# Patient Record
Sex: MALE | Race: WHITE | Employment: OTHER | URBAN - METROPOLITAN AREA
[De-identification: names, ages, dates, MRNs, and addresses within clinical notes are randomized per-mention and may not be internally consistent; named-entity substitution may affect disease eponyms.]

---

## 2017-01-04 RX ORDER — FENOFIBRATE 160 MG/1
160 TABLET ORAL DAILY
Qty: 90 TAB | Refills: 3 | Status: SHIPPED | OUTPATIENT
Start: 2017-01-04 | End: 2018-01-27 | Stop reason: SDUPTHER

## 2017-01-04 RX ORDER — PANTOPRAZOLE SODIUM 40 MG/1
TABLET, DELAYED RELEASE ORAL
Qty: 30 TAB | Refills: 3 | Status: SHIPPED | OUTPATIENT
Start: 2017-01-04 | End: 2017-03-12 | Stop reason: SDUPTHER

## 2017-02-14 NOTE — PERIOP NOTES
[facility]  Ambulatory Surgery Unit  Pre-operative Instructions    Surgery/Procedure Date  02/16/2017            Tentative Arrival Time 0895      1. On the day of your surgery/procedure, please report to the Ambulatory Surgery Unit Registration Desk and sign in at your designated time. The Ambulatory Surgery Unit is located in [facility] on the [facility] side of the [facility] across from the [address] [place] [address]. Please have all of your health insurance cards and a photo ID. 2. You must have someone with you to drive you home, as you should not drive a car for 24 hours following anesthesia. Please make arrangements for a responsible adult friend or family member to stay with you for at least the first 24 hours after your surgery. 3. Do not have anything to eat or drink (including water, gum, mints, coffee, juice) after midnight   02/15/2017  . This may not apply to medications prescribed by your physician. (Please note below the special instructions with medications to take the morning of surgery, if applicable.)    4. We recommend you do not drink any alcoholic beverages for 24 hours before and after your surgery. 5. Stop all Aspirin and non-steroidal anti-inflammatory drugs (i.e. Advil, Aleve) as directed by your surgeon's office. Stop all vitamins and herbal supplements seven days prior to your surgery. If you are currently taking Plavix, Coumadin, or other blood-thinning agents, contact your surgeon for instructions. 6. In an effort to help prevent surgical site infection, we ask that you shower with an anti-bacterial soap (i.e. Dial or Safeguard) for 3 days prior to and on the morning of surgery, using a fresh towel after each shower. Do not apply any lotions, powders or deodorants after the shower on the day of your procedure. If applicable, please do not shave the operative site for 48 hours prior to surgery. 7. Wear comfortable clothes.   Wear glasses instead of contacts. Do not bring any money or jewelry. Do not wear make-up, particularly mascara, the morning of your surgery. Do not wear nail polish, particularly if you are having foot /hand surgery. Wear your hair loose or down, no ponytails, buns, silas pins or clips. All body piercings must be removed. 8. You should understand that if you do not follow these instructions your surgery may be cancelled. If your physical condition changes (i.e. fever, cold or flu) please contact your surgeon as soon as possible. 9. It is important that you be on time. If a situation occurs where you may be late, or if you have any questions or problems, please call [phone].    10. Your surgery time may be subject to change. You will receive a phone call the day prior to surgery to confirm your arrival time. 11. Pediatric patients: please bring a change of clothes, diapers, bottle/sippy cup, pacifier, etc.      Special Instructions:    MEDICATIONS TO TAKE THE MORNING OF SURGERY WITH A SIP OF WATER: Protonix      I understand a pre-operative phone call will be made to verify my surgery time. In the event that I am not available, I give permission for a message to be left on my answering service and/or with another person? yes         ___________________      ___________________  _________  Pt verbalized understanding of preop instructions via telephone.   (Signature of Patient)          (Witness)                              (Date and Time)

## 2017-02-15 ENCOUNTER — ANESTHESIA EVENT (OUTPATIENT)
Dept: SURGERY | Age: 67
End: 2017-02-15
Payer: MEDICARE

## 2017-02-16 ENCOUNTER — HOSPITAL ENCOUNTER (OUTPATIENT)
Age: 67
Setting detail: OUTPATIENT SURGERY
Discharge: HOME | End: 2017-02-16
Attending: ORTHOPAEDIC SURGERY | Admitting: ORTHOPAEDIC SURGERY
Payer: MEDICARE

## 2017-02-16 ENCOUNTER — ANESTHESIA (OUTPATIENT)
Dept: SURGERY | Age: 67
End: 2017-02-16
Payer: MEDICARE

## 2017-02-16 VITALS
SYSTOLIC BLOOD PRESSURE: 142 MMHG | RESPIRATION RATE: 11 BRPM | HEIGHT: 70 IN | HEART RATE: 65 BPM | OXYGEN SATURATION: 97 % | BODY MASS INDEX: 29.06 KG/M2 | DIASTOLIC BLOOD PRESSURE: 87 MMHG | WEIGHT: 203 LBS | TEMPERATURE: 97.7 F

## 2017-02-16 PROBLEM — G56.02 CARPAL TUNNEL SYNDROME, LEFT: Status: ACTIVE | Noted: 2017-02-16

## 2017-02-16 PROCEDURE — 74011250636 HC RX REV CODE- 250/636

## 2017-02-16 PROCEDURE — 76210000046 HC AMBSU PH II REC FIRST 0.5 HR: Performed by: ORTHOPAEDIC SURGERY

## 2017-02-16 PROCEDURE — 76030000000 HC AMB SURG OR TIME 0.5 TO 1: Performed by: ORTHOPAEDIC SURGERY

## 2017-02-16 PROCEDURE — 77030006607 HC BLD CARPL SAFGD INLC -C: Performed by: ORTHOPAEDIC SURGERY

## 2017-02-16 PROCEDURE — 76210000040 HC AMBSU PH I REC FIRST 0.5 HR: Performed by: ORTHOPAEDIC SURGERY

## 2017-02-16 PROCEDURE — 74011250636 HC RX REV CODE- 250/636: Performed by: ANESTHESIOLOGY

## 2017-02-16 PROCEDURE — 77030020255 HC SOL INJ LR 1000ML BG: Performed by: ORTHOPAEDIC SURGERY

## 2017-02-16 PROCEDURE — 77030020753 HC CUF TRNQT 1BLA STRY -B: Performed by: ORTHOPAEDIC SURGERY

## 2017-02-16 PROCEDURE — 77030002916 HC SUT ETHLN J&J -A: Performed by: ORTHOPAEDIC SURGERY

## 2017-02-16 PROCEDURE — 76060000061 HC AMB SURG ANES 0.5 TO 1 HR: Performed by: ORTHOPAEDIC SURGERY

## 2017-02-16 PROCEDURE — 77030018836 HC SOL IRR NACL ICUM -A: Performed by: ORTHOPAEDIC SURGERY

## 2017-02-16 PROCEDURE — 74011000250 HC RX REV CODE- 250: Performed by: ORTHOPAEDIC SURGERY

## 2017-02-16 PROCEDURE — 74011250636 HC RX REV CODE- 250/636: Performed by: ORTHOPAEDIC SURGERY

## 2017-02-16 PROCEDURE — 77030010813: Performed by: ORTHOPAEDIC SURGERY

## 2017-02-16 RX ORDER — HYDROMORPHONE HYDROCHLORIDE 1 MG/ML
.2-.5 INJECTION, SOLUTION INTRAMUSCULAR; INTRAVENOUS; SUBCUTANEOUS ONCE
Status: DISCONTINUED | OUTPATIENT
Start: 2017-02-16 | End: 2017-02-16 | Stop reason: HOSPADM

## 2017-02-16 RX ORDER — OXYCODONE AND ACETAMINOPHEN 5; 325 MG/1; MG/1
1 TABLET ORAL ONCE
Status: DISCONTINUED | OUTPATIENT
Start: 2017-02-16 | End: 2017-02-16 | Stop reason: HOSPADM

## 2017-02-16 RX ORDER — DIPHENHYDRAMINE HYDROCHLORIDE 50 MG/ML
12.5 INJECTION, SOLUTION INTRAMUSCULAR; INTRAVENOUS AS NEEDED
Status: DISCONTINUED | OUTPATIENT
Start: 2017-02-16 | End: 2017-02-16 | Stop reason: HOSPADM

## 2017-02-16 RX ORDER — ONDANSETRON 2 MG/ML
4 INJECTION INTRAMUSCULAR; INTRAVENOUS AS NEEDED
Status: DISCONTINUED | OUTPATIENT
Start: 2017-02-16 | End: 2017-02-16 | Stop reason: HOSPADM

## 2017-02-16 RX ORDER — FENTANYL CITRATE 50 UG/ML
INJECTION, SOLUTION INTRAMUSCULAR; INTRAVENOUS AS NEEDED
Status: DISCONTINUED | OUTPATIENT
Start: 2017-02-16 | End: 2017-02-16 | Stop reason: HOSPADM

## 2017-02-16 RX ORDER — LIDOCAINE HYDROCHLORIDE AND EPINEPHRINE 20; 5 MG/ML; UG/ML
INJECTION, SOLUTION EPIDURAL; INFILTRATION; INTRACAUDAL; PERINEURAL AS NEEDED
Status: DISCONTINUED | OUTPATIENT
Start: 2017-02-16 | End: 2017-02-16 | Stop reason: HOSPADM

## 2017-02-16 RX ORDER — MORPHINE SULFATE 10 MG/ML
2 INJECTION, SOLUTION INTRAMUSCULAR; INTRAVENOUS
Status: DISCONTINUED | OUTPATIENT
Start: 2017-02-16 | End: 2017-02-16 | Stop reason: HOSPADM

## 2017-02-16 RX ORDER — MIDAZOLAM HYDROCHLORIDE 1 MG/ML
INJECTION, SOLUTION INTRAMUSCULAR; INTRAVENOUS AS NEEDED
Status: DISCONTINUED | OUTPATIENT
Start: 2017-02-16 | End: 2017-02-16 | Stop reason: HOSPADM

## 2017-02-16 RX ORDER — OXYCODONE AND ACETAMINOPHEN 5; 325 MG/1; MG/1
1 TABLET ORAL
Qty: 30 TAB | Refills: 0 | Status: SHIPPED | OUTPATIENT
Start: 2017-02-16 | End: 2017-12-26

## 2017-02-16 RX ORDER — LIDOCAINE HYDROCHLORIDE 10 MG/ML
0.1 INJECTION, SOLUTION EPIDURAL; INFILTRATION; INTRACAUDAL; PERINEURAL AS NEEDED
Status: DISCONTINUED | OUTPATIENT
Start: 2017-02-16 | End: 2017-02-16 | Stop reason: HOSPADM

## 2017-02-16 RX ORDER — SODIUM CHLORIDE 0.9 % (FLUSH) 0.9 %
5-10 SYRINGE (ML) INJECTION AS NEEDED
Status: DISCONTINUED | OUTPATIENT
Start: 2017-02-16 | End: 2017-02-16 | Stop reason: HOSPADM

## 2017-02-16 RX ORDER — SODIUM CHLORIDE, SODIUM LACTATE, POTASSIUM CHLORIDE, CALCIUM CHLORIDE 600; 310; 30; 20 MG/100ML; MG/100ML; MG/100ML; MG/100ML
25 INJECTION, SOLUTION INTRAVENOUS CONTINUOUS
Status: DISCONTINUED | OUTPATIENT
Start: 2017-02-16 | End: 2017-02-16 | Stop reason: HOSPADM

## 2017-02-16 RX ORDER — PROPOFOL 10 MG/ML
INJECTION, EMULSION INTRAVENOUS AS NEEDED
Status: DISCONTINUED | OUTPATIENT
Start: 2017-02-16 | End: 2017-02-16 | Stop reason: HOSPADM

## 2017-02-16 RX ORDER — PROPOFOL 10 MG/ML
INJECTION, EMULSION INTRAVENOUS
Status: DISCONTINUED | OUTPATIENT
Start: 2017-02-16 | End: 2017-02-16 | Stop reason: HOSPADM

## 2017-02-16 RX ORDER — SODIUM CHLORIDE 0.9 % (FLUSH) 0.9 %
5-10 SYRINGE (ML) INJECTION EVERY 8 HOURS
Status: DISCONTINUED | OUTPATIENT
Start: 2017-02-16 | End: 2017-02-16 | Stop reason: HOSPADM

## 2017-02-16 RX ORDER — ONDANSETRON 2 MG/ML
INJECTION INTRAMUSCULAR; INTRAVENOUS AS NEEDED
Status: DISCONTINUED | OUTPATIENT
Start: 2017-02-16 | End: 2017-02-16 | Stop reason: HOSPADM

## 2017-02-16 RX ORDER — LIDOCAINE HYDROCHLORIDE 20 MG/ML
INJECTION, SOLUTION EPIDURAL; INFILTRATION; INTRACAUDAL; PERINEURAL AS NEEDED
Status: DISCONTINUED | OUTPATIENT
Start: 2017-02-16 | End: 2017-02-16 | Stop reason: HOSPADM

## 2017-02-16 RX ORDER — GLYCOPYRROLATE 0.2 MG/ML
INJECTION INTRAMUSCULAR; INTRAVENOUS AS NEEDED
Status: DISCONTINUED | OUTPATIENT
Start: 2017-02-16 | End: 2017-02-16 | Stop reason: HOSPADM

## 2017-02-16 RX ORDER — FENTANYL CITRATE 50 UG/ML
25 INJECTION, SOLUTION INTRAMUSCULAR; INTRAVENOUS
Status: DISCONTINUED | OUTPATIENT
Start: 2017-02-16 | End: 2017-02-16 | Stop reason: HOSPADM

## 2017-02-16 RX ORDER — TRIAMCINOLONE ACETONIDE 40 MG/ML
INJECTION, SUSPENSION INTRA-ARTICULAR; INTRAMUSCULAR AS NEEDED
Status: DISCONTINUED | OUTPATIENT
Start: 2017-02-16 | End: 2017-02-16 | Stop reason: HOSPADM

## 2017-02-16 RX ADMIN — PROPOFOL 25 MCG/KG/MIN: 10 INJECTION, EMULSION INTRAVENOUS at 09:24

## 2017-02-16 RX ADMIN — MIDAZOLAM HYDROCHLORIDE 1 MG: 1 INJECTION, SOLUTION INTRAMUSCULAR; INTRAVENOUS at 09:24

## 2017-02-16 RX ADMIN — ONDANSETRON 4 MG: 2 INJECTION INTRAMUSCULAR; INTRAVENOUS at 09:30

## 2017-02-16 RX ADMIN — PROPOFOL 10 MG: 10 INJECTION, EMULSION INTRAVENOUS at 09:24

## 2017-02-16 RX ADMIN — LIDOCAINE HYDROCHLORIDE 40 MG: 20 INJECTION, SOLUTION EPIDURAL; INFILTRATION; INTRACAUDAL; PERINEURAL at 09:24

## 2017-02-16 RX ADMIN — SODIUM CHLORIDE, SODIUM LACTATE, POTASSIUM CHLORIDE, AND CALCIUM CHLORIDE 25 ML/HR: 600; 310; 30; 20 INJECTION, SOLUTION INTRAVENOUS at 07:38

## 2017-02-16 RX ADMIN — MIDAZOLAM HYDROCHLORIDE 1 MG: 1 INJECTION, SOLUTION INTRAMUSCULAR; INTRAVENOUS at 09:44

## 2017-02-16 RX ADMIN — MIDAZOLAM HYDROCHLORIDE 2 MG: 1 INJECTION, SOLUTION INTRAMUSCULAR; INTRAVENOUS at 09:22

## 2017-02-16 RX ADMIN — MIDAZOLAM HYDROCHLORIDE 1 MG: 1 INJECTION, SOLUTION INTRAMUSCULAR; INTRAVENOUS at 09:34

## 2017-02-16 RX ADMIN — GLYCOPYRROLATE 0.2 MG: 0.2 INJECTION INTRAMUSCULAR; INTRAVENOUS at 09:24

## 2017-02-16 RX ADMIN — FENTANYL CITRATE 50 MCG: 50 INJECTION, SOLUTION INTRAMUSCULAR; INTRAVENOUS at 09:22

## 2017-02-16 RX ADMIN — FENTANYL CITRATE 50 MCG: 50 INJECTION, SOLUTION INTRAMUSCULAR; INTRAVENOUS at 09:53

## 2017-02-16 NOTE — ANESTHESIA PREPROCEDURE EVALUATION
Anesthetic History   No history of anesthetic complications            Review of Systems / Medical History  Patient summary reviewed, nursing notes reviewed and pertinent labs reviewed    Pulmonary  Within defined limits                 Neuro/Psych   Within defined limits           Cardiovascular    Hypertension              Exercise tolerance: >4 METS     GI/Hepatic/Renal     GERD: well controlled           Endo/Other        Arthritis     Other Findings              Physical Exam    Airway  Mallampati: I  TM Distance: 4 - 6 cm  Neck ROM: normal range of motion   Mouth opening: Normal     Cardiovascular    Rhythm: regular  Rate: normal      Pertinent negatives: No murmur   Dental  No notable dental hx       Pulmonary  Breath sounds clear to auscultation               Abdominal  GI exam deferred       Other Findings            Anesthetic Plan    ASA: 2  Anesthesia type: MAC          Induction: Intravenous  Anesthetic plan and risks discussed with: Patient      Took beta blocker yesterday am

## 2017-02-16 NOTE — OP NOTES
PATIENT NAME:  Ana Cerna. SURGEON:  Golda Cranker., M.D.    DATE OF SURGERY:  2/16/2017    LOCATION: [place] [address]    PREOPERATIVE DIAGNOSIS: Right Carpal Tunnel Syndrome        POSTOPERATIVE DIAGNOSIS: Same    PROCEDURE: Right Carpal Tunnel Decompression           ANESTHESIA: Local 2% xylocaine with epinephrine and  and IV sedation     BLOOD LOSS: none    COMPLICATIONS: none    OPERATIVE INDICATIONS:  The patient is a 74 y.o. old man who is 5 months s/p right palmar fasciectomy and thumb CMC interposition arthroplasty. His recovery has been low and he has now has developed progressive right carpal tunnel syndrome, unresponsive to all conservative treatment. The patient has symptoms and signs of median nerve entrapment including median distribution paresthesias and numbness, nocturnal wakening, positive Tinel's sign, and positive wrist flexion test.He has also developed a subtle sensory deficit in the thumb and index fingers mostly, which I have told him may or may not resolve completely after surgery. Symptoms have failed to respond consistently to conservative treatment ie splinting, exercises, NSAIDS, and steroid injection, such that patient has elected to undergo carpal tunnel decompression with mini-incision. He understands the alternatives to surgery, the nature of this elective procedure, the usual recovery, possible variations in healing, and the potential for shortcomings and complications ( including but not exclusive to bleeding, infection, scar tenderness,  weakness, residual numbness, or thenar muscle weakness). DESCRIPTION  OF PROCEDURE: After satisfactory anesthesia had been attained, the right hand and forearm were prepped and draped in a sterile field. A timeout was taken and the operative site was confirmed. A 1.5 cm incision was made in the proximal palm in line with the radial side of the ring finger.   Dissection was carried through the subcutaneous tissue and a small segment of palmar fascia was excised, protecting the ulnar nerve and artery carefully. After a Gramajo retractor was carefully placed, the distal end of the transverse carpal ligament was visualized and released from its distal edge proximally. The first 1.5 cm was released under direct vision along its ulnar border until the flexor tendons were visualized within the carpal tunnel ulnar to the median nerve itself. This allowed for injection of a few cc of 2% xylocaine/epinephrine and the subsequent safe passage of the Integra guide beneath the TCL until it was palpable proximal to the wrist flexion crease and just ulnar to the palmaris longus; then the Integra carpal tunnel blade was passed along the groove in the guide, releasing the ligament to a point 1 cm proximal to the wrist flexion crease. A complete decompression was confirmed by direct visualization of the decompressed median nerve. No other synovial pathology was noted. Hemostasis was confirmed and the wound was closed with a few nylon sutures. As planned the middle and ring fingers were injected with 10 mg kenalog in each because of his history of intermittent finger stiffness. A sterile dressing and volar splint were applied leaving the fingers free for range of motion. The patient tolerated the procedure well and was discharged to the recovery area uneventfully.

## 2017-02-16 NOTE — PERIOP NOTES
Cesar Mays.  1950  [id number]    Situation:  Verbal report given from: NATACHA Nicole CRNA, RN  Procedure: Procedure(s):  RIGHT CARPAL TUNNEL DECOMPRESSION    Background:    Preoperative diagnosis: CARPAL TUNNEL SYNDROME RIGHT WRIST    Postoperative diagnosis: CARPAL TUNNEL SYNDROME RIGHT WRIST    :  Dr. Syed Suh    Assistant(s): Circ-1: Angelica Wilks RN  Circ-2: Bryan Hancock Tech-1: Ernestina Wallace    Specimens: * No specimens in log *    Assessment:  Intra-procedure medications         Anesthesia gave intra-procedure sedation and medications, see anesthesia flow sheet     Intravenous fluids: LR@ KVO     Vital signs stable       Recommendation:    Permission to share finding with family or friend yes

## 2017-02-16 NOTE — DISCHARGE INSTRUCTIONS
[facility]  Post-operative instructions    I would like to see you in our [place] office on : Hand Therapy appointment 2/21/2017 at 8:30 am.    [facility]   [address] [place] [address]. [address]  [phone]      You have just had surgery by Dr. Poncho Moreno. Please follow these instructions for a safe and speedy recovery. 1.  Surgical bandage:  Leave the bandage in place until we remove it. Please keep it clean and dry. To shower or bathe, apply a plastic bag or GLAD Press'n Seal plastic wrap around the bandage or simply sponge bathe. 2.  Elevation:  Hand swelling is best prevented by keeping your hand elevated above the level of your heart at all times, night and day. The opposite, dangling your hand below your waist, will causes additional pain, swelling, and later stiffness. You can elevate the hand in a sling or by propping it on a pillow at night. Occasionally, we will provide you with a custom-made foam block for elevating the arm. Ice compresses may help but do not replace elevation. Frequently, extreme pain is caused by a a tight bandage, which should be lossened. If pain is severe and progressive, call us at [phone] during the day (ask for immediate connection to Dr. Poncho Moreno team) or during the night (ask for the on-call physician). 3.  Medication:  You will be provided with and appropriate pain medication (over-the-counter or prescription). Please fill this at a pharmacy promptly so you will have it available when all local anesthetic wears off. Take this to relieve pain as directed on the bottle. Please refrain from driving, drinking alcohol, and making important personal decisions while taking the medication. Please call us if you need something stronger.   Medication changes or refills must be made before 5 pm or through your pharmacy.    ----------------------------------------------------------------------------------------------------------------------    I want to thank you for choosing us for your hand care needs. My staff and I committed to providing all our customers with the highest quality hand surgery and subsequent hand therapy care a possible. We want your recovery to be comfortable. If you have clinical non-emergent questions about your surgery or other hand conditions, please call [phone] and ask for my team.  Your call will be returned with 24 hours. For more information regarding your recent hand surgery and recovery you can visit my website. We look forward to seeing you again. [facility]  [email or web address]      DO NOT TAKE TYLENOL/ACETAMINOPHEN WITH PERCOCET, LORTAB, [address] TAKE NARCOTIC PAIN MEDICATIONS WITH FOOD   Narcotics tend to be constipating, we suggest taking a stool softener such as Colace or Miralax (follow package instructions). DO NOT DRIVE WHILE TAKING NARCOTIC PAIN MEDICATIONS. DO NOT TAKE SLEEPING MEDICATIONS OR ANTIANXIETY MEDICATIONS WHILE TAKING NARCOTIC PAIN MEDICATIONS,  ESPECIALLY THE NIGHT OF ANESTHESIA. CPAP PATIENTS BE SURE TO WEAR MACHINE WHENEVER NAPPING OR SLEEPING. DISCHARGE SUMMARY from Nurse    The following personal items collected during your admission are returned to you:   Dental Appliance: Dental Appliances: None  Vision: Visual Aid: Glasses  Hearing Aid:    Jewelry:    Clothing: Clothing:  (clothing under stretcher, glasses to family member)  Other Valuables:    Valuables sent to safe:        PATIENT INSTRUCTIONS:    After General Anesthesia or Intravenous Sedation, for 24 hours or while taking prescription Narcotics:        Someone should be with you for the next 24 hours. For your own safety, a responsible adult must drive you home. · Limit your activities  · Recommended activity: Rest today, up with assistance today. Do not climb stairs or shower unattended for the next 24 hours.   · Please start with a soft bland diet and advance as tolerated (no nausea) to regular diet. · If you have a sore throat you should try the following: fluids, warm salt water gargles, or throat lozenges. If it does not improve after several days please follow up with your primary physician. · Do not drive and operate hazardous machinery  · Do not make important personal or business decisions  · Do  not drink alcoholic beverages  · If you have not urinated within 8 hours after discharge, please contact your surgeon on call. Report the following to your surgeon:  · Excessive pain, swelling, redness or odor of or around the surgical area  · Temperature over 100.5  · Nausea and vomiting lasting longer than 4 hours or if unable to take medications  · Any signs of decreased circulation or nerve impairment to extremity: change in color, persistent  numbness, tingling, coldness or increase pain      · You will receive a Post Operative Call from one of the Recovery Room Nurses on the day after your surgery to check on you. It is very important for us to know how you are recovering after your surgery. If you have an issue or need to speak with someone, please call your surgeon, do not wait for the post operative call. · You may receive an e-mail or letter in the mail from [facility] regarding your experience with us in the Ambulatory Surgery Unit. Your feedback is valuable to us and we appreciate your participation in the survey. · If the above instructions are not adequate, please contact Germaine Butts RN, [facility] anesthesia Nurse Manager or our Anesthesiologist, at [phone]. If you are having problems after your surgery, call the physician at his office number. · We wish you a speedy recovery ? What to do at Home:      *  Please give a list of your current medications to your Primary Care Provider. *  Please update this list whenever your medications are discontinued, doses are      changed, or new medications (including over-the-counter products) are added.     *  Please carry medication information at all times in case of emergency situations. These are general instructions for a healthy lifestyle:    No smoking/ No tobacco products/ Avoid exposure to second hand smoke    Surgeon General's Warning:  Quitting smoking now greatly reduces serious risk to your health. Obesity, smoking, and sedentary lifestyle greatly increases your risk for illness    A healthy diet, regular physical exercise & weight monitoring are important for maintaining a healthy lifestyle    You may be retaining fluid if you have a history of heart failure or if you experience any of the following symptoms:  Weight gain of 3 pounds or more overnight or 5 pounds in a week, increased swelling in our hands or feet or shortness of breath while lying flat in bed. Please call your doctor as soon as you notice any of these symptoms; do not wait until your next office visit. Recognize signs and symptoms of STROKE:    F-face looks uneven    A-arms unable to move or move even    S-speech slurred or non-existent    T-time-call 911 as soon as signs and symptoms begin-DO NOT go       Back to bed or wait to see if you get better-TIME IS BRAIN. If you have not received your influenza and/or pneumococcal vaccine, please follow up with your primary care physician. The discharge information has been reviewed with the patient and spouse. The patient and spouse verbalized understanding.

## 2017-02-16 NOTE — IP AVS SNAPSHOT
[address] [phone] [phone] Patient: Demetria Castro. MRN: [id number] PAOLO:3/40/7834 You are allergic to the following Allergen Reactions Shellfish Derived Anaphylaxis Ace Inhibitors Cough Pcn (Penicillins) Rash Recent Documentation Height Weight BMI Smoking Status 1.778 m 92.1 kg 29.13 kg/m2 Former Smoker Emergency Contacts Name Discharge Info Relation Home Work Mobile Gerber Melgar DISCHARGE CAREGIVER [3] Spouse [3] [phone] About your hospitalization You were admitted on:  February 16, 2017 You last received care in the:  [facility] PACU You were discharged on:  February 16, 2017 Unit phone number:  [phone] Why you were hospitalized Your primary diagnosis was:  Carpal Tunnel Syndrome, Left Providers Seen During Your Hospitalizations Provider Role Specialty Primary office phone Romeo Alejandre MD Attending Provider Orthopedic Surgery [phone] Your Primary Care Physician (PCP) Primary Care Physician Office Phone Office Fax Eddi Keen [phone] Follow-up Information Follow up With Details Comments Contact Info Milton Natarajan MD   [address] [place] [address],[address] [facility] [phone]. [phone] Current Discharge Medication List  
  
START taking these medications Dose & Instructions Dispensing Information Comments Morning Noon Evening Bedtime  
 oxyCODONE-acetaminophen 5-325 mg per tablet Commonly known as:  PERCOCET Your next dose is: Today, Tomorrow Other:  _________ Dose:  1 Tab Take 1 Tab by mouth every four (4) hours as needed for Pain. Max Daily Amount: 6 Tabs. Quantity:  30 Tab Refills:  0 CONTINUE these medications which have CHANGED Dose & Instructions Dispensing Information Comments Morning Noon Evening Bedtime  
 tamsulosin 0.4 mg capsule Commonly known as:  FLOMAX What changed:  when to take this Your next dose is: Today, Tomorrow Other:  _________ Dose:  0.4 mg Take 1 Cap by mouth daily. Quantity:  90 Cap Refills:  3 CONTINUE these medications which have NOT CHANGED Dose & Instructions Dispensing Information Comments Morning Noon Evening Bedtime  
 atenolol-chlorthalidone 50-25 mg per tablet Commonly known as:  Orpha Hertel Your next dose is: Today, Tomorrow Other:  _________ Take 1 tablet by mouth  daily Quantity:  90 Tab Refills:  1  
     
   
   
   
  
 calcium carbonate 200 mg calcium (500 mg) Chew Commonly known as:  TUMS Your next dose is: Today, Tomorrow Other:  _________ Dose:  2 Tab Take 2 Tabs by mouth four (4) times daily as needed for Other (perioral tingling). Quantity:  30 Tab Refills:  0  
     
   
   
   
  
 fenofibrate 160 mg tablet Commonly known as:  LOFIBRA Your next dose is: Today, Tomorrow Other:  _________ Dose:  160 mg Take 1 Tab by mouth daily. Quantity:  90 Tab Refills:  3 In place of lofibra  
    
   
   
   
  
 fexofenadine 180 mg tablet Commonly known as:  Estelita Leech Your next dose is: Today, Tomorrow Other:  _________ Dose:  180 mg Take 180 mg by mouth daily as needed. Refills:  0  
     
   
   
   
  
 fluticasone 27.5 mcg/actuation nasal spray Commonly known as:  VERAMYST Your next dose is: Today, Tomorrow Other:  _________ Dose:  2 Spray 2 Sprays by Both Nostrils route daily. Quantity:  3 Bottle Refills:  3  
     
   
   
   
  
 pantoprazole 40 mg tablet Commonly known as:  PROTONIX Your next dose is: Today, Tomorrow Other:  _________ Take 1 tablet by mouth  daily Quantity:  30 Tab Refills:  3  
     
   
   
   
  
 potassium chloride 10 mEq tablet Commonly known as:  K-DUR, KLOR-CON Your next dose is: Today, Tomorrow Other:  _________ Take 2 tablets by mouth 3  times daily Quantity:  540 Tab Refills:  2 VITAMIN D3 1,000 unit tablet Generic drug:  cholecalciferol Your next dose is: Today, Tomorrow Other:  _________ Dose:  2000 Units Take 2,000 Units by mouth daily. Refills:  0 Where to Get Your Medications Information on where to get these meds will be given to you by the nurse or doctor. ! Ask your nurse or doctor about these medications  
  oxyCODONE-acetaminophen 5-325 mg per tablet Discharge Instructions [facility] Post-operative instructions I would like to see you in our [place] office on : Hand Therapy appointment 2/21/2017 at 8:30 am. 
 
[facility]  
[address] [place] [address]. [address] 
[phone] You have just had surgery by Dr. Tangela Raza. Please follow these instructions for a safe and speedy recovery. 1.  Surgical bandage:  Leave the bandage in place until we remove it. Please keep it clean and dry. To shower or bathe, apply a plastic bag or GLAD Press'n Seal plastic wrap around the bandage or simply sponge bathe. 2.  Elevation:  Hand swelling is best prevented by keeping your hand elevated above the level of your heart at all times, night and day. The opposite, dangling your hand below your waist, will causes additional pain, swelling, and later stiffness. You can elevate the hand in a sling or by propping it on a pillow at night. Occasionally, we will provide you with a custom-made foam block for elevating the arm. Ice compresses may help but do not replace elevation.   Frequently, extreme pain is caused by a a tight bandage, which should be lossened. If pain is severe and progressive, call us at [phone] during the day (ask for immediate connection to Dr. Yisroel Brittle team) or during the night (ask for the on-call physician). 3.  Medication:  You will be provided with and appropriate pain medication (over-the-counter or prescription). Please fill this at a pharmacy promptly so you will have it available when all local anesthetic wears off. Take this to relieve pain as directed on the bottle. Please refrain from driving, drinking alcohol, and making important personal decisions while taking the medication. Please call us if you need something stronger. Medication changes or refills must be made before 5 pm or through your pharmacy. 
 
---------------------------------------------------------------------------------------------------------------------- I want to thank you for choosing us for your hand care needs. My staff and I committed to providing all our customers with the highest quality hand surgery and subsequent hand therapy care a possible. We want your recovery to be comfortable. If you have clinical non-emergent questions about your surgery or other hand conditions, please call [phone] and ask for my team.  Your call will be returned with 24 hours. For more information regarding your recent hand surgery and recovery you can visit my website. We look forward to seeing you again. Have [employer] [facility] [email or web address] 
 
 
DO NOT TAKE TYLENOL/ACETAMINOPHEN WITH PERCOCET, [facility], [address] TAKE NARCOTIC PAIN MEDICATIONS WITH FOOD Narcotics tend to be constipating, we suggest taking a stool softener such as Colace or Miralax (follow package instructions). DO NOT DRIVE WHILE TAKING NARCOTIC PAIN MEDICATIONS. DO NOT TAKE SLEEPING MEDICATIONS OR ANTIANXIETY MEDICATIONS WHILE TAKING NARCOTIC PAIN MEDICATIONS,  ESPECIALLY THE NIGHT OF ANESTHESIA. CPAP PATIENTS BE SURE TO WEAR MACHINE WHENEVER NAPPING OR SLEEPING. DISCHARGE SUMMARY from Nurse The following personal items collected during your admission are returned to you:  
Dental Appliance: Dental Appliances: None Vision: Visual Aid: Glasses Hearing Aid:   
Jewelry:   
Clothing: Clothing:  (clothing under stretcher, glasses to family member) Other Valuables:   
Valuables sent to safe:   
 
 
PATIENT INSTRUCTIONS: 
 
 
F-face looks uneven A-arms unable to move or move even S-speech slurred or non-existent T-time-call 911 as soon as signs and symptoms begin-DO NOT go Back to bed or wait to see if you get better-TIME IS BRAIN. If you have not received your influenza and/or pneumococcal vaccine, please follow up with your primary care physician. The discharge information has been reviewed with the patient and spouse. The patient and spouse verbalized understanding. Discharge Instructions Attachments/References OXYCODONE/ACETAMINOPHEN (BY MOUTH) (ENGLISH) Discharge Orders None Introducing \[facility]\"" & HEALTH SERVICES! Dear Christina Nichols: 
Thank you for requesting a [employer] account. Our records indicate that you already have an active [employer] account. You can access your account anytime at [email or web address] Did you know that you can access your hospital and ER discharge instructions at any time in [employer]? You can also review all of your test results from your hospital stay or ER visit. Additional Information If you have questions, please visit the Frequently Asked Questions section of the [employer] website at [email or web address] Remember, [employer] is NOT to be used for urgent needs. For medical emergencies, dial 911. Now available from your iPhone and Android! General Information Please provide this summary of care documentation to your next provider. Patient Signature:  ____________________________________________________________ Date:  ____________________________________________________________  
  
Mecca Chua Provider Signature:  ____________________________________________________________ Date:  ____________________________________________________________ More Information Oxycodone/Acetaminophen (By mouth) Acetaminophen (y-znoo-k-MIN-oh-fen), Oxycodone Hydrochloride (xy-c-AWK-done natasha-angel-KLOR-gayla) Treats moderate to moderately severe pain.  This medicine is a narcotic pain reliever. Brand Name(s):Endocet, Percocet, Primlev, Roxicet, Xartemis XR There may be other brand names for this medicine. When This Medicine Should Not Be Used: This medicine is not right for everyone. Do not use it if you had an allergic reaction to acetaminophen or oxycodone, or if you have serious breathing problems (such as severe asthma, hypercarbia, respiratory depression) or paralytic ileus. How to Use This Medicine:  
Capsule, Liquid, Tablet, Long Acting Tablet · Your doctor will tell you how much medicine to use. Do not use more than directed. · Measure the oral liquid medicine with a marked measuring spoon, oral syringe, or medicine cup. · Swallow the extended-release tablet whole. Do not crush, break, or chew it. Do not lick or wet the tablet before placing it in your mouth. Do not give this medicine through a feeding tube. · This medicine should come with a Medication Guide. Ask your pharmacist for a copy if you do not have one. · Store the medicine in a closed container at room temperature, away from heat, moisture, and direct light. Ask your pharmacist about the best way to dispose of medicine you do not use. Drugs and Foods to Avoid: Ask your doctor or pharmacist before using any other medicine, including over-the-counter medicines, vitamins, and herbal products. · Do not use Xartemis XR if you have used an MAO inhibitor in the past 14 days. · Some medicines and foods can affect how this medicine works. Tell your doctor if you are taking any of the following: ¨ Butorphanol, lamotrigine, nalbuphine, naltrexone, pentazocine, propranolol, zidovudine ¨ Birth control pills, a diuretic (water pill), muscle relaxants, a phenothiazine medicine (chlorpromazine, perphenazine, promethazine, prochlorperazine, thioridazine) · Do not drink alcohol while you are using this medicine. Acetaminophen can damage your liver, and alcohol can increase this risk.  Do not take acetaminophen without asking your doctor if you have 3 or more drinks of alcohol every day. · Tell your doctor if you are using any medicine that makes you sleepy, such as allergy medicine or other narcotic pain medicine. Warnings While Using This Medicine: · Tell your doctor if you are pregnant, or if you have kidney disease, liver disease, heart disease, low blood pressure, breathing problems or lung disease, especially if you have asthma, COPD, cor pulmonale, or kyphoscoliosis (curved spine that causes breathing trouble). Tell your doctor if you have urination problems, an underactive thyroid, [place] disease, pancreas or prostate problems, or a stomach disorder. Tell your doctor if you have a history of head injury or brain damage, seizures, or alcohol or drug abuse. Tell your doctor if you are allergic to codeine. · Do not breastfeed while you are using this medicine, unless otherwise directed by your doctor. · This medicine may cause the following problems: 
¨ Liver problems ¨ Serious skin reactions ¨ Low blood pressure · If you take this medicine for more than a few weeks, do not stop taking it suddenly. Your doctor will need to slowly decrease your dose before you stop it completely. · Get emergency help immediately if you think you may have taken too much of this medicine. Signs of an overdose include shallow breathing, fainting, confusion, nausea, vomiting, pinpoint pupils of the eyes, or pale or blue lips, fingernails, or skin. · This medicine may make you dizzy or drowsy. Do not drive or do anything that could be dangerous until you know how this medicine affects you. · This medicine contains acetaminophen. Read the labels of all other medicines you are using to see if they also contain acetaminophen, or ask your doctor or pharmacist. Batsheva Feliciano not use more than 4 grams (4,000 milligrams) total of acetaminophen in one day. · This medicine can be habit-forming.  Do not use more than your prescribed dose. Call your doctor if you think your medicine is not working. · This medicine may cause constipation, especially with long-term use. Ask your doctor if you should use a laxative to prevent and treat constipation. · Keep all medicine out of the reach of children. Never share your medicine with anyone. Possible Side Effects While Using This Medicine:  
Call your doctor right away if you notice any of these side effects: · Allergic reaction: Itching or hives, swelling in your face or hands, swelling or tingling in your mouth or throat, chest tightness, trouble breathing · Dark urine or pale stools, nausea, vomiting, loss of appetite, stomach pain, yellow skin or eyes · Extreme weakness, shallow breathing, uneven heartbeat, seizures, sweating, or cold or clammy skin · Lightheadedness, dizziness, or fainting · Trouble breathing If you notice these less serious side effects, talk with your doctor: · Constipation · Headache · Mild lightheadedness, sleepiness, or drowsiness · Mild nausea or vomiting If you notice other side effects that you think are caused by this medicine, tell your doctor. Call your doctor for medical advice about side effects. You may report side effects to FDA at 8-142-FDA-6850 © 2016 [address] is for End User's use only and may not be sold, redistributed or otherwise used for commercial purposes. The above information is an  only. It is not intended as medical advice for individual conditions or treatments. Talk to your doctor, nurse or pharmacist before following any medical regimen to see if it is safe and effective for you.

## 2017-02-17 NOTE — ANESTHESIA POSTPROCEDURE EVALUATION
Post-Anesthesia Evaluation and Assessment    Patient: Whit Cordoba MRN: [id number]  SSN: [id number]    YOB: 1950  Age: 74 y.o. Sex: male       Cardiovascular Function/Vital Signs  Visit Vitals    /87 (BP 1 Location: Left arm, BP Patient Position: At rest)    Pulse 65    Temp 36.5 °C (97.7 °F)    Resp 11    Ht 5' 10\" (1.778 m)    Wt 92.1 kg (203 lb)    SpO2 97%    BMI 29.13 kg/m2       Patient is status post MAC anesthesia for Procedure(s):  RIGHT CARPAL TUNNEL DECOMPRESSION. Nausea/Vomiting: None    Postoperative hydration reviewed and adequate. Pain:  Pain Scale 1: Numeric (0 - 10) (02/16/17 1016)  Pain Intensity 1: 0 (02/16/17 1016)   Managed    Neurological Status:   Neuro (WDL): Within Defined Limits (02/16/17 1016)   At baseline    Mental Status and Level of Consciousness: Arousable    Pulmonary Status:   O2 Device: Room air (02/16/17 1016)   Adequate oxygenation and airway patent    Complications related to anesthesia: None    Post-anesthesia assessment completed.  No concerns    Signed By: Aure Elias MD     February 17, 2017

## 2017-06-09 ENCOUNTER — TELEPHONE (OUTPATIENT)
Dept: INTERNAL MEDICINE CLINIC | Age: 67
End: 2017-06-09

## 2017-06-09 NOTE — TELEPHONE ENCOUNTER
Santana Barnes with [employer] were wondering what the dosage form should be for the Potassium. Best contact number is [phone] reference number [id number].        Message received & copied from [facility] after closing on 6/8/17

## 2017-06-09 NOTE — TELEPHONE ENCOUNTER
Spoke with Sabi Waldrop at [facility]. Confirmed directions and form for patient's potassium rx.

## 2017-07-21 RX ORDER — PANTOPRAZOLE SODIUM 40 MG/1
TABLET, DELAYED RELEASE ORAL
Qty: 90 TAB | Refills: 0 | Status: SHIPPED | OUTPATIENT
Start: 2017-07-21 | End: 2017-10-01 | Stop reason: SDUPTHER

## 2017-07-21 RX ORDER — TAMSULOSIN HYDROCHLORIDE 0.4 MG/1
CAPSULE ORAL
Qty: 90 CAP | Refills: 0 | Status: SHIPPED | OUTPATIENT
Start: 2017-07-21 | End: 2017-10-01 | Stop reason: SDUPTHER

## 2017-07-21 RX ORDER — POTASSIUM CHLORIDE 750 MG/1
TABLET, EXTENDED RELEASE ORAL
Qty: 540 TAB | Refills: 0 | Status: SHIPPED | OUTPATIENT
Start: 2017-07-21 | End: 2017-10-01 | Stop reason: SDUPTHER

## 2017-08-11 ENCOUNTER — HOSPITAL ENCOUNTER (OUTPATIENT)
Dept: MRI IMAGING | Age: 67
Discharge: HOME | End: 2017-08-11
Attending: PHYSICAL MEDICINE & REHABILITATION

## 2017-08-11 DIAGNOSIS — M54.2 CERVICALGIA: ICD-10-CM

## 2017-08-11 DIAGNOSIS — M50.30 DDD (DEGENERATIVE DISC DISEASE), CERVICAL: ICD-10-CM

## 2017-08-16 ENCOUNTER — HOSPITAL ENCOUNTER (OUTPATIENT)
Dept: MRI IMAGING | Age: 67
Discharge: HOME | End: 2017-08-16
Attending: PHYSICAL MEDICINE & REHABILITATION
Payer: MEDICARE

## 2017-08-16 PROCEDURE — 72141 MRI NECK SPINE W/O DYE: CPT

## 2017-09-20 ENCOUNTER — OFFICE VISIT (OUTPATIENT)
Dept: INTERNAL MEDICINE CLINIC | Age: 67
End: 2017-09-20

## 2017-09-20 ENCOUNTER — HOSPITAL ENCOUNTER (OUTPATIENT)
Dept: LAB | Age: 67
Discharge: HOME | End: 2017-09-20
Payer: MEDICARE

## 2017-09-20 VITALS
RESPIRATION RATE: 18 BRPM | DIASTOLIC BLOOD PRESSURE: 74 MMHG | HEIGHT: 70 IN | TEMPERATURE: 97.7 F | WEIGHT: 211.6 LBS | SYSTOLIC BLOOD PRESSURE: 127 MMHG | BODY MASS INDEX: 30.29 KG/M2 | HEART RATE: 56 BPM | OXYGEN SATURATION: 98 %

## 2017-09-20 DIAGNOSIS — M79.641 PAIN OF RIGHT HAND: ICD-10-CM

## 2017-09-20 DIAGNOSIS — I10 ESSENTIAL HYPERTENSION: Primary | ICD-10-CM

## 2017-09-20 DIAGNOSIS — Z23 ENCOUNTER FOR IMMUNIZATION: ICD-10-CM

## 2017-09-20 DIAGNOSIS — E78.2 MIXED HYPERLIPIDEMIA: ICD-10-CM

## 2017-09-20 DIAGNOSIS — Z11.59 NEED FOR HEPATITIS C SCREENING TEST: ICD-10-CM

## 2017-09-20 DIAGNOSIS — R73.09 ELEVATED GLUCOSE: ICD-10-CM

## 2017-09-20 DIAGNOSIS — Z00.00 MEDICARE ANNUAL WELLNESS VISIT, SUBSEQUENT: ICD-10-CM

## 2017-09-20 LAB — HBA1C MFR BLD HPLC: 5.7 % (ref 4.8–5.6)

## 2017-09-20 PROCEDURE — 36415 COLL VENOUS BLD VENIPUNCTURE: CPT

## 2017-09-20 PROCEDURE — 86803 HEPATITIS C AB TEST: CPT

## 2017-09-20 PROCEDURE — 80061 LIPID PANEL: CPT

## 2017-09-20 PROCEDURE — 85651 RBC SED RATE NONAUTOMATED: CPT

## 2017-09-20 PROCEDURE — 85027 COMPLETE CBC AUTOMATED: CPT

## 2017-09-20 PROCEDURE — 86431 RHEUMATOID FACTOR QUANT: CPT

## 2017-09-20 PROCEDURE — 86038 ANTINUCLEAR ANTIBODIES: CPT

## 2017-09-20 PROCEDURE — 83735 ASSAY OF MAGNESIUM: CPT

## 2017-09-20 PROCEDURE — 80053 COMPREHEN METABOLIC PANEL: CPT

## 2017-09-20 RX ORDER — DICLOFENAC SODIUM 10 MG/G
GEL TOPICAL
COMMUNITY
Start: 2017-09-19 | End: 2017-12-26

## 2017-09-20 RX ORDER — FLUOROURACIL 50 MG/G
CREAM TOPICAL
COMMUNITY
Start: 2017-08-30 | End: 2017-12-26

## 2017-09-20 NOTE — PROGRESS NOTES
Medicare Wellness Visit      Milan Taylor is a 69 y.o. male and presents for Annual Medicare Wellness Visit. Assessment of cognitive impairment: Alert and oriented x 3. Patient appears well groomed, appropriately dressed with pleasant and cooperative mood. Depression Screen:   PHQ over the last two weeks 9/20/2017   PHQ Not Done -   Little interest or pleasure in doing things Not at all   Feeling down, depressed or hopeless Not at all   Total Score PHQ 2 0       Fall Risk Assessment:    Fall Risk Assessment, last 12 mths 9/20/2017   Able to walk? Yes   Fall in past 12 months? No       Abuse Screen:   Abuse Screening Questionnaire 9/20/2017   Do you ever feel afraid of your partner? N   Are you in a relationship with someone who physically or mentally threatens you? N   Is it safe for you to go home? Y       Activities of Daily Living:  Self-care.    Requires assistance with: no ADLs  Patient handle his/her own medications  yes Use of pill box  yes  Activities of Daily Living:   ADL Assessment 9/20/2017   Feeding yourself No Help Needed   Getting from bed to chair No Help Needed   Getting dressed No Help Needed   Bathing or showering No Help Needed   Walk across the room (includes cane/walker) No Help Needed   Using the telphone No Help Needed   Taking your medications No Help Needed   Preparing meals No Help Needed   Managing money (expenses/bills) No Help Needed   Moderately strenuous housework (laundry) No Help Needed   Shopping for personal items (toiletries/medicines) No Help Needed   Shopping for groceries No Help Needed   Driving No Help Needed   Climbing a flight of stairs No Help Needed   Getting to places beyond walking distances No Help Needed       Health Maintenance:  Daily Aspirin: no  Bone Density: N/A  Patient does not have diagnosis of osteoporosis  Glaucoma Screening:  Patient reports seeing Dr. Joel Peguero and has appointment end of Sept. 2017  Immunizations:    Tetanus: patient declines at this time. Influenza: patient will receive this vaccine today. Order pended. Shingles: not up to date - patient declines at this time, but may consider for future. PPSV-23: up to date. Prevnar-13: up to date. Cancer screening:    Colon: patient reports this was done about 8 years ago with Dr. Susie Harding and has upcoming appointment with him Nov. 2107. He will check for repeat date at that appointment and schedule as needed. Prostate:  Patient is followed by Dr. Triny Kraft for this    Alcohol Risk Screen:   On any occasion during the past 3 months, have you had more than 3 drinks(female) or 4 drinks (male) containing alcohol in one? No  Do you average more than 7 drinks (female) or 14 drinks (male) per week? No  Type and amount:patient reports drinking occasional beer    Hearing Loss:  Hearing is good at this interview. Patient reports having mild hearing loss and has seen audiologist 1 year ago. He does not wear hearing aids. Encouraged patient to follow up if hearing worsens. Patient nods head in agreement. Vision Loss:   Wears glasses, contact lenses, or have any other visual impairment  Yes pt. Wears glasses and keeps regularly scheduled appointments with Dr. Nita He    Adult Nutrition Screen:  No risk factors noted. Patient reports good appetite and stable weight    Advance Care Planning:   End of Life Planning: has an advanced directive - a copy has been provided      Medications/Allergies: Reviewed with patient  Prior to Admission medications    Medication Sig Start Date End Date Taking?  Authorizing Provider   potassium chloride (KLOR-CON) 10 mEq tablet Take 2 tablets by mouth 3  times a day 7/21/17  Yes Cosme Morejon MD   tamsulosin [facility]) 0.4 mg capsule Take 1 capsule by mouth  daily 7/21/17  Yes Cosme Morejon MD   pantoprazole (PROTONIX) 40 mg tablet Take 1 tablet by mouth  daily 7/21/17  Yes Cosme Morejon MD   atenolol-chlorthalidone (TENORETIC) 50-25 mg per tablet Take 1 tablet by mouth  daily 5/7/17  Yes Clemencia Mendosa MD   fluticasone (VERAMYST) 27.5 mcg/actuation nasal spray 2 Sprays by Both Nostrils route daily. 1/4/17  Yes Clemencia Mendosa MD   fenofibrate (LOFIBRA) 160 mg tablet Take 1 Tab by mouth daily. 1/4/17  Yes Clemencia Mendosa MD   fexofenadine (ALLEGRA) 180 mg tablet Take 180 mg by mouth daily as needed. Yes Phys Other, MD   cholecalciferol (VITAMIN D3) 1,000 unit tablet Take 2,000 Units by mouth daily. Yes Phys Other, MD   diclofenac (VOLTAREN) 1 % gel Apply two grams by topical route four times daily to the affected area (s). 9/19/17   Historical Provider   fluorouracil (EFUDEX) 5 % chemo cream  8/30/17   Historical Provider   oxyCODONE-acetaminophen (PERCOCET) 5-325 mg per tablet Take 1 Tab by mouth every four (4) hours as needed for Pain. Max Daily Amount: 6 Tabs. 2/16/17   Duane Money, MD   calcium carbonate (TUMS) 200 mg calcium (500 mg) chew Take 2 Tabs by mouth four (4) times daily as needed for Other (perioral tingling). 9/9/15   Nicolette Ibrahim MD       Allergies   Allergen Reactions    Shellfish Derived Anaphylaxis    Ace Inhibitors Cough    Pcn [Penicillins] Rash       Objective:  Visit Vitals    /74 (BP 1 Location: Left arm, BP Patient Position: Sitting)    Pulse (!) 56    Temp 97.7 °F (36.5 °C) (Oral)    Resp 18    Ht 5' 10\" (1.778 m)    Wt 211 lb 9.6 oz (96 kg)    SpO2 98%    BMI 30.36 kg/m2    Body mass index is 30.36 kg/(m^2). Problem List: Reviewed with patient and discussed risk factors.     Patient Active Problem List   Diagnosis Code    Gastroesophageal reflux disease without esophagitis K21.9    Essential hypertension I10    Benign prostatic hyperplasia N40.0    Mixed hyperlipidemia E78.2    Primary osteoarthritis of first carpometacarpal joint of right hand M18.11    Dupuytren's contracture of right hand M72.0    Carpal tunnel syndrome, left G56.02       PSH: Reviewed with patient  Past Surgical History:   Procedure Laterality Date    HX COLONOSCOPY      HX HEENT  2003    lymph node bx in neck (benign)    HX LAP CHOLECYSTECTOMY  2000    HX ORTHOPAEDIC  2008    Back Surgery lumbar    HX ORTHOPAEDIC Right 2016    thumb and palm under ring finger    HX PARATHYROIDECTOMY      one right sided 2015. SH: Reviewed with patient  Social History   Substance Use Topics    Smoking status: Former Smoker     Quit date: 9/3/1990    Smokeless tobacco: Never Used    Alcohol use 1.2 oz/week     1 Cans of beer, 1 Shots of liquor per week       FH: Reviewed with patient  Family History   Problem Relation Age of Onset    Cancer Mother      lung    Cancer Father      lung       Current medical providers:    Patient Care Team:  Kd Evans MD as PCP - General (Internal Medicine)  Anne Schaeffer MD as Surgeon (General Surgery)  Renetta Bunch MD (Ophthalmology)  Carlee Shaffer OD (Optometry)  Maikol Perry MD (Urology)  Wesley Lemus MD (Gastroenterology)  Rakel Collins MD as Physician (Hand Surgery)    Plan:      Orders Placed This Encounter    Influenza virus vaccine (QUADRIVALENT PRES FREE SYRINGE) IM ([id number])    AMB POC HEMOGLOBIN A1C    diclofenac (VOLTAREN) 1 % gel    fluorouracil (EFUDEX) 5 % chemo cream     1. Patient will schedule and maintain health maintenance screens as discussed this visit  2. Patient will continue current exercise program; increase gradually as tolerated  3. Patient has completed ADV document and supplied to office for scanning into chart.  Update as needed      Health Maintenance   Topic Date Due    Hepatitis C Screening  1950    DTaP/Tdap/Td series (1 - Tdap) 04/27/1969    FOBT Q 1 YEAR AGE 50-75  04/27/1998    ZOSTER VACCINE AGE 60>  02/27/2008    INFLUENZA AGE 9 TO ADULT  08/01/2017    GLAUCOMA SCREENING Q2Y  01/19/2018    MEDICARE YEARLY EXAM  09/21/2018    Pneumococcal 65+ Low/Medium Risk  Completed         Urinary/ Fecal Incontinence: No    Regular physical exercise: Yes patient reports water aerobics 2-3 days/week, works out in gym 2 days/week and walks for exercise    Medication reconciliation completed by MA and reviewed by provider. Family history and Care Team reviewed and updated. Patient provided with a copy of After Visit Summary and Medicare Part B Preventive Services Table. See table for findings under Recommendation and Scheduled.     Patient Verbalized understanding of all information discussed

## 2017-09-20 NOTE — MR AVS SNAPSHOT
Visit Information Date & Time Provider Department Dept. Phone Encounter #  
 9/20/2017  9:15 AM Christian Gary, [address]  [id number] Upcoming Health Maintenance Date Due Hepatitis C Screening 1950 DTaP/Tdap/Td series (1 - Tdap) 4/27/1969 FOBT Q 1 YEAR AGE 50-75 4/27/1998 ZOSTER VACCINE AGE 60> 2/27/2008 INFLUENZA AGE 9 TO ADULT 8/1/2017 GLAUCOMA SCREENING Q2Y 1/19/2018 MEDICARE YEARLY EXAM 9/21/2018 Allergies as of 9/20/2017  Review Complete On: 9/20/2017 By: Christian Gary MD  
  
 Severity Noted Reaction Type Reactions Shellfish Derived High 02/14/2014    Anaphylaxis Ace Inhibitors  03/25/2016   Side Effect Cough Pcn [Penicillins]  02/14/2014    Rash Current Immunizations  Never Reviewed Name Date DTP 5/4/2015 Influenza High Dose Vaccine PF 10/29/2015 Influenza Vaccine (Quad) PF  Incomplete Pneumococcal Conjugate (PCV-13) 5/4/2015 Pneumococcal Polysaccharide (PPSV-23) 1/16/2014 Not reviewed this visit You Were Diagnosed With   
  
 Codes Comments Essential hypertension    -  Primary ICD-10-CM: I10 
ICD-9-CM: 401.9 Medicare annual wellness visit, subsequent     ICD-10-CM: Z00.00 ICD-9-CM: V70.0 Encounter for immunization     ICD-10-CM: O12 ICD-9-CM: V03.89 Elevated glucose     ICD-10-CM: R73.09 
ICD-9-CM: 790.29 Mixed hyperlipidemia     ICD-10-CM: E78.2 ICD-9-CM: 272.2 Pain of right hand     ICD-10-CM: M79.641 ICD-9-CM: 729.5 Need for hepatitis C screening test     ICD-10-CM: Z11.59 
ICD-9-CM: V73.89 Vitals BP Pulse Temp Resp Height(growth percentile) Weight(growth percentile) 127/74 (BP 1 Location: Left arm, BP Patient Position: Sitting) (!) 56 97.7 °F (36.5 °C) (Oral) 18 5' 10\" (1.778 m) 211 lb 9.6 oz (96 kg) SpO2 BMI Smoking Status 98% 30.36 kg/m2 Former Smoker Vitals History BMI and BSA Data Body Mass Index Body Surface Area  
 30.36 kg/m 2 2.18 m 2 Preferred Pharmacy Pharmacy Name Phone 305 [facility], [address] [address] [place] [address] [address] Kenneth Eaton Your Updated Medication List  
  
   
This list is accurate as of: 9/20/17 10:02 AM.  Always use your most recent med list.  
  
  
  
  
 atenolol-chlorthalidone 50-25 mg per tablet Commonly known as:  Tucker Stephen Take 1 tablet by mouth  daily  
  
 calcium carbonate 200 mg calcium (500 mg) Chew Commonly known as:  TUMS Take 2 Tabs by mouth four (4) times daily as needed for Other (perioral tingling). diclofenac 1 % Gel Commonly known as:  VOLTAREN Apply two grams by topical route four times daily to the affected area (s). diph,Pertuss(Acell),Tet Vac-PF 2 Lf-(2.5-5-3-5 mcg)-5Lf/0.5 mL susp Commonly known as:  ADACEL  
0.5 mL by IntraMUSCular route once for 1 dose. fenofibrate 160 mg tablet Commonly known as:  LOFIBRA Take 1 Tab by mouth daily. fexofenadine 180 mg tablet Commonly known as:  Ifeoma Massimo Take 180 mg by mouth daily as needed. fluorouracil 5 % chemo cream  
Commonly known as:  EFUDEX  
  
 fluticasone 27.5 mcg/actuation nasal spray Commonly known as:  VERAMYST  
2 Sprays by Both Nostrils route daily. oxyCODONE-acetaminophen 5-325 mg per tablet Commonly known as:  PERCOCET Take 1 Tab by mouth every four (4) hours as needed for Pain. Max Daily Amount: 6 Tabs. pantoprazole 40 mg tablet Commonly known as:  PROTONIX Take 1 tablet by mouth  daily  
  
 potassium chloride 10 mEq tablet Commonly known as:  KLOR-CON Take 2 tablets by mouth 3  times a day  
  
 tamsulosin 0.4 mg capsule Commonly known as:  FLOMAX Take 1 capsule by mouth  daily  
  
 varicella zoster vacine live 19,400 unit/0.65 mL Susr injection Commonly known as:  ZOSTAVAX  
1 Vial by SubCUTAneous route once for 1 dose. VITAMIN D3 1,000 unit tablet Generic drug:  cholecalciferol Take 2,000 Units by mouth daily. Prescriptions Printed Refills diph,Pertuss,Acell,,Tet Vac-PF (ADACEL) 2 Lf-(2.5-5-3-5 mcg)-5Lf/0.5 mL susp 0 Si.5 mL by IntraMUSCular route once for 1 dose. Class: Print Route: IntraMUSCular  
 varicella zoster vacine live (ZOSTAVAX) 19,400 unit/0.65 mL susr injection 0 Si Vial by SubCUTAneous route once for 1 dose. Class: Print Route: SubCUTAneous We Performed the Following AMB POC HEMOGLOBIN A1C [78090 CPT(R)] RENÉE QL, W/REFLEX CASCADE [[id number] Custom] CBC W/O DIFF [21612 CPT(R)] HCV AB W/RFLX TO JESUSITA [66721 CPT(R)] INFLUENZA VIRUS VAC QUAD,SPLIT,PRESV FREE SYRINGE IM [id number] CPT(R)] LIPID PANEL [59475 CPT(R)] MAGNESIUM U651361 CPT(R)] METABOLIC PANEL, COMPREHENSIVE [93910 CPT(R)] RHEUMATOID FACTOR, QL [id number] CPT(R)] SED RATE (ESR) [id number] CPT(R)] Patient Instructions PATIENT INSTRUCTIONS: 
Prepared by Allen Presley Todays date: 17 Medicare Part B Preventive Services Guidelines/Limitations Date last completed and Frequency Due Date Bone Mass Measurement 
(age 72 & older, biennial) Requires diagnosis related to osteoporosis or estrogen deficiency. Biennial benefit unless patient has history of long-term glucocorticoid tx or baseline is needed because initial test was by other method or for 
patients with vertebral abnormalities on x-ray Completed: No record found Recommended every 2 years Due: N/A Unless  recommended by your PCP or Specialist 
  
Cardiovascular Screening Blood Tests (every 5 years or annual if on cholesterol lowering meds) Total cholesterol, HDL, Triglycerides Order as a panel if possible Completed:  
16 As recommended by your PCP Due: May 2021 OR As recommended by your PCP or Specialist  
HIV Screening HIV-1 and HIV-2 by EIA, SHANNAN, rapid antibody test, or oral mucosa transudate Covered annually for patients between the ages of 13 - 72 or patients less than age 13 and those older than age 72 that are considered at an increased risk. N/A N/A Hepatitis C screening tests Indicated for patients with at least one of the following: current or past history of IV drug use, those who had blood transfusion before 1992, those born between [facility]. No record found Done today. Colorectal Cancer Screening 
-Fecal occult blood test (annual) -Flexible sigmoidoscopy (5y) 
-Screening colonoscopy (10y) -Barium Enema Age 49-80; After age [de-identified] if history of abnormal results Completed:  
 Dr. Awa Stiles performed colonoscopy about 8 years ago Recommended every 5 to 10 years  Due: You have an appointment with Dr. [facility]wa Stiles [facility]. 2017. Please check for follow up date for colonoscopy Hepatitis B vaccines (covered for patients at high risk- hemophilia, ESRD, DM, body fluid contact 
 
 
 
 
 
 
 
 
 
 
 
______________ Prostate Cancer Screening (annually up to age 76) -Digital rectal exam 
-Prostate specific antigen (PSA) Medium/high risk factors:  End-stage renal disease, Hemophiliacs who received Factor VIII or IX concentrates, Clients of institutions for the mentally retarded, Persons who live in the same house as a HepB virus carrier, Homosexual men, Illicit injectable drug abusers. Three shot series covered once 
_________________ Annually (age 48 or over), JADON not paid separately when covered E/M service is provided on the same date You have had this About 20 years ago 
 
 
 
 
 
 
 
 
 
 
 
 
 
 
 
______________ Completed: 
Done Sept. 2017 at [facility] Recommended 
annually Completed 
 
 
 
 
 
 
 
 
 
 
 
 
 
 
 
 
 
_____________ Due: Continue to follow Dr. Judy Darby recommendations Seasonal Influenza Vaccination (annually)  Completed:  
9-21-17 Recommended Annually Due: FALL 2018 TDAP Vaccination Covered by Medicare part D through the pharmacy- PCP provides prescription Completed: No record found Recommended every 10 years Due: NOW At your discretion Zoster (Shingles) Vaccination Covered by Medicare Part D through the pharmacy- PCP provides prescription Completed: No record found Recommended once over age 61  Due: NOW At your discretion This is a one-time vaccine Glaucoma Screening (no USPSTF recommendation) Covered for high risk patients such as patients with Diabetes mellitus, family history of glaucoma, , age 48 or over,  American, age 72 or over Completed:  
2016 Dr. Olivia Melendez at [facility] Recommended annually Due: NOW Appointment scheduled Sept. 2017 Pneumococcal Vaccination (once after 65)  Completed:  
1-16-14 Recommended once over the age of 72 Completed This is a one-time vaccine Prevnar 13 vaccine  Prevnar 13   
5-4-15 Recommended once over the age of 72 Completed This is a one-time vaccine Diabetes Screening Tests (at least every 3 years, Medicare covers annually or at 6-month intervals for prediabetic patients) Fasting blood sugar (FBS) or glucose tolerance test (GTT) Patient must be diagnosed with one of the following: 
-Hypertension, Dyslipidemia, obesity, previous impaired FBS or GTT 
Or any two of the following: overweight, FH of diabetes, age ? 72, history of gestational diabetes, birth of baby weighing more than 9 pounds Completed: Your glucose was 102 on 5-5-16 Recommended every -6 months for pre-diabetics Due: May 2019 OR As recommended by your PCP or Specialist  
Lung Cancer Screening-with low dose CT for patients who meet all criteria:  
-Age 50-69 
-either a current smoker or have quit in the past 15 yrs 
-have a smoking history of 30 pack years or more Covered every 12 months N/A N/A Ultrasound Screening for Abdominal Aortic Aneurysm (AAA) (once) Medicare covers a one-time AAA ultrasound. You must get a referral from your doctor. Limited to patients who meet one of the following criteria: 
* Men who are 73-68 years old and have smoked more than 100 cigarettes in their lifetime. *Anyone with a FH of AAA Completed: 
 
 Check records and notify office if this has been done N/A = Not Applicable Please contact RN/Nurse Navigator with any questions about your visit or instructions today. Thank you for the opportunity for us to participate in your care. Varicella Virus Vaccine (By injection) Varicella Virus Vaccine (ambreen-i-SHERMAN-a VYE-elizabeth VAX-een) Varivax® prevents chicken pox and Zostavax® prevents shingles. Both are caused by varicella virus. Brand Name(s): Varivax, Zostavax There may be other brand names for this medicine. When This Medicine Should Not Be Used: This vaccine is not right for everyone. You should not receive it if you had an allergic reaction to varicella virus vaccine, gelatin, or neomycin, or if you are pregnant. You should not receive it if you have a fever, an immune system problem, AIDS or HIV, a blood or bone marrow disorder, or tuberculosis. How to Use This Medicine:  
Injectable · Your doctor will prescribe your exact dose and tell you how often it should be given. This medicine is given as a shot under your skin. · A nurse or other health provider will give you this medicine. · Varivax®:  
¨ Most people will need 2 shots. Children usually receive one shot at 15to 13months of age and a second shot between 3and 10years of age. Teenagers and adults should have a second shot 4 weeks after the first dose. · Zostavax®:  
¨ Only 1 dose is needed. · Read and follow the patient instructions that come with this medicine. Talk to your doctor or pharmacist if you have any questions. · Missed dose: It is important that Varivax® be given at the proper time.  If a scheduled shot is missed, call your doctor to make another appointment as soon as possible. Drugs and Foods to Avoid: Ask your doctor or pharmacist before using any other medicine, including over-the-counter medicines, vitamins, and herbal products. · You should not receive this vaccine if you are also taking cancer medicines or steroid medicine. · Tell your doctor if you plan to get a flu shot or other vaccines. Zostavax® should not be given with Pneumovax® 23 pneumococcal vaccine. · Children and adolescents should not take aspirin or medicines that contain aspirin (including cold medicines) for 6 weeks after receiving this vaccine. Warnings While Using This Medicine: · It is not safe to take this medicine during pregnancy. It could harm an unborn baby. Tell your doctor right away if you become pregnant. Do not become pregnant for 3 months after you receive this vaccine without first checking with your doctor. · Tell your doctor if you are breastfeeding, or if you have received a blood transfusion, other blood products, or an immune globulin. · You may be able to pass the virus to other people after your get this vaccine. You should avoid close contact with people at high risk for chickenpox for 6 weeks after you receive this vaccine. Some examples of people who are at high risk are pregnant women,  babies, and those with immune system problems (including bone marrow disease, cancer, or AIDS). Talk to your doctor if you have questions. Possible Side Effects While Using This Medicine:  
Call your doctor right away if you notice any of these side effects: · Allergic reaction: Itching or hives, swelling in your face or hands, swelling or tingling in your mouth or throat, chest tightness, trouble breathing · Blistering, peeling, or red skin rash · Cough, chills, runny or stuffy nose, or cold-like symptoms · High fever (at least 102 degrees F in children) If you notice these less serious side effects, talk with your doctor: · Ear pain · Mild skin rash, itching, or dryness · Pain, redness, itching, swelling, rash, or a lump where the shot was given If you notice other side effects that you think are caused by this medicine, tell your doctor. Call your doctor for medical advice about side effects. You may report side effects to FDA at 7-067-FDA-0114 © 2017 Swap[employer]son Information is for End User's use only and may not be sold, redistributed or otherwise used for commercial purposes. The above information is an  only. It is not intended as medical advice for individual conditions or treatments. Talk to your doctor, nurse or pharmacist before following any medical regimen to see if it is safe and effective for you. Tdap (Tetanus, Diphtheria, Pertussis) Vaccine: What You Need to Know Why get vaccinated? Tetanus, diphtheria, and pertussis are very serious diseases. Tdap vaccine can protect us from these diseases. And Tdap vaccine given to pregnant women can protect  babies against pertussis. Tetanus (lockjaw) is rare in the [facility] today. It causes painful muscle tightening and stiffness, usually all over the body. · It can lead to tightening of muscles in the head and neck so you can't open your mouth, swallow, or sometimes even breathe. Tetanus kills about 1 out of 10 people who are infected even after receiving the best medical care. Diphtheria is also rare in the [place] today. It can cause a thick coating to form in the back of the throat. · It can lead to breathing problems, heart failure, paralysis, and death. Pertussis (whooping cough) causes severe coughing spells, which can cause difficulty breathing, vomiting, and disturbed sleep. · It can also lead to weight loss, incontinence, and rib fractures. Up to 2 in 100 adolescents and 5 in 100 adults with pertussis are hospitalized or have complications, which could include pneumonia or death. These diseases are caused by bacteria. Diphtheria and pertussis are spread from person to person through secretions from coughing or sneezing. Tetanus enters the body through cuts, scratches, or wounds. Before vaccines, as many as 200,000 cases of diphtheria, 200,000 cases of pertussis, and hundreds of cases of tetanus were reported in the [place] each year. Since vaccination began, reports of cases for tetanus and diphtheria have dropped by about 99% and for pertussis by about 80%. Tdap vaccine The Tdap vaccine can protect adolescents and adults from tetanus, diphtheria, and pertussis. One dose of Tdap is routinely given at age 6 or 15. People who did not get Tdap at that age should get it as soon as possible. Tdap is especially important for health care professionals and anyone having close contact with a baby younger than 12 months. Pregnant women should get a dose of Tdap during every pregnancy, to protect the  from pertussis. Infants are most at risk for severe, life-threatening complications from pertussis. Another vaccine, called Td, protects against tetanus and diphtheria, but not pertussis. A Td booster should be given every 10 years. Tdap may be given as one of these boosters if you have never gotten Tdap before. Tdap may also be given after a severe cut or burn to prevent tetanus infection. Your doctor or the person giving you the vaccine can give you more information. Tdap may safely be given at the same time as other vaccines. Some people should not get this vaccine · A person who has ever had a life-threatening allergic reaction after a previous dose of any diphtheria-, tetanus-, or pertussis-containing vaccine, OR has a severe allergy to any part of this vaccine, should not get Tdap vaccine. Tell the person giving the vaccine about any severe allergies.  
· Anyone who had coma or long repeated seizures within 7 days after a childhood dose of DTP or DTaP, or a previous dose of Tdap, should not get Tdap, unless a cause other than the vaccine was found. They can still get Td. · Talk to your doctor if you: 
¨ Have seizures or another nervous system problem. ¨ Had severe pain or swelling after any vaccine containing diphtheria, tetanus, or pertussis. ¨ Ever had a condition called Guillain-Barré Syndrome (GBS). ¨ Aren't feeling well on the day the shot is scheduled. Risks With any medicine, including vaccines, there is a chance of side effects. These are usually mild and go away on their own. Serious reactions are also possible but are rare. Most people who get Tdap vaccine do not have any problems with it. Mild problems following Tdap 
(Did not interfere with activities) · Pain where the shot was given (about 3 in 4 adolescents or 2 in 3 adults) · Redness or swelling where the shot was given (about 1 person in 5) · Mild fever of at least 100.4°F (up to about 1 in 25 adolescents or 1 in 100 adults) · Headache (about 3 or 4 people in 10) · Tiredness (about 1 person in 3 or 4) · Nausea, vomiting, diarrhea, stomachache (up to 1 in 4 adolescents or 1 in 10 adults) · Chills, sore joints (about 1 person in 10) · Body aches (about 1 person in 3 or 4) · Rash, swollen glands (uncommon) Moderate problems following Tdap (Interfered with activities, but did not require medical attention) · Pain where the shot was given (up to 1 in 5 or 6) · Redness or swelling where the shot was given (up to about 1 in 16 adolescents or 1 in 12 adults) · Fever over 102°F (about 1 in 100 adolescents or 1 in 250 adults) · Headache (about 1 in 7 adolescents or 1 in 10 adults) · Nausea, vomiting, diarrhea, stomachache (up to 1 to 3 people in 100) · Swelling of the entire arm where the shot was given (up to about 1 in 500) Severe problems following Tdap 
(Unable to perform usual activities; required medical attention) · Swelling, severe pain, bleeding and redness in the arm where the shot was given (rare) Problems that could happen after any vaccine: · People sometimes faint after a medical procedure, including vaccination. Sitting or lying down for about 15 minutes can help prevent fainting, and injuries caused by a fall. Tell your doctor if you feel dizzy or have vision changes or ringing in the ears. · Some people get severe pain in the shoulder and have difficulty moving the arm where a shot was given. This happens very rarely. · Any medication can cause a severe allergic reaction. Such reactions from a vaccine are very rare, estimated at fewer than 1 in a million doses, and would happen within a few minutes to a few hours after the vaccination. As with any medicine, there is a very remote chance of a vaccine causing a serious injury or death. The safety of vaccines is always being monitored. For more information, visit: www.cdc.gov/vaccinesafety. What if there is a serious problem? What should I look for? · Look for anything that concerns you, such as signs of a severe allergic reaction, very high fever, or unusual behavior. Signs of a severe allergic reaction can include hives, swelling of the face and throat, difficulty breathing, a fast heartbeat, dizziness, and weakness. These would usually start a few minutes to a few hours after the vaccination. What should I do? · If you think it is a severe allergic reaction or other emergency that can't wait, call 9-1-1 or get the person to the nearest hospital. Otherwise, call your doctor. · Afterward, the reaction should be reported to the Vaccine Adverse Event Reporting System (VAERS). Your doctor might file this report, or you can do it yourself through the VAERS web site at www.vaers. hhs.gov, or by calling 3-642.955.3833. VAERS does not give medical advice.  
The [employer] Vaccine Injury [place] 
 The [employer] Injury Compensation Program (VICP) is a federal program that was created to compensate people who may have been injured by certain vaccines. Persons who believe they may have been injured by a vaccine can learn about the program and about filing a claim by calling 6-208.647.1884 or visiting the [employer] website at www.[facility].gov/vaccinecompensation. There is a time limit to file a claim for compensation. How can I learn more? · Ask your doctor. He or she can give you the vaccine package insert or suggest other sources of information. · Call your local or state health department. · Contact the Centers for Disease Control and Prevention (CDC): 
¨ Call 5-199.717.9988 (1-800-CDC-INFO) or ¨ Visit CDC's website at www.cdc.gov/vaccines Vaccine Information Statement (Interim) Tdap Vaccine 
(2/24/15) 42 ROBERT Rodriguez [facility] [id number] [facility] and [employer] Centers for Disease Control and Prevention Many Vaccine Information Statements are available in Russian and other languages. See www.immunize.org/vis. Muchas hojas de información sobre vacunas están disponibles en español y en otros idiomas. Visite www.immunize.org/vis. Care instructions adapted under license by [employer] (which disclaims liability or warranty for this information). If you have questions about a medical condition or this instruction, always ask your healthcare professional. [place] [address] any warranty or liability for your use of this information. Introducing [facility]! Dear Rekha Maxwell: 
Thank you for requesting a [facility] account. Our records indicate that you already have an active [employer] account. You can access your account anytime at [email or web address] Did you know that you can access your hospital and ER discharge instructions at any time in [employer]? You can also review all of your test results from your hospital stay or ER visit. Additional Information If you have questions, please visit the Frequently Asked Questions section of the [employer] website at [email or web address] Remember, [employer] is NOT to be used for urgent needs. For medical emergencies, dial 911. Now available from your iPhone and Android! Please provide this summary of care documentation to your next provider. Your primary care clinician is listed as Patricio Garcia. If you have any questions after today's visit, please call [phone].

## 2017-09-20 NOTE — PROGRESS NOTES
Nicolle Escobar. is a 69 y.o. male who presents for follow up on medications. Last seen October 2016. Treated for HTN. On tenoretic 50/25mg and potassium supplement 6 a day. On this regimen for the past 5 years. Was on other medications in the past. BP at home is elevated per patient- 132-158/70's. No dizziness or headaches. No decline in exercise tolerance. Exercises regularly. Is active with water aerobics 3 days a week, resistance workout, stretching, cycling. He thinks he may want to simplify medications. Father diabetic. He is concerned about diabetes risk. Diet is healthy.       Sees urologist, Dr. Aravind Varela, for BPH. Taking flomax. Is getting up twice a night. PSA done with urology recently. Prior ditropan, not tolerated.      No aspirin or NSAIDS due to GI bleeding, sees Dr. Jen Sykes.       Since last seen, had surgery with Dr. Suzette Sandhoff, Nov 2017, for right thumb pain. Still has issues with hand pain, seeing a new ortho. Dr. Ar Yoon. He was told by the ortho that he needed testing for inflammatory arthritis. Does some hand exercises and had had PT in the past.  Given voltaren gel. Past Medical History:   Diagnosis Date    Arthritis     Enlarged prostate     GERD (gastroesophageal reflux disease)     High cholesterol     Hx of seasonal allergies     Hypertension        Family History   Problem Relation Age of Onset    Cancer Mother      lung    Cancer Father      lung       Social History     Social History    Marital status:      Spouse name: N/A    Number of children: N/A    Years of education: N/A     Occupational History    Not on file.      Social History Main Topics    Smoking status: Former Smoker     Quit date: 9/3/1990    Smokeless tobacco: Never Used    Alcohol use 1.2 oz/week     1 Cans of beer, 1 Shots of liquor per week    Drug use: No    Sexual activity: Yes     Partners: Female     Birth control/ protection: None     Other Topics Concern  Not on file     Social History Narrative       Current Outpatient Prescriptions on File Prior to Visit   Medication Sig Dispense Refill    potassium chloride (KLOR-CON) 10 mEq tablet Take 2 tablets by mouth 3  times a day 540 Tab 0    tamsulosin (FLOMAX) 0.4 mg capsule Take 1 capsule by mouth  daily 90 Cap 0    pantoprazole (PROTONIX) 40 mg tablet Take 1 tablet by mouth  daily 90 Tab 0    atenolol-chlorthalidone (TENORETIC) 50-25 mg per tablet Take 1 tablet by mouth  daily 90 Tab 0    fluticasone (VERAMYST) 27.5 mcg/actuation nasal spray 2 Sprays by Both Nostrils route daily. 3 Bottle 3    fenofibrate (LOFIBRA) 160 mg tablet Take 1 Tab by mouth daily. 90 Tab 3    fexofenadine (ALLEGRA) 180 mg tablet Take 180 mg by mouth daily as needed.  cholecalciferol (VITAMIN D3) 1,000 unit tablet Take 2,000 Units by mouth daily.  oxyCODONE-acetaminophen (PERCOCET) 5-325 mg per tablet Take 1 Tab by mouth every four (4) hours as needed for Pain. Max Daily Amount: 6 Tabs. 30 Tab 0    calcium carbonate (TUMS) 200 mg calcium (500 mg) chew Take 2 Tabs by mouth four (4) times daily as needed for Other (perioral tingling). 30 Tab 0     No current facility-administered medications on file prior to visit. Review of Systems  Pertinent items are noted in HPI. Objective:     Visit Vitals    /74 (BP 1 Location: Left arm, BP Patient Position: Sitting)    Pulse (!) 56    Temp 97.7 °F (36.5 °C) (Oral)    Resp 18    Ht 5' 10\" (1.778 m)    Wt 211 lb 9.6 oz (96 kg)    SpO2 98%    BMI 30.36 kg/m2     Gen: well appearing male  HEENT:   PERRL,normal conjunctiva. External ear and canals normal, TMs no opacification or erythema,  OP no erythema, no exudates, MMM  Neck:  Supple. Thyroid normal size, nontender, without nodules. No masses or LAD  Resp:  No wheezing, no rhonchi, no rales. CV:  RRR, normal S1S2, no murmur. GI: soft, nontender, without masses. No hepatosplenomegaly.   Extrem:  +2 pulses, no edema, warm distally      Assessment/Plan:     1. Medicare annual wellness visit, subsequent- REVIEWED MEDICARE WELLNESS      2. Encounter for immunization    - Influenza virus vaccine (QUADRIVALENT PRES FREE SYRINGE) IM (31917)  - diph,Pertuss,Acell,,Tet Vac-PF (ADACEL) 2 Lf-(2.5-5-3-5 mcg)-5Lf/0.5 mL susp; 0.5 mL by IntraMUSCular route once for 1 dose. Dispense: 1 Syringe; Refill: 0  - varicella zoster vacine live (ZOSTAVAX) 19,400 unit/0.65 mL susr injection; 1 Vial by SubCUTAneous route once for 1 dose. Dispense: 0.65 mL; Refill: 0    3. Elevated glucose-Recommend following diabetic diet, exercise and maintain a healthy weight. - AMB POC HEMOGLOBIN A1C    4. Essential hypertension- Recommend following a lower sodium diet and regular cardiovascular exercise. Blood pressure goal is less than 130/85 on average. Advised compliance with blood pressure medication and regular follow up. - METABOLIC PANEL, COMPREHENSIVE  - CBC W/O DIFF  - MAGNESIUM    5. Mixed hyperlipidemia- Recommend AHA diet and regular cardiovascular exercise. Continue lofibra    - LIPID PANEL    6. Pain of right hand-followed by ortho. - RHEUMATOID FACTOR, QL  - RENÉE QL, W/REFLEX CASCADE  - SED RATE (ESR)    7. Need for hepatitis C screening test    - HCV AB W/RFLX TO JESUSITA    Follow-up Disposition:  Return for follow up pending labs and 6 months.  Hung London MD

## 2017-09-20 NOTE — PATIENT INSTRUCTIONS
PATIENT INSTRUCTIONS:  Prepared by Kelsie Wood date: 9-19-17  Medicare Part B Preventive Services Guidelines/Limitations Date last completed and Frequency Due Date   Bone Mass Measurement  (age 72 & older, biennial) Requires diagnosis related to osteoporosis or estrogen deficiency. Biennial benefit unless patient has history of long-term glucocorticoid tx or baseline is needed because initial test was by other method or for  patients with vertebral abnormalities on x-ray Completed:   No record found    Recommended every 2 years Due: N/A    Unless  recommended by your PCP or Specialist     Cardiovascular Screening Blood Tests (every 5 years or annual if on cholesterol lowering meds)  Total cholesterol, HDL, Triglycerides   Order as a panel if possible Completed:   5-5-16    As recommended by your PCP Due: May 2021    OR  As recommended by your PCP or Specialist   HIV Screening    HIV-1 and HIV-2 by EIA, SHANNAN, rapid antibody test, or oral mucosa transudate Covered annually for patients between the ages of 13 [de-identified] 72 or patients less than age 13 and those older than age 72 that are considered at an increased risk. N/A N/A   Hepatitis C screening tests Indicated for patients with at least one of the following: current or past history of IV drug use, those who had blood transfusion before 1992, those born between [facility]. No record found Done today. Colorectal Cancer Screening  -Fecal occult blood test (annual)  -Flexible sigmoidoscopy (5y)  -Screening colonoscopy (10y)  -Barium Enema Age 49-80; After age [de-identified] if history of abnormal results Completed:    Dr. Awa Stiles performed colonoscopy about 8 years ago    Recommended every 5 to 10 years  Due: You have an appointment with Dr. Awa Stiles in [facility]. 2017.   Please check for follow up date for colonoscopy         Hepatitis B vaccines  (covered for patients at high risk- hemophilia, ESRD, DM, body fluid contact                        ______________  Prostate Cancer Screening (annually up to age 76)  -Digital rectal exam  -Prostate specific antigen (PSA)   Medium/high risk factors:  End-stage renal disease,  Hemophiliacs who received Factor VIII or IX concentrates, Clients of institutions for the mentally retarded, Persons who live in the same house as a HepB virus carrier, Homosexual men, Illicit injectable drug abusers.     Three shot series covered once  _________________  Annually (age 48 or over), JADON not paid separately when covered E/M service is provided on the same date You have had this  About 20 years ago                                ______________  Completed:  Done Sept. 2017 at [facility]      Recommended  annually Completed                                    _____________  Due: Continue to follow Dr. Radha Langston recommendations   Seasonal Influenza Vaccination (annually)  Completed:   9-21-17    Recommended Annually   Due: FALL 2018       TDAP Vaccination Covered by Medicare part D through the pharmacy- PCP provides prescription Completed:   No record found    Recommended every 10 years   Due: NOW    At your discretion   Zoster (Shingles) Vaccination Covered by Medicare Part D through the pharmacy- PCP provides prescription Completed:   No record found    Recommended once over age 61  Due: NOW  At your discretion    This is a one-time vaccine   Glaucoma Screening (no USPSTF recommendation) Covered for high risk patients such as patients with Diabetes mellitus, family history of glaucoma, , age 48 or over,  American, age 72 or over Completed:   2016  Dr. Hannah Grimes at [facility]     Recommended annually Due: NOW    Appointment scheduled Sept. 2017   Pneumococcal Vaccination (once after 65)  Completed:   1-16-14    Recommended once over the age of 72   Completed    This is a one-time vaccine   Prevnar 13 vaccine  Prevnar 13    5-4-15    Recommended once over the age of 72 Completed      This is a one-time vaccine       Diabetes Screening Tests (at least every 3 years, Medicare covers annually or at 6-month intervals for prediabetic patients)     Fasting blood sugar (FBS) or glucose tolerance test (GTT) Patient must be diagnosed with one of the following:  -Hypertension, Dyslipidemia, obesity, previous impaired FBS or GTT  Or any two of the following: overweight, FH of diabetes, age ? 72, history of gestational diabetes, birth of baby weighing more than 9 pounds Completed: Your glucose was 102 on 5-5-16          Recommended every -6 months for pre-diabetics Due: May 2019      OR  As recommended by your PCP or Specialist   Lung Cancer Screening-with low dose CT for patients who meet all criteria:   -Age 50-69  -either a current smoker or have quit in the past 15 yrs  -have a smoking history of 30 pack years or more Covered every 12 months N/A N/A   Ultrasound Screening for Abdominal Aortic Aneurysm (AAA) (once) Medicare covers a one-time AAA ultrasound. You must get a referral from your doctor. Limited to patients who meet one of the following criteria:  * Men who are 73-68 years old and have smoked more than 100 cigarettes in their lifetime. *Anyone with a FH of AAA Completed:     Check records and notify office if this has been done   N/A = Not Applicable    Please contact RN/Nurse Navigator with any questions about your visit or instructions today. Thank you for the opportunity for us to participate in your care. Varicella Virus Vaccine (By injection)   Varicella Virus Vaccine (ambreen-i-SHERMAN-a VYE-elizabeth VAX-een)  Varivax® prevents chicken pox and Zostavax® prevents shingles. Both are caused by varicella virus. Brand Name(s): Varivax, Zostavax   There may be other brand names for this medicine. When This Medicine Should Not Be Used: This vaccine is not right for everyone. You should not receive it if you had an allergic reaction to varicella virus vaccine, gelatin, or neomycin, or if you are pregnant.  You should not receive it if you have a fever, an immune system problem, AIDS or HIV, a blood or bone marrow disorder, or tuberculosis. How to Use This Medicine:   Injectable  · Your doctor will prescribe your exact dose and tell you how often it should be given. This medicine is given as a shot under your skin. · A nurse or other health provider will give you this medicine. · Varivax®:   ¨ Most people will need 2 shots. Children usually receive one shot at 15to 13months of age and a second shot between 3and 10years of age. Teenagers and adults should have a second shot 4 weeks after the first dose. · Zostavax®:   ¨ Only 1 dose is needed. · Read and follow the patient instructions that come with this medicine. Talk to your doctor or pharmacist if you have any questions. · Missed dose: It is important that Varivax® be given at the proper time. If a scheduled shot is missed, call your doctor to make another appointment as soon as possible. Drugs and Foods to Avoid:   Ask your doctor or pharmacist before using any other medicine, including over-the-counter medicines, vitamins, and herbal products. · You should not receive this vaccine if you are also taking cancer medicines or steroid medicine. · Tell your doctor if you plan to get a flu shot or other vaccines. Zostavax® should not be given with Pneumovax® 23 pneumococcal vaccine. · Children and adolescents should not take aspirin or medicines that contain aspirin (including cold medicines) for 6 weeks after receiving this vaccine. Warnings While Using This Medicine:   · It is not safe to take this medicine during pregnancy. It could harm an unborn baby. Tell your doctor right away if you become pregnant. Do not become pregnant for 3 months after you receive this vaccine without first checking with your doctor. · Tell your doctor if you are breastfeeding, or if you have received a blood transfusion, other blood products, or an immune globulin.   · You may be able to pass the virus to other people after your get this vaccine. You should avoid close contact with people at high risk for chickenpox for 6 weeks after you receive this vaccine. Some examples of people who are at high risk are pregnant women,  babies, and those with immune system problems (including bone marrow disease, cancer, or AIDS). Talk to your doctor if you have questions. Possible Side Effects While Using This Medicine:   Call your doctor right away if you notice any of these side effects:  · Allergic reaction: Itching or hives, swelling in your face or hands, swelling or tingling in your mouth or throat, chest tightness, trouble breathing  · Blistering, peeling, or red skin rash  · Cough, chills, runny or stuffy nose, or cold-like symptoms  · High fever (at least 102 degrees F in children)  If you notice these less serious side effects, talk with your doctor:   · Ear pain  · Mild skin rash, itching, or dryness  · Pain, redness, itching, swelling, rash, or a lump where the shot was given  If you notice other side effects that you think are caused by this medicine, tell your doctor. Call your doctor for medical advice about side effects. You may report side effects to FDA at 6-616-FDA-3771  ©  [address] Information is for End User's use only and may not be sold, redistributed or otherwise used for commercial purposes. The above information is an  only. It is not intended as medical advice for individual conditions or treatments. Talk to your doctor, nurse or pharmacist before following any medical regimen to see if it is safe and effective for you. Tdap (Tetanus, Diphtheria, Pertussis) Vaccine: What You Need to Know  Why get vaccinated? Tetanus, diphtheria, and pertussis are very serious diseases. Tdap vaccine can protect us from these diseases. And Tdap vaccine given to pregnant women can protect  babies against pertussis. Tetanus (lockjaw) is rare in the [facility] today.  It causes painful muscle tightening and stiffness, usually all over the body. · It can lead to tightening of muscles in the head and neck so you can't open your mouth, swallow, or sometimes even breathe. Tetanus kills about 1 out of 10 people who are infected even after receiving the best medical care. Diphtheria is also rare in the [place] today. It can cause a thick coating to form in the back of the throat. · It can lead to breathing problems, heart failure, paralysis, and death. Pertussis (whooping cough) causes severe coughing spells, which can cause difficulty breathing, vomiting, and disturbed sleep. · It can also lead to weight loss, incontinence, and rib fractures. Up to 2 in 100 adolescents and 5 in 100 adults with pertussis are hospitalized or have complications, which could include pneumonia or death. These diseases are caused by bacteria. Diphtheria and pertussis are spread from person to person through secretions from coughing or sneezing. Tetanus enters the body through cuts, scratches, or wounds. Before vaccines, as many as 200,000 cases of diphtheria, 200,000 cases of pertussis, and hundreds of cases of tetanus were reported in the [place] each year. Since vaccination began, reports of cases for tetanus and diphtheria have dropped by about 99% and for pertussis by about 80%. Tdap vaccine  The Tdap vaccine can protect adolescents and adults from tetanus, diphtheria, and pertussis. One dose of Tdap is routinely given at age 6 or 15. People who did not get Tdap at that age should get it as soon as possible. Tdap is especially important for health care professionals and anyone having close contact with a baby younger than 12 months. Pregnant women should get a dose of Tdap during every pregnancy, to protect the  from pertussis. Infants are most at risk for severe, life-threatening complications from pertussis.   Another vaccine, called Td, protects against tetanus and diphtheria, but not pertussis. A Td booster should be given every 10 years. Tdap may be given as one of these boosters if you have never gotten Tdap before. Tdap may also be given after a severe cut or burn to prevent tetanus infection. Your doctor or the person giving you the vaccine can give you more information. Tdap may safely be given at the same time as other vaccines. Some people should not get this vaccine  · A person who has ever had a life-threatening allergic reaction after a previous dose of any diphtheria-, tetanus-, or pertussis-containing vaccine, OR has a severe allergy to any part of this vaccine, should not get Tdap vaccine. Tell the person giving the vaccine about any severe allergies. · Anyone who had coma or long repeated seizures within 7 days after a childhood dose of DTP or DTaP, or a previous dose of Tdap, should not get Tdap, unless a cause other than the vaccine was found. They can still get Td. · Talk to your doctor if you:  ¨ Have seizures or another nervous system problem. ¨ Had severe pain or swelling after any vaccine containing diphtheria, tetanus, or pertussis. ¨ Ever had a condition called Guillain-Barré Syndrome (GBS). ¨ Aren't feeling well on the day the shot is scheduled. Risks  With any medicine, including vaccines, there is a chance of side effects. These are usually mild and go away on their own. Serious reactions are also possible but are rare. Most people who get Tdap vaccine do not have any problems with it.   Mild problems following Tdap  (Did not interfere with activities)  · Pain where the shot was given (about 3 in 4 adolescents or 2 in 3 adults)  · Redness or swelling where the shot was given (about 1 person in 5)  · Mild fever of at least 100.4°F (up to about 1 in 25 adolescents or 1 in 100 adults)  · Headache (about 3 or 4 people in 10)  · Tiredness (about 1 person in 3 or 4)  · Nausea, vomiting, diarrhea, stomachache (up to 1 in 4 adolescents or 1 in 10 adults)  · Chills, sore joints (about 1 person in 10)  · Body aches (about 1 person in 3 or 4)  · Rash, swollen glands (uncommon)  Moderate problems following Tdap  (Interfered with activities, but did not require medical attention)  · Pain where the shot was given (up to 1 in 5 or 6)  · Redness or swelling where the shot was given (up to about 1 in 16 adolescents or 1 in 12 adults)  · Fever over 102°F (about 1 in 100 adolescents or 1 in 250 adults)  · Headache (about 1 in 7 adolescents or 1 in 10 adults)  · Nausea, vomiting, diarrhea, stomachache (up to 1 to 3 people in 100)  · Swelling of the entire arm where the shot was given (up to about 1 in 500)  Severe problems following Tdap  (Unable to perform usual activities; required medical attention)  · Swelling, severe pain, bleeding and redness in the arm where the shot was given (rare)  Problems that could happen after any vaccine:  · People sometimes faint after a medical procedure, including vaccination. Sitting or lying down for about 15 minutes can help prevent fainting, and injuries caused by a fall. Tell your doctor if you feel dizzy or have vision changes or ringing in the ears. · Some people get severe pain in the shoulder and have difficulty moving the arm where a shot was given. This happens very rarely. · Any medication can cause a severe allergic reaction. Such reactions from a vaccine are very rare, estimated at fewer than 1 in a million doses, and would happen within a few minutes to a few hours after the vaccination. As with any medicine, there is a very remote chance of a vaccine causing a serious injury or death. The safety of vaccines is always being monitored. For more information, visit: www.cdc.gov/vaccinesafety. What if there is a serious problem? What should I look for? · Look for anything that concerns you, such as signs of a severe allergic reaction, very high fever, or unusual behavior.   Signs of a severe allergic reaction can include hives, swelling of the face and throat, difficulty breathing, a fast heartbeat, dizziness, and weakness. These would usually start a few minutes to a few hours after the vaccination. What should I do? · If you think it is a severe allergic reaction or other emergency that can't wait, call 9-1-1 or get the person to the nearest hospital. Otherwise, call your doctor. · Afterward, the reaction should be reported to the Vaccine Adverse Event Reporting System (VAERS). Your doctor might file this report, or you can do it yourself through the VAERS web site at www.vaers. [facility].gov, or by calling 7-141.479.2457. VAERS does not give medical advice. The National Vaccine Injury Compensation Program  The National Vaccine Injury Compensation Program (VICP) is a federal program that was created to compensate people who may have been injured by certain vaccines. Persons who believe they may have been injured by a vaccine can learn about the program and about filing a claim by calling 5-842.791.2982 or visiting the [employer] website at www.[facility].gov/vaccinecompensation. There is a time limit to file a claim for compensation. How can I learn more? · Ask your doctor. He or she can give you the vaccine package insert or suggest other sources of information. · Call your local or state health department. · Contact the Centers for Disease Control and Prevention (CDC):  ¨ Call 6-144.247.4982 (1-800-CDC-INFO) or  ¨ Visit CDC's website at www.cdc.gov/vaccines  Vaccine Information Statement (Interim)  Tdap Vaccine  (2/24/15)  42 ROBERT Ortez [id number]  Department of Health and Human Services  Centers for Disease Control and Prevention  Many Vaccine Information Statements are available in [place] and other languages. See www.immunize.org/vis. Muchas hojas de información sobre vacunas están disponibles en español y en otros idiomas. Visite www.immunize.org/vis.   Care instructions adapted under license by [employer] (which disclaims liability or warranty for this information). If you have questions about a medical condition or this instruction, always ask your healthcare professional. [address] any warranty or liability for your use of this information.

## 2017-09-20 NOTE — PROGRESS NOTES
Chief Complaint   Patient presents with    Annual Wellness Visit     Pt fasting     Reviewed record In preparation for visit and have obtained necessary documentation    1. Have you been to the ER, urgent care clinic since your last visit? Hospitalized since your last visit? NO    2. Have you seen or consulted any other health care providers outside of the [address] [place] [address] since your last visit? Include any pap smears or colon screening. Basia Raza    Patient does have advance directive on file.

## 2017-09-21 LAB
ALBUMIN SERPL-MCNC: 4.3 G/DL (ref 3.6–4.8)
ALBUMIN/GLOB SERPL: 1.8 {RATIO} (ref 1.2–2.2)
ALP SERPL-CCNC: 34 IU/L (ref 39–117)
ALT SERPL-CCNC: 32 IU/L (ref 0–44)
ANA SER QL: NEGATIVE
AST SERPL-CCNC: 24 IU/L (ref 0–40)
BILIRUB SERPL-MCNC: 0.9 MG/DL (ref 0–1.2)
BUN SERPL-MCNC: 20 MG/DL (ref 8–27)
BUN/CREAT SERPL: 16 (ref 10–24)
CALCIUM SERPL-MCNC: 10.1 MG/DL (ref 8.6–10.2)
CHLORIDE SERPL-SCNC: 98 MMOL/L (ref 96–106)
CHOLEST SERPL-MCNC: 151 MG/DL (ref 100–199)
CO2 SERPL-SCNC: 28 MMOL/L (ref 18–29)
CREAT SERPL-MCNC: 1.24 MG/DL (ref 0.76–1.27)
ERYTHROCYTE [DISTWIDTH] IN BLOOD BY AUTOMATED COUNT: 13.8 % (ref 12.3–15.4)
ERYTHROCYTE [SEDIMENTATION RATE] IN BLOOD BY WESTERGREN METHOD: 2 MM/HR (ref 0–30)
GLOBULIN SER CALC-MCNC: 2.4 G/DL (ref 1.5–4.5)
GLUCOSE SERPL-MCNC: 107 MG/DL (ref 65–99)
HCT VFR BLD AUTO: 49.3 % (ref 37.5–51)
HCV AB S/CO SERPL IA: <0.1 S/CO RATIO (ref 0–0.9)
HCV AB SERPL QL IA: NORMAL
HDLC SERPL-MCNC: 47 MG/DL
HGB BLD-MCNC: 16.1 G/DL (ref 12.6–17.7)
LDLC SERPL CALC-MCNC: 82 MG/DL (ref 0–99)
MAGNESIUM SERPL-MCNC: 1.8 MG/DL (ref 1.6–2.3)
MCH RBC QN AUTO: 28.4 PG (ref 26.6–33)
MCHC RBC AUTO-ENTMCNC: 32.7 G/DL (ref 31.5–35.7)
MCV RBC AUTO: 87 FL (ref 79–97)
PLATELET # BLD AUTO: 217 X10E3/UL (ref 150–379)
POTASSIUM SERPL-SCNC: 4 MMOL/L (ref 3.5–5.2)
PROT SERPL-MCNC: 6.7 G/DL (ref 6–8.5)
RBC # BLD AUTO: 5.66 X10E6/UL (ref 4.14–5.8)
RHEUMATOID FACT SERPL-ACNC: <10 IU/ML (ref 0–13.9)
SEE BELOW:, 164879: NORMAL
SODIUM SERPL-SCNC: 139 MMOL/L (ref 134–144)
TRIGL SERPL-MCNC: 109 MG/DL (ref 0–149)
VLDLC SERPL CALC-MCNC: 22 MG/DL (ref 5–40)
WBC # BLD AUTO: 7.4 X10E3/UL (ref 3.4–10.8)

## 2017-09-27 NOTE — PROGRESS NOTES
Spoke with patient , let patient know that patients  lab results were normal Cholesterol controlled. Negative for hepatitis C, negative for connective tissue disease, negative for rheumatoid arthritis. Normal blood counts. Normal kidney and liver tests. Not diabetic. Labs sent to ortho. Follow up annually and as needed. Asked patient if any questions or concerns at the time. Pt stated no.

## 2017-10-01 RX ORDER — ATENOLOL AND CHLORTHALIDONE TABLET 50; 25 MG/1; MG/1
TABLET ORAL
Qty: 90 TAB | Refills: 0 | Status: SHIPPED | OUTPATIENT
Start: 2017-10-01 | End: 2017-11-13 | Stop reason: ALTCHOICE

## 2017-10-01 RX ORDER — POTASSIUM CHLORIDE 750 MG/1
TABLET, EXTENDED RELEASE ORAL
Qty: 540 TAB | Refills: 0 | Status: SHIPPED | OUTPATIENT
Start: 2017-10-01 | End: 2017-11-13

## 2017-10-01 RX ORDER — TAMSULOSIN HYDROCHLORIDE 0.4 MG/1
CAPSULE ORAL
Qty: 90 CAP | Refills: 0 | Status: SHIPPED | OUTPATIENT
Start: 2017-10-01 | End: 2018-02-09 | Stop reason: SDUPTHER

## 2017-10-01 RX ORDER — PANTOPRAZOLE SODIUM 40 MG/1
TABLET, DELAYED RELEASE ORAL
Qty: 90 TAB | Refills: 0 | Status: SHIPPED | OUTPATIENT
Start: 2017-10-01 | End: 2018-01-27 | Stop reason: SDUPTHER

## 2017-11-15 ENCOUNTER — TELEPHONE (OUTPATIENT)
Dept: INTERNAL MEDICINE CLINIC | Age: 67
End: 2017-11-15

## 2017-11-15 ENCOUNTER — OFFICE VISIT (OUTPATIENT)
Dept: INTERNAL MEDICINE CLINIC | Age: 67
End: 2017-11-15

## 2017-11-15 VITALS
TEMPERATURE: 97.6 F | HEART RATE: 60 BPM | HEIGHT: 70 IN | WEIGHT: 200 LBS | SYSTOLIC BLOOD PRESSURE: 145 MMHG | BODY MASS INDEX: 28.63 KG/M2 | DIASTOLIC BLOOD PRESSURE: 71 MMHG | RESPIRATION RATE: 16 BRPM | OXYGEN SATURATION: 99 %

## 2017-11-15 DIAGNOSIS — J01.01 ACUTE RECURRENT MAXILLARY SINUSITIS: Primary | ICD-10-CM

## 2017-11-15 DIAGNOSIS — I10 ESSENTIAL HYPERTENSION: ICD-10-CM

## 2017-11-15 RX ORDER — PREDNISONE 20 MG/1
TABLET ORAL
Qty: 6 TAB | Refills: 0 | Status: SHIPPED | OUTPATIENT
Start: 2017-11-15 | End: 2017-12-26

## 2017-11-15 RX ORDER — AZITHROMYCIN 250 MG/1
250 TABLET, FILM COATED ORAL SEE ADMIN INSTRUCTIONS
Qty: 6 TAB | Refills: 0 | Status: SHIPPED | OUTPATIENT
Start: 2017-11-15 | End: 2017-11-20

## 2017-11-15 RX ORDER — CODEINE PHOSPHATE AND GUAIFENESIN 10; 100 MG/5ML; MG/5ML
5 SOLUTION ORAL
Qty: 120 ML | Refills: 0 | Status: SHIPPED | OUTPATIENT
Start: 2017-11-15 | End: 2017-12-26

## 2017-11-15 NOTE — TELEPHONE ENCOUNTER
Called patient. Two patient identifiers verified. Patient complaint of sinus and chest congestion, headache, cough, and yellow mucus production. Appointment scheduled for today at [phone] PM with Dr. Madeline Currie. Patient satisfied.

## 2017-11-15 NOTE — PROGRESS NOTES
HISTORY OF PRESENT ILLNESS  Livan Lau is a 69 y.o. male. HPI   Pt normally follows with Dr. Igor Schneider (PCP). Pt is here for acute care. Pt c/o cough with yellow sputum production (improving), mild wheezing, congestion and sinus pain (worsening) x 1 week   Pt states that the cough used to keep him up at night   However, his cough did not keep him up last night  Pt denies F/C  Pt has been taking mucinex and tylenol OTC  Pt states that his wife had similar sx, as well   Ordered zpak and 4 days worth of prednisone   Ordered cough syrup prn  If his sx worsen, he will call the clinic        Patient Active Problem List    Diagnosis Date Noted    Pain of right hand 09/20/2017    Carpal tunnel syndrome, left 02/16/2017    Primary osteoarthritis of first carpometacarpal joint of right hand 11/10/2016    Dupuytren's contracture of right hand 11/10/2016    Benign prostatic hyperplasia 10/25/2016    Mixed hyperlipidemia 10/25/2016    Gastroesophageal reflux disease without esophagitis 03/25/2016    Essential hypertension 03/25/2016     Current Outpatient Prescriptions   Medication Sig Dispense Refill    tamsulosin (FLOMAX) 0.4 mg capsule TAKE 1 CAPSULE BY MOUTH  DAILY 90 Cap 0    pantoprazole (PROTONIX) 40 mg tablet TAKE 1 TABLET BY MOUTH  DAILY 90 Tab 0    fluticasone (VERAMYST) 27.5 mcg/actuation nasal spray 2 Sprays by Both Nostrils route daily. 3 Bottle 3    fenofibrate (LOFIBRA) 160 mg tablet Take 1 Tab by mouth daily. 90 Tab 3    cholecalciferol (VITAMIN D3) 1,000 unit tablet Take 2,000 Units by mouth daily.  diclofenac (VOLTAREN) 1 % gel Apply two grams by topical route four times daily to the affected area (s).  fluorouracil (EFUDEX) 5 % chemo cream       oxyCODONE-acetaminophen (PERCOCET) 5-325 mg per tablet Take 1 Tab by mouth every four (4) hours as needed for Pain. Max Daily Amount: 6 Tabs.  30 Tab 0    calcium carbonate (TUMS) 200 mg calcium (500 mg) chew Take 2 Tabs by mouth four (4) times daily as needed for Other (perioral tingling). 30 Tab 0    fexofenadine (ALLEGRA) 180 mg tablet Take 180 mg by mouth daily as needed. Past Surgical History:   Procedure Laterality Date    HX COLONOSCOPY      HX HEENT  2003    lymph node bx in neck (benign)    HX LAP CHOLECYSTECTOMY  2000    HX ORTHOPAEDIC  2008    Back Surgery lumbar    HX ORTHOPAEDIC Right 2016    thumb and palm under ring finger    HX PARATHYROIDECTOMY      one right sided 2015. Lab Results  Component Value Date/Time   WBC 7.4 09/20/2017 10:23 AM   HGB 16.1 09/20/2017 10:23 AM   Hemoglobin (POC) 17.3 09/09/2015 07:08 AM   HCT 49.3 09/20/2017 10:23 AM   Hematocrit (POC) 51 09/09/2015 07:08 AM   PLATELET 487 94/18/0402 10:23 AM   MCV 87 09/20/2017 10:23 AM     Lab Results  Component Value Date/Time   Cholesterol, total 151 09/20/2017 10:23 AM   HDL Cholesterol 47 09/20/2017 10:23 AM   LDL, calculated 82 09/20/2017 10:23 AM   LDL-C, External 75.0 06/05/2015   Triglyceride 109 09/20/2017 10:23 AM     Lab Results  Component Value Date/Time   GFR est non-AA 59 09/20/2017 10:23 AM   GFRNA, POC 55 09/09/2015 07:08 AM   GFR est AA 68 09/20/2017 10:23 AM   GFRAA, POC >60 09/09/2015 07:08 AM   Creatinine 1.24 09/20/2017 10:23 AM   Creatinine (POC) 1.3 09/09/2015 07:08 AM   BUN 20 09/20/2017 10:23 AM   BUN (POC) 17 09/09/2015 07:08 AM   Sodium 139 09/20/2017 10:23 AM   Sodium (POC) 140 09/09/2015 07:08 AM   Potassium 4.0 09/20/2017 10:23 AM   Potassium (POC) 3.4 09/09/2015 07:08 AM   Chloride 98 09/20/2017 10:23 AM   Chloride (POC) 104 09/09/2015 07:08 AM   CO2 28 09/20/2017 10:23 AM   Magnesium 1.8 09/20/2017 10:23 AM   PTH, Intact 28 07/17/2015 11:43 AM          Review of Systems   Constitutional: Negative for chills and fever. HENT: Positive for congestion and sinus pain. Respiratory: Positive for cough, sputum production and wheezing. Negative for shortness of breath. Cardiovascular: Negative for chest pain. Physical Exam   Constitutional: He is oriented to person, place, and time. He appears well-developed and well-nourished. No distress. HENT:   Head: Normocephalic and atraumatic. Mouth/Throat: No oropharyngeal exudate. Significant erythema to BL TM, worse on R TM  Erythema to oropharynx   Eyes: Conjunctivae and EOM are normal. Right eye exhibits no discharge. Left eye exhibits no discharge. Neck: Normal range of motion. Neck supple. No thyromegaly present. Cardiovascular: Normal rate, regular rhythm and normal heart sounds. Exam reveals no gallop and no friction rub. No murmur heard. Pulmonary/Chest: Effort normal and breath sounds normal. No respiratory distress. He has no wheezes. He has no rales. He exhibits no tenderness. Musculoskeletal: Normal range of motion. He exhibits no edema, tenderness or deformity. Lymphadenopathy:     He has no cervical adenopathy. Neurological: He is alert and oriented to person, place, and time. He has normal reflexes. He exhibits normal muscle tone. Coordination normal.   Skin: Skin is warm and dry. No rash noted. He is not diaphoretic. No erythema. No pallor. Psychiatric: He has a normal mood and affect. His behavior is normal.       ASSESSMENT and PLAN    ICD-10-CM ICD-9-CM    1. Acute recurrent maxillary sinusitis    Will treat with zpak and short prednisone taper, tussionex for cough   J01.01 461.0    2. Essential hypertension    Borderline BP today, not feeling well, no change to meds   I10 401.9         Scribed by [address], as dictated by Dr. Mayra Enamorado. Current diagnosis and concerns discussed with pt at length. Pt understands risks and benefits or current treatment plan and medications, and accepts the treatment and medication with any possible risks. Pt asks appropriate questions, which were answered. Pt was instructed to call with any concerns or problems. This note will not be viewable in [address]

## 2017-11-15 NOTE — MR AVS SNAPSHOT
Visit Information Date & Time Provider Department Dept. Phone Encounter #  
 11/15/2017  2:15 PM Jolena Ganser, [address] [phone] [id number] Follow-up Instructions Return if symptoms worsen or fail to improve. Your Appointments 9/11/2018  8:30 AM  
ROUTINE CARE with Sharon Bee MD  
[facility] [address]) Appt Note: 6 month follow up; not needed; annual follow [address] [facility] [address] [address]  
[address] [facility] [address] [place] [address] Upcoming Health Maintenance Date Due FOBT Q 1 YEAR AGE 50-75 4/27/1998 GLAUCOMA SCREENING Q2Y 1/19/2018 MEDICARE YEARLY EXAM 9/21/2018 DTaP/Tdap/Td series (2 - Td) 9/20/2027 Allergies as of 11/15/2017  Review Complete On: 11/15/2017 By: Jolena Ganser, MD  
  
 Severity Noted Reaction Type Reactions Shellfish Derived High 02/14/2014    Anaphylaxis Ace Inhibitors  03/25/2016   Side Effect Cough Nsaids (Non-steroidal Anti-inflammatory Drug)  09/20/2017   Intolerance Other (comments) GI bleeding. Pcn [Penicillins]  02/14/2014    Rash Current Immunizations  Reviewed on 9/21/2017 Name Date DTP 5/4/2015 Influenza High Dose Vaccine PF 10/29/2015 Influenza Vaccine (Quad) PF 9/20/2017 Pneumococcal Conjugate (PCV-13) 5/4/2015 Pneumococcal Polysaccharide (PPSV-23) 1/16/2014 Tdap 9/20/2017 Zoster Vaccine, Live 9/20/2017 Not reviewed this visit You Were Diagnosed With   
  
 Codes Comments Acute recurrent maxillary sinusitis    -  Primary ICD-10-CM: J01.01 
ICD-9-CM: 461.0 Essential hypertension     ICD-10-CM: I10 
ICD-9-CM: 401.9 Vitals BP Pulse Temp Resp Height(growth percentile) Weight(growth percentile) 145/71 (BP 1 Location: Left arm, BP Patient Position: Sitting) 60 97.6 °F (36.4 °C) (Oral) 16 5' 10\" (1.778 m) 200 lb (90.7 kg) SpO2 BMI Smoking Status 99% 28.7 kg/m2 Former Smoker Vitals History BMI and BSA Data Body Mass Index Body Surface Area 28.7 kg/m 2 2.12 m 2 Preferred Pharmacy Pharmacy Name Phone [facility] [address], [address] [place] [address] Avenue [phone] Your Updated Medication List  
  
   
This list is accurate as of: 11/15/17  2:24 PM.  Always use your most recent med list.  
  
  
  
  
 azithromycin 250 mg tablet Commonly known as:  Charlotta Primrose Take 1 Tab by mouth See Admin Instructions for 5 days. calcium carbonate 200 mg calcium (500 mg) Chew Commonly known as:  TUMS Take 2 Tabs by mouth four (4) times daily as needed for Other (perioral tingling). diclofenac 1 % Gel Commonly known as:  VOLTAREN Apply two grams by topical route four times daily to the affected area (s). fenofibrate 160 mg tablet Commonly known as:  LOFIBRA Take 1 Tab by mouth daily. fexofenadine 180 mg tablet Commonly known as:  Reuben Ardara Take 180 mg by mouth daily as needed. fluorouracil 5 % chemo cream  
Commonly known as:  EFUDEX  
  
 fluticasone 27.5 mcg/actuation nasal spray Commonly known as:  VERAMYST  
2 Sprays by Both Nostrils route daily. guaiFENesin-codeine 100-10 mg/5 mL solution Commonly known as:  ROBITUSSIN AC Take 5 mL by mouth three (3) times daily as needed for Cough. Max Daily Amount: 15 mL. oxyCODONE-acetaminophen 5-325 mg per tablet Commonly known as:  PERCOCET Take 1 Tab by mouth every four (4) hours as needed for Pain. Max Daily Amount: 6 Tabs. pantoprazole 40 mg tablet Commonly known as:  PROTONIX  
TAKE 1 TABLET BY MOUTH  DAILY predniSONE 20 mg tablet Commonly known as:  DELTASONE  
40mg po daily for 2 days, 20mg po daily for 2days then stop  
  
 tamsulosin 0.4 mg capsule Commonly known as:  FLOMAX TAKE 1 CAPSULE BY MOUTH  DAILY  
  
 VITAMIN D3 1,000 unit tablet Generic drug:  cholecalciferol Take 2,000 Units by mouth daily. Prescriptions Printed Refills  
 guaiFENesin-codeine (ROBITUSSIN AC) 100-10 mg/5 mL solution 0 Sig: Take 5 mL by mouth three (3) times daily as needed for Cough. Max Daily Amount: 15 mL. Class: Print Route: Oral  
  
Prescriptions Sent to Pharmacy Refills  
 azithromycin (ZITHROMAX) 250 mg tablet 0 Sig: Take 1 Tab by mouth See Admin Instructions for 5 days. Class: Normal  
 Pharmacy: [facility] [address] [place] [address] Dr Santos, [address] [place] [address] Ph #: [phone] Route: Oral  
 predniSONE (DELTASONE) 20 mg tablet 0 Simg po daily for 2 days, 20mg po daily for 2days then stop Class: Normal  
 Pharmacy: [address] [place] [address] Dr Santos, [address] [place] [address] Ph #: [phone] Follow-up Instructions Return if symptoms worsen or fail to improve. To-Do List   
 2017 12:45 PM  
  Appointment with MASSAGE-JACKSON at [facility] OP PT ([phone]) [facility] SERVICES! Dear Pérez Philip: 
Thank you for requesting a [employer] account. Our records indicate that you already have an active [employer] account. You can access your account anytime at [email or web address] Did you know that you can access your hospital and ER discharge instructions at any time in [employer] You can also review all of your test results from your hospital stay or ER visit. Additional Information If you have questions, please visit the Frequently Asked Questions section of the [employer] website at [email or web address] Remember, [employer] is NOT to be used for urgent needs. For medical emergencies, dial 911. Now available from your iPhone and Android! Please provide this summary of care documentation to your next provider. Your primary care clinician is listed as Marina Plants.  If you have any questions after today's visit, please call [phone].

## 2017-11-15 NOTE — TELEPHONE ENCOUNTER
Pt would like to be seen today for a sinus infection.  ([phone]       Message received & copied from [facility]

## 2017-11-26 ENCOUNTER — PATIENT MESSAGE (OUTPATIENT)
Dept: INTERNAL MEDICINE CLINIC | Age: 67
End: 2017-11-26

## 2017-11-27 NOTE — TELEPHONE ENCOUNTER
From: Alicia Hatfield.   To: Fabiola Elena MD  Sent: 11/26/2017 2:59 PM EST  Subject: Update Medical Information    Below are my blood pressure reading from 11-14 to 11-26:    139/74  141/71  129/62  144/81  133/74  146/71  149/76  150/79

## 2017-11-28 RX ORDER — AMLODIPINE BESYLATE 5 MG/1
5 TABLET ORAL DAILY
Qty: 30 TAB | Refills: 2 | Status: SHIPPED | OUTPATIENT
Start: 2017-11-28 | End: 2018-01-27 | Stop reason: SDUPTHER

## 2017-11-29 NOTE — TELEPHONE ENCOUNTER
Regarding: Update Medical Information  Contact: [phone]  ----- Message from Ashley Schuler LPN sent at 02/16/9761 11:37 AM EST -----  Please review patient's BP readings     ----- Message from Della Oconnor to Vernell Terrell MD sent at 11/26/2017  2:59 PM -----   Below are my blood pressure reading from 11-14 to 11-26:    139/74  141/71  129/62  144/81  133/74  146/71  149/76  150/79

## 2017-11-29 NOTE — TELEPHONE ENCOUNTER
Blood pressure should be treated. I am sending in amlodipine 5mg once a day. Patient to send in readings at 2 weeks on medication.

## 2017-12-26 RX ORDER — DICLOFENAC SODIUM 10 MG/G
GEL TOPICAL
COMMUNITY
Start: 2017-09-19 | End: 2022-02-23

## 2017-12-26 RX ORDER — FLUOROURACIL 50 MG/G
CREAM TOPICAL
COMMUNITY
Start: 2017-08-30 | End: 2017-12-26

## 2017-12-26 NOTE — PERIOP NOTES
[facility]  Ambulatory Surgery Unit  Pre-operative Instructions for Endo Procedures    Procedure Date  1/5            Tentative Arrival Time 1200      1. On the day of your procedure, please report to the Ambulatory Surgery Unit Registration Desk and sign in at your designated time. The Ambulatory Surgery Unit is located in [facility] on the [facility] side of the [facility] across from the [address] [place] [address]. Please have all of your health insurance cards and a photo ID. 2. You must have someone with you to drive you home, as you should not drive a car for 24 hours following anesthesia. Please make arrangements for a responsible adult friend or family member to stay with you for at least the first 24 hours after your procedure. 3. Do not have anything to eat or drink (including water, gum, mints, coffee, juice) after midnight   none. This may not apply to medications prescribed by your physician. (Please note below the special instructions with medications to take the morning of your procedure.)    4. If applicable, follow the clear liquid diet and bowel prep instructions provided by your physician's office. If you do not have this information, or have any questions, please contact your physician's office. 5. We recommend you do not drink any alcoholic beverages for 24 hours before and after your procedure. 6. Contact your surgeons office for instructions on the following medications: non-steroidal anti-inflammatory drugs (i.e. Advil, Aleve), vitamins, and supplements. (Some surgeons will want you to stop these medications prior to surgery and others may allow you to take them)   **If you are currently taking Plavix, Coumadin, Aspirin and/or other blood-thinning agents, contact your surgeon for instructions. ** Your surgeon will partner with the physician prescribing these medications to determine if it is safe to stop or if you need to continue taking.  Please do not stop taking these medications without instructions from your surgeon. 7. In an effort to help prevent surgical site infection, we ask that you shower with an anti-bacterial soap (i.e. Dial or Safeguard) on the morning of your procedure. Do not apply any lotions, powders, or deodorants after showering. 8. Wear comfortable clothes. Wear glasses instead of contacts. Do not bring any jewelry or money (other than copays or fees as instructed). Do not wear make-up, particularly mascara, the morning of your procedure. Wear your hair loose or down, no ponytails, buns, silas pins or clips. All body piercings must be removed. 9. You should understand that if you do not follow these instructions your procedure may be cancelled. If your physical condition changes (i.e. fever, cold or flu) please contact your surgeon as soon as possible. 10. It is important that you be on time. If a situation occurs where you may be late, or if you have any questions or problems, please call [phone]. 11. Your procedure time may be subject to change. You will receive a phone call the day prior to confirm your arrival time. Special Instructions: Take all medications and inhalers, as prescribed, on the morning of surgery with a sip of water EXCEPT: none      I understand a pre-operative phone call will be made to verify my procedure time. In the event that I am not available, I give permission for a message to be left on my answering service and/or with another person?       yes    Reviewed by phone with pt, verbalized understanding     ___________________      ___________________      ___________________  (Signature of Patient)          (Witness)                   (Date and Time)

## 2018-01-04 ENCOUNTER — ANESTHESIA EVENT (OUTPATIENT)
Dept: SURGERY | Age: 68
End: 2018-01-04
Payer: MEDICARE

## 2018-01-05 ENCOUNTER — ANESTHESIA (OUTPATIENT)
Dept: SURGERY | Age: 68
End: 2018-01-05
Payer: MEDICARE

## 2018-01-05 ENCOUNTER — HOSPITAL ENCOUNTER (OUTPATIENT)
Age: 68
Setting detail: OUTPATIENT SURGERY
Discharge: HOME | End: 2018-01-05
Attending: SPECIALIST | Admitting: SPECIALIST
Payer: MEDICARE

## 2018-01-05 VITALS
WEIGHT: 194.25 LBS | HEIGHT: 70 IN | TEMPERATURE: 98.1 F | HEART RATE: 65 BPM | OXYGEN SATURATION: 100 % | BODY MASS INDEX: 27.81 KG/M2 | DIASTOLIC BLOOD PRESSURE: 84 MMHG | RESPIRATION RATE: 18 BRPM | SYSTOLIC BLOOD PRESSURE: 138 MMHG

## 2018-01-05 PROCEDURE — 76060000073 HC AMB SURG ANES FIRST 0.5 HR: Performed by: SPECIALIST

## 2018-01-05 PROCEDURE — 77030020255 HC SOL INJ LR 1000ML BG: Performed by: SPECIALIST

## 2018-01-05 PROCEDURE — 74011000250 HC RX REV CODE- 250

## 2018-01-05 PROCEDURE — 74011250636 HC RX REV CODE- 250/636: Performed by: ANESTHESIOLOGY

## 2018-01-05 PROCEDURE — 77030009426 HC FCPS BIOP ENDOSC BSC -B: Performed by: SPECIALIST

## 2018-01-05 PROCEDURE — 77030021352 HC CBL LD SYS DISP COVD -B: Performed by: SPECIALIST

## 2018-01-05 PROCEDURE — 76210000040 HC AMBSU PH I REC FIRST 0.5 HR: Performed by: SPECIALIST

## 2018-01-05 PROCEDURE — 76210000050 HC AMBSU PH II REC 0.5 TO 1 HR: Performed by: SPECIALIST

## 2018-01-05 PROCEDURE — 74011250636 HC RX REV CODE- 250/636

## 2018-01-05 PROCEDURE — 76030000002 HC AMB SURG OR TIME FIRST 0.: Performed by: SPECIALIST

## 2018-01-05 PROCEDURE — 77030019988 HC FCPS ENDOSC DISP BSC -B: Performed by: SPECIALIST

## 2018-01-05 PROCEDURE — 88305 TISSUE EXAM BY PATHOLOGIST: CPT | Performed by: SPECIALIST

## 2018-01-05 RX ORDER — SODIUM CHLORIDE 9 MG/ML
50 INJECTION, SOLUTION INTRAVENOUS CONTINUOUS
Status: DISCONTINUED | OUTPATIENT
Start: 2018-01-05 | End: 2018-01-05 | Stop reason: HOSPADM

## 2018-01-05 RX ORDER — SODIUM CHLORIDE 0.9 % (FLUSH) 0.9 %
5-10 SYRINGE (ML) INJECTION AS NEEDED
Status: DISCONTINUED | OUTPATIENT
Start: 2018-01-05 | End: 2018-01-05 | Stop reason: HOSPADM

## 2018-01-05 RX ORDER — SODIUM CHLORIDE 0.9 % (FLUSH) 0.9 %
5-10 SYRINGE (ML) INJECTION EVERY 8 HOURS
Status: DISCONTINUED | OUTPATIENT
Start: 2018-01-05 | End: 2018-01-05 | Stop reason: HOSPADM

## 2018-01-05 RX ORDER — DEXTROMETHORPHAN/PSEUDOEPHED 2.5-7.5/.8
1.2 DROPS ORAL
Status: DISCONTINUED | OUTPATIENT
Start: 2018-01-05 | End: 2018-01-05 | Stop reason: HOSPADM

## 2018-01-05 RX ORDER — FENTANYL CITRATE 50 UG/ML
25 INJECTION, SOLUTION INTRAMUSCULAR; INTRAVENOUS
Status: DISCONTINUED | OUTPATIENT
Start: 2018-01-05 | End: 2018-01-05 | Stop reason: HOSPADM

## 2018-01-05 RX ORDER — SODIUM CHLORIDE, SODIUM LACTATE, POTASSIUM CHLORIDE, CALCIUM CHLORIDE 600; 310; 30; 20 MG/100ML; MG/100ML; MG/100ML; MG/100ML
25 INJECTION, SOLUTION INTRAVENOUS CONTINUOUS
Status: DISCONTINUED | OUTPATIENT
Start: 2018-01-05 | End: 2018-01-05 | Stop reason: HOSPADM

## 2018-01-05 RX ORDER — LIDOCAINE HYDROCHLORIDE 10 MG/ML
0.1 INJECTION, SOLUTION EPIDURAL; INFILTRATION; INTRACAUDAL; PERINEURAL AS NEEDED
Status: DISCONTINUED | OUTPATIENT
Start: 2018-01-05 | End: 2018-01-05 | Stop reason: HOSPADM

## 2018-01-05 RX ORDER — DIPHENHYDRAMINE HYDROCHLORIDE 50 MG/ML
12.5 INJECTION, SOLUTION INTRAMUSCULAR; INTRAVENOUS AS NEEDED
Status: DISCONTINUED | OUTPATIENT
Start: 2018-01-05 | End: 2018-01-05 | Stop reason: HOSPADM

## 2018-01-05 RX ORDER — ONDANSETRON 2 MG/ML
4 INJECTION INTRAMUSCULAR; INTRAVENOUS AS NEEDED
Status: DISCONTINUED | OUTPATIENT
Start: 2018-01-05 | End: 2018-01-05 | Stop reason: HOSPADM

## 2018-01-05 RX ORDER — LIDOCAINE HYDROCHLORIDE 20 MG/ML
INJECTION, SOLUTION EPIDURAL; INFILTRATION; INTRACAUDAL; PERINEURAL AS NEEDED
Status: DISCONTINUED | OUTPATIENT
Start: 2018-01-05 | End: 2018-01-05 | Stop reason: HOSPADM

## 2018-01-05 RX ORDER — PROPOFOL 10 MG/ML
INJECTION, EMULSION INTRAVENOUS AS NEEDED
Status: DISCONTINUED | OUTPATIENT
Start: 2018-01-05 | End: 2018-01-05 | Stop reason: HOSPADM

## 2018-01-05 RX ADMIN — LIDOCAINE HYDROCHLORIDE 100 MG: 20 INJECTION, SOLUTION EPIDURAL; INFILTRATION; INTRACAUDAL; PERINEURAL at 13:21

## 2018-01-05 RX ADMIN — PROPOFOL 20 MG: 10 INJECTION, EMULSION INTRAVENOUS at 13:22

## 2018-01-05 RX ADMIN — SODIUM CHLORIDE, SODIUM LACTATE, POTASSIUM CHLORIDE, AND CALCIUM CHLORIDE 25 ML/HR: 600; 310; 30; 20 INJECTION, SOLUTION INTRAVENOUS at 12:22

## 2018-01-05 RX ADMIN — PROPOFOL 50 MG: 10 INJECTION, EMULSION INTRAVENOUS at 13:27

## 2018-01-05 RX ADMIN — PROPOFOL 150 MG: 10 INJECTION, EMULSION INTRAVENOUS at 13:21

## 2018-01-05 RX ADMIN — PROPOFOL 50 MG: 10 INJECTION, EMULSION INTRAVENOUS at 13:31

## 2018-01-05 RX ADMIN — PROPOFOL 50 MG: 10 INJECTION, EMULSION INTRAVENOUS at 13:29

## 2018-01-05 RX ADMIN — PROPOFOL 50 MG: 10 INJECTION, EMULSION INTRAVENOUS at 13:24

## 2018-01-05 NOTE — H&P
Gastroenterology Outpatient History and Physical    Patient: Ernestina Rivera. Physician: Flo Katz MD    Vital Signs: Blood pressure 149/81, pulse 70, temperature 98.7 °F (37.1 °C), resp. rate 16, height 5' 10\" (1.778 m), weight 88.1 kg (194 lb 4 oz), SpO2 98 %. Allergies: Allergies   Allergen Reactions    Shellfish Derived Anaphylaxis    Ace Inhibitors Cough    Nsaids (Non-Steroidal Anti-Inflammatory Drug) Other (comments)     GI bleeding.  Pcn [Penicillins] Rash       Chief Complaint: Diarrhea also CRC screening    History of Present Illness: 70 yo WM with urgency and chronic diarrhea for colonoscopy also for screening. Justification for Procedure: above    History:  Past Medical History:   Diagnosis Date    Arthritis     Enlarged prostate     GERD (gastroesophageal reflux disease)     High cholesterol     Hx of seasonal allergies     Hypertension       Past Surgical History:   Procedure Laterality Date    HX CARPAL TUNNEL RELEASE Right     HX COLONOSCOPY      HX HEENT  2003    lymph node bx in neck (benign)    HX LAP CHOLECYSTECTOMY  2000    HX ORTHOPAEDIC  2008    Back Surgery lumbar    HX ORTHOPAEDIC Right 2016    thumb and palm under ring finger    HX PARATHYROIDECTOMY      one right sided 2015.         Social History     Social History    Marital status:      Spouse name: N/A    Number of children: N/A    Years of education: N/A     Social History Main Topics    Smoking status: Former Smoker     Quit date: 9/3/1990    Smokeless tobacco: Never Used    Alcohol use 1.2 oz/week     1 Cans of beer, 1 Shots of liquor per week    Drug use: No    Sexual activity: Yes     Partners: Female     Birth control/ protection: None     Other Topics Concern    None     Social History Narrative      Family History   Problem Relation Age of Onset   Fausto Malloy Cancer Mother      lung    Cancer Father      lung       Medications:   Prior to Admission medications    Medication Sig Start Date End Date Taking? Authorizing Provider   diclofenac (VOLTAREN) 1 % gel Apply two grams by topical route four times daily to the affected area (s). PRN 9/19/17  Yes Historical Provider   amLODIPine (NORVASC) 5 mg tablet Take 1 Tab by mouth daily. For hypertension 11/28/17  Yes Edson Chamberlain MD   tamsulosin (FLOMAX) 0.4 mg capsule TAKE 1 CAPSULE BY MOUTH  DAILY 10/1/17  Yes Edson Chamberlain MD   pantoprazole (PROTONIX) 40 mg tablet TAKE 1 TABLET BY MOUTH  DAILY 10/1/17  Yes Edson Chamberlain MD   fluticasone (VERAMYST) 27.5 mcg/actuation nasal spray 2 Sprays by Both Nostrils route daily. 1/4/17  Yes Edson Chamberlain MD   fenofibrate (LOFIBRA) 160 mg tablet Take 1 Tab by mouth daily. 1/4/17  Yes Edson Chamberlain MD   cholecalciferol (VITAMIN D3) 1,000 unit tablet Take 2,000 Units by mouth daily. Yes Jarad Yen MD   calcium carbonate (TUMS) 200 mg calcium (500 mg) chew Take 2 Tabs by mouth four (4) times daily as needed for Other (perioral tingling). 9/9/15   Dina Cantor MD   fexofenadine (ALLEGRA) 180 mg tablet Take 180 mg by mouth daily as needed. Jarad Yen MD       Physical Exam:   General: alert, no distress   HEENT: Head: Normocephalic, no lesions, without obvious abnormality.    Heart: regular rate and rhythm, S1, S2 normal, no murmur, click, rub or gallop   Lungs: chest clear, no wheezing, rales, normal symmetric air entry   Abdominal: soft, NT/ND + BS   Neurological: Grossly normal   Extremities: extremities normal, atraumatic, no cyanosis or edema     Findings/Diagnosis: Screening; chronic diarrhea    Plan of Care/Planned Procedure: Colonoscopy with MAC    Signed By: Vicente Gonzales MD     January 5, 2018

## 2018-01-05 NOTE — ANESTHESIA PREPROCEDURE EVALUATION
Anesthetic History   No history of anesthetic complications            Review of Systems / Medical History  Patient summary reviewed, nursing notes reviewed and pertinent labs reviewed    Pulmonary  Within defined limits                 Neuro/Psych   Within defined limits           Cardiovascular    Hypertension              Exercise tolerance: >4 METS     GI/Hepatic/Renal     GERD: well controlled           Endo/Other        Arthritis     Other Findings              Physical Exam    Airway  Mallampati: I  TM Distance: 4 - 6 cm  Neck ROM: normal range of motion   Mouth opening: Normal     Cardiovascular    Rhythm: regular  Rate: normal      Pertinent negatives: No murmur   Dental    Dentition: Caps/crowns and Bridges     Pulmonary  Breath sounds clear to auscultation               Abdominal  GI exam deferred       Other Findings            Anesthetic Plan    ASA: 2  Anesthesia type: general and total IV anesthesia          Induction: Intravenous  Anesthetic plan and risks discussed with: Patient

## 2018-01-05 NOTE — PERIOP NOTES
1337 pt arrived via stretcher from OR procedure sedated and comfortable  1345 pt more alert and awake brought wife back. 1350 tolerating liquids and crackers  1355 Dr. Candice Das stopped by to visit with pt.   1400 IV removed and discharge instructions being gone over  1420 Pt. Alert. Denies pain or chill. Discharge instructions reviewed with caregiver and patient. Allowed and answered any and all questions. Tolerating PO fluids. Both state ready for discharge. Assisted pt with getting dressed.  Confirmed all belongings with patient

## 2018-01-05 NOTE — IP AVS SNAPSHOT
[facility] [address] [facility] 
[phone] Patient: Jagjit Xiong MRN: [id number] EDZ:5/03/8325 About your hospitalization You were admitted on:  January 5, 2018 You last received care in the:  [facility] PACU You were discharged on:  January 5, 2018 Why you were hospitalized Your primary diagnosis was:  Not on File Follow-up Information None Your Scheduled Appointments Friday January 12, 2018  7:30 AM EST  
MASSAGE 60 with MASSAGE-JACKSON  
[facility] OP PT ([address]) [address] [facility] [id number] [phone] Discharge Orders None A check sara indicates which time of day the medication should be taken. My Medications CONTINUE taking these medications Instructions Each Dose to Equal  
 Morning Noon Evening Bedtime  
 amLODIPine 5 mg tablet Commonly known as:  Dakota Vazquez Your last dose was: Your next dose is: Take 1 Tab by mouth daily. For hypertension 5 mg  
    
   
   
   
  
 calcium carbonate 200 mg calcium (500 mg) Chew Commonly known as:  TUMS Your last dose was: Your next dose is: Take 2 Tabs by mouth four (4) times daily as needed for Other (perioral tingling). 2 Tab  
    
   
   
   
  
 diclofenac 1 % Gel Commonly known as:  VOLTAREN Your last dose was: Your next dose is:    
   
   
 Apply two grams by topical route four times daily to the affected area (s). PRN  
     
   
   
   
  
 fenofibrate 160 mg tablet Commonly known as:  LOFIBRA Your last dose was: Your next dose is: Take 1 Tab by mouth daily. 160 mg  
    
   
   
   
  
 fexofenadine 180 mg tablet Commonly known as:  Abi Rae Your last dose was: Your next dose is: Take 180 mg by mouth daily as needed. 180 mg  
    
   
   
   
  
 fluticasone 27.5 mcg/actuation nasal spray Commonly known as:  VERAMYST Your last dose was: Your next dose is: 2 Sprays by Both Nostrils route daily. 2 Spray  
    
   
   
   
  
 pantoprazole 40 mg tablet Commonly known as:  PROTONIX Your last dose was: Your next dose is: TAKE 1 TABLET BY MOUTH  DAILY  
     
   
   
   
  
 tamsulosin 0.4 mg capsule Commonly known as:  FLOMAX Your last dose was: Your next dose is: TAKE 1 CAPSULE BY MOUTH  DAILY  
     
   
   
   
  
 VITAMIN D3 1,000 unit tablet Generic drug:  cholecalciferol Your last dose was: Your next dose is: Take 2,000 Units by mouth daily. 2000 Units Discharge Instructions Sera Lozada. 
[id number] 
1950 COLON DISCHARGE INSTRUCTIONS Discomfort: 
Redness at IV site- apply warm compress to area; if redness or soreness persist- contact your physician There may be a slight amount of blood passed from the rectum Gaseous discomfort- walking, belching will help relieve any discomfort You may not operate a vehicle for 12 hours You may not engage in an occupation involving machinery or appliances for rest of today You may not drink alcoholic beverages for at least 12 hours Avoid making any critical decisions for at least 24 hour DIET: 
 Regular diet.  however -  remember your colon is empty and a heavy meal will produce gas. Avoid these foods:  vegetables, fried / greasy foods, carbonated drinks for today. MEDICATIONS: 
  
 
 Regarding Aspirin or Nonsteroidal medications, please see below. ACTIVITY: 
You may resume your normal daily activities it is recommended that you spend the remainder of the day resting -  avoid any strenuous activity. CALL M.D. ANY SIGN OF: Increasing pain, nausea, vomiting Abdominal distension (swelling) New increased bleeding (oral or rectal) Fever (chills) Pain in chest area Bloody discharge from nose or mouth Shortness of breath Tylenol as needed for pain. Follow-up Instructions: 
 Call Dr. Tawana Cancino for results of procedure / biopsy in 4-5 days at telephone #  [phone] DISCHARGE SUMMARY from Nurse The following personal items collected during your admission are returned to you:  
Dental Appliance: Dental Appliances: None Vision: Visual Aid: Glasses, At home Hearing Aid:   
Jewelry:   
Clothing:   
Other Valuables:   
Valuables sent to safe:   
 
 
PATIENT INSTRUCTIONS: 
 
 
B - Balance E - Eyes F-  Face looks uneven A-  Arms unable to move or move even S-  Speech slurred or non-existent T-  Time-call 911 as soon as signs and symptoms begin-DO NOT go Back to bed or wait to see if you get better-TIME IS BRAIN. If you have not received your influenza and/or pneumococcal vaccine, please follow up with your primary care physician. The discharge information has been reviewed with the {PATIENT PARENT GUARDIAN:67118}. The {PATIENT PARENT GUARDIAN:63925} verbalized understanding. ACO Transitions of Care Introducing [employer] [address] Aaliyah Gopi offers a voluntary care coordination program to provide high quality service and care to [facility] fee-for-service beneficiaries. Krystal Humphrey was designed to help you enhance your health and well-being through the following services: ? Transitions of Care  support for individuals who are transitioning from one care setting to another (example: Hospital to home). ? Chronic and Complex Care Coordination  support for individuals and caregivers of those with serious or chronic illnesses or with more than one chronic (ongoing) condition and those who take a number of different medications. If you meet specific medical criteria, a [facility] [address] may call you directly to coordinate your care with your primary care physician and your other care providers. For questions about the [facility] programs, please, contact your physicians office. For general questions or additional information about Accountable Care Organizations: 
Please visit www.medicare.gov/acos. html or call 1-800-MEDICARE (9-942.789.6123) TTY users should call 9-752.109.6211. Introducing [facility]! Dear Abilio Lubin: 
Thank you for requesting a [employer] online [employer] account. Our records indicate that you already have an active [employer] account. You can access your account anytime at [email or web address] Did you know that you can access your hospital and ER discharge instructions at any time in [employer] club? You can also review all of your test results from your hospital stay or ER visit. Additional Information If you have questions, please visit the Frequently Asked Questions section of the [employer] website at [email or web address] Remember, [employer] is NOT to be used for urgent needs. For medical emergencies, dial 911. Now available from your iPhone and Android! Providers Seen During Your Hospitalization Provider Specialty Primary office phone Hessie Klinefelter, MD Gastroenterology [phone] Your Primary Care Physician (PCP) Primary Care Physician Office Phone Office Fax Gloria Ana [phone] You are allergic to the following Allergen Reactions Shellfish Derived Anaphylaxis Ace Inhibitors Cough Nsaids (Non-Steroidal Anti-Inflammatory Drug) Other (comments) GI bleeding. Pcn (Penicillins) Rash Recent Documentation Height Weight BMI Smoking Status 1.778 m 88.1 kg 27.87 kg/m2 Former Smoker Emergency Contacts Name Discharge Info Relation Home Work Mobile Gerber Melgar DISCHARGE CAREGIVER [3] Spouse [3] [phone] Patient Belongings The following personal items are in your possession at time of discharge: 
  Dental Appliances: None  Visual Aid: Glasses, At home Please provide this summary of care documentation to your next provider. Signatures-by signing, you are acknowledging that this After Visit Summary has been reviewed with you and you have received a copy.   
  
 
  
    
    
 Patient Signature: ____________________________________________________________ Date:  ____________________________________________________________  
  
Curlie Ruths Provider Signature:  ____________________________________________________________ Date:  ____________________________________________________________

## 2018-01-05 NOTE — ANESTHESIA POSTPROCEDURE EVALUATION
Post-Anesthesia Evaluation and Assessment    Patient: Barb Del Castillo MRN: [id number]  SSN: [id number]    YOB: 1950  Age: 69 y.o. Sex: male       Cardiovascular Function/Vital Signs  Visit Vitals    /84    Pulse 65    Temp 36.7 °C (98.1 °F)    Resp 18    Ht 5' 10\" (1.778 m)    Wt 88.1 kg (194 lb 4 oz)    SpO2 100%    BMI 27.87 kg/m2       Patient is status post general, total IV anesthesia anesthesia for Procedure(s):  COLONOSCOPY WITH BIOPSIES. Nausea/Vomiting: None    Postoperative hydration reviewed and adequate. Pain:  Pain Scale 1: Numeric (0 - 10) (01/05/18 1400)  Pain Intensity 1: 0 (01/05/18 1400)   Managed    Neurological Status:   Neuro (WDL): Within Defined Limits (01/05/18 1400)  Neuro  Neurologic State: Alert;Eyes open spontaneously (01/05/18 1400)  LUE Motor Response: Spontaneous ; Purposeful (01/05/18 1400)  LLE Motor Response: Spontaneous ; Purposeful (01/05/18 1400)  RUE Motor Response: Spontaneous ; Purposeful (01/05/18 1400)  RLE Motor Response: Spontaneous ; Purposeful (01/05/18 1400)   At baseline    Mental Status and Level of Consciousness: Arousable    Pulmonary Status:   O2 Device: Room air (01/05/18 1400)   Adequate oxygenation and airway patent    Complications related to anesthesia: None    Post-anesthesia assessment completed.  No concerns    Signed By: Shant Saravia MD     January 5, 2018

## 2018-01-05 NOTE — DISCHARGE INSTRUCTIONS
[facility]  [id number]  1950    COLON DISCHARGE INSTRUCTIONS  Discomfort:  Redness at IV site- apply warm compress to area; if redness or soreness persist- contact your physician  There may be a slight amount of blood passed from the rectum  Gaseous discomfort- walking, belching will help relieve any discomfort  You may not operate a vehicle for 12 hours  You may not engage in an occupation involving machinery or appliances for rest of today  You may not drink alcoholic beverages for at least 12 hours  Avoid making any critical decisions for at least 24 hour  DIET:   Regular diet. - however -  remember your colon is empty and a heavy meal will produce gas. Avoid these foods:  vegetables, fried / greasy foods, carbonated drinks for today. MEDICATIONS:        Regarding Aspirin or Nonsteroidal medications, please see below. ACTIVITY:  You may resume your normal daily activities it is recommended that you spend the remainder of the day resting -  avoid any strenuous activity. CALL M.D. ANY SIGN OF:  Increasing pain, nausea, vomiting  Abdominal distension (swelling)  New increased bleeding (oral or rectal)  Fever (chills)  Pain in chest area  Bloody discharge from nose or mouth  Shortness of breath    Tylenol as needed for pain. Follow-up Instructions:   Call Dr. Osvaldo Cuellar for results of procedure / biopsy in 4-5 days at telephone #  [phone]      Deanna Hernandez from Nurse    The following personal items collected during your admission are returned to you:   Dental Appliance: Dental Appliances: None  Vision: Visual Aid: Glasses, At home  Hearing Aid:    Jewelry:    Clothing:    Other Valuables:    Valuables sent to safe:        PATIENT INSTRUCTIONS:    After General Anesthesia or Intravenous Sedation, for 24 hours or while taking prescription Narcotics:        Someone should be with you for the next 24 hours.         For your own safety, a responsible adult must drive you home.  · Limit your activities  · Recommended activity: Rest today, up with assistance today. Do not climb stairs or shower unattended for the next 24 hours. · Please start with a soft bland diet and advance as tolerated (no nausea) to regular diet. · If you have a sore throat you should try the following: fluids, warm salt water gargles, or throat lozenges. If it does not improve after several days please follow up with your primary physician. · Do not drive and operate hazardous machinery  · Do not make important personal or business decisions  · Do  not drink alcoholic beverages  · If you have not urinated within 8 hours after discharge, please contact your surgeon on call. Report the following to your surgeon:  · Excessive pain, swelling, redness or odor of or around the surgical area  · Temperature over 100.5  · Nausea and vomiting lasting longer than 4 hours or if unable to take medications  · Any signs of decreased circulation or nerve impairment to extremity: change in color, persistent  numbness, tingling, coldness or increase pain      · You will receive a Post Operative Call from one of the Recovery Room Nurses on the day after your surgery to check on you. It is very important for us to know how you are recovering after your surgery. If you have an issue or need to speak with someone, please call your surgeon, do not wait for the post operative call. · You may receive an e-mail or letter in the mail from David regarding your experience with us in the Ambulatory Surgery Unit. Your feedback is valuable to us and we appreciate your participation in the survey. · If the above instructions are not adequate, please contact Mirza Geronimo RN, Brittani anesthesia Nurse Manager or our Anesthesiologist, at [phone]. If you are having problems after your surgery, call the physician at his office number. · We wish you a speedy recovery ?         What to do at Home:      *  Please give a list of your current medications to your Primary Care Provider. *  Please update this list whenever your medications are discontinued, doses are      changed, or new medications (including over-the-counter products) are added. *  Please carry medication information at all times in case of emergency situations. These are general instructions for a healthy lifestyle:    No smoking/ No tobacco products/ Avoid exposure to second hand smoke    Surgeon General's Warning:  Quitting smoking now greatly reduces serious risk to your health. Obesity, smoking, and sedentary lifestyle greatly increases your risk for illness    A healthy diet, regular physical exercise & weight monitoring are important for maintaining a healthy lifestyle    You may be retaining fluid if you have a history of heart failure or if you experience any of the following symptoms:  Weight gain of 3 pounds or more overnight or 5 pounds in a week, increased swelling in our hands or feet or shortness of breath while lying flat in bed. Please call your doctor as soon as you notice any of these symptoms; do not wait until your next office visit. Recognize signs and symptoms of STROKE:    B - Balance  E - Eyes    F-  Face looks uneven    A-  Arms unable to move or move even    S-  Speech slurred or non-existent    T-  Time-call 911 as soon as signs and symptoms begin-DO NOT go       Back to bed or wait to see if you get better-TIME IS BRAIN. If you have not received your influenza and/or pneumococcal vaccine, please follow up with your primary care physician. The discharge information has been reviewed with the patient and caregiver. The patient and caregiver verbalized understanding.

## 2018-01-05 NOTE — PERIOP NOTES
Daniel Diaz.  1950  [id number]    Situation:  Verbal report given from: [address] CRNA  Procedure: Procedure(s):  COLONOSCOPY WITH BIOPSIES    Background:    Preoperative diagnosis: DIARRHEA    Postoperative diagnosis: DIVERTICULOSIS, INTERNAL HEMORRHOIDS    :  Dr. Ilir Kelley    Assistant(s): Circ-1: Dominick Catherine RN  Circ-2: Wendy Toussaint; Ryan Hughes RN  Scrub Tech-1: Nina Becerra    Specimens:   ID Type Source Tests Collected by Time Destination   1 : Terminal Ileum biopsy Preservative Ileum, Terminal  Juni Felix MD 1/5/2018 1327 Pathology   2 : Random colon biopsy Preservative Colon  Juni Felix MD 1/5/2018 1329 Pathology       Assessment:  Intra-procedure medications         Anesthesia gave intra-procedure sedation and medications, see anesthesia flow sheet     Intravenous fluids: LR@ KVO     Vital signs stable       Recommendation:    Permission to share finding with family or friend yes

## 2018-01-05 NOTE — PROCEDURES
Colonoscopy Procedure Note    Indications:   Diarrhea, Screening colonoscopy    Referring Physician: Markus Orta MD  Anesthesia/Sedation: MAC anesthesia Propofol  Endoscopist:  Dr. Nickie Arroyo    Procedure in Detail:  Informed consent was obtained for the procedure, including sedation. Risks of perforation, hemorrhage, adverse drug reaction, and aspiration were discussed. The patient was placed in the left lateral decubitus position. Based on the pre-procedure assessment, including review of the patient's medical history, medications, allergies, and review of systems, he had been deemed to be an appropriate candidate for moderate sedation; he was therefore sedated with the medications listed above. The patient was monitored continuously with ECG tracing, pulse oximetry, blood pressure monitoring, and direct observations. A rectal examination was performed. The [id number] was inserted into the rectum and advanced under direct vision to the terminal ileum. The quality of the colonic preparation was adequate. A careful inspection was made as the colonoscope was withdrawn, including a retroflexed view of the rectum; findings and interventions are described below. Appropriate photodocumentation was obtained. Findings:     1. Scope advanced to the cecum and terminal ileum. 2.  Overall the preparation was adequate. 3.  Normal mucosa visualized throughout s/p bx of the Ileum (jar 1) and entire colon (Jar 2). 4.  Diffuse moderate severity diverticulosis of sigmoid colon. 5.  Grade 2 internal hemorrhoids. Therapies:  See above    Specimen:  Specimens were collected as described above and sent to pathology. Complications: None were encountered during the procedure. EBL: < 10 ml.     Recommendations:   -f/u with Dr. Osvaldo Cuellar  -repeat colonoscopy for screening purposes in 10 years  Signed By: Arvin Do MD                        January 5, 2018

## 2018-01-29 RX ORDER — FENOFIBRATE 160 MG/1
160 TABLET ORAL DAILY
Qty: 90 TAB | Refills: 1 | Status: SHIPPED | OUTPATIENT
Start: 2018-01-29 | End: 2018-08-11 | Stop reason: SDUPTHER

## 2018-01-29 RX ORDER — PANTOPRAZOLE SODIUM 40 MG/1
40 TABLET, DELAYED RELEASE ORAL DAILY
Qty: 90 TAB | Refills: 1 | Status: SHIPPED | OUTPATIENT
Start: 2018-01-29 | End: 2018-11-11 | Stop reason: SDUPTHER

## 2018-01-29 RX ORDER — AMLODIPINE BESYLATE 5 MG/1
5 TABLET ORAL DAILY
Qty: 90 TAB | Refills: 1 | Status: SHIPPED | OUTPATIENT
Start: 2018-01-29 | End: 2018-07-28 | Stop reason: SDUPTHER

## 2018-01-29 NOTE — TELEPHONE ENCOUNTER
From: Tasha Colón. To: Bayron Bridges MD  Sent: 1/27/2018 8:55 AM EST  Subject: Medication Renewal Request    Original authorizing provider: Bayron Bridges MD    [facility]. would like a refill of the following medications:  fenofibrate (LOFIBRA) 160 mg tablet [Cha Swann MD]  pantoprazole (PROTONIX) 40 mg tablet Bayron Bridges MD]  amLODIPine (NORVASC) 5 mg tablet Bayron Bridges MD]    Preferred pharmacy: [facility] [address] [place] [address] [address]    Comment:

## 2018-02-09 RX ORDER — TAMSULOSIN HYDROCHLORIDE 0.4 MG/1
CAPSULE ORAL
Qty: 90 CAP | Refills: 1 | Status: SHIPPED | OUTPATIENT
Start: 2018-02-09 | End: 2018-06-12 | Stop reason: SDUPTHER

## 2018-05-03 ENCOUNTER — HOSPITAL ENCOUNTER (OUTPATIENT)
Dept: MRI IMAGING | Age: 68
Discharge: HOME | End: 2018-05-03
Attending: OTOLARYNGOLOGY
Payer: MEDICARE

## 2018-05-03 DIAGNOSIS — H90.3 SENSORINEURAL HEARING LOSS, ASYMMETRICAL: ICD-10-CM

## 2018-05-03 LAB — CREAT BLD-MCNC: 1.1 MG/DL (ref 0.6–1.3)

## 2018-05-03 PROCEDURE — A9576 INJ PROHANCE MULTIPACK: HCPCS | Performed by: OTOLARYNGOLOGY

## 2018-05-03 PROCEDURE — 82565 ASSAY OF CREATININE: CPT

## 2018-05-03 PROCEDURE — 70553 MRI BRAIN STEM W/O & W/DYE: CPT

## 2018-05-03 PROCEDURE — 74011250636 HC RX REV CODE- 250/636: Performed by: OTOLARYNGOLOGY

## 2018-05-03 RX ADMIN — GADOTERIDOL 20 ML: 279.3 INJECTION, SOLUTION INTRAVENOUS at 15:54

## 2018-06-12 RX ORDER — TAMSULOSIN HYDROCHLORIDE 0.4 MG/1
0.4 CAPSULE ORAL DAILY
Qty: 90 CAP | Refills: 1 | Status: SHIPPED | OUTPATIENT
Start: 2018-06-12 | End: 2018-12-18 | Stop reason: SDUPTHER

## 2018-06-12 NOTE — TELEPHONE ENCOUNTER
From: Estelita Montiel. To: Rodri Pedroza MD  Sent: 6/12/2018 7:51 AM EDT  Subject: Medication Renewal Request    Original authorizing provider: Rodri Pedroza MD    [address] [facility]. would like a refill of the following medications:  tamsulosin (FLOMAX) 0.4 mg capsule Rodri Pedroza MD]    Preferred pharmacy: [address] [place] [address] [address], [address]    Comment:

## 2018-07-30 RX ORDER — AMLODIPINE BESYLATE 5 MG/1
5 TABLET ORAL DAILY
Qty: 90 TAB | Refills: 1 | Status: SHIPPED | OUTPATIENT
Start: 2018-07-30 | End: 2019-02-06 | Stop reason: SDUPTHER

## 2018-07-30 NOTE — TELEPHONE ENCOUNTER
From: Lali Rodriguez. To: Sharon Bee MD  Sent: 7/28/2018 8:19 AM EDT  Subject: Medication Renewal Request    Original authorizing provider: Sharon Bee MD    [address] [facility]. would like a refill of the following medications:  amLODIPine (NORVASC) 5 mg tablet Sharon Bee MD]    Preferred pharmacy: [facility] [address], [address]    Comment:

## 2018-08-12 RX ORDER — FENOFIBRATE 160 MG/1
TABLET ORAL
Qty: 90 TAB | Refills: 1 | Status: SHIPPED | OUTPATIENT
Start: 2018-08-12 | End: 2019-02-06 | Stop reason: SDUPTHER

## 2018-10-01 NOTE — PROGRESS NOTES
This is the Subsequent Medicare Annual Wellness Exam, performed 12 months or more after the Initial AWV or the last Subsequent AWV I have reviewed the patient's medical history in detail and updated the computerized patient record. History Past Medical History:  
Diagnosis Date  Arthritis  Enlarged prostate  GERD (gastroesophageal reflux disease)  High cholesterol  Hx of seasonal allergies  Hypertension Past Surgical History:  
Procedure Laterality Date  COLONOSCOPY N/A 1/5/2018 COLONOSCOPY WITH BIOPSIES performed by Lloyd Rojas MD at [facility] AMBULATORY OR  
 HX CARPAL TUNNEL RELEASE Right  HX COLONOSCOPY    
 HX HEENT  2003  
 lymph node bx in neck (benign)  HX LAP CHOLECYSTECTOMY  2000 [facility] ORTHOPAEDIC  2008 Back Surgery lumbar  HX ORTHOPAEDIC Right 2016  
 thumb and palm under ring finger  HX PARATHYROIDECTOMY    
 one right sided 2015. Current Outpatient Prescriptions Medication Sig Dispense Refill  fenofibrate (LOFIBRA) 160 mg tablet TAKE ONE TABLET BY MOUTH ONCE DAILY **TAKE  IN  PLACE  OF  LOFIBRA** 90 Tab 1  
 amLODIPine (NORVASC) 5 mg tablet Take 1 Tab by mouth daily. For hypertension 90 Tab 1  
 tamsulosin (FLOMAX) 0.4 mg capsule Take 1 Cap by mouth daily. 90 Cap 1  
 pantoprazole (PROTONIX) 40 mg tablet Take 1 Tab by mouth daily. 90 Tab 1  
 diclofenac (VOLTAREN) 1 % gel Apply two grams by topical route four times daily to the affected area (s). PRN    
 fluticasone (VERAMYST) 27.5 mcg/actuation nasal spray 2 Sprays by Both Nostrils route daily. 3 Bottle 3  
 calcium carbonate (TUMS) 200 mg calcium (500 mg) chew Take 2 Tabs by mouth four (4) times daily as needed for Other (perioral tingling). 30 Tab 0  
 fexofenadine (ALLEGRA) 180 mg tablet Take 180 mg by mouth daily as needed.  cholecalciferol (VITAMIN D3) 1,000 unit tablet Take 2,000 Units by mouth daily. Allergies Allergen Reactions  Shellfish Derived Anaphylaxis  Ace Inhibitors Cough  Nsaids (Non-Steroidal Anti-Inflammatory Drug) Other (comments) GI bleeding.  Pcn [Penicillins] Rash Family History Problem Relation Age of Onset  Cancer Mother   
  lung  Cancer Father   
  lung Social History Substance Use Topics  Smoking status: Former Smoker Quit date: 9/3/1990  Smokeless tobacco: Never Used  Alcohol use 1.2 oz/week 1 Cans of beer, 1 Shots of liquor per week Patient Active Problem List  
Diagnosis Code  Gastroesophageal reflux disease without esophagitis K21.9  Essential hypertension I10  Benign prostatic hyperplasia N40.0  Mixed hyperlipidemia E78.2  Primary osteoarthritis of first carpometacarpal joint of right hand M18.11  
 Dupuytren's contracture of right hand M72.0  Carpal tunnel syndrome, left G56.02  
 Pain of right hand M79.641 Depression Risk Factor Screening: PHQ over the last two weeks 9/20/2017 PHQ Not Done - Little interest or pleasure in doing things Not at all Feeling down, depressed, irritable, or hopeless Not at all Total Score PHQ 2 0 Alcohol Risk Factor Screening: You do not drink alcohol or very rarely. Functional Ability and Level of Safety:  
Hearing Loss Hearing is good. Activities of Daily Living The home contains: no safety equipment. Patient does total self care Fall Risk Fall Risk Assessment, last 12 mths 9/20/2017 Able to walk? Yes Fall in past 12 months? No  
 
 
Abuse Screen Patient is not abused Cognitive Screening Evaluation of Cognitive Function: 
Has your family/caregiver stated any concerns about your memory: no 
Normal 
 
Patient Care Team  
Patient Care Team: 
Danya Araujo MD as PCP - General (Internal Medicine) Lauren Gastelum MD as Surgeon (General Surgery) Staci Favre, MD (Ophthalmology) Joanne Melendez OD (Optometry) Jody High MD (Urology) Claire Schultz MD (Gastroenterology) Leia Infante MD as Physician (Hand Surgery) Leia Infante MD as Consulting Provider (Hand Surgery) Assessment/Plan Education and counseling provided: 
Are appropriate based on today's review and evaluation Diagnoses and all orders for this visit: 
 
1. Medicare annual wellness visit, subsequent Health Maintenance Due Topic Date Due  
 FOBT Q 1 YEAR AGE 50-75  04/27/1998  GLAUCOMA SCREENING Q2Y  01/19/2018  Shingrix Vaccine Age 50> (2 of 2) 07/10/2018  Influenza Age 5 to Adult  08/01/2018  MEDICARE YEARLY EXAM  09/21/2018

## 2018-10-01 NOTE — PATIENT INSTRUCTIONS
Medicare Wellness Visit, Male The best way to live healthy is to have a lifestyle where you eat a well-balanced diet, exercise regularly, limit alcohol use, and quit all forms of tobacco/nicotine, if applicable. Regular preventive services are another way to keep healthy. Preventive services (vaccines, screening tests, monitoring & exams) can help personalize your care plan, which helps you manage your own care. Screening tests can find health problems at the earliest stages, when they are easiest to treat. [address] Nguy[address]n follows the current, evidence-based guidelines published by the [facility] Santos Mart[facility] ([facility]) when recommending preventive services for our patients. Because we follow these guidelines, sometimes recommendations change over time as research supports it. (For example, a prostate screening blood test is no longer routinely recommended for men with no symptoms.) Of course, you and your doctor may decide to screen more often for some diseases, based on your risk and co-morbidities (chronic disease you are already diagnosed with). Preventive services for you include: - Medicare offers their members a free annual wellness visit, which is time for you and your primary care provider to discuss and plan for your preventive service needs. Take advantage of this benefit every year! 
-All adults over age 72 should receive the recommended pneumonia vaccines. Current USPSTF guidelines recommend a series of two vaccines for the best pneumonia protection.  
-All adults should have a flu vaccine yearly and an ECG.  All adults age 61 and older should receive a shingles vaccine once in their lifetime.   
-All adults age 38-68 who are overweight should have a diabetes screening test once every three years.  
-Other screening tests & preventive services for persons with diabetes include: an eye exam to screen for diabetic retinopathy, a kidney function test, a foot exam, and stricter control over your cholesterol.  
-Cardiovascular screening for adults with routine risk involves an electrocardiogram (ECG) at intervals determined by the provider.  
-Colorectal cancer screening should be done for adults age 54-65 with no increased risk factors for colorectal cancer. There are a number of acceptable methods of screening for this type of cancer. Each test has its own benefits and drawbacks. Discuss with your provider what is most appropriate for you during your annual wellness visit. The different tests include: colonoscopy (considered the best screening method), a fecal occult blood test, a fecal DNA test, and sigmoidoscopy. 
-All adults born between [facility] should be screened once for Hepatitis C. 
-An Abdominal Aortic Aneurysm (AAA) Screening is recommended for men age 73-68 who has ever smoked in their lifetime. Here is a list of your current Health Maintenance items (your personalized list of preventive services) with a due date: 
Health Maintenance Due Topic Date Due  Glaucoma Screening   01/19/2018  Flu Vaccine  08/01/2018 Body Mass Index: Care Instructions Your Care Instructions Body mass index (BMI) can help you see if your weight is raising your risk for health problems. It uses a formula to compare how much you weigh with how tall you are. · A BMI lower than 18.5 is considered underweight. · A BMI between 18.5 and 24.9 is considered healthy. · A BMI between 25 and 29.9 is considered overweight. A BMI of 30 or higher is considered obese. If your BMI is in the normal range, it means that you have a lower risk for weight-related health problems. If your BMI is in the overweight or obese range, you may be at increased risk for weight-related health problems, such as high blood pressure, heart disease, stroke, arthritis or joint pain, and diabetes.  If your BMI is in the underweight range, you may be at increased risk for health problems such as fatigue, lower protection (immunity) against illness, muscle loss, bone loss, hair loss, and hormone problems. BMI is just one measure of your risk for weight-related health problems. You may be at higher risk for health problems if you are not active, you eat an unhealthy diet, or you drink too much alcohol or use tobacco products. Follow-up care is a key part of your treatment and safety. Be sure to make and go to all appointments, and call your doctor if you are having problems. It's also a good idea to know your test results and keep a list of the medicines you take. How can you care for yourself at home? · Practice healthy eating habits. This includes eating plenty of fruits, vegetables, whole grains, lean protein, and low-fat dairy. · If your doctor recommends it, get more exercise. Walking is a good choice. Bit by bit, increase the amount you walk every day. Try for at least 30 minutes on most days of the week. · Do not smoke. Smoking can increase your risk for health problems. If you need help quitting, talk to your doctor about stop-smoking programs and medicines. These can increase your chances of quitting for good. · Limit alcohol to 2 drinks a day for men and 1 drink a day for women. Too much alcohol can cause health problems. If you have a BMI higher than 25 · Your doctor may do other tests to check your risk for weight-related health problems. This may include measuring the distance around your waist. A waist measurement of more than 40 inches in men or 35 inches in women can increase the risk of weight-related health problems. · Talk with your doctor about steps you can take to stay healthy or improve your health. You may need to make lifestyle changes to lose weight and stay healthy, such as changing your diet and getting regular exercise. If you have a BMI lower than 18.5 · Your doctor may do other tests to check your risk for health problems. · Talk with your doctor about steps you can take to stay healthy or improve your health. You may need to make lifestyle changes to gain or maintain weight and stay healthy, such as getting more healthy foods in your diet and doing exercises to build muscle. Where can you learn more? Go to http://darcie-linden.info/. Enter S176 in the search box to learn more about \"Body Mass Index: Care Instructions. \" Current as of: October 13, 2016 Content Version: 11.4 © 8534-5389 [employer]. Care instructions adapted under license by [employer] (which disclaims liability or warranty for this information). If you have questions about a medical condition or this instruction, always ask your healthcare professional. [address] any warranty or liability for your use of this information.

## 2018-10-01 NOTE — PROGRESS NOTES
Hillary Monsivais. is a 77 y.o. male who presents for follow up. Last seen Dept 2017 Treated for HTN. /69. HR 50-60. Off beta blocker. Is active regularly walking and weights. Water aerobics. 3 days a week. No dizziness or headaches. No decline in exercise tolerance.   
   
Sees urologist, Dr. Vito New, for BPH. Taking flomax, nocturia has increased. Has PSA with urologist, last was normal. Due for follow up in 2 weeks.  
   
No aspirin or NSAIDS due to GI bleeding, sees Dr. Morris Menjivar exam, DR. Ochoa, 6 weeks ago normal.   
 
Sees Dr. Razo Congress. On gabapentin 300mg TID for neck pain, better, has now reduced to 2 a day. Had NCV with DR. [facility]. Reports ear pressure, right ear pain on cruise. Saw ship's doctor and given antibiotic. A little better[facility]ak and mucinex. Not better, saw ENT, right ear wax removed. No fluid seen. Advised to use afrin for 3 days and continue nasacort.   
   
Still has issues with hand pain, seeing a new ortho. Dr. Jyoti Cuba. has trigger fingers in right 2nd and 3rd fingers. Last shots not helpful. Does some hand exercises. Given voltaren gel, stopped using it but it was helpful. Yvon Prabhakar He feels that the gabapentin has helped the hand symptoms.    
  
 
 
Past Medical History:  
Diagnosis Date  Arthritis  Enlarged prostate  GERD (gastroesophageal reflux disease)  High cholesterol  Hx of seasonal allergies  Hypertension Family History Problem Relation Age of Onset  Cancer Mother   
  lung  Cancer Father   
  lung Social History Social History  Marital status:  Spouse name: N/A  
 Number of children: N/A  
 Years of education: N/A Occupational History  Not on file. Social History Main Topics  Smoking status: Former Smoker Quit date: 9/3/1990  Smokeless tobacco: Never Used  Alcohol use 1.2 oz/week 1 Cans of beer, 1 Shots of liquor per week  Drug use: No  
 Sexual activity: Yes  
  Partners: Female Birth control/ protection: None Other Topics Concern  Not on file Social History Narrative Current Outpatient Prescriptions on File Prior to Visit Medication Sig Dispense Refill  fenofibrate (LOFIBRA) 160 mg tablet TAKE ONE TABLET BY MOUTH ONCE DAILY **TAKE  IN  PLACE  OF  LOFIBRA** 90 Tab 1  
 amLODIPine (NORVASC) 5 mg tablet Take 1 Tab by mouth daily. For hypertension 90 Tab 1  
 tamsulosin (FLOMAX) 0.4 mg capsule Take 1 Cap by mouth daily. 90 Cap 1  
 pantoprazole (PROTONIX) 40 mg tablet Take 1 Tab by mouth daily. 90 Tab 1  calcium carbonate (TUMS) 200 mg calcium (500 mg) chew Take 2 Tabs by mouth four (4) times daily as needed for Other (perioral tingling). 30 Tab 0  
 fexofenadine (ALLEGRA) 180 mg tablet Take 180 mg by mouth daily as needed.  cholecalciferol (VITAMIN D3) 1,000 unit tablet Take 2,000 Units by mouth daily.  diclofenac (VOLTAREN) 1 % gel Apply two grams by topical route four times daily to the affected area (s). PRN    
 fluticasone (VERAMYST) 27.5 mcg/actuation nasal spray 2 Sprays by Both Nostrils route daily. 3 Bottle 3 No current facility-administered medications on file prior to visit. Review of Systems Pertinent items are noted in HPI. Objective:  
 
Visit Vitals  /73 (BP 1 Location: Left arm, BP Patient Position: Sitting)  Pulse (!) 52  Temp 97.6 °F (36.4 °C) (Oral)  Resp 16  
 Ht 5' 10\" (1.778 m)  Wt 194 lb 3.2 oz (88.1 kg)  SpO2 100%  BMI 27.86 kg/m2 Gen: well appearing male HEENT:   PERRL,normal conjunctiva. External ear and canals normal, TMs no opacification or erythema,  OP no erythema, no exudates, MMM Neck:  Supple. Thyroid normal size, nontender, without nodules. No masses or LAD Resp:  No wheezing, no rhonchi, no rales. CV:  RRR, normal S1S2, no murmur. GI: soft, nontender, without masses. No hepatosplenomegaly. Extrem:  +2 pulses, no edema, warm distally Assessment/Plan: ICD-10-CM ICD-9-CM 1. Essential hypertension I11 820.8 METABOLIC PANEL, COMPREHENSIVE  
   CBC W/O DIFF  
   URINALYSIS W/ RFLX MICROSCOPIC 2. Medicare annual wellness visit, subsequent Z00.00 V70.0 3. Encounter for immunization Z23 V03.89 ADMIN INFLUENZA VIRUS VAC INFLUENZA VIRUS VAC QUAD,SPLIT,PRESV FREE SYRINGE IM 4. Benign prostatic hyperplasia, unspecified whether lower urinary tract symptoms present N40.0 600.00   
5. Mixed hyperlipidemia E78.2 272.2 LIPID PANEL 6. Drug-induced vitamin B12 deficiency anemia D51.9 281.1 VITAMIN B12  
 T50.905A E947.9 7. Congestion of both ears H93.8X3 388.8 montelukast (SINGULAIR) 10 mg tablet 8. Family history of diabetes mellitus Z83.3 V18.0 9. Elevated glucose R73.09 790.29 HEMOGLOBIN A1C W/O EAG 10. BMI 27.0-27.9,adult Z68.27 V85.23 Follow-up Disposition: 
Return for follow up pending labs and 6 months. Halina Hall MD 
 
 
Discussed the patient's BMI with him. The BMI follow up plan is as follows:  
 
dietary management education, guidance, and counseling 
encourage exercise 
monitor weight 
prescribed dietary intake An After Visit Summary was printed and given to the patient.

## 2018-10-02 ENCOUNTER — OFFICE VISIT (OUTPATIENT)
Dept: INTERNAL MEDICINE CLINIC | Age: 68
End: 2018-10-02

## 2018-10-02 VITALS
BODY MASS INDEX: 27.8 KG/M2 | DIASTOLIC BLOOD PRESSURE: 73 MMHG | OXYGEN SATURATION: 100 % | RESPIRATION RATE: 16 BRPM | HEIGHT: 70 IN | HEART RATE: 52 BPM | WEIGHT: 194.2 LBS | SYSTOLIC BLOOD PRESSURE: 153 MMHG | TEMPERATURE: 97.6 F

## 2018-10-02 DIAGNOSIS — Z83.3 FAMILY HISTORY OF DIABETES MELLITUS: ICD-10-CM

## 2018-10-02 DIAGNOSIS — R73.09 ELEVATED GLUCOSE: ICD-10-CM

## 2018-10-02 DIAGNOSIS — E78.2 MIXED HYPERLIPIDEMIA: ICD-10-CM

## 2018-10-02 DIAGNOSIS — T50.905A DRUG-INDUCED VITAMIN B12 DEFICIENCY ANEMIA: ICD-10-CM

## 2018-10-02 DIAGNOSIS — D51.9 DRUG-INDUCED VITAMIN B12 DEFICIENCY ANEMIA: ICD-10-CM

## 2018-10-02 DIAGNOSIS — Z23 ENCOUNTER FOR IMMUNIZATION: ICD-10-CM

## 2018-10-02 DIAGNOSIS — Z00.00 MEDICARE ANNUAL WELLNESS VISIT, SUBSEQUENT: ICD-10-CM

## 2018-10-02 DIAGNOSIS — I10 ESSENTIAL HYPERTENSION: Primary | ICD-10-CM

## 2018-10-02 DIAGNOSIS — H93.8X3 CONGESTION OF BOTH EARS: ICD-10-CM

## 2018-10-02 DIAGNOSIS — N40.0 BENIGN PROSTATIC HYPERPLASIA, UNSPECIFIED WHETHER LOWER URINARY TRACT SYMPTOMS PRESENT: ICD-10-CM

## 2018-10-02 RX ORDER — MONTELUKAST SODIUM 10 MG/1
10 TABLET ORAL DAILY
Qty: 30 TAB | Refills: 2 | Status: SHIPPED | OUTPATIENT
Start: 2018-10-02 | End: 2020-12-16

## 2018-10-02 RX ORDER — TRIAMCINOLONE ACETONIDE 55 UG/1
2 SPRAY, METERED NASAL DAILY
COMMUNITY
End: 2022-02-14

## 2018-10-02 RX ORDER — MINERAL OIL
ENEMA (ML) RECTAL
COMMUNITY
End: 2019-04-02 | Stop reason: SDUPTHER

## 2018-10-02 RX ORDER — ZOSTER VACCINE RECOMBINANT, ADJUVANTED 50 MCG/0.5
KIT INTRAMUSCULAR
COMMUNITY
Start: 2018-08-14 | End: 2019-10-18

## 2018-10-02 RX ORDER — GABAPENTIN 300 MG/1
300 CAPSULE ORAL 2 TIMES DAILY
COMMUNITY
Start: 2018-10-01

## 2018-10-02 NOTE — MR AVS SNAPSHOT
[address] [facility] 
[phone] Patient: Griffin Lindsey. MRN: [id number] Visit Information Date & Time Provider Department Dept. Phone Encounter #  
 10/2/2018  9:30 AM Edgardo Liu, [address] [id number] Follow-up Instructions Return for follow up pending labs and 6 months. Jeet Woodaly Upcoming Health Maintenance Date Due  
 GLAUCOMA SCREENING Q2Y 1/19/2018 Influenza Age 5 to Adult 8/1/2018 Shingrix Vaccine Age 50> (2 of 2) 11/1/2019* MEDICARE YEARLY EXAM 10/3/2019 DTaP/Tdap/Td series (2 - Td) 9/20/2027 COLONOSCOPY 1/5/2028 *Topic was postponed. The date shown is not the original due date. Allergies as of 10/2/2018  Review Complete On: 10/2/2018 By: Edgardo Liu MD  
  
 Severity Noted Reaction Type Reactions Shellfish Derived High 02/14/2014    Anaphylaxis Ace Inhibitors  03/25/2016   Side Effect Cough Nsaids (Non-steroidal Anti-inflammatory Drug)  09/20/2017   Intolerance Other (comments) GI bleeding. Pcn [Penicillins]  02/14/2014    Rash Current Immunizations  Reviewed on 10/2/2018 Name Date DTP 5/4/2015 Influenza High Dose Vaccine PF 10/29/2015 Influenza Vaccine (Quad) PF  Incomplete, 9/20/2017 Pneumococcal Conjugate (PCV-13) 5/4/2015 Pneumococcal Polysaccharide (PPSV-23) 1/16/2014 Tdap 9/20/2017 Zoster Recombinant 9/1/2018, 5/15/2018 Zoster Vaccine, Live 9/20/2017 Reviewed by Edgardo Liu MD on 10/2/2018 at 10:11 AM  
 Reviewed by Edgardo Liu MD on 10/2/2018 at 10:12 AM  
You Were Diagnosed With   
  
 Codes Comments Essential hypertension    -  Primary ICD-10-CM: I10 
ICD-9-CM: 401.9 Medicare annual wellness visit, subsequent     ICD-10-CM: Z00.00 ICD-9-CM: V70.0 Encounter for immunization     ICD-10-CM: K36 ICD-9-CM: V03.89   
 Benign prostatic hyperplasia, unspecified whether lower urinary tract symptoms present     ICD-10-CM: N40.0 ICD-9-CM: 600.00 Mixed hyperlipidemia     ICD-10-CM: E78.2 ICD-9-CM: 272.2 Drug-induced vitamin B12 deficiency anemia     ICD-10-CM: D51.9, T50.905A ICD-9-CM: 281.1, E947.9 Congestion of both ears     ICD-10-CM: H93.8X3 ICD-9-CM: 388. 8 Family history of diabetes mellitus     ICD-10-CM: Z83.3 ICD-9-CM: V18.0 Elevated glucose     ICD-10-CM: R73.09 
ICD-9-CM: 790.29 Vitals BP Pulse Temp Resp Height(growth percentile) Weight(growth percentile) 153/73 (BP 1 Location: Left arm, BP Patient Position: Sitting) (!) 52 97.6 °F (36.4 °C) (Oral) 16 5' 10\" (1.778 m) 194 lb 3.2 oz (88.1 kg) SpO2 BMI Smoking Status 100% 27.86 kg/m2 Former Smoker BMI and BSA Data Body Mass Index Body Surface Area  
 27.86 kg/m 2 2.09 m 2 Preferred Pharmacy Pharmacy Name Phone [facility] [address], [address] [place] [address] Avenue [phone] Your Updated Medication List  
  
   
This list is accurate as of 10/2/18 10:24 AM.  Always use your most recent med list. amLODIPine 5 mg tablet Commonly known as:  Toshia Leavens Take 1 Tab by mouth daily. For hypertension  
  
 calcium carbonate 200 mg calcium (500 mg) Chew Commonly known as:  TUMS Take 2 Tabs by mouth four (4) times daily as needed for Other (perioral tingling). diclofenac 1 % Gel Commonly known as:  VOLTAREN Apply two grams by topical route four times daily to the affected area (s). PRN  
  
 fenofibrate 160 mg tablet Commonly known as:  LOFIBRA TAKE ONE TABLET BY MOUTH ONCE DAILY **TAKE  IN  PLACE  OF  LOFIBRA**  
  
 * fexofenadine 180 mg tablet Commonly known as:  Bro Phillips Take 180 mg by mouth daily as needed. * fexofenadine 180 mg tablet Commonly known as:  Bro Phillips Take  by mouth. fluticasone 27.5 mcg/actuation nasal spray Commonly known as:  VERAMYST  
2 Sprays by Both Nostrils route daily. gabapentin 300 mg capsule Commonly known as:  NEURONTIN  
TAKE 1 CAPSULE BY MOUTH THREE TIMES DAILY  
  
 montelukast 10 mg tablet Commonly known as:  SINGULAIR Take 1 Tab by mouth daily. NASACORT 55 mcg nasal inhaler Generic drug:  triamcinolone 2 Sprays daily. pantoprazole 40 mg tablet Commonly known as:  PROTONIX Take 1 Tab by mouth daily. SHINGRIX (PF) 50 mcg/0.5 mL Susr injection Generic drug:  varicella-zoster recombinant (PF)  
  
 tamsulosin 0.4 mg capsule Commonly known as:  FLOMAX Take 1 Cap by mouth daily. VITAMIN D3 1,000 unit tablet Generic drug:  cholecalciferol Take 2,000 Units by mouth daily. * Notice: This list has 2 medication(s) that are the same as other medications prescribed for you. Read the directions carefully, and ask your doctor or other care provider to review them with you. Prescriptions Sent to Pharmacy Refills  
 montelukast (SINGULAIR) 10 mg tablet 2 Sig: Take 1 Tab by mouth daily. Class: Normal  
 Pharmacy: [address] [place] [address] Dr Santos, [address] [place] [address] Ph #: [phone] Route: Oral  
  
We Performed the Following ADMIN INFLUENZA VIRUS VAC [ HCPCS] CBC W/O DIFF [12110 CPT(R)] HEMOGLOBIN A1C W/O EAG [89964 CPT(R)] INFLUENZA VIRUS VAC QUAD,SPLIT,PRESV FREE SYRINGE IM [id number] CPT(R)] LIPID PANEL [71001 CPT(R)] METABOLIC PANEL, COMPREHENSIVE [04179 CPT(R)] URINALYSIS W/ RFLX MICROSCOPIC [31251 CPT(R)] VITAMIN B12 [id number] CPT(R)] Follow-up Instructions Return for follow up pending labs and 6 months. .  
  
To-Do List   
 10/17/2018 12:30 PM  
  Appointment with [facility] at [facility] ([phone]) Patient Instructions Medicare Wellness Visit, Male The best way to live healthy is to have a lifestyle where you eat a well-balanced diet, exercise regularly, limit alcohol use, and quit all forms of tobacco/nicotine, if applicable. Regular preventive services are another way to keep healthy. Preventive services (vaccines, screening tests, monitoring & exams) can help personalize your care plan, which helps you manage your own care. Screening tests can find health problems at the earliest stages, when they are easiest to treat. [address] Aaliyah Nguyễn follows the current, evidence-based guidelines published by the [facility] Santos Mart[facility] ([facility]) when recommending preventive services for our patients. Because we follow these guidelines, sometimes recommendations change over time as research supports it. (For example, a prostate screening blood test is no longer routinely recommended for men with no symptoms.) Of course, you and your doctor may decide to screen more often for some diseases, based on your risk and co-morbidities (chronic disease you are already diagnosed with). Preventive services for you include: - Medicare offers their members a free annual wellness visit, which is time for you and your primary care provider to discuss and plan for your preventive service needs. Take advantage of this benefit every year! 
-All adults over age 72 should receive the recommended pneumonia vaccines. Current USPSTF guidelines recommend a series of two vaccines for the best pneumonia protection.  
-All adults should have a flu vaccine yearly and an ECG. All adults age 61 and older should receive a shingles vaccine once in their lifetime.   
-All adults age 38-68 who are overweight should have a diabetes screening test once every three years.  
-Other screening tests & preventive services for persons with diabetes include: an eye exam to screen for diabetic retinopathy, a kidney function test, a foot exam, and stricter control over your cholesterol. -Cardiovascular screening for adults with routine risk involves an electrocardiogram (ECG) at intervals determined by the provider.  
-Colorectal cancer screening should be done for adults age 54-65 with no increased risk factors for colorectal cancer. There are a number of acceptable methods of screening for this type of cancer. Each test has its own benefits and drawbacks. Discuss with your provider what is most appropriate for you during your annual wellness visit. The different tests include: colonoscopy (considered the best screening method), a fecal occult blood test, a fecal DNA test, and sigmoidoscopy. 
-All adults born between [facility] should be screened once for Hepatitis C. 
-An Abdominal Aortic Aneurysm (AAA) Screening is recommended for men age 73-68 who has ever smoked in their lifetime. Here is a list of your current Health Maintenance items (your personalized list of preventive services) with a due date: 
Health Maintenance Due Topic Date Due  Glaucoma Screening   01/19/2018  Flu Vaccine  08/01/2018 [facility]! Dear [address] [place] [address]: 
Thank you for requesting a [facility] account. Our records indicate that you already have an active [facility] account. You can access your account anytime at [email or web address] Did you know that you can access your hospital and ER discharge instructions at any time in [facility]? You can also review all of your test results from your hospital stay or ER visit. Additional Information If you have questions, please visit the Frequently Asked Questions section of the [employer] [facility] website at [email or web address] Remember, [facility] is NOT to be used for urgent needs. For medical emergencies, dial 911. Now available from your iPhone and Android! Please provide this summary of care documentation to your next provider. Your primary care clinician is listed as Gustavo Lakhani. If you have any questions after today's visit, please call [phone].

## 2018-10-02 NOTE — PROGRESS NOTES
Reviewed record in preparation for visit and have obtained necessary documentation. Identified pt with two pt identifiers(name and ). Chief Complaint Patient presents with Latosha Annual Wellness Visit Pt Fasting  Medication Evaluation  Immunization/Injection Flu shot Health Maintenance Due Topic Date Due  Stool testing for trace blood  1998  Glaucoma Screening   2018  Shingles Vaccine (2 of 2) 07/10/2018  Flu Vaccine  2018 Mr. Douglas has a reminder for a \"due or due soon\" health maintenance. I have asked that he discuss this further with his primary care provider for follow-up on this health maintenance. Coordination of Care Questionnaire: 
:  
 
1) Have you been to an emergency room, urgent care clinic since your last visit? yes Urgent Care bilateral ear infection Hospitalized since your last visit? no          
 
2) Have you seen or consulted any other health care providers outside of [address] [place] [address] since your last visit? no  (Include any pap smears or colon screenings in this section.) 3) In the event something were to happen to you and you were unable to speak on your behalf, do you have an Advance Directive/ Living Will in place stating your wishes? YES Do you have an Advance Directive on file? yes 4) Are you interested in receiving information on Advance Directives? NO Patient is accompanied by self I have received verbal consent from [employer]. to discuss any/all medical information while they are present in the room.

## 2018-10-03 LAB
ALBUMIN SERPL-MCNC: 4.8 G/DL (ref 3.5–4.8)
ALBUMIN/GLOB SERPL: 2.2 {RATIO} (ref 1.2–2.2)
ALP SERPL-CCNC: 42 IU/L (ref 39–117)
ALT SERPL-CCNC: 18 IU/L (ref 0–44)
APPEARANCE UR: CLEAR
AST SERPL-CCNC: 19 IU/L (ref 0–40)
BILIRUB SERPL-MCNC: 1.4 MG/DL (ref 0–1.2)
BILIRUB UR QL STRIP: NEGATIVE
BUN SERPL-MCNC: 18 MG/DL (ref 8–27)
BUN/CREAT SERPL: 16 (ref 10–24)
CALCIUM SERPL-MCNC: 10.1 MG/DL (ref 8.6–10.2)
CHLORIDE SERPL-SCNC: 101 MMOL/L (ref 96–106)
CHOLEST SERPL-MCNC: 134 MG/DL (ref 100–199)
CO2 SERPL-SCNC: 24 MMOL/L (ref 20–29)
COLOR UR: YELLOW
CREAT SERPL-MCNC: 1.1 MG/DL (ref 0.76–1.27)
ERYTHROCYTE [DISTWIDTH] IN BLOOD BY AUTOMATED COUNT: 14.4 % (ref 12.3–15.4)
GLOBULIN SER CALC-MCNC: 2.2 G/DL (ref 1.5–4.5)
GLUCOSE SERPL-MCNC: 87 MG/DL (ref 65–99)
GLUCOSE UR QL: NEGATIVE
HBA1C MFR BLD: 5.1 % (ref 4.8–5.6)
HCT VFR BLD AUTO: 45.8 % (ref 37.5–51)
HDLC SERPL-MCNC: 55 MG/DL
HGB BLD-MCNC: 15.5 G/DL (ref 13–17.7)
HGB UR QL STRIP: NEGATIVE
KETONES UR QL STRIP: NEGATIVE
LDLC SERPL CALC-MCNC: 64 MG/DL (ref 0–99)
LEUKOCYTE ESTERASE UR QL STRIP: NEGATIVE
MCH RBC QN AUTO: 28.1 PG (ref 26.6–33)
MCHC RBC AUTO-ENTMCNC: 33.8 G/DL (ref 31.5–35.7)
MCV RBC AUTO: 83 FL (ref 79–97)
MICRO URNS: NORMAL
NITRITE UR QL STRIP: NEGATIVE
PH UR STRIP: 5.5 [PH] (ref 5–7.5)
PLATELET # BLD AUTO: 242 X10E3/UL (ref 150–379)
POTASSIUM SERPL-SCNC: 4.3 MMOL/L (ref 3.5–5.2)
PROT SERPL-MCNC: 7 G/DL (ref 6–8.5)
PROT UR QL STRIP: NORMAL
RBC # BLD AUTO: 5.52 X10E6/UL (ref 4.14–5.8)
SODIUM SERPL-SCNC: 141 MMOL/L (ref 134–144)
SP GR UR: 1.03 (ref 1–1.03)
TRIGL SERPL-MCNC: 74 MG/DL (ref 0–149)
UROBILINOGEN UR STRIP-MCNC: 0.2 MG/DL (ref 0.2–1)
VIT B12 SERPL-MCNC: 344 PG/ML (ref 232–1245)
VLDLC SERPL CALC-MCNC: 15 MG/DL (ref 5–40)
WBC # BLD AUTO: 8.3 X10E3/UL (ref 3.4–10.8)

## 2018-11-11 RX ORDER — PANTOPRAZOLE SODIUM 40 MG/1
TABLET, DELAYED RELEASE ORAL
Qty: 90 TAB | Refills: 1 | Status: SHIPPED | OUTPATIENT
Start: 2018-11-11 | End: 2019-05-19 | Stop reason: SDUPTHER

## 2018-12-18 RX ORDER — TAMSULOSIN HYDROCHLORIDE 0.4 MG/1
CAPSULE ORAL
Qty: 90 CAP | Refills: 1 | Status: SHIPPED | OUTPATIENT
Start: 2018-12-18 | End: 2019-06-29 | Stop reason: SDUPTHER

## 2019-02-07 RX ORDER — AMLODIPINE BESYLATE 5 MG/1
TABLET ORAL
Qty: 90 TAB | Refills: 1 | Status: SHIPPED | OUTPATIENT
Start: 2019-02-07 | End: 2019-04-02 | Stop reason: ALTCHOICE

## 2019-02-07 RX ORDER — FENOFIBRATE 160 MG/1
TABLET ORAL
Qty: 90 TAB | Refills: 1 | Status: SHIPPED | OUTPATIENT
Start: 2019-02-07 | End: 2019-08-03 | Stop reason: SDUPTHER

## 2019-04-02 ENCOUNTER — OFFICE VISIT (OUTPATIENT)
Dept: INTERNAL MEDICINE CLINIC | Age: 69
End: 2019-04-02

## 2019-04-02 VITALS
RESPIRATION RATE: 16 BRPM | OXYGEN SATURATION: 100 % | DIASTOLIC BLOOD PRESSURE: 72 MMHG | WEIGHT: 197.2 LBS | HEIGHT: 70 IN | TEMPERATURE: 97.5 F | SYSTOLIC BLOOD PRESSURE: 148 MMHG | BODY MASS INDEX: 28.23 KG/M2 | HEART RATE: 55 BPM

## 2019-04-02 DIAGNOSIS — I10 ESSENTIAL HYPERTENSION: Primary | ICD-10-CM

## 2019-04-02 DIAGNOSIS — N40.0 BENIGN PROSTATIC HYPERPLASIA, UNSPECIFIED WHETHER LOWER URINARY TRACT SYMPTOMS PRESENT: ICD-10-CM

## 2019-04-02 DIAGNOSIS — E78.2 MIXED HYPERLIPIDEMIA: ICD-10-CM

## 2019-04-02 DIAGNOSIS — K21.9 GASTROESOPHAGEAL REFLUX DISEASE WITHOUT ESOPHAGITIS: ICD-10-CM

## 2019-04-02 RX ORDER — AMLODIPINE BESYLATE 10 MG/1
10 TABLET ORAL DAILY
Qty: 90 TAB | Refills: 3 | Status: SHIPPED | OUTPATIENT
Start: 2019-04-02 | End: 2020-04-14

## 2019-04-02 NOTE — PROGRESS NOTES
Kiersten Smalls. is a 77 y.o. adult who presents for follow up. Seen in October. Concerned regarding BP.131-160/67-78. Taking amlodipine 5mg once a day. BP at ortho appt in February 160/72. Prior atenolol/chlorthalidone. Had low potassium, low resting heart rate. Prior ace inhibitor with cough. Lower back pain is better with exercise. Right knee pain intermittent. Saw Dr. Mateusz Gustafson. On gabapentin 300mg BID for neck pain. Had hand OA. Had right thumb surgery for OA. Dr. Dara Waite. Sees Dr. Irene Payan now. Doing hand exercises. Is participating in water aerobics, walking and weights. No sx with exertion. Watches salt in diet. Has hearing aides. On singulair and allegra for allergies.      
No aspirin or NSAIDS due to GI bleeding, sees Dr. Chantal Diaz.   
  
 
Past Medical History:  
Diagnosis Date  Arthritis  Enlarged prostate  GERD (gastroesophageal reflux disease)  High cholesterol  Hx of seasonal allergies  Hypertension Family History Problem Relation Age of Onset  Cancer Mother   
     lung  Cancer Father   
     lung Social History Socioeconomic History  Marital status:  Spouse name: Not on file  Number of children: Not on file  Years of education: Not on file  Highest education level: Not on file Occupational History  Not on file Social Needs  Financial resource strain: Not on file  Food insecurity:  
  Worry: Not on file Inability: Not on file  Transportation needs:  
  Medical: Not on file Non-medical: Not on file Tobacco Use  Smoking status: Former Smoker Last attempt to quit: 9/3/1990 Years since quittin.6  Smokeless tobacco: Never Used Substance and Sexual Activity  Alcohol use: Yes Alcohol/week: 1.2 oz Types: 1 Cans of beer, 1 Shots of liquor per week  Drug use: No  
 Sexual activity: Yes  
  Partners: Female Birth control/protection: None Lifestyle  Physical activity:  
  Days per week: Not on file Minutes per session: Not on file  Stress: Not on file Relationships  Social connections:  
  Talks on phone: Not on file Gets together: Not on file Attends Hinduism service: Not on file Active member of club or organization: Not on file Attends meetings of clubs or organizations: Not on file Relationship status: Not on file  Intimate partner violence:  
  Fear of current or ex partner: Not on file Emotionally abused: Not on file Physically abused: Not on file Forced sexual activity: Not on file Other Topics Concern  Not on file Social History Narrative  Not on file Current Outpatient Medications on File Prior to Visit Medication Sig Dispense Refill  fenofibrate (LOFIBRA) 160 mg tablet TAKE 1 TABLET BY MOUTH ONCE DAILY **TAKE  IN  PLACE  OF  LOFIBRA** 90 Tab 1  
 tamsulosin (FLOMAX) 0.4 mg capsule TAKE 1 CAPSULE BY MOUTH ONCE DAILY 90 Cap 1  
 pantoprazole (PROTONIX) 40 mg tablet TAKE ONE TABLET BY MOUTH ONCE DAILY 90 Tab 1  
 gabapentin (NEURONTIN) 300 mg capsule TAKE 1 CAPSULE BY MOUTH THREE TIMES DAILY  triamcinolone (NASACORT) 55 mcg nasal inhaler 2 Sprays daily.  montelukast (SINGULAIR) 10 mg tablet Take 1 Tab by mouth daily. 30 Tab 2  
 diclofenac (VOLTAREN) 1 % gel Apply two grams by topical route four times daily to the affected area (s). PRN    
 fluticasone (VERAMYST) 27.5 mcg/actuation nasal spray 2 Sprays by Both Nostrils route daily. 3 Bottle 3  
 fexofenadine (ALLEGRA) 180 mg tablet Take 180 mg by mouth daily as needed.  cholecalciferol (VITAMIN D3) 1,000 unit tablet Take 2,000 Units by mouth daily.  SHINGRIX, PF, 50 mcg/0.5 mL susr injection  calcium carbonate (TUMS) 200 mg calcium (500 mg) chew Take 2 Tabs by mouth four (4) times daily as needed for Other (perioral tingling).  30 Tab 0  
 
 No current facility-administered medications on file prior to visit. Review of Systems Pertinent items are noted in HPI. Objective:  
 
Visit Vitals /72 Pulse (!) 55 Temp 97.5 °F (36.4 °C) (Oral) Resp 16 Ht 5' 10\" (1.778 m) Wt 197 lb 3.2 oz (89.4 kg) SpO2 100% BMI 28.30 kg/m² Gen: well appearing adult HEENT:   PERRL,normal conjunctiva. External ear and canals normal, TMs no opacification or erythema,  OP no erythema, no exudates, MMM Neck:  Supple. Thyroid normal size, nontender, without nodules. No masses or LAD Resp:  No wheezing, no rhonchi, no rales. CV:  RRR, normal S1S2, no murmur. GI: soft, nontender, without masses. No hepatosplenomegaly. Extrem:  +2 pulses, no edema, warm distally Assessment/Plan: ICD-10-CM ICD-9-CM 1. Essential hypertension I10 401.9 amLODIPine (NORVASC) 10 mg tablet 2. Gastroesophageal reflux disease without esophagitis K21.9 530.81   
3. Benign prostatic hyperplasia, unspecified whether lower urinary tract symptoms present N40.0 600.00   
4. Mixed hyperlipidemia E78.2 272.2 Amlodipine increase to 10mg once a day. Use 2 of the 5mg until gone. New rx for 10mg tablet. Regular cardio exercise and low salt diet. Call or message with blood pressure after 2 weeks on medication. Goal is less than 130/85 on average. Follow up:  6 months Andrez Fish MD 
 
Discussed the patient's BMI with her. The BMI follow up plan is as follows:  
 
dietary management education, guidance, and counseling 
encourage exercise 
monitor weight 
prescribed dietary intake An After Visit Summary was printed and given to the patient.

## 2019-04-02 NOTE — PATIENT INSTRUCTIONS
Amlodipine increase to 10mg once a day. Use 2 of the 5mg until gone. New rx for 10mg tablet. Regular cardio exercise and low salt diet. Call or message with blood pressure after 2 weeks on medication. Goal is less than 130/85 on average. Body Mass Index: Care Instructions Your Care Instructions Body mass index (BMI) can help you see if your weight is raising your risk for health problems. It uses a formula to compare how much you weigh with how tall you are. · A BMI lower than 18.5 is considered underweight. · A BMI between 18.5 and 24.9 is considered healthy. · A BMI between 25 and 29.9 is considered overweight. A BMI of 30 or higher is considered obese. If your BMI is in the normal range, it means that you have a lower risk for weight-related health problems. If your BMI is in the overweight or obese range, you may be at increased risk for weight-related health problems, such as high blood pressure, heart disease, stroke, arthritis or joint pain, and diabetes. If your BMI is in the underweight range, you may be at increased risk for health problems such as fatigue, lower protection (immunity) against illness, muscle loss, bone loss, hair loss, and hormone problems. BMI is just one measure of your risk for weight-related health problems. You may be at higher risk for health problems if you are not active, you eat an unhealthy diet, or you drink too much alcohol or use tobacco products. Follow-up care is a key part of your treatment and safety. Be sure to make and go to all appointments, and call your doctor if you are having problems. It's also a good idea to know your test results and keep a list of the medicines you take. How can you care for yourself at home? · Practice healthy eating habits. This includes eating plenty of fruits, vegetables, whole grains, lean protein, and low-fat dairy. · If your doctor recommends it, get more exercise.  Walking is a good choice. Bit by bit, increase the amount you walk every day. Try for at least 30 minutes on most days of the week. · Do not smoke. Smoking can increase your risk for health problems. If you need help quitting, talk to your doctor about stop-smoking programs and medicines. These can increase your chances of quitting for good. · Limit alcohol to 2 drinks a day for men and 1 drink a day for women. Too much alcohol can cause health problems. If you have a BMI higher than 25 · Your doctor may do other tests to check your risk for weight-related health problems. This may include measuring the distance around your waist. A waist measurement of more than 40 inches in men or 35 inches in women can increase the risk of weight-related health problems. · Talk with your doctor about steps you can take to stay healthy or improve your health. You may need to make lifestyle changes to lose weight and stay healthy, such as changing your diet and getting regular exercise. If you have a BMI lower than 18.5 · Your doctor may do other tests to check your risk for health problems. · Talk with your doctor about steps you can take to stay healthy or improve your health. You may need to make lifestyle changes to gain or maintain weight and stay healthy, such as getting more healthy foods in your diet and doing exercises to build muscle. Where can you learn more? Go to http://darcie-linden.info/. Enter S176 in the search box to learn more about \"Body Mass Index: Care Instructions. \" Current as of: October 13, 2016 Content Version: 11.4 © 3144-2375 [employer]wise, [employer]. Care instructions adapted under license by [employer] (which disclaims liability or warranty for this information). If you have questions about a medical condition or this instruction, always ask your healthcare professional. [place] [address] any warranty or liability for your use of this information.

## 2019-04-02 NOTE — PROGRESS NOTES
Reviewed record in preparation for visit and have obtained necessary documentation. Identified pt with two pt identifiers(name and ). Chief Complaint Patient presents with  Hypertension 6 month follow up  Medication Evaluation Pt fasting Health Maintenance Due Topic Date Due  Mammogram  1998 Ms. Douglas has a reminder for a \"due or due soon\" health maintenance. I have asked that she discuss this further with her primary care provider for follow-up on this health maintenance. Coordination of Care Questionnaire: 
:  
 
1) Have you been to an emergency room, urgent care clinic since your last visit? yes Hospitalized since your last visit? no          
 
2) Have you seen or consulted any other health care providers outside of [address] [place] [address] since your last visit? no  (Include any pap smears or colon screenings in this section.) 3) In the event something were to happen to you and you were unable to speak on your behalf, do you have an Advance Directive/ Living Will in place stating your wishes? YES Do you have an Advance Directive on file? yes 4) Are you interested in receiving information on Advance Directives? NO Patient is accompanied by self I have received verbal consent from Dhruv Harrell. to discuss any/all medical information while they are present in the room.

## 2019-05-19 RX ORDER — PANTOPRAZOLE SODIUM 40 MG/1
TABLET, DELAYED RELEASE ORAL
Qty: 90 TAB | Refills: 1 | Status: SHIPPED | OUTPATIENT
Start: 2019-05-19 | End: 2019-11-09 | Stop reason: SDUPTHER

## 2019-07-01 RX ORDER — TAMSULOSIN HYDROCHLORIDE 0.4 MG/1
CAPSULE ORAL
Qty: 90 CAP | Refills: 1 | Status: SHIPPED | OUTPATIENT
Start: 2019-07-01 | End: 2019-11-24 | Stop reason: SDUPTHER

## 2019-08-04 RX ORDER — FENOFIBRATE 160 MG/1
TABLET ORAL
Qty: 90 TAB | Refills: 1 | Status: SHIPPED | OUTPATIENT
Start: 2019-08-04 | End: 2019-09-04

## 2019-08-25 ENCOUNTER — APPOINTMENT (OUTPATIENT)
Dept: CT IMAGING | Age: 69
DRG: 069 | End: 2019-08-25
Attending: EMERGENCY MEDICINE
Payer: MEDICARE

## 2019-08-25 ENCOUNTER — HOSPITAL ENCOUNTER (INPATIENT)
Age: 69
LOS: 1 days | Discharge: HOME | DRG: 069 | End: 2019-08-26
Attending: EMERGENCY MEDICINE | Admitting: INTERNAL MEDICINE
Payer: MEDICARE

## 2019-08-25 ENCOUNTER — APPOINTMENT (OUTPATIENT)
Dept: MRI IMAGING | Age: 69
DRG: 069 | End: 2019-08-25
Attending: INTERNAL MEDICINE
Payer: MEDICARE

## 2019-08-25 ENCOUNTER — APPOINTMENT (OUTPATIENT)
Dept: GENERAL RADIOLOGY | Age: 69
DRG: 069 | End: 2019-08-25
Attending: INTERNAL MEDICINE
Payer: MEDICARE

## 2019-08-25 DIAGNOSIS — G45.9 TIA (TRANSIENT ISCHEMIC ATTACK): ICD-10-CM

## 2019-08-25 DIAGNOSIS — H53.2 DOUBLE VISION: Primary | ICD-10-CM

## 2019-08-25 DIAGNOSIS — E78.5 DYSLIPIDEMIA, GOAL LDL BELOW 70: ICD-10-CM

## 2019-08-25 DIAGNOSIS — R42 VERTIGO: ICD-10-CM

## 2019-08-25 DIAGNOSIS — I10 BENIGN ESSENTIAL HTN: ICD-10-CM

## 2019-08-25 PROBLEM — I63.9 CVA (CEREBRAL VASCULAR ACCIDENT) (HCC): Status: ACTIVE | Noted: 2019-08-25

## 2019-08-25 LAB
AMPHET UR QL SCN: NEGATIVE
ANION GAP SERPL CALC-SCNC: 6 MMOL/L (ref 5–15)
APPEARANCE UR: CLEAR
ATRIAL RATE: 54 BPM
BACTERIA URNS QL MICRO: NEGATIVE /HPF
BARBITURATES UR QL SCN: NEGATIVE
BASOPHILS # BLD: 0 K/UL (ref 0–0.1)
BASOPHILS NFR BLD: 0 % (ref 0–1)
BENZODIAZ UR QL: NEGATIVE
BILIRUB UR QL: NEGATIVE
BUN SERPL-MCNC: 17 MG/DL (ref 6–20)
BUN/CREAT SERPL: 17 (ref 12–20)
CALCIUM SERPL-MCNC: 9.6 MG/DL (ref 8.5–10.1)
CALCULATED P AXIS, ECG09: 35 DEGREES
CALCULATED R AXIS, ECG10: -42 DEGREES
CALCULATED T AXIS, ECG11: -9 DEGREES
CANNABINOIDS UR QL SCN: NEGATIVE
CHLORIDE SERPL-SCNC: 107 MMOL/L (ref 97–108)
CO2 SERPL-SCNC: 28 MMOL/L (ref 21–32)
COCAINE UR QL SCN: NEGATIVE
COLOR UR: NORMAL
CREAT SERPL-MCNC: 1.03 MG/DL (ref 0.7–1.3)
DIAGNOSIS, 93000: NORMAL
DIFFERENTIAL METHOD BLD: ABNORMAL
DRUG SCRN COMMENT,DRGCM: NORMAL
EOSINOPHIL # BLD: 0.1 K/UL (ref 0–0.4)
EOSINOPHIL NFR BLD: 1 % (ref 0–7)
EPITH CASTS URNS QL MICRO: NORMAL /LPF
ERYTHROCYTE [DISTWIDTH] IN BLOOD BY AUTOMATED COUNT: 13.6 % (ref 11.5–14.5)
GLUCOSE BLD STRIP.AUTO-MCNC: 123 MG/DL (ref 65–100)
GLUCOSE SERPL-MCNC: 124 MG/DL (ref 65–100)
GLUCOSE UR STRIP.AUTO-MCNC: NEGATIVE MG/DL
HCT VFR BLD AUTO: 49.9 % (ref 36.6–50.3)
HGB BLD-MCNC: 15.9 G/DL (ref 12.1–17)
HGB UR QL STRIP: NEGATIVE
HYALINE CASTS URNS QL MICRO: NORMAL /LPF (ref 0–5)
IMM GRANULOCYTES # BLD AUTO: 0.1 K/UL (ref 0–0.04)
IMM GRANULOCYTES NFR BLD AUTO: 1 % (ref 0–0.5)
INR PPP: 1 (ref 0.9–1.1)
KETONES UR QL STRIP.AUTO: NEGATIVE MG/DL
LEUKOCYTE ESTERASE UR QL STRIP.AUTO: NEGATIVE
LYMPHOCYTES # BLD: 3.7 K/UL (ref 0.8–3.5)
LYMPHOCYTES NFR BLD: 38 % (ref 12–49)
MCH RBC QN AUTO: 28.2 PG (ref 26–34)
MCHC RBC AUTO-ENTMCNC: 31.9 G/DL (ref 30–36.5)
MCV RBC AUTO: 88.6 FL (ref 80–99)
METHADONE UR QL: NEGATIVE
MONOCYTES # BLD: 0.8 K/UL (ref 0–1)
MONOCYTES NFR BLD: 9 % (ref 5–13)
NEUTS SEG # BLD: 4.9 K/UL (ref 1.8–8)
NEUTS SEG NFR BLD: 51 % (ref 32–75)
NITRITE UR QL STRIP.AUTO: NEGATIVE
NRBC # BLD: 0 K/UL (ref 0–0.01)
NRBC BLD-RTO: 0 PER 100 WBC
OPIATES UR QL: NEGATIVE
P-R INTERVAL, ECG05: 174 MS
PCP UR QL: NEGATIVE
PH UR STRIP: 7.5 [PH] (ref 5–8)
PLATELET # BLD AUTO: 218 K/UL (ref 150–400)
PMV BLD AUTO: 11 FL (ref 8.9–12.9)
POTASSIUM SERPL-SCNC: 3.3 MMOL/L (ref 3.5–5.1)
PROT UR STRIP-MCNC: NEGATIVE MG/DL
PROTHROMBIN TIME: 10.3 SEC (ref 9–11.1)
Q-T INTERVAL, ECG07: 496 MS
QRS DURATION, ECG06: 154 MS
QTC CALCULATION (BEZET), ECG08: 470 MS
RBC # BLD AUTO: 5.63 M/UL (ref 4.1–5.7)
RBC #/AREA URNS HPF: NORMAL /HPF (ref 0–5)
SERVICE CMNT-IMP: ABNORMAL
SODIUM SERPL-SCNC: 141 MMOL/L (ref 136–145)
SP GR UR REFRACTOMETRY: 1.01 (ref 1–1.03)
TROPONIN I SERPL-MCNC: <0.05 NG/ML
TROPONIN I SERPL-MCNC: <0.05 NG/ML
UA: UC IF INDICATED,UAUC: NORMAL
UROBILINOGEN UR QL STRIP.AUTO: 0.2 EU/DL (ref 0.2–1)
VENTRICULAR RATE, ECG03: 54 BPM
WBC # BLD AUTO: 9.7 K/UL (ref 4.1–11.1)
WBC URNS QL MICRO: NORMAL /HPF (ref 0–4)

## 2019-08-25 PROCEDURE — 93005 ELECTROCARDIOGRAM TRACING: CPT

## 2019-08-25 PROCEDURE — 94762 N-INVAS EAR/PLS OXIMTRY CONT: CPT

## 2019-08-25 PROCEDURE — 70551 MRI BRAIN STEM W/O DYE: CPT

## 2019-08-25 PROCEDURE — 74011636320 HC RX REV CODE- 636/320: Performed by: EMERGENCY MEDICINE

## 2019-08-25 PROCEDURE — 65660000000 HC RM CCU STEPDOWN

## 2019-08-25 PROCEDURE — 36415 COLL VENOUS BLD VENIPUNCTURE: CPT

## 2019-08-25 PROCEDURE — 70450 CT HEAD/BRAIN W/O DYE: CPT

## 2019-08-25 PROCEDURE — 74011250637 HC RX REV CODE- 250/637: Performed by: EMERGENCY MEDICINE

## 2019-08-25 PROCEDURE — 74011250637 HC RX REV CODE- 250/637: Performed by: INTERNAL MEDICINE

## 2019-08-25 PROCEDURE — 74011250636 HC RX REV CODE- 250/636

## 2019-08-25 PROCEDURE — 65390000012 HC CONDITION CODE 44 OBSERVATION

## 2019-08-25 PROCEDURE — 80307 DRUG TEST PRSMV CHEM ANLYZR: CPT

## 2019-08-25 PROCEDURE — 99285 EMERGENCY DEPT VISIT HI MDM: CPT

## 2019-08-25 PROCEDURE — 81001 URINALYSIS AUTO W/SCOPE: CPT

## 2019-08-25 PROCEDURE — 65270000029 HC RM PRIVATE

## 2019-08-25 PROCEDURE — 85025 COMPLETE CBC W/AUTO DIFF WBC: CPT

## 2019-08-25 PROCEDURE — 74011250636 HC RX REV CODE- 250/636: Performed by: EMERGENCY MEDICINE

## 2019-08-25 PROCEDURE — 71045 X-RAY EXAM CHEST 1 VIEW: CPT

## 2019-08-25 PROCEDURE — 70496 CT ANGIOGRAPHY HEAD: CPT

## 2019-08-25 PROCEDURE — 84484 ASSAY OF TROPONIN QUANT: CPT

## 2019-08-25 PROCEDURE — 80048 BASIC METABOLIC PNL TOTAL CA: CPT

## 2019-08-25 PROCEDURE — 85610 PROTHROMBIN TIME: CPT

## 2019-08-25 PROCEDURE — 74011250636 HC RX REV CODE- 250/636: Performed by: INTERNAL MEDICINE

## 2019-08-25 PROCEDURE — 82962 GLUCOSE BLOOD TEST: CPT

## 2019-08-25 PROCEDURE — 96374 THER/PROPH/DIAG INJ IV PUSH: CPT

## 2019-08-25 PROCEDURE — 97112 NEUROMUSCULAR REEDUCATION: CPT

## 2019-08-25 PROCEDURE — 97165 OT EVAL LOW COMPLEX 30 MIN: CPT

## 2019-08-25 RX ORDER — ONDANSETRON 2 MG/ML
4 INJECTION INTRAMUSCULAR; INTRAVENOUS
Status: DISCONTINUED | OUTPATIENT
Start: 2019-08-25 | End: 2019-08-26 | Stop reason: HOSPADM

## 2019-08-25 RX ORDER — PANTOPRAZOLE SODIUM 40 MG/1
40 TABLET, DELAYED RELEASE ORAL
Status: DISCONTINUED | OUTPATIENT
Start: 2019-08-25 | End: 2019-08-26 | Stop reason: HOSPADM

## 2019-08-25 RX ORDER — SODIUM CHLORIDE 0.9 % (FLUSH) 0.9 %
5-40 SYRINGE (ML) INJECTION AS NEEDED
Status: DISCONTINUED | OUTPATIENT
Start: 2019-08-25 | End: 2019-08-26 | Stop reason: HOSPADM

## 2019-08-25 RX ORDER — ENOXAPARIN SODIUM 100 MG/ML
40 INJECTION SUBCUTANEOUS EVERY 24 HOURS
Status: DISCONTINUED | OUTPATIENT
Start: 2019-08-25 | End: 2019-08-26 | Stop reason: HOSPADM

## 2019-08-25 RX ORDER — CALCIUM CARBONATE 200(500)MG
400 TABLET,CHEWABLE ORAL
Status: DISCONTINUED | OUTPATIENT
Start: 2019-08-25 | End: 2019-08-26 | Stop reason: HOSPADM

## 2019-08-25 RX ORDER — MECLIZINE HCL 12.5 MG 12.5 MG/1
25 TABLET ORAL
Status: COMPLETED | OUTPATIENT
Start: 2019-08-25 | End: 2019-08-25

## 2019-08-25 RX ORDER — LABETALOL HYDROCHLORIDE 5 MG/ML
5 INJECTION, SOLUTION INTRAVENOUS
Status: DISCONTINUED | OUTPATIENT
Start: 2019-08-25 | End: 2019-08-26 | Stop reason: HOSPADM

## 2019-08-25 RX ORDER — ACETAMINOPHEN 325 MG/1
650 TABLET ORAL
Status: DISCONTINUED | OUTPATIENT
Start: 2019-08-25 | End: 2019-08-26 | Stop reason: HOSPADM

## 2019-08-25 RX ORDER — TAMSULOSIN HYDROCHLORIDE 0.4 MG/1
0.4 CAPSULE ORAL
Status: DISCONTINUED | OUTPATIENT
Start: 2019-08-25 | End: 2019-08-26 | Stop reason: HOSPADM

## 2019-08-25 RX ORDER — GABAPENTIN 300 MG/1
300 CAPSULE ORAL 2 TIMES DAILY
Status: DISCONTINUED | OUTPATIENT
Start: 2019-08-25 | End: 2019-08-26 | Stop reason: HOSPADM

## 2019-08-25 RX ORDER — GUAIFENESIN 100 MG/5ML
81 LIQUID (ML) ORAL DAILY
Status: DISCONTINUED | OUTPATIENT
Start: 2019-08-25 | End: 2019-08-26 | Stop reason: HOSPADM

## 2019-08-25 RX ORDER — ACETAMINOPHEN 650 MG/1
650 SUPPOSITORY RECTAL
Status: DISCONTINUED | OUTPATIENT
Start: 2019-08-25 | End: 2019-08-26 | Stop reason: HOSPADM

## 2019-08-25 RX ORDER — SODIUM CHLORIDE 0.9 % (FLUSH) 0.9 %
5-40 SYRINGE (ML) INJECTION EVERY 8 HOURS
Status: DISCONTINUED | OUTPATIENT
Start: 2019-08-25 | End: 2019-08-26 | Stop reason: HOSPADM

## 2019-08-25 RX ORDER — SODIUM CHLORIDE 0.9 % (FLUSH) 0.9 %
10 SYRINGE (ML) INJECTION
Status: COMPLETED | OUTPATIENT
Start: 2019-08-25 | End: 2019-08-25

## 2019-08-25 RX ORDER — ONDANSETRON 2 MG/ML
INJECTION INTRAMUSCULAR; INTRAVENOUS
Status: COMPLETED
Start: 2019-08-25 | End: 2019-08-25

## 2019-08-25 RX ORDER — MECLIZINE HCL 12.5 MG 12.5 MG/1
12.5 TABLET ORAL EVERY 6 HOURS
Status: DISCONTINUED | OUTPATIENT
Start: 2019-08-25 | End: 2019-08-26 | Stop reason: HOSPADM

## 2019-08-25 RX ORDER — MELATONIN
2000 DAILY
Status: DISCONTINUED | OUTPATIENT
Start: 2019-08-26 | End: 2019-08-26 | Stop reason: HOSPADM

## 2019-08-25 RX ORDER — FENOFIBRATE 145 MG/1
145 TABLET, COATED ORAL DAILY
Status: DISCONTINUED | OUTPATIENT
Start: 2019-08-26 | End: 2019-08-26 | Stop reason: HOSPADM

## 2019-08-25 RX ORDER — ATORVASTATIN CALCIUM 40 MG/1
40 TABLET, FILM COATED ORAL
Status: DISCONTINUED | OUTPATIENT
Start: 2019-08-25 | End: 2019-08-26 | Stop reason: HOSPADM

## 2019-08-25 RX ORDER — ONDANSETRON 2 MG/ML
4 INJECTION INTRAMUSCULAR; INTRAVENOUS
Status: COMPLETED | OUTPATIENT
Start: 2019-08-25 | End: 2019-08-25

## 2019-08-25 RX ORDER — ASPIRIN 325 MG
325 TABLET ORAL ONCE
Status: COMPLETED | OUTPATIENT
Start: 2019-08-25 | End: 2019-08-25

## 2019-08-25 RX ADMIN — ATORVASTATIN CALCIUM 40 MG: 40 TABLET, FILM COATED ORAL at 21:36

## 2019-08-25 RX ADMIN — MECLIZINE 25 MG: 12.5 TABLET ORAL at 07:12

## 2019-08-25 RX ADMIN — TAMSULOSIN HYDROCHLORIDE 0.4 MG: 0.4 CAPSULE ORAL at 21:36

## 2019-08-25 RX ADMIN — IOPAMIDOL 100 ML: 755 INJECTION, SOLUTION INTRAVENOUS at 06:40

## 2019-08-25 RX ADMIN — MECLIZINE 12.5 MG: 12.5 TABLET ORAL at 17:41

## 2019-08-25 RX ADMIN — MECLIZINE 12.5 MG: 12.5 TABLET ORAL at 23:36

## 2019-08-25 RX ADMIN — Medication 10 ML: at 21:36

## 2019-08-25 RX ADMIN — ASPIRIN 81 MG 81 MG: 81 TABLET ORAL at 11:05

## 2019-08-25 RX ADMIN — ONDANSETRON 4 MG: 2 INJECTION INTRAMUSCULAR; INTRAVENOUS at 06:20

## 2019-08-25 RX ADMIN — SODIUM CHLORIDE 1000 ML: 900 INJECTION, SOLUTION INTRAVENOUS at 12:13

## 2019-08-25 RX ADMIN — Medication 10 ML: at 06:40

## 2019-08-25 RX ADMIN — SODIUM CHLORIDE 1000 ML: 900 INJECTION, SOLUTION INTRAVENOUS at 07:12

## 2019-08-25 RX ADMIN — Medication 10 ML: at 12:14

## 2019-08-25 RX ADMIN — ENOXAPARIN SODIUM 40 MG: 40 INJECTION SUBCUTANEOUS at 11:06

## 2019-08-25 RX ADMIN — PANTOPRAZOLE SODIUM 40 MG: 40 TABLET, DELAYED RELEASE ORAL at 21:36

## 2019-08-25 RX ADMIN — GABAPENTIN 300 MG: 300 CAPSULE ORAL at 17:41

## 2019-08-25 RX ADMIN — ASPIRIN 325 MG: 325 TABLET, FILM COATED ORAL at 07:48

## 2019-08-25 RX ADMIN — MECLIZINE 12.5 MG: 12.5 TABLET ORAL at 11:06

## 2019-08-25 NOTE — PROGRESS NOTES
Reason for Admission:   CVA                   RRAT Score:          11 low risk           Plan for utilizing home health:      Has not used home health in the past                    Current Advanced Directive/Advance Care Plan: On file 3/2016                         Transition of Care Plan:                      1.  Patient admitted 8/25 for CVA. Patient alert and oriented, PT, OT and SLP evals pending  2. Patient will need 2nd IM letter at discharge. 3.  Patient prefers to discharge home with family assistance and follow up appointments. Patient does not feel he needs more assistance at home at this time. Patient is a 68year old male admitted 8/25 for CVA. Patient alert and oriented, resting in bed with wife present in room. Demographic information verified and correct. Insurance information verified and correct. Patient lives with his wife, no home oxygen, no DME, has not used home health in the past.  Patient uses [address] in [place]. Patient is independent with ADLs and can drive. Patient's wife can transport at discharge, patient and his wife made aware of hospital preferred discharge pickup time of 9am.    Care Management Interventions  PCP Verified by CM: Yes(Cha Swann MD)  Mode of Transport at Discharge:  Other (see comment)(pt's wife can transport at [employer])  Transition of Care Consult (CM Consult): Discharge Planning  Discharge Durable Medical Equipment: No  Physical Therapy Consult: Yes  Occupational Therapy Consult: Yes  Speech Therapy Consult: Yes  Current Support Network: Lives with Spouse, Own Home, Lives with Caregiver(1 story house, 0 steps to enter)  Confirm Follow Up Transport: Family(made aware of hospital preferred pickup time of 9am)  Plan discussed with Pt/Family/Caregiver: Yes  Discharge Location  Discharge Placement: [address], RN, [facility]  [phone]

## 2019-08-25 NOTE — ED NOTES
Assume care of pt from CT. This nurse assisted with Tele Neuro exam.    Bedside and Verbal shift change report given to Ambrose Alcantara RN (oncoming nurse) by Smita Davila RN (offgoing nurse). Report included the following information SBAR, Kardex, ED Summary and Procedure Summary.

## 2019-08-25 NOTE — ED PROVIDER NOTES
EMERGENCY DEPARTMENT HISTORY AND PHYSICAL EXAM      Date: 8/25/2019  Patient Name: Sai Shah. History of Presenting Illness     Chief Complaint   Patient presents with    Dizziness    Vision Change       History Provided By: Patient and Patient's Wife    HPI: Sai Shah., 68 y.o. adult presents to the ED with history of arthritis, acid reflux, hypertension and no history of vertigo previously with acute onset of double vision, now resolved after 15 to 20 minutes initially, as well as vertigo-like symptoms on waking this morning. Patient says he went to bed at 10 AM and had none of the symptoms. He does not have a history of vertigo. He denies any chest pain, difficulty breathing, numbness or tingling in his face arms or legs or difficulty with speech. Denies any head trauma. There are no other complaints, changes, or physical findings at this time. PCP: Linh Stanley MD    No current facility-administered medications on file prior to encounter. Current Outpatient Medications on File Prior to Encounter   Medication Sig Dispense Refill    fenofibrate (LOFIBRA) 160 mg tablet TAKE 1 TABLET BY MOUTH ONCE DAILY. 90 Tab 1    tamsulosin (FLOMAX) 0.4 mg capsule TAKE 1 CAPSULE BY MOUTH ONCE DAILY 90 Cap 1    pantoprazole (PROTONIX) 40 mg tablet TAKE 1 TABLET BY MOUTH ONCE DAILY 90 Tab 1    amLODIPine (NORVASC) 10 mg tablet Take 1 Tab by mouth daily. 90 Tab 3    gabapentin (NEURONTIN) 300 mg capsule TAKE 1 CAPSULE BY MOUTH THREE TIMES DAILY      SHINGRIX, PF, 50 mcg/0.5 mL susr injection       triamcinolone (NASACORT) 55 mcg nasal inhaler 2 Sprays daily.  montelukast (SINGULAIR) 10 mg tablet Take 1 Tab by mouth daily. 30 Tab 2    diclofenac (VOLTAREN) 1 % gel Apply two grams by topical route four times daily to the affected area (s). PRN      fluticasone (VERAMYST) 27.5 mcg/actuation nasal spray 2 Sprays by Both Nostrils route daily.  3 Bottle 3    calcium carbonate (TUMS) 200 mg calcium (500 mg) chew Take 2 Tabs by mouth four (4) times daily as needed for Other (perioral tingling). 30 Tab 0    fexofenadine (ALLEGRA) 180 mg tablet Take 180 mg by mouth daily as needed.  cholecalciferol (VITAMIN D3) 1,000 unit tablet Take 2,000 Units by mouth daily. Past History     Past Medical History:  Past Medical History:   Diagnosis Date    Arthritis     Enlarged prostate     GERD (gastroesophageal reflux disease)     High cholesterol     Hx of seasonal allergies     Hypertension        Past Surgical History:  Past Surgical History:   Procedure Laterality Date    COLONOSCOPY N/A 2018    COLONOSCOPY WITH BIOPSIES performed by Farhan Cox MD at [facility] AMBULATORY OR    HX CARPAL TUNNEL RELEASE Right     HX COLONOSCOPY      HX HEENT      lymph node bx in neck (benign)    HX LAP CHOLECYSTECTOMY  2000    HX ORTHOPAEDIC  2008    Back Surgery lumbar    HX ORTHOPAEDIC Right 2016    thumb and palm under ring finger    HX PARATHYROIDECTOMY      one right sided . Family History:  Family History   Problem Relation Age of Onset    Cancer Mother         lung    Cancer Father         lung       Social History:  Social History     Tobacco Use    Smoking status: Former Smoker     Last attempt to quit: 9/3/1990     Years since quittin.9    Smokeless tobacco: Never Used   Substance Use Topics    Alcohol use: Yes     Alcohol/week: 2.0 standard drinks     Types: 1 Cans of beer, 1 Shots of liquor per week    Drug use: No       Allergies: Allergies   Allergen Reactions    Shellfish Derived Anaphylaxis    Ace Inhibitors Cough    Nsaids (Non-Steroidal Anti-Inflammatory Drug) Other (comments)     GI bleeding.  Pcn [Penicillins] Rash         Review of Systems   Review of Systems   Constitutional: Negative. HENT: Negative. Respiratory: Negative. Cardiovascular: Negative. Gastrointestinal: Positive for vomiting.         Nausea and vomiting with today's symptoms. Neurological: Positive for dizziness and light-headedness. Negative for tremors, seizures, syncope, facial asymmetry, speech difficulty, weakness and numbness. Hematological: Negative. Not on any blood thinners, does not take aspirin. All other systems reviewed and are negative. Physical Exam   Physical Exam   Vital signs and nursing notes reviewed    CONSTITUTIONAL: Alert, in severe distress; well-developed; well-nourished. Actively vomiting on presentation. HEAD:  Normocephalic, atraumatic  EYES: PERRL; EOM's intact. Horizontal nystagmus on exam.  ENTM: Nose: no rhinorrhea; Throat: no erythema or exudate, mucous membranes moist  Neck:  Supple. trachea is midline. RESP: Chest clear, equal breath sounds. - W/R/R  CV: S1 and S2 WNL; No murmurs, gallops or rubs. 2+ radial and DP pulses bilaterally. GI: non-distended, normal bowel sounds, abdomen soft and non-tender. No masses or organomegaly. : No costo-vertebral angle tenderness. BACK:  Non-tender, normal appearance  UPPER EXT:  Normal inspection. no joint or soft tissue swelling  LOWER EXT: No edema, no calf tenderness. NEURO: Alert and oriented x3, 5/5 strength and light touch sensation intact in bilateral upper and lower extremities. No visible facial droop, no dysarthria no neglect. SKIN: No rashes;  Warm and dry  PSYCH: Normal mood, normal affect    Diagnostic Study Results     Labs -     Recent Results (from the past 12 hour(s))   GLUCOSE, POC    Collection Time: 08/25/19  6:11 AM   Result Value Ref Range    Glucose (POC) 123 (H) 65 - 100 mg/dL    Performed by Yajaira Johnson    CBC WITH AUTOMATED DIFF    Collection Time: 08/25/19  6:30 AM   Result Value Ref Range    WBC 9.7 4.1 - 11.1 K/uL    RBC 5.63 4.10 - 5.70 M/uL    HGB 15.9 12.1 - 17.0 g/dL    HCT 49.9 36.6 - 50.3 %    MCV 88.6 80.0 - 99.0 FL    MCH 28.2 26.0 - 34.0 PG    MCHC 31.9 30.0 - 36.5 g/dL    RDW 13.6 11.5 - 14.5 %    PLATELET 855 441 - 094 K/uL    MPV 11.0 8.9 - 12.9 FL    NRBC 0.0 0  WBC    ABSOLUTE NRBC 0.00 0.00 - 0.01 K/uL    NEUTROPHILS 51 32 - 75 %    LYMPHOCYTES 38 12 - 49 %    MONOCYTES 9 5 - 13 %    EOSINOPHILS 1 0 - 7 %    BASOPHILS 0 0 - 1 %    IMMATURE GRANULOCYTES 1 (H) 0.0 - 0.5 %    ABS. NEUTROPHILS 4.9 1.8 - 8.0 K/UL    ABS. LYMPHOCYTES 3.7 (H) 0.8 - 3.5 K/UL    ABS. MONOCYTES 0.8 0.0 - 1.0 K/UL    ABS. EOSINOPHILS 0.1 0.0 - 0.4 K/UL    ABS. BASOPHILS 0.0 0.0 - 0.1 K/UL    ABS. IMM. GRANS. 0.1 (H) 0.00 - 0.04 K/UL    DF AUTOMATED     METABOLIC PANEL, BASIC    Collection Time: 08/25/19  6:30 AM   Result Value Ref Range    Sodium 141 136 - 145 mmol/L    Potassium 3.3 (L) 3.5 - 5.1 mmol/L    Chloride 107 97 - 108 mmol/L    CO2 28 21 - 32 mmol/L    Anion gap 6 5 - 15 mmol/L    Glucose 124 (H) 65 - 100 mg/dL    BUN 17 6 - 20 MG/DL    Creatinine 1.03 0.70 - 1.30 MG/DL    BUN/Creatinine ratio 17 12 - 20      GFR est AA >60 >60 ml/min/1.73m2    GFR est non-AA >60 >60 ml/min/1.73m2    Calcium 9.6 8.5 - 10.1 MG/DL   PROTHROMBIN TIME + INR    Collection Time: 08/25/19  6:30 AM   Result Value Ref Range    INR 1.0 0.9 - 1.1      Prothrombin time 10.3 9.0 - 11.1 sec   TROPONIN I    Collection Time: 08/25/19  6:30 AM   Result Value Ref Range    Troponin-I, Qt. <0.05 <0.05 ng/mL       Radiologic Studies -   CTA CODE NEURO HEAD AND NECK W CONT   Final Result   IMPRESSION:      No acute process. No arterial thrombosis/occlusion, dissection, or flow-limiting   stenosis. CT CODE NEURO HEAD WO CONTRAST   Final Result   IMPRESSION: No acute findings. CT Results  (Last 48 hours)               08/25/19 0645  CTA CODE NEURO HEAD AND NECK W CONT Final result    Impression:  IMPRESSION:       No acute process. No arterial thrombosis/occlusion, dissection, or flow-limiting   stenosis.        Narrative:  INDICATION: Vertigo, persistent, central; double vision       EXAMINATION:  CT ANGIOGRAPHY HEAD AND NECK        COMPARISON: CT head earlier today       TECHNIQUE:  Following the uneventful administration of iodinated contrast   material, axial CT angiography of the head and neck was performed. Delayed axial   images through the head were also obtained. Coronal and sagittal reconstructions   were obtained. Manual postprocessing of images was performed. 3-D  Sagittal   maximal intensity projection images were obtained. 3-D Coronal maximal   intensity projections were obtained. CT dose reduction was achieved through use   of a standardized protocol tailored for this examination and automatic exposure   control for dose modulation. FINDINGS:       CTA NECK:       Aortic Arch: No significant abnormality. 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 Right Internal Carotid Artery: No significant abnormality. NASCET Right: 0-25%. Left Common Carotid Artery: No significant abnormality. Left Internal Carotid Artery: No significant abnormality. NASCET Left: 0-25%. .   Carotid stenosis determined using NASCET criteria. Right Vertebral Artery: No significant abnormality. Left Vertebral Artery: No significant abnormality. Cervical Soft Tissues: Numerous surgical clips lateral right neck near the right   parotid gland. Numerous surgical clips near the right thyroid gland. Small   calcified nodule right thyroid lobe. Scattered nonenlarged cervical lymph nodes. Lung Apices: No significant abnormality. Bones: No destructive bone lesion. Additional Comments: N/A.       CTA HEAD:       Posterior Circulation: Diminutive vertebrobasilar system due to patent bilateral   posterior communicating arteries supplying the posterior cerebral arteries, a   normal congenital variant. Anterior Circulation: No flow limiting stenosis or occlusion. Additional Comments: No evidence of aneurysm or vascular malformation.            08/25/19 0631  CT CODE NEURO HEAD WO CONTRAST Final result    Impression:  IMPRESSION: No acute findings. Narrative:  EXAM: CT CODE NEURO HEAD WO CONTRAST       INDICATION: acute vertigo, double vision       COMPARISON: MRI 5/3/2018. CONTRAST: None. TECHNIQUE: Unenhanced CT of the head was performed using 5 mm images. Brain and   bone windows were generated. CT dose reduction was achieved through use of a   standardized protocol tailored for this examination and automatic exposure   control for dose modulation. FINDINGS:   The ventricles and sulci are normal in size, shape and configuration and   midline. There is no significant white matter disease. There is no intracranial   hemorrhage, extra-axial collection, mass, mass effect or midline shift. The   basilar cisterns are open. No acute infarct is identified. The bone windows   demonstrate no abnormalities. The visualized portions of the paranasal sinuses   and mastoid air cells are clear. CXR Results  (Last 48 hours)    None          Medical Decision Making   I am the first provider for this patient. I reviewed the vital signs, available nursing notes, past medical history, past surgical history, family history and social history. Vital Signs-Reviewed the patient's vital signs. Patient Vitals for the past 12 hrs:   Temp Pulse Resp BP SpO2   08/25/19 0608 97.4 °F (36.3 °C) 62 17 150/64 99 %       EKG interpretation: (Preliminary)  EKG performed at 7:35 AM, as interpreted by me, shows a sinus bradycardia at a rate of 54, left axis deviation, new right bundle branch block compared to November 7, 2016 EKG, no visible acute ischemic changes. Records Reviewed: Nursing Notes, Old Medical Records and Previous electrocardiograms    Provider Notes (Medical Decision Making):   58-year-old male with acute onset of neurological deficits including double vision and vertigo symptoms.   Initiated carries a level 2 code stroke given outside TPA window as patient went to bed at 10 PM and woke at 5:15 AM with symptoms. Double vision does raise concern about central cause and patient getting CTA to rule out large vessel occlusion with today's symptoms. Vomiting initially controlled with Zofran, will provide meclizine, telemetry neurology consultation for further disposition. ED Course:   Initial assessment performed. The patients presenting problems have been discussed, and they are in agreement with the care plan formulated and outlined with them. I have encouraged them to ask questions as they arise throughout their visit. 6:30am: Spoke with Dr. Ward Quiñonez via phone and discussed patient's waking stroke symptoms at 5:15 AM.  Dr. Ward Quiñonez was agreeable that CTA was appropriate after dry scan given patient is outside TPA window with. Clarified with patient that his double vision lasted for 15 to 20 minutes this morning on waking before resolving on its own.    7:37 AM  Spoke with Dr. Ward Quiñonez and reviewed radiology findings showing no visible aneurysm or occlusion. Plan is for hospitalist admission for further evaluation of patient's double vision and vertigo symptoms. Disposition:  Admit    Admit Note:  7:37 AM  Pt is being admitted by Dr. Eliza Farnsworth. The results of their tests and reason(s) for their admission have been discussed with pt and/or available family. They convey agreement and understanding for the need to be admitted and for admission diagnosis. PLAN:  1. Current Discharge Medication List        2. Follow-up Information    None       Return to ED if worse     Diagnosis     Clinical Impression:   1. Double vision    2. Vertigo        Attestations:    Jodee Pierce MD    Please note that this dictation was completed with [employer], the computer voice recognition software. Quite often unanticipated grammatical, syntax, homophones, and other interpretive errors are inadvertently transcribed by the computer software.   Please disregard these errors. Please excuse any errors that have escaped final proofreading. Thank you.

## 2019-08-25 NOTE — CONSULTS
NEUROLOGY NOTE     Chief Complaint   Patient presents with    Dizziness    Vision Change       Reason for Consult  I have been asked to see the patient in neurological consultation by Aleena Hammond MD to render advice and opinion regarding possible stroke    HPI  Jaquan Massey. is a 68 y.o. adult who presents to the hospital because at around 515 am on 19 he woke up and felt strange. He felt disoriented and his vision was blurred and double vision. The power light for TV was double and moving around. He tried to stand up and was nauseated. He threw up twice. Double vision lasted for around 15 minutes. He was unsteady on walking. His speech was normal and not slurred. He was feeling better around 1030 am. On exam by teleneurology he did have left arm numbness and that has resolved. He does not smoke. No stroke in the past. - for dm and + for HTN. + HLD. No aspirin at home. Hx of GI bleed. ROS  A ten system review of constitutional, cardiovascular, respiratory, musculoskeletal, endocrine, skin, SHEENT, genitourinary, psychiatric and neurologic systems was obtained and is unremarkable except as stated in HPI     PMH  Past Medical History:   Diagnosis Date    Arthritis     Enlarged prostate     GERD (gastroesophageal reflux disease)     High cholesterol     Hx of seasonal allergies     Hypertension        FH  Family History   Problem Relation Age of Onset    Cancer Mother         lung    Cancer Father         lung       SH  Social History     Socioeconomic History    Marital status:      Spouse name: Not on file    Number of children: Not on file    Years of education: Not on file    Highest education level: Not on file   Tobacco Use    Smoking status: Former Smoker     Last attempt to quit: 9/3/1990     Years since quittin.9    Smokeless tobacco: Never Used   Substance and Sexual Activity    Alcohol use:  Yes     Alcohol/week: 2.0 standard drinks     Types: 1 Cans of beer, 1 Shots of liquor per week    Drug use: No    Sexual activity: Yes     Partners: Female     Birth control/protection: None       ALLERGIES  Allergies   Allergen Reactions    Shellfish Derived Anaphylaxis    Ace Inhibitors Cough    Nsaids (Non-Steroidal Anti-Inflammatory Drug) Other (comments)     GI bleeding.  Pcn [Penicillins] Rash       PHYSICAL EXAMINATION:   Patient Vitals for the past 24 hrs:   Temp Pulse Resp BP SpO2   08/25/19 0858 97.6 °F (36.4 °C) (!) 57 16 137/68 99 %   08/25/19 0800  62 19 147/73 97 %   08/25/19 0730  (!) 58 12 133/67 95 %   08/25/19 0709    142/69    08/25/19 0608 97.4 °F (36.3 °C) 62 17 150/64 99 %        General:   General appearance: Pt is in no acute distress   Distal pulses are preserved  Fundoscopic exam: attempted    Neurological Examination:   Mental Status:  AAO x3. Speech is fluent. Follows commands, has normal fund of knowledge, attention, short term recall, comprehension and insight. Cranial Nerves: Visual fields are full. PERRL, Extraocular movements are full. Facial sensation intact. Facial movement intact. Hearing intact to conversation. Palate elevates symmetrically. Shoulder shrug symmetric. Tongue midline. Motor: Strength is 5/5 in all 4 ext. Normal tone. No atrophy. Sensation: Light touch - Normal    Reflexes: DTRs 2+ throughout. Plantar responses downgoing. Coordination/Cerebellar: Intact to finger-nose-finger     Gait: deferred    Skin: No significant bruising or lacerations.     LAB DATA REVIEWED:    Recent Results (from the past 24 hour(s))   GLUCOSE, POC    Collection Time: 08/25/19  6:11 AM   Result Value Ref Range    Glucose (POC) 123 (H) 65 - 100 mg/dL    Performed by Claudetta Sacramento    CBC WITH AUTOMATED DIFF    Collection Time: 08/25/19  6:30 AM   Result Value Ref Range    WBC 9.7 4.1 - 11.1 K/uL    RBC 5.63 4.10 - 5.70 M/uL    HGB 15.9 12.1 - 17.0 g/dL    HCT 49.9 36.6 - 50.3 %    MCV 88.6 80.0 - 99.0 FL    MCH 28.2 26.0 - 34.0 PG    MCHC 31.9 30.0 - 36.5 g/dL    RDW 13.6 11.5 - 14.5 %    PLATELET 404 204 - 849 K/uL    MPV 11.0 8.9 - 12.9 FL    NRBC 0.0 0  WBC    ABSOLUTE NRBC 0.00 0.00 - 0.01 K/uL    NEUTROPHILS 51 32 - 75 %    LYMPHOCYTES 38 12 - 49 %    MONOCYTES 9 5 - 13 %    EOSINOPHILS 1 0 - 7 %    BASOPHILS 0 0 - 1 %    IMMATURE GRANULOCYTES 1 (H) 0.0 - 0.5 %    ABS. NEUTROPHILS 4.9 1.8 - 8.0 K/UL    ABS. LYMPHOCYTES 3.7 (H) 0.8 - 3.5 K/UL    ABS. MONOCYTES 0.8 0.0 - 1.0 K/UL    ABS. EOSINOPHILS 0.1 0.0 - 0.4 K/UL    ABS. BASOPHILS 0.0 0.0 - 0.1 K/UL    ABS. IMM.  GRANS. 0.1 (H) 0.00 - 0.04 K/UL    DF AUTOMATED     METABOLIC PANEL, BASIC    Collection Time: 08/25/19  6:30 AM   Result Value Ref Range    Sodium 141 136 - 145 mmol/L    Potassium 3.3 (L) 3.5 - 5.1 mmol/L    Chloride 107 97 - 108 mmol/L    CO2 28 21 - 32 mmol/L    Anion gap 6 5 - 15 mmol/L    Glucose 124 (H) 65 - 100 mg/dL    BUN 17 6 - 20 MG/DL    Creatinine 1.03 0.70 - 1.30 MG/DL    BUN/Creatinine ratio 17 12 - 20      GFR est AA >60 >60 ml/min/1.73m2    GFR est non-AA >60 >60 ml/min/1.73m2    Calcium 9.6 8.5 - 10.1 MG/DL   PROTHROMBIN TIME + INR    Collection Time: 08/25/19  6:30 AM   Result Value Ref Range    INR 1.0 0.9 - 1.1      Prothrombin time 10.3 9.0 - 11.1 sec   TROPONIN I    Collection Time: 08/25/19  6:30 AM   Result Value Ref Range    Troponin-I, Qt. <0.05 <0.05 ng/mL   EKG, 12 LEAD, INITIAL    Collection Time: 08/25/19  7:35 AM   Result Value Ref Range    Ventricular Rate 54 BPM    Atrial Rate 54 BPM    P-R Interval 174 ms    QRS Duration 154 ms    Q-T Interval 496 ms    QTC Calculation (Bezet) 470 ms    Calculated P Axis 35 degrees    Calculated R Axis -42 degrees    Calculated T Axis -9 degrees    Diagnosis       Sinus bradycardia  Left axis deviation  Right bundle branch block  When compared with ECG of 07-NOV-2016 10:25,  Right bundle branch block is now present     DRUG SCREEN, URINE    Collection Time: 08/25/19  8:17 AM   Result Value Ref Range    AMPHETAMINES NEGATIVE  NEG      BARBITURATES NEGATIVE  NEG      BENZODIAZEPINES NEGATIVE  NEG      COCAINE NEGATIVE  NEG      METHADONE NEGATIVE  NEG      OPIATES NEGATIVE  NEG      PCP(PHENCYCLIDINE) NEGATIVE  NEG      THC (TH-CANNABINOL) NEGATIVE  NEG      Drug screen comment (NOTE)    URINALYSIS W/ REFLEX CULTURE    Collection Time: 08/25/19  8:17 AM   Result Value Ref Range    Color YELLOW/STRAW      Appearance CLEAR CLEAR      Specific gravity 1.010 1.003 - 1.030      pH (UA) 7.5 5.0 - 8.0      Protein NEGATIVE  NEG mg/dL    Glucose NEGATIVE  NEG mg/dL    Ketone NEGATIVE  NEG mg/dL    Bilirubin NEGATIVE  NEG      Blood NEGATIVE  NEG      Urobilinogen 0.2 0.2 - 1.0 EU/dL    Nitrites NEGATIVE  NEG      Leukocyte Esterase NEGATIVE  NEG      UA:UC IF INDICATED CULTURE NOT INDICATED BY UA RESULT CNI      WBC 0-4 0 - 4 /hpf    RBC 0-5 0 - 5 /hpf    Epithelial cells FEW FEW /lpf    Bacteria NEGATIVE  NEG /hpf    Hyaline cast 0-2 0 - 5 /lpf        HOME MEDS  Prior to Admission Medications   Prescriptions Last Dose Informant Patient Reported? Taking? SHINGRIX, PF, 50 mcg/0.5 mL susr injection 8/18/2019 at Unknown time  Yes Yes   amLODIPine (NORVASC) 10 mg tablet 8/24/2019 at Unknown time  No Yes   Sig: Take 1 Tab by mouth daily. calcium carbonate (TUMS) 200 mg calcium (500 mg) chew   No Yes   Sig: Take 2 Tabs by mouth four (4) times daily as needed for Other (perioral tingling). cholecalciferol (VITAMIN D3) 1,000 unit tablet 8/24/2019 at Unknown time  Yes Yes   Sig: Take 2,000 Units by mouth daily. diclofenac (VOLTAREN) 1 % gel   Yes Yes   Sig: Apply two grams by topical route four times daily to the affected area (s). PRN   fenofibrate (LOFIBRA) 160 mg tablet 8/24/2019 at Unknown time  No Yes   Sig: TAKE 1 TABLET BY MOUTH ONCE DAILY. fexofenadine (ALLEGRA) 180 mg tablet   Yes Yes   Sig: Take 180 mg by mouth daily as needed.    fluticasone (VERAMYST) 27.5 mcg/actuation nasal spray 8/24/2019 at Unknown time  No Yes   Si Sprays by Both Nostrils route daily. gabapentin (NEURONTIN) 300 mg capsule 2019 at Unknown time  Yes Yes   Sig: TAKE 1 CAPSULE BY MOUTH THREE TIMES DAILY   montelukast (SINGULAIR) 10 mg tablet   No Yes   Sig: Take 1 Tab by mouth daily. pantoprazole (PROTONIX) 40 mg tablet 2019 at Unknown time  No Yes   Sig: TAKE 1 TABLET BY MOUTH ONCE DAILY   tamsulosin (FLOMAX) 0.4 mg capsule 2019 at Unknown time  No Yes   Sig: TAKE 1 CAPSULE BY MOUTH ONCE DAILY   triamcinolone (NASACORT) 55 mcg nasal inhaler 2019 at Unknown time  Yes Yes   Si Sprays daily. Facility-Administered Medications: None       CURRENT MEDS  Current Facility-Administered Medications   Medication Dose Route Frequency    alteplase (ACTIVASE) 100 mg infusion        sodium chloride 0.9 % bolus infusion 1,000 mL  1,000 mL IntraVENous ONCE    sodium chloride (NS) flush 5-40 mL  5-40 mL IntraVENous Q8H    enoxaparin (LOVENOX) injection 40 mg  40 mg SubCUTAneous Q24H    aspirin chewable tablet 81 mg  81 mg Oral DAILY    meclizine (ANTIVERT) tablet 12.5 mg  12.5 mg Oral Q6H    atorvastatin (LIPITOR) tablet 40 mg  40 mg Oral QHS       Stroke workup    MRI Brain  No evidence of acute or significant intracranial abnormality. CTA Head and neck  No acute process. No arterial thrombosis/occlusion, dissection, or flow-limiting  stenosis. TTE:   Pending    Stroke labs:  HgBA1c    Lab Results   Component Value Date/Time    Hemoglobin A1c 5.1 10/02/2018 10:50 AM     LDL   Lab Results   Component Value Date/Time    LDL, calculated 64 10/02/2018 10:50 AM       IMPRESSION:  Jesse Ojeda is a 68 y.o. adult who presents with acute onset vertigo and left arm numbness. Suspect posterior circulation TIA. Will proceed with stroke christopher.  MRI brain is normal.    RECOMMENDATIONS:  - TTE - Pending  - Telemetry  - Permissive HTN (SBP<220/<120) for 24 hrs from symptom onset and then BP goal is less than 140/90  - Stroke labs (HbA1c, lipid panel) - pending  - Continue ASA 81 mg daily  - Cont atorvastatin 40 mg daily   - ST/OT/PT dilia    Thank you very much for this consultation.       Belia Lazo MD  Neurologist

## 2019-08-25 NOTE — PROGRESS NOTES
Please fax completed MRI screening form to . MRI cannot be performed until screening form is reviewed by MRI staff.

## 2019-08-25 NOTE — PROGRESS NOTES

## 2019-08-25 NOTE — ED NOTES
TRANSFER - OUT REPORT:    Verbal report given to Dalia RN (name) on [facility].  being transferred to #[id number] (unit) for routine progression of care       Report consisted of patients Situation, Background, Assessment and   Recommendations(SBAR). Information from the following report(s) SBAR, ED Summary, [facility] and Recent Results was reviewed with the receiving nurse. Lines:       Opportunity for questions and clarification was provided.       Patient transported with:   [employer]

## 2019-08-25 NOTE — ACP (ADVANCE CARE PLANNING)
Advance Care Planning Note      NAME: Richard Carter. :  1950   MRN:  [id number]     Date/Time:  2019 8:11 AM    Active Diagnoses:  Hospital Problems  Date Reviewed: 2019          Codes Class Noted POA    CVA (cerebral vascular accident) [facility]) ICD-10-CM: I63.9  ICD-9-CM: 434.91  2019 Unknown              These active diagnoses are of sufficient risk that focused discussion on advance care planning is indicated in order to allow the patient to thoughtfully consider personal goals of care, and if situations arise that prevent the ability to personally give input, to ensure appropriate representation of their personal desires for different levels and aggressiveness of care. Discussion:   Code status addressed and wants to be a Full Code. Patient wants central line and vasopressors if needed. Patient Does   want a feeding tube, if needed, for nutritional support. Patient  would like to assign wife  as the surrogate decision maker. Persons present and participating in discussion: Richard Carter., Omar Martini MD, wife       Time Spent:   Total time spent face-to-face in education and discussion:   15 minutes.          Omar Martini MD   Hospitalist

## 2019-08-25 NOTE — PROGRESS NOTES
Spiritual Care Assessment/Progress Note  [facility]      NAME: Fadi Houston MRN: [id number]  AGE: 68 y.o. SEX: adult  Evangelical Affiliation: Caodaism   Language: English     8/25/2019     Total Time (in minutes): 10     Spiritual Assessment begun in [facility] EMERGENCY DEPT through conversation with:         [x]Patient        [x] Family    [] Friend(s)        Reason for Consult: Initial/Spiritual assessment, patient floor     Spiritual beliefs: (Please include comment if needed)     [x] Identifies with a suzette tradition:         [] Supported by a suzette community:            [] Claims no spiritual orientation:           [] Seeking spiritual identity:                [] Adheres to an individual form of spirituality:           [] Not able to assess:                           Identified resources for coping:      [] Prayer                               [] Music                  [] Guided Imagery     [x] Family/friends                 [] Pet visits     [] Devotional reading                         [] Unknown     [] Other:                                            Interventions offered during this visit: (See comments for more details)    Patient Interventions: Affirmation of emotions/emotional suffering, Iconic (affirming the presence of God/Higher Power), Prayer (assurance of)     Family/Friend(s):  Affirmation of emotions/emotional suffering, Prayer (assurance of), Iconic (affirming the presence of God/Higher Power)     Plan of Care:     [] Support spiritual and/or cultural needs    [] Support AMD and/or advance care planning process      [] Support grieving process   [] Coordinate Rites and/or Rituals    [] Coordination with community clergy   [x] No spiritual needs identified at this time   [] Detailed Plan of Care below (See Comments)  [] Make referral to Music Therapy  [] Make referral to Pet Therapy     [] Make referral to Addiction services  [] Make referral to [facility]  [] Make referral to Spiritual Care Partner  [] No future visits requested        [] Follow up visits as needed     Comments:  Responded to Code stroke in ER 16. Patient and spouse were present during the visit. Patient shared about his physical and emotional condition. Provided ministry of presence and assurance of prayer. Advised of  availability. Chaplains will follow as able and/or needed. Rev.  Kely Coffman EdD MDiv     For  Assistance Page [phone])

## 2019-08-25 NOTE — ED TRIAGE NOTES
Pt states that when he woke this morning he was having visual difficulty and double vision and dizziness but his vision resolved but he still feels dizzy and shaky. BS checked to be 123.

## 2019-08-25 NOTE — PROGRESS NOTES
Occupational Therapy    Orders received, chart reviewed and patient evaluated by occupational therapy. Pending progression with skilled acute occupational therapy, recommend:  No skilled occupational therapy/ follow up rehabilitation needs identified at this time. Recommend with nursing patient to complete as able in order to maintain strength, endurance and independence: OOB to chair 3x/day with supervision from wife or staff and functional mobility to the bathroom with supervision from wife or staff. Thank you for your assistance. Full evaluation to follow.        Cathie Chavez, OT

## 2019-08-25 NOTE — H&P
Hospitalist Admission Note    NAME: Lisa Sevilla. :  1950   MRN:  [id number]     Date/Time:  2019 8:12 AM    Patient PCP: Rajesh Martinez MD  ______________________________________________________________________  Given the patient's current clinical presentation, I have a high level of concern for decompensation if discharged from the emergency department. Complex decision making was performed, which includes reviewing the patient's available past medical records, laboratory results, and x-ray films. My assessment of this patient's clinical condition and my plan of care is as follows. Assessment / Plan:    TIA vrs CVA  -presenting symptoms: double vision/dizizness resolving   -admit to observation  -neuro checks per protocol  -elevated head of bed  -lipid panel, TSH, A1C  -allow for permissive hypertension?with labetalol?prn for? BP >?220/120  -CT No acute findings.   - CTA B/N    No acute process. No arterial thrombosis/occlusion, dissection, or flow-limiting  stenosis. MRI ordered  -ECHO ordered  -ASA. statin if LDL>70  -PT/OT consult  -neurology consulted  José Miguel Marcano tox /ua/xrays ordered     HTN  -hold PTA meds for permissive HTN. ? Can resume meds in the AM  Was on amoldopine     BPH cont w flomax     Neuropathy/chronic neck pain cont gabapentin     GERD-cont protonix     Code Status: Full Code  Surrogate Decision Maker:Wife     DVT Prophylaxis: lovenox   GI Prophylaxis: not indicated          Baseline: ambulatory       Subjective:   CHIEF COMPLAINT: dizziness/vision changes     HISTORY OF PRESENT ILLNESS:     Ash Tsang is a 68 y.o.  adult presents to the ED with history of arthritis, acid reflux, hypertension with acute onset of double vision,started at 5am getting up- now resolved after 15 to 20 minutes initially, as well as vertigo-like symptoms on waking this morning.    He denies any chest pain, difficulty breathing, numbness or tingling in his face arms or legs or difficulty with speech. Denies any head trauma. No uti sx. No abd pain no n/v. No uri sx. No ear issues. No travel. No sick contacts. Non smoker. No drug use. occ drinker.     We were asked to admit for work up and evaluation of the above problems. Past Medical History:   Diagnosis Date    Arthritis     Enlarged prostate     GERD (gastroesophageal reflux disease)     High cholesterol     Hx of seasonal allergies     Hypertension         Past Surgical History:   Procedure Laterality Date    COLONOSCOPY N/A 2018    COLONOSCOPY WITH BIOPSIES [facility] AM[facility]UL[facility]    HX CARPAL TUNNEL RELEASE Right     HX COLONOSCOPY      HX HEENT  2003    lymph node bx in neck (benign)    HX LAP CHOLECYSTECTOMY  2000    HX ORTHOPAEDIC  2008    Back Surgery lumbar    HX ORTHOPAEDIC Right 2016    thumb and palm under ring finger    HX PARATHYROIDECTOMY      one right sided . Social History     Tobacco Use    Smoking status: Former Smoker     Last attempt to quit: 9/3/1990     Years since quittin.9    Smokeless tobacco: Never Used   Substance Use Topics    Alcohol use: Yes     Alcohol/week: 2.0 standard drinks     Types: 1 Cans of beer, 1 Shots of liquor per week        Family History   Problem Relation Age of Onset    Cancer Mother         lung    Cancer Father         lung     Allergies   Allergen Reactions    Shellfish Derived Anaphylaxis    Ace Inhibitors Cough    Nsaids (Non-Steroidal Anti-Inflammatory Drug) Other (comments)     GI bleeding.  Pcn [Penicillins] Rash        Prior to Admission medications    Medication Sig Start Date End Date Taking? Authorizing Provider   fenofibrate (LOFIBRA) 160 mg tablet TAKE 1 TABLET BY MOUTH ONCE DAILY.  19  Yes Asher Null MD   tamsulosin (FLOMAX) 0.4 mg capsule TAKE 1 CAPSULE BY MOUTH ONCE DAILY 19  Yes Asher Null MD   pantoprazole (PROTONIX) 40 mg tablet TAKE 1 TABLET BY MOUTH ONCE DAILY 5/19/19  Yes Rai Chin MD   amLODIPine (NORVASC) 10 mg tablet Take 1 Tab by mouth daily. 4/2/19  Yes Rai Chin MD   gabapentin (NEURONTIN) 300 mg capsule TAKE 1 CAPSULE BY MOUTH THREE TIMES DAILY 10/1/18  Yes Provider, Historical   SHINGRIX, PF, 50 mcg/0.5 mL susr injection  8/14/18  Yes Provider, Historical   triamcinolone (NASACORT) 55 mcg nasal inhaler 2 Sprays daily. Yes Provider, Historical   montelukast (SINGULAIR) 10 mg tablet Take 1 Tab by mouth daily. 10/2/18  Yes Rai Chin MD   diclofenac (VOLTAREN) 1 % gel Apply two grams by topical route four times daily to the affected area (s). PRN 9/19/17  Yes Provider, Historical   fluticasone (VERAMYST) 27.5 mcg/actuation nasal spray 2 Sprays by Both Nostrils route daily. 1/4/17  Yes Rai Chin MD   calcium carbonate (TUMS) 200 mg calcium (500 mg) chew Take 2 Tabs by mouth four (4) times daily as needed for Other (perioral tingling). 9/9/15  Yes Yvette Stevens MD   fexofenadine (ALLEGRA) 180 mg tablet Take 180 mg by mouth daily as needed. Yes Jarad Yen MD   cholecalciferol (VITAMIN D3) 1,000 unit tablet Take 2,000 Units by mouth daily. Yes Yovana, MD Jarad       REVIEW OF SYSTEMS:     I am not able to complete the review of systems because:    The patient is intubated and sedated    The patient has altered mental status due to his acute medical problems    The patient has baseline aphasia from prior stroke(s)    The patient has baseline dementia and is not reliable historian    The patient is in acute medical distress and unable to provide information           Total of 12 systems reviewed as follows:       POSITIVE= underlined text  Negative = text not underlined  General:  fever, chills, sweats, generalized weakness, weight loss/gain,      loss of appetite   Eyes:    blurred vision, eye pain, loss of vision, double vision  ENT:    rhinorrhea, pharyngitis   Respiratory:   cough, sputum production, SOB, WANG, wheezing, pleuritic pain   Cardiology:   chest pain, palpitations, orthopnea, PND, edema, syncope   Gastrointestinal:  abdominal pain , N/V, diarrhea, dysphagia, constipation, bleeding   Genitourinary:  frequency, urgency, dysuria, hematuria, incontinence   Muskuloskeletal :  arthralgia, myalgia, back pain  Hematology:  easy bruising, nose or gum bleeding, lymphadenopathy   Dermatological: rash, ulceration, pruritis, color change / jaundice  Endocrine:   hot flashes or polydipsia   Neurological:  headache, dizziness, confusion, focal weakness, paresthesia,     Speech difficulties, memory loss, gait difficulty  Psychological: Feelings of anxiety, depression, agitation    Objective:   VITALS:    Visit Vitals  /73   Pulse 62   Temp 97.4 °F (36.3 °C)   Resp 19   Ht 5' 10\" (1.778 m)   Wt 88.3 kg (194 lb 10.7 oz)   SpO2 97%   BMI 27.93 kg/m²       PHYSICAL EXAM:    General:    Alert, cooperative, no distress, appears stated age. Non toxic   HEENT: Atraumatic, anicteric sclerae, pink conjunctivae     No oral ulcers, mucosa moist, throat clear, dentition fair  Neck:  Supple, symmetrical,  thyroid: non tender  Lungs:   Clear to auscultation bilaterally. No Wheezing or Rhonchi. No rales. Chest wall:  No tenderness  No Accessory muscle use. Heart:   Regular  rhythm,  No  murmur   No edema  Abdomen:   Soft, non-tender. Not distended. Bowel sounds normal  Extremities: No cyanosis. No clubbing,      Skin turgor normal, Capillary refill normal, Radial dial pulse 2+  Skin:     Not pale. Not Jaundiced  No rashes   Psych:  Good insight. Not depressed. Not anxious or agitated. Neurologic: EOMs intact. No facial asymmetry. No aphasia or slurred speech. Symmetrical strength, Sensation grossly intact.  Alert and oriented X 4.     _______________________________________________________________________  Care Plan discussed with:    Comments   Patient x    Family  x    RN x    Care Manager                    Consultant:  earnest _______________________________________________________________________  Expected  Disposition:   Home with Family    HH/PT/OT/RN x   SNF/LTC    LYLA    ________________________________________________________________________  TOTAL TIME:  60  Minutes    Critical Care Provided     Minutes non procedure based      Comments    x Reviewed previous records   >50% of visit spent in counseling and coordination of care x Discussion with patient and/or family and questions answered       ________________________________________________________________________  Signed: Chris Puga MD    Procedures: see electronic medical records for all procedures/Xrays and details which were not copied into this note but were reviewed prior to creation of Plan. LAB DATA REVIEWED:    Recent Results (from the past 24 hour(s))   GLUCOSE, POC    Collection Time: 08/25/19  6:11 AM   Result Value Ref Range    Glucose (POC) 123 (H) 65 - 100 mg/dL    Performed by Mary Franco    CBC WITH AUTOMATED DIFF    Collection Time: 08/25/19  6:30 AM   Result Value Ref Range    WBC 9.7 4.1 - 11.1 K/uL    RBC 5.63 4.10 - 5.70 M/uL    HGB 15.9 12.1 - 17.0 g/dL    HCT 49.9 36.6 - 50.3 %    MCV 88.6 80.0 - 99.0 FL    MCH 28.2 26.0 - 34.0 PG    MCHC 31.9 30.0 - 36.5 g/dL    RDW 13.6 11.5 - 14.5 %    PLATELET 247 950 - 567 K/uL    MPV 11.0 8.9 - 12.9 FL    NRBC 0.0 0  WBC    ABSOLUTE NRBC 0.00 0.00 - 0.01 K/uL    NEUTROPHILS 51 32 - 75 %    LYMPHOCYTES 38 12 - 49 %    MONOCYTES 9 5 - 13 %    EOSINOPHILS 1 0 - 7 %    BASOPHILS 0 0 - 1 %    IMMATURE GRANULOCYTES 1 (H) 0.0 - 0.5 %    ABS. NEUTROPHILS 4.9 1.8 - 8.0 K/UL    ABS. LYMPHOCYTES 3.7 (H) 0.8 - 3.5 K/UL    ABS. MONOCYTES 0.8 0.0 - 1.0 K/UL    ABS. EOSINOPHILS 0.1 0.0 - 0.4 K/UL    ABS. BASOPHILS 0.0 0.0 - 0.1 K/UL    ABS. IMM.  GRANS. 0.1 (H) 0.00 - 0.04 K/UL    DF AUTOMATED     METABOLIC PANEL, BASIC    Collection Time: 08/25/19  6:30 AM   Result Value Ref Range    Sodium 141 136 - 145 mmol/L    Potassium 3.3 (L) 3.5 - 5.1 mmol/L    Chloride 107 97 - 108 mmol/L    CO2 28 21 - 32 mmol/L    Anion gap 6 5 - 15 mmol/L    Glucose 124 (H) 65 - 100 mg/dL    BUN 17 6 - 20 MG/DL    Creatinine 1.03 0.70 - 1.30 MG/DL    BUN/Creatinine ratio 17 12 - 20      GFR est AA >60 >60 ml/min/1.73m2    GFR est non-AA >60 >60 ml/min/1.73m2    Calcium 9.6 8.5 - 10.1 MG/DL   PROTHROMBIN TIME + INR    Collection Time: 08/25/19  6:30 AM   Result Value Ref Range    INR 1.0 0.9 - 1.1      Prothrombin time 10.3 9.0 - 11.1 sec   TROPONIN I    Collection Time: 08/25/19  6:30 AM   Result Value Ref Range    Troponin-I, Qt. <0.05 <0.05 ng/mL   EKG, 12 LEAD, INITIAL    Collection Time: 08/25/19  7:35 AM   Result Value Ref Range    Ventricular Rate 54 BPM    Atrial Rate 54 BPM    P-R Interval 174 ms    QRS Duration 154 ms    Q-T Interval 496 ms    QTC Calculation (Bezet) 470 ms    Calculated P Axis 35 degrees    Calculated R Axis -42 degrees    Calculated T Axis -9 degrees    Diagnosis       Sinus bradycardia  Left axis deviation  Right bundle branch block  When compared with ECG of 07-NOV-2016 10:25,  Right bundle branch block is now present

## 2019-08-25 NOTE — PROGRESS NOTES
OCCUPATIONAL THERAPY EVALUATION/DISCHARGE  Patient: Iván Joyce (73 y.o. adult)  Date: 8/25/2019  Primary Diagnosis: CVA (cerebral vascular accident) [facility]) [I63.9]       Precautions: Fall       ASSESSMENT  Based on the objective data described below, the patient presents with resolution of symptoms earlier this morning including diplopia, dizziness, emesis, L UE/face impaired sensation, and disorientation. Head CT and MRI negative for acute changes. Prior to admission he was indep with ADL tasks, driving and volunteering at [employer]. Lives with wife. BP elevated 170/89, HR 58 with orders to allow for permissive hypertension. Vision testing WDL, Mike Latch 65/66 with only deficits mild decrease in time of coordination task with LUE compared to RUE. Supervision for IV mgmt otherwise indep with ADL tasks. Provided extensive education on BE FAST signs of stroke recognition and role of neuroplasticity in recovery sensation and vision with neuro events. Patient and wife with good understanding. Functional Outcome Measure: The patient scored 65/66 on the Antonito Latch outcome measure which is indicative of mild impairment in LUE coordination. Other factors to consider for discharge: none- lives at home with wife;  Recommend discussion of returning to driving with MD     PLAN :  Recommendation for discharge: (in order for the patient to meet his/her long term goals)  No skilled occupational therapy/ follow up rehabilitation needs identified at this time. This discharge recommendation:  Has not yet been discussed the attending provider and/or case management    Equipment recommendations for successful discharge: none       SUBJECTIVE:   Patient stated It was the strangest thing.     OBJECTIVE DATA SUMMARY:   HISTORY:   Past Medical History:   Diagnosis Date    Arthritis     Enlarged prostate     GERD (gastroesophageal reflux disease)     High cholesterol     Hx of seasonal allergies     Hypertension      Past Surgical History:   Procedure Laterality Date    COLONOSCOPY N/A 1/5/2018    COLONOSCOPY WITH BIOPSIES performed by Mariola Monroe MD at [facility] AMBULATORY OR    HX CARPAL TUNNEL RELEASE Right     HX COLONOSCOPY      HX HEENT  2003    lymph node bx in neck (benign)    HX LAP CHOLECYSTECTOMY  2000    HX ORTHOPAEDIC  2008    Back Surgery lumbar    HX ORTHOPAEDIC Right 2016    thumb and palm under ring finger    HX PARATHYROIDECTOMY      one right sided 2015. Prior Level of Function/Environment/Context: See assessment section  Expanded or extensive additional review of patient history:     Home Situation  Home Environment: Private residence  # Steps to Enter: 0  One/Two Story Residence: One story  Living Alone: No  Support Systems: Spouse/Significant Other/Partner, Friends \ neighbors  Patient Expects to be Discharged to[de-identified] Private residence  Current DME Used/Available at Home: Raised toilet seat, Grab bars    Hand dominance: Right    EXAMINATION OF PERFORMANCE DEFICITS:  Cognitive/Behavioral Status:  Neurologic State: Alert  Orientation Level: Oriented X4  Cognition: Appropriate decision making       Hearing: Auditory  Auditory Impairment: Hard of hearing, bilateral    Vision/Perceptual:    Tracking: Able to track stimulus in all quadrants w/o difficulty    Saccades: Decreased speed of saccadic movement    Convergence: Breaks at 8 from nose    Visual Fields: (WDL)  Diplopia: No(resolved this morning)    Acuity: Within Defined Limits;Able to read clock/calendar on wall without difficulty; Able to read employee name badge without difficulty(photophobia earlier today)    Corrective Lenses: Glasses    Range of Motion:    AROM: Within functional limits  PROM: Within functional limits                      Strength:    Strength: Within functional limits                Coordination:  Coordination: Within functional limits  Fine Motor Skills-Upper: Left Intact; Right Intact         Tone & Sensation:  Tone: Normal                         Balance:  Sitting: Intact  Standing: Intact    Functional Mobility and Transfers for ADLs:  Bed Mobility:  Rolling: Modified independent  Supine to Sit: Modified independent  Sit to Supine: Modified independent  Scooting: Modified independent    Transfers:  Sit to Stand: Supervision  Stand to Sit: Supervision  Bathroom Mobility: Supervision/set up(for IV mgmt)  Toilet Transfer : Supervision(for IV mgmt)    ADL Assessment:  Feeding: Independent    Oral Facial Hygiene/Grooming: Independent    Bathing: Modified independent    Upper Body Dressing: Independent    Lower Body Dressing: Modified independent; Additional time    Toileting: Independent       Functional Measure:  Fugl-Herr Assessment of Motor Recovery after Stroke:   Reflex Activity  Flexors/Biceps/Fingers: Can be elicited  Extensors/Triceps: Can be elicited  Reflex Subtotal: 4    Volitional Movement Within Synergies  Shoulder Retraction: Full  Shoulder Elevation: Full  Shoulder Abduction (90 degrees): Full  Shoulder External Rotation: Full  Elbow Flexion: Full  Forearm Supination: Full  Shoulder Adduction/Internal Rotation: Full  Elbow Extension: Full  Forearm Pronation: Full  Subtotal: 18    Volitional Movement Mixing Synergies  Hand to Lumbar Spine: Full  Shoulder Flexion (0-90 degrees): Full  Pronation-Supination: Full  Subtotal: 6    Volitional Movement With Little or No Synergy  Shoulder Abduction (0-90 degrees): Full  Shoulder Flexion ( degrees): Full  Pronation/Supination: Full  Subtotal : 6    Normal Reflex Activity  Biceps, Triceps, Finger Flexors:  Full  Subtotal : 2    Upper Extremity Total   Upper Extremity Total: 36    Wrist  Stability at 15 Degree Dorsiflexion: Full  Repeated Dorsiflexion/ Volar Flexion: Full  Stability at 15 Degree Dorsiflexion: Full  Repeated Dorsiflexion/ Volar Flexion: Full  Circumduction: Full  Wrist Total: 10    Hand  Mass Flexion: Full  Mass Extension: Full  Grasp A: Full  Grasp B: Full  Grasp C: Full  Grasp D: Full  Grasp E: Full  Hand Total: 14    Coordination/Speed  Tremor: None  Dysmetria: None  Time: 2-5s  Coordination/Speed Total : 5    Total A-D  Total A-D (Motor Function): 65/66     This is a reliable/valid measure of arm function after a neurological event. It has established value to characterize functional status and for measuring spontaneous and therapy-induced recovery; tests proximal and distal motor functions. Fugl-Herr Assessment  UE scores recorded between five and 30 days post neurologic event can be used to predict UE recovery at six months post neurologic event. Severe = 0-21 points   Moderately Severe = 22-33 points   Moderate = 34-47 points   Mild = 48-66 points  JEREL Azul, JONY Kohler, & STANLEY Glynn (1992). Measurement of motor recovery after stroke: Outcome assessment and sample size requirements.  Stroke, 23, pp. 0689-3046.   ------------------------------------------------------------------------------------------------------------------------------------------------------------------  MCID:  Stroke:   Grecia Bergman et al, 2001; n = 171; mean age 79 (6) years; assessed within 16 (12) days of stroke, Acute Stroke)  FMA Motor Scores from Admission to Discharge   10 point increase in FMA Upper Extremity = 1.5 change in discharge FIM   10 point increase in FMA Lower Extremity = 1.9 change in discharge FIM  MDC:   Stroke:   Alf Echeverria al, 2008, n = 14, mean age = 59.9 (14.6) years, assessed on average 14 (6.5) months post stroke, Chronic Stroke)   FMA = 5.2 points for the Upper Extremity portion of the assessment     Occupational Therapy Evaluation Charge Determination   History Examination Decision-Making   LOW Complexity : Brief history review  LOW Complexity : 1-3 performance deficits relating to physical, cognitive , or psychosocial skils that result in activity limitations and / or participation restrictions  LOW Complexity : No comorbidities that affect functional and no verbal or physical assistance needed to complete eval tasks       Based on the above components, the patient evaluation is determined to be of the following complexity level: LOW   Pain Rating:  Denies pain    Activity Tolerance:   Good  Please refer to the flowsheet for vital signs taken during this treatment. After treatment patient left in no apparent distress:    Supine in bed, Call bell within reach and Caregiver / family present    COMMUNICATION/EDUCATION:   The patients plan of care was discussed with: Physical Therapist and Registered Nurse. Patient was educated regarding his deficit(s) that have all resolved  as this relates to his diagnosis of stoke work up. He demonstrated Good understanding as evidenced by participation in session. Patient and/or family was verbally educated on the BE FAST acronym for signs/symptoms of CVA and TIA. BE FAST was written on patient's communication board  for visual education and reinforcement. All questions answered with patient indicating good understanding.      Thank you for this referral.  Natalio Saavedra, OT  Time Calculation: 22 mins

## 2019-08-26 ENCOUNTER — TELEPHONE (OUTPATIENT)
Dept: INTERNAL MEDICINE CLINIC | Age: 69
End: 2019-08-26

## 2019-08-26 ENCOUNTER — APPOINTMENT (OUTPATIENT)
Dept: NON INVASIVE DIAGNOSTICS | Age: 69
DRG: 069 | End: 2019-08-26
Attending: INTERNAL MEDICINE
Payer: MEDICARE

## 2019-08-26 VITALS
BODY MASS INDEX: 27.77 KG/M2 | HEIGHT: 70 IN | WEIGHT: 194 LBS | DIASTOLIC BLOOD PRESSURE: 74 MMHG | SYSTOLIC BLOOD PRESSURE: 145 MMHG | RESPIRATION RATE: 18 BRPM | OXYGEN SATURATION: 100 % | HEART RATE: 51 BPM | TEMPERATURE: 97.7 F

## 2019-08-26 PROBLEM — G45.9 TIA (TRANSIENT ISCHEMIC ATTACK): Status: ACTIVE | Noted: 2019-08-26

## 2019-08-26 LAB
ALBUMIN SERPL-MCNC: 3.4 G/DL (ref 3.5–5)
ALBUMIN/GLOB SERPL: 1.2 {RATIO} (ref 1.1–2.2)
ALP SERPL-CCNC: 34 U/L (ref 45–117)
ALT SERPL-CCNC: 24 U/L (ref 12–78)
ANION GAP SERPL CALC-SCNC: 6 MMOL/L (ref 5–15)
APTT PPP: 26.8 SEC (ref 22.1–32)
AST SERPL-CCNC: 16 U/L (ref 15–37)
BILIRUB SERPL-MCNC: 0.8 MG/DL (ref 0.2–1)
BUN SERPL-MCNC: 13 MG/DL (ref 6–20)
BUN/CREAT SERPL: 13 (ref 12–20)
CALCIUM SERPL-MCNC: 9 MG/DL (ref 8.5–10.1)
CHLORIDE SERPL-SCNC: 110 MMOL/L (ref 97–108)
CHOLEST SERPL-MCNC: 144 MG/DL
CO2 SERPL-SCNC: 26 MMOL/L (ref 21–32)
CREAT SERPL-MCNC: 0.98 MG/DL (ref 0.7–1.3)
ECHO AV AREA PEAK VELOCITY: 2.4 CM2
ECHO AV AREA/BSA PEAK VELOCITY: 1.2 CM2/M2
ECHO AV PEAK GRADIENT: 6.3 MMHG
ECHO AV PEAK VELOCITY: 125.07 CM/S
ECHO EST RA PRESSURE: 10 MMHG
ECHO LA AREA 2C: 17.42 CM2
ECHO LA AREA 4C: 18.3 CM2
ECHO LA MAJOR AXIS: 4.04 CM
ECHO LA VOL 2C: 49.86 ML (ref 18–58)
ECHO LA VOL 4C: 41.56 ML (ref 18–58)
ECHO LA VOL BP: 50 ML (ref 18–58)
ECHO LA VOL/BSA BIPLANE: 24.27 ML/M2 (ref 16–28)
ECHO LA VOLUME INDEX A2C: 24.2 ML/M2 (ref 16–28)
ECHO LA VOLUME INDEX A4C: 20.17 ML/M2 (ref 16–28)
ECHO LV E' LATERAL VELOCITY: 3.47 CM/S
ECHO LV E' SEPTAL VELOCITY: 8.61 CM/S
ECHO LV INTERNAL DIMENSION DIASTOLIC: 5.64 CM (ref 4.2–5.9)
ECHO LV INTERNAL DIMENSION SYSTOLIC: 3.19 CM
ECHO LV IVSD: 1.13 CM (ref 0.6–1)
ECHO LV MASS 2D: 314.8 G (ref 88–224)
ECHO LV MASS INDEX 2D: 152.8 G/M2 (ref 49–115)
ECHO LV POSTERIOR WALL DIASTOLIC: 1.14 CM (ref 0.6–1)
ECHO LVOT DIAM: 1.96 CM
ECHO LVOT PEAK GRADIENT: 4 MMHG
ECHO LVOT PEAK VELOCITY: 99.59 CM/S
ECHO MV A VELOCITY: 54.45 CM/S
ECHO MV E DECELERATION TIME (DT): 352.8 MS
ECHO MV E VELOCITY: 58.04 CM/S
ECHO MV E/A RATIO: 1.07
ECHO MV E/E' LATERAL: 16.73
ECHO MV E/E' RATIO (AVERAGED): 11.73
ECHO MV E/E' SEPTAL: 6.74
ECHO MV REGURGITANT PEAK GRADIENT: 44.9 MMHG
ECHO MV REGURGITANT PEAK VELOCITY: 335.07 CM/S
ECHO PULMONARY ARTERY SYSTOLIC PRESSURE (PASP): 17.4 MMHG
ECHO PV MAX VELOCITY: 51.03 CM/S
ECHO PV PEAK GRADIENT: 1 MMHG
ECHO RA AREA 4C: 18.46 CM2
ECHO RIGHT VENTRICULAR SYSTOLIC PRESSURE (RVSP): 17.4 MMHG
ECHO TV REGURGITANT MAX VELOCITY: 135.63 CM/S
ECHO TV REGURGITANT PEAK GRADIENT: 7.4 MMHG
ERYTHROCYTE [DISTWIDTH] IN BLOOD BY AUTOMATED COUNT: 13.7 % (ref 11.5–14.5)
EST. AVERAGE GLUCOSE BLD GHB EST-MCNC: 105 MG/DL
GLOBULIN SER CALC-MCNC: 2.9 G/DL (ref 2–4)
GLUCOSE SERPL-MCNC: 84 MG/DL (ref 65–100)
HBA1C MFR BLD: 5.3 % (ref 4.2–6.3)
HCT VFR BLD AUTO: 45.5 % (ref 36.6–50.3)
HDLC SERPL-MCNC: 49 MG/DL
HDLC SERPL: 2.9 {RATIO} (ref 0–5)
HGB BLD-MCNC: 14.4 G/DL (ref 12.1–17)
INR PPP: 1 (ref 0.9–1.1)
LDLC SERPL CALC-MCNC: 77.6 MG/DL (ref 0–100)
LIPID PROFILE,FLP: NORMAL
MCH RBC QN AUTO: 27.9 PG (ref 26–34)
MCHC RBC AUTO-ENTMCNC: 31.6 G/DL (ref 30–36.5)
MCV RBC AUTO: 88.2 FL (ref 80–99)
NRBC # BLD: 0 K/UL (ref 0–0.01)
NRBC BLD-RTO: 0 PER 100 WBC
PLATELET # BLD AUTO: 190 K/UL (ref 150–400)
PMV BLD AUTO: 10.6 FL (ref 8.9–12.9)
POTASSIUM SERPL-SCNC: 3.5 MMOL/L (ref 3.5–5.1)
PROT SERPL-MCNC: 6.3 G/DL (ref 6.4–8.2)
PROTHROMBIN TIME: 10.5 SEC (ref 9–11.1)
RBC # BLD AUTO: 5.16 M/UL (ref 4.1–5.7)
SODIUM SERPL-SCNC: 142 MMOL/L (ref 136–145)
THERAPEUTIC RANGE,PTTT: NORMAL SECS (ref 58–77)
TRIGL SERPL-MCNC: 87 MG/DL (ref ?–150)
TROPONIN I SERPL-MCNC: <0.05 NG/ML
TROPONIN I SERPL-MCNC: <0.05 NG/ML
TSH SERPL DL<=0.05 MIU/L-ACNC: 1.74 UIU/ML (ref 0.36–3.74)
VIT B12 SERPL-MCNC: 307 PG/ML (ref 193–986)
VLDLC SERPL CALC-MCNC: 17.4 MG/DL
WBC # BLD AUTO: 8.4 K/UL (ref 4.1–11.1)

## 2019-08-26 PROCEDURE — 74011250637 HC RX REV CODE- 250/637: Performed by: INTERNAL MEDICINE

## 2019-08-26 PROCEDURE — 85027 COMPLETE CBC AUTOMATED: CPT

## 2019-08-26 PROCEDURE — 65660000000 HC RM CCU STEPDOWN

## 2019-08-26 PROCEDURE — 36415 COLL VENOUS BLD VENIPUNCTURE: CPT

## 2019-08-26 PROCEDURE — 84484 ASSAY OF TROPONIN QUANT: CPT

## 2019-08-26 PROCEDURE — 99218 HC RM OBSERVATION: CPT

## 2019-08-26 PROCEDURE — 80053 COMPREHEN METABOLIC PANEL: CPT

## 2019-08-26 PROCEDURE — 74011250636 HC RX REV CODE- 250/636: Performed by: INTERNAL MEDICINE

## 2019-08-26 PROCEDURE — 96375 TX/PRO/DX INJ NEW DRUG ADDON: CPT

## 2019-08-26 PROCEDURE — 85610 PROTHROMBIN TIME: CPT

## 2019-08-26 PROCEDURE — 94760 N-INVAS EAR/PLS OXIMETRY 1: CPT

## 2019-08-26 PROCEDURE — 97161 PT EVAL LOW COMPLEX 20 MIN: CPT | Performed by: PHYSICAL THERAPIST

## 2019-08-26 PROCEDURE — 84443 ASSAY THYROID STIM HORMONE: CPT

## 2019-08-26 PROCEDURE — 80061 LIPID PANEL: CPT

## 2019-08-26 PROCEDURE — 65390000012 HC CONDITION CODE 44 OBSERVATION

## 2019-08-26 PROCEDURE — 85730 THROMBOPLASTIN TIME PARTIAL: CPT

## 2019-08-26 PROCEDURE — 83036 HEMOGLOBIN GLYCOSYLATED A1C: CPT

## 2019-08-26 PROCEDURE — 82607 VITAMIN B-12: CPT

## 2019-08-26 PROCEDURE — 97116 GAIT TRAINING THERAPY: CPT | Performed by: PHYSICAL THERAPIST

## 2019-08-26 PROCEDURE — 96372 THER/PROPH/DIAG INJ SC/IM: CPT

## 2019-08-26 RX ORDER — GUAIFENESIN 100 MG/5ML
81 LIQUID (ML) ORAL DAILY
Qty: 30 TAB | Refills: 1 | Status: SHIPPED | OUTPATIENT
Start: 2019-08-27

## 2019-08-26 RX ORDER — ATORVASTATIN CALCIUM 40 MG/1
40 TABLET, FILM COATED ORAL
Qty: 30 TAB | Refills: 1 | Status: SHIPPED | OUTPATIENT
Start: 2019-08-26 | End: 2019-09-04 | Stop reason: SDUPTHER

## 2019-08-26 RX ADMIN — MECLIZINE 12.5 MG: 12.5 TABLET ORAL at 05:34

## 2019-08-26 RX ADMIN — FENOFIBRATE 145 MG: 145 TABLET ORAL at 08:23

## 2019-08-26 RX ADMIN — ENOXAPARIN SODIUM 40 MG: 40 INJECTION SUBCUTANEOUS at 08:21

## 2019-08-26 RX ADMIN — ASPIRIN 81 MG 81 MG: 81 TABLET ORAL at 08:22

## 2019-08-26 RX ADMIN — Medication 10 ML: at 03:23

## 2019-08-26 RX ADMIN — GABAPENTIN 300 MG: 300 CAPSULE ORAL at 08:22

## 2019-08-26 RX ADMIN — VITAMIN D, TAB 1000IU (100/BT) 2000 UNITS: 25 TAB at 08:22

## 2019-08-26 NOTE — PROGRESS NOTES
Bedside and Verbal shift change report given sasha (oncoming nurse) by Jake Denise nurse). Report included the following information SBAR, Kardex, Intake/Output and MAR.     Zone Phone:   [phone]        Significant changes during shift:  None           Patient Information     Amy Chou.  68 y.o.  8/25/2019  6:16 AM by Chris Puga MD. Amy Chou. was admitted from Home     Problem List          Patient Active Problem List     Diagnosis Date Noted    CVA [facility]cerebral vascular accident) ([facility] [address] 08/25/2019    BMI 27.0-27.9,adult 10/07/2018    Pain of right hand 09/20/2017    Carpal tunnel syndrome, left 02/16/2017    Primary osteoarthritis of first carpometacarpal joint of right hand 11/10/2016    Dupuytren's contracture of right hand 11/10/2016    Benign prostatic hyperplasia 10/25/2016    Mixed hyperlipidemia 10/25/2016    Gastroesophageal reflux disease without esophagitis 03/25/2016    Essential hypertension 03/25/2016           Past Medical History:   Diagnosis Date    Arthritis      Enlarged prostate      GERD (gastroesophageal reflux disease)      High cholesterol      Hx of seasonal allergies      Hypertension              Core Measures:     CVA: Yes Yes     Activity Status:     OOB to Chair No  Ambulated this shift Yes   Bed Rest No        DVT prophylaxis:     DVT prophylaxis Med- Yes  DVT prophylaxis SCD or NICK- No      Wounds: (If Applicable)     Wounds- Yes     Location R shin     Patient Safety:     Falls Score Total Score: 1  Safety Level_______  Bed Alarm On? No  Sitter?  No     Plan for upcoming shift: safety, neuro checks, echo           Discharge Plan: Yes once test are done     Active Consults:  IP CONSULT TO HOSPITALIST  IP CONSULT TO NEUROLOGY

## 2019-08-26 NOTE — PROGRESS NOTES
NEUROLOGY NOTE     Chief Complaint   Patient presents with    Dizziness    Vision Change       SUBJECTIVE:  No overnight neurological complaints. HPI  Dillon Torres is a 68 y.o. adult who presents to the hospital because at around 515 am on 19 he woke up and felt strange. He felt disoriented and his vision was blurred and double vision. The power light for TV was double and moving around. He tried to stand up and was nauseated. He threw up twice. Double vision lasted for around 15 minutes. He was unsteady on walking. His speech was normal and not slurred. He was feeling better around 1030 am. On exam by teleneurology he did have left arm numbness and that has resolved. He does not smoke. No stroke in the past. - for dm and + for HTN. + HLD. No aspirin at home. Hx of GI bleed. ROS  A ten system review of constitutional, cardiovascular, respiratory, musculoskeletal, endocrine, skin, SHEENT, genitourinary, psychiatric and neurologic systems was obtained and is unremarkable except as stated in HPI     PMH  Past Medical History:   Diagnosis Date    Arthritis     Enlarged prostate     GERD (gastroesophageal reflux disease)     High cholesterol     Hx of seasonal allergies     Hypertension        FH  Family History   Problem Relation Age of Onset    Cancer Mother         lung    Cancer Father         lung       SH  Social History     Socioeconomic History    Marital status:      Spouse name: Not on file    Number of children: Not on file    Years of education: Not on file    Highest education level: Not on file   Tobacco Use    Smoking status: Former Smoker     Last attempt to quit: 9/3/1990     Years since quittin.9    Smokeless tobacco: Never Used   Substance and Sexual Activity    Alcohol use:  Yes     Alcohol/week: 2.0 standard drinks     Types: 1 Cans of beer, 1 Shots of liquor per week    Drug use: No    Sexual activity: Yes     Partners: Female     Birth control/protection: None       ALLERGIES  Allergies   Allergen Reactions    Shellfish Derived Anaphylaxis    Ace Inhibitors Cough    Nsaids (Non-Steroidal Anti-Inflammatory Drug) Other (comments)     GI bleeding.  Pcn [Penicillins] Rash       PHYSICAL EXAMINATION:   Patient Vitals for the past 24 hrs:   Temp Pulse Resp BP SpO2   08/26/19 1006    145/73    08/26/19 0757 98.1 °F (36.7 °C) (!) 55 16 153/79 96 %   08/26/19 0323 98 °F (36.7 °C) 60 19 145/73 96 %   08/25/19 2337 98.3 °F (36.8 °C) 63 18 140/68 96 %   08/25/19 1930 98.1 °F (36.7 °C) (!) 58 18 137/64 97 %   08/25/19 1508 97.9 °F (36.6 °C) (!) 53 18 138/66 98 %   08/25/19 1300  (!) 58  170/89    08/25/19 1212 98 °F (36.7 °C) (!) 57 18 158/75 99 %        General:   General appearance: Pt is in no acute distress   Distal pulses are preserved    Neurological Examination:   Mental Status:  AAO x3. Speech is fluent. Follows commands, has normal fund of knowledge, attention, short term recall, comprehension and insight. Cranial Nerves: Visual fields are full. PERRL, Extraocular movements are full. Facial sensation intact. Facial movement intact. Hearing intact to conversation. Palate elevates symmetrically. Shoulder shrug symmetric. Tongue midline. Motor: Strength is 5/5 in all 4 ext. Normal tone. No atrophy. Sensation: Light touch - Normal    Reflexes: DTRs 2+ throughout. Plantar responses downgoing. Coordination/Cerebellar: Intact to finger-nose-finger     Gait: deferred    Skin: No significant bruising or lacerations.     LAB DATA REVIEWED:    Recent Results (from the past 24 hour(s))   TROPONIN I    Collection Time: 08/25/19  5:53 PM   Result Value Ref Range    Troponin-I, Qt. <0.05 <0.05 ng/mL   TROPONIN I    Collection Time: 08/26/19  3:25 AM   Result Value Ref Range    Troponin-I, Qt. <0.05 <0.05 ng/mL   HEMOGLOBIN A1C WITH EAG    Collection Time: 08/26/19  3:25 AM   Result Value Ref Range    Hemoglobin A1c 5.3 4.2 - 6.3 % Est. average glucose 105 mg/dL   PROTHROMBIN TIME + INR    Collection Time: 08/26/19  3:25 AM   Result Value Ref Range    INR 1.0 0.9 - 1.1      Prothrombin time 10.5 9.0 - 11.1 sec   CBC W/O DIFF    Collection Time: 08/26/19  3:25 AM   Result Value Ref Range    WBC 8.4 4.1 - 11.1 K/uL    RBC 5.16 4.10 - 5.70 M/uL    HGB 14.4 12.1 - 17.0 g/dL    HCT 45.5 36.6 - 50.3 %    MCV 88.2 80.0 - 99.0 FL    MCH 27.9 26.0 - 34.0 PG    MCHC 31.6 30.0 - 36.5 g/dL    RDW 13.7 11.5 - 14.5 %    PLATELET 638 445 - 559 K/uL    MPV 10.6 8.9 - 12.9 FL    NRBC 0.0 0  WBC    ABSOLUTE NRBC 0.00 0.00 - 0.01 K/uL   PTT    Collection Time: 08/26/19  3:25 AM   Result Value Ref Range    aPTT 26.8 22.1 - 32.0 sec    aPTT, therapeutic range     58.0 - 77.0 SECS   VITAMIN B12    Collection Time: 08/26/19  3:25 AM   Result Value Ref Range    Vitamin B12 307 193 - 986 pg/mL   TROPONIN I    Collection Time: 08/26/19  3:25 AM   Result Value Ref Range    Troponin-I, Qt. <0.05 <1.91 ng/mL   METABOLIC PANEL, COMPREHENSIVE    Collection Time: 08/26/19  3:25 AM   Result Value Ref Range    Sodium 142 136 - 145 mmol/L    Potassium 3.5 3.5 - 5.1 mmol/L    Chloride 110 (H) 97 - 108 mmol/L    CO2 26 21 - 32 mmol/L    Anion gap 6 5 - 15 mmol/L    Glucose 84 65 - 100 mg/dL    BUN 13 6 - 20 MG/DL    Creatinine 0.98 0.70 - 1.30 MG/DL    BUN/Creatinine ratio 13 12 - 20      GFR est AA >60 >60 ml/min/1.73m2    GFR est non-AA >60 >60 ml/min/1.73m2    Calcium 9.0 8.5 - 10.1 MG/DL    Bilirubin, total 0.8 0.2 - 1.0 MG/DL    ALT (SGPT) 24 12 - 78 U/L    AST (SGOT) 16 15 - 37 U/L    Alk.  phosphatase 34 (L) 45 - 117 U/L    Protein, total 6.3 (L) 6.4 - 8.2 g/dL    Albumin 3.4 (L) 3.5 - 5.0 g/dL    Globulin 2.9 2.0 - 4.0 g/dL    A-G Ratio 1.2 1.1 - 2.2     LIPID PANEL    Collection Time: 08/26/19  3:25 AM   Result Value Ref Range    LIPID PROFILE          Cholesterol, total 144 <200 MG/DL    Triglyceride 87 <150 MG/DL    HDL Cholesterol 49 MG/DL    LDL, calculated 77.6 0 - 100 MG/DL    VLDL, calculated 17.4 MG/DL    CHOL/HDL Ratio 2.9 0.0 - 5.0     TSH 3RD GENERATION    Collection Time: 19  3:25 AM   Result Value Ref Range    TSH 1.74 0.36 - 3.74 uIU/mL        HOME MEDS  Prior to Admission Medications   Prescriptions Last Dose Informant Patient Reported? Taking? SHINGRIX, PF, 50 mcg/0.5 mL susr injection 2019 at Unknown time  Yes Yes   amLODIPine (NORVASC) 10 mg tablet 2019 at Unknown time  No Yes   Sig: Take 1 Tab by mouth daily. calcium carbonate (TUMS) 200 mg calcium (500 mg) chew   No Yes   Sig: Take 2 Tabs by mouth four (4) times daily as needed for Other (perioral tingling). cholecalciferol (VITAMIN D3) 1,000 unit tablet 2019 at Unknown time  Yes Yes   Sig: Take 2,000 Units by mouth daily. diclofenac (VOLTAREN) 1 % gel   Yes Yes   Sig: Apply two grams by topical route four times daily to the affected area (s). PRN   fenofibrate (LOFIBRA) 160 mg tablet 2019 at Unknown time  No Yes   Sig: TAKE 1 TABLET BY MOUTH ONCE DAILY. fexofenadine (ALLEGRA) 180 mg tablet   Yes Yes   Sig: Take 180 mg by mouth daily as needed. fluticasone (VERAMYST) 27.5 mcg/actuation nasal spray 2019 at Unknown time  No Yes   Si Sprays by Both Nostrils route daily. gabapentin (NEURONTIN) 300 mg capsule 2019 at Unknown time  Yes Yes   Sig: TAKE 1 CAPSULE BY MOUTH THREE TIMES DAILY   montelukast (SINGULAIR) 10 mg tablet   No Yes   Sig: Take 1 Tab by mouth daily. pantoprazole (PROTONIX) 40 mg tablet 2019 at Unknown time  No Yes   Sig: TAKE 1 TABLET BY MOUTH ONCE DAILY   tamsulosin (FLOMAX) 0.4 mg capsule 2019 at Unknown time  No Yes   Sig: TAKE 1 CAPSULE BY MOUTH ONCE DAILY   triamcinolone (NASACORT) 55 mcg nasal inhaler 2019 at Unknown time  Yes Yes   Si Sprays daily.       Facility-Administered Medications: None       CURRENT MEDS  Current Facility-Administered Medications   Medication Dose Route Frequency    sodium chloride (NS) flush 5-40 mL  5-40 mL IntraVENous Q8H    enoxaparin (LOVENOX) injection 40 mg  40 mg SubCUTAneous Q24H    aspirin chewable tablet 81 mg  81 mg Oral DAILY    meclizine (ANTIVERT) tablet 12.5 mg  12.5 mg Oral Q6H    atorvastatin (LIPITOR) tablet 40 mg  40 mg Oral QHS    cholecalciferol (VITAMIN D3) tablet 2,000 Units  2,000 Units Oral DAILY    fenofibrate nanocrystallized (TRICOR) tablet 145 mg  145 mg Oral DAILY    gabapentin (NEURONTIN) capsule 300 mg  300 mg Oral BID    pantoprazole (PROTONIX) tablet 40 mg  40 mg Oral QHS    tamsulosin (FLOMAX) capsule 0.4 mg  0.4 mg Oral QHS       Stroke workup    MRI Brain  No evidence of acute or significant intracranial abnormality. CTA Head and neck  No acute process. No arterial thrombosis/occlusion, dissection, or flow-limiting  stenosis. TTE:   Pending    Stroke labs:  HgBA1c    Lab Results   Component Value Date/Time    Hemoglobin A1c 5.3 08/26/2019 03:25 AM     LDL   Lab Results   Component Value Date/Time    LDL, calculated 77.6 08/26/2019 03:25 AM       IMPRESSION:  Crow Santiago. is a 68 y.o. adult who presents with acute onset vertigo and left arm numbness. Suspect posterior circulation TIA. Michela Yoon MRI brain is normal.    RECOMMENDATIONS:  - TTE - Pending  - Telemetry  - BP goal is less than 140/90  - Continue ASA 81 mg daily  - Cont atorvastatin 40 mg daily   - ST/OT/PT eval    TTE is pending. No further neuro christopher. Call if TTE is concerning.        Parish Romero MD  Neurologist

## 2019-08-26 NOTE — PROGRESS NOTES
PHYSICAL THERAPY EVALUATION/DISCHARGE  Patient: Crow Santiago. (73 y.o. adult)  Date: 8/26/2019  Primary Diagnosis: CVA (cerebral vascular accident) ([facility] [address] [I63.9]  TIA (transient ischemic attack) [G45.9]       Precautions:          ASSESSMENT  Based on the objective data described below, the patient presents with near baseline level of function at this time. Patient reporting resolution of symptoms but has very mild report of feeling \"not right\" when ambulating but did not note any balance deficit or path deviation. No further PT needed at this time. Please reconsult should patient status change. Other factors to consider for discharge: none     Further skilled acute physical therapy is not indicated at this time. PLAN :  Recommendation for discharge: (in order for the patient to meet his/her long term goals)  No skilled physical therapy/ follow up rehabilitation needs identified at this time. This discharge recommendation:  Has been made in collaboration with the attending provider and/or case management    Equipment recommendations for successful discharge: none       SUBJECTIVE:   Patient stated I just feel a tiny bit weird when I was just walking but I have been laying down too much.     OBJECTIVE DATA SUMMARY:   HISTORY:    Past Medical History:   Diagnosis Date    Arthritis     Enlarged prostate     GERD (gastroesophageal reflux disease)     High cholesterol     Hx of seasonal allergies     Hypertension      Past Surgical History:   Procedure Laterality Date    COLONOSCOPY N/A 1/5/2018    COLONOSCOPY WITH BIOPSIES performed by Kevin Gan MD at [facility] AM[facility]UL[facility] OR    HX CARPAL TUNNEL RELEASE Right     HX COLONOSCOPY      HX HEENT  2003    lymph node bx in neck (benign)    HX LAP CHOLECYSTECTOMY  2000    HX ORTHOPAEDIC  2008    Back Surgery lumbar    HX ORTHOPAEDIC Right 2016    thumb and palm under ring finger    HX PARATHYROIDECTOMY      one right sided 2015. Prior level of function: independent with functional mobility at baseline. Lives with wife and does not use any assistive device. Normally independent and active  Personal factors and/or comorbidities impacting plan of care:     Home Situation  Home Environment: Private residence  # Steps to Enter: 0  One/Two Story Residence: One story  Living Alone: No  Support Systems: Spouse/Significant Other/Partner, Friends \ neighbors  Patient Expects to be Discharged to[de-identified] Private residence  Current DME Used/Available at Home: Raised toilet seat, Grab bars    EXAMINATION/PRESENTATION/DECISION MAKING:   Critical Behavior:  Neurologic State: Alert  Orientation Level: Oriented X4  Cognition: Appropriate safety awareness, Follows commands     Hearing: Auditory  Auditory Impairment: Hard of hearing, bilateral    Range Of Motion:  AROM: Within functional limits           PROM: Within functional limits           Strength:    Strength: Within functional limits                    Tone & Sensation:                                  Coordination:     Vision:      Functional Mobility:  Bed Mobility:  Rolling: Independent  Supine to Sit: Independent  Sit to Supine: Independent  Scooting: Independent  Transfers:  Sit to Stand: Independent  Stand to Sit: Independent                       Balance:   Sitting: Intact  Standing: Intact  Ambulation/Gait Training:  Distance (ft): 150 Feet (ft)  Assistive Device: Gait belt  Ambulation - Level of Assistance: Modified independent     Gait Description (WDL): Exceptions to WDL  Gait Abnormalities: Decreased step clearance                 Step Length: Left shortened;Right shortened      Activity Tolerance:   Good  Please refer to the flowsheet for vital signs taken during this treatment.     After treatment patient left in no apparent distress:   Sitting in chair and Call bell within reach    COMMUNICATION/EDUCATION:   The patients plan of care was discussed with: Physical Therapist, Occupational Therapist and Registered Nurse. Fall prevention education was provided and the patient/caregiver indicated understanding.     Thank you for this referral.  Trung Roblero PT, DPT   Time Calculation: 14 mins

## 2019-08-26 NOTE — DISCHARGE SUMMARY
Hospitalist Discharge Summary     Patient ID:  Christian Haro  [id number]  68 y.o.  1950 8/25/2019    PCP on record: Drew Luna MD    Admit date: 8/25/2019  Discharge date and time: 8/26/2019    DISCHARGE DIAGNOSIS:    Likely TIA - started on ASA, statin  CVA ruled out with neg MRI  HTN  BPH  Neuropathy/chronic neck pain  GERD  Full code      CONSULTATIONS:  IP CONSULT TO NEUROLOGY    Excerpted HPI from H&P of Blake Gifford MD:  \"74 y.o.  adult presents to the ED with history of arthritis, acid reflux, hypertension with acute onset of double vision,started at 5am getting up- now resolved after 15 to 20 minutes initially, as well as vertigo-like symptoms on waking this morning.   He denies any chest pain, difficulty breathing, numbness or tingling in his face arms or legs or difficulty with speech.  Denies any head trauma. No uti sx. No abd pain no n/v. No uri sx. No ear issues. No travel. No sick contacts. Non smoker. No drug use. occ drinker.     We were asked to admit for work up and evaluation of the above problems. \"    ______________________________________________________________________  DISCHARGE SUMMARY/HOSPITAL COURSE:  for full details see H&P, daily progress notes, labs, consult notes. TIA   CVA ruled out with neg MRI  -presenting symptoms: double vision/dizizness resolving   -admit to observation  -neuro checks per protocol  -elevated head of bed  -lipid panel, TSH, A1C  -allow for permissive hypertension?with labetalol?prn for? BP >?220/120  -CT No acute findings.   - CTA B/N    No acute process. No arterial thrombosis/occlusion, dissection, or flow-limiting  stenosis. MRI ordered  -ECHO ordered  -ASA.  statin if LDL>70  -PT/OT consult  -neurology consulted  Mehdi Mtz tox /ua/xrays ordered     HTN  Resume meds on DC  Was on amoldopine     BPH cont w flomax      Neuropathy/chronic neck pain cont gabapentin      GERD-cont protonix      Code Status: Full Code  Surrogate Decision Maker:Wife     DVT Prophylaxis: lovenox   GI Prophylaxis: not indicated                    _______________________________________________________________________  Patient seen and examined by me on discharge day. Pertinent Findings:  Gen:    Not in distress  Chest: Clear lungs  CVS:   Regular rhythm. No edema  Abd:  Soft, not distended, not tender  Neuro:  Alert, oriented x 3  _______________________________________________________________________  DISCHARGE MEDICATIONS:   Current Discharge Medication List      START taking these medications    Details   aspirin 81 mg chewable tablet Take 1 Tab by mouth daily. Qty: 30 Tab, Refills: 1      atorvastatin (LIPITOR) 40 mg tablet Take 1 Tab by mouth nightly. Qty: 30 Tab, Refills: 1         CONTINUE these medications which have NOT CHANGED    Details   fenofibrate (LOFIBRA) 160 mg tablet TAKE 1 TABLET BY MOUTH ONCE DAILY. Qty: 90 Tab, Refills: 1      tamsulosin (FLOMAX) 0.4 mg capsule TAKE 1 CAPSULE BY MOUTH ONCE DAILY  Qty: 90 Cap, Refills: 1      pantoprazole (PROTONIX) 40 mg tablet TAKE 1 TABLET BY MOUTH ONCE DAILY  Qty: 90 Tab, Refills: 1      amLODIPine (NORVASC) 10 mg tablet Take 1 Tab by mouth daily. Qty: 90 Tab, Refills: 3    Comments: Dose change  Associated Diagnoses: Essential hypertension      gabapentin (NEURONTIN) 300 mg capsule TAKE 1 CAPSULE BY MOUTH THREE TIMES DAILY      SHINGRIX, PF, 50 mcg/0.5 mL susr injection       triamcinolone (NASACORT) 55 mcg nasal inhaler 2 Sprays daily. montelukast (SINGULAIR) 10 mg tablet Take 1 Tab by mouth daily. Qty: 30 Tab, Refills: 2    Associated Diagnoses: Congestion of both ears      diclofenac (VOLTAREN) 1 % gel Apply two grams by topical route four times daily to the affected area (s). PRN      fluticasone (VERAMYST) 27.5 mcg/actuation nasal spray 2 Sprays by Both Nostrils route daily.   Qty: 3 Bottle, Refills: 3      fexofenadine (ALLEGRA) 180 mg tablet Take 180 mg by mouth daily as needed. cholecalciferol (VITAMIN D3) 1,000 unit tablet Take 2,000 Units by mouth daily. calcium carbonate (TUMS) 200 mg calcium (500 mg) chew Take 2 Tabs by mouth four (4) times daily as needed for Other (perioral tingling). Qty: 30 Tab, Refills: 0               Patient Follow Up Instructions:    Activity: Activity as tolerated  Diet: Cardiac Diet and Low fat, Low cholesterol  Follow-up with Dr Elle Grullon (neurolog) as needed    Follow-up Information     Follow up With Specialties Details Why Contact Montserrat Stanley MD Internal Medicine   [address] [place] [address]  [phone]          ________________________________________________________________    Risk of deterioration: Low    Condition at Discharge:  Stable  __________________________________________________________________    Disposition  Home with family, no needs    ____________________________________________________________________    Code Status: Full Code  ___________________________________________________________________      Total time in minutes spent coordinating this discharge (includes going over instructions, follow-up, prescriptions, and preparing report for sign off to her PCP) :  36 minutes    Signed:  Katie Pfeiffer MD

## 2019-08-26 NOTE — PROGRESS NOTES
Problem: Falls - Risk of  Goal: *Absence of Falls  Description  Document Jeffrey Paul Fall Risk and appropriate interventions in the flowsheet.   Outcome: Progressing Towards Goal  Note:   Fall Risk Interventions:  Mobility Interventions: Patient to call before getting OOB                             Problem: Patient Education: Go to Patient Education Activity  Goal: Patient/Family Education  Outcome: Progressing Towards Goal     Problem: Patient Education: Go to Patient Education Activity  Goal: Patient/Family Education  Outcome: Progressing Towards Goal     Problem: TIA/CVA Stroke: 0-24 hours  Goal: Off Pathway (Use only if patient is Off Pathway)  Outcome: Progressing Towards Goal  Goal: Activity/Safety  Outcome: Progressing Towards Goal  Goal: Consults, if ordered  Outcome: Progressing Towards Goal  Goal: Diagnostic Test/Procedures  Outcome: Progressing Towards Goal  Goal: Nutrition/Diet  Outcome: Progressing Towards Goal  Goal: Discharge Planning  Outcome: Progressing Towards Goal  Goal: Medications  Outcome: Progressing Towards Goal  Goal: Respiratory  Outcome: Progressing Towards Goal  Goal: Treatments/Interventions/Procedures  Outcome: Progressing Towards Goal  Goal: Minimize risk of bleeding post-thrombolytic infusion  Outcome: Progressing Towards Goal  Goal: Monitor for complications post-thrombolytic infusion  Outcome: Progressing Towards Goal  Goal: Psychosocial  Outcome: Progressing Towards Goal  Goal: *Hemodynamically stable  Outcome: Progressing Towards Goal  Goal: *Neurologically stable  Description  Absence of additional neurological deficits    Outcome: Progressing Towards Goal  Goal: *Verbalizes anxiety and depression are reduced or absent  Outcome: Progressing Towards Goal  Goal: *Absence of Signs of Aspiration on Current Diet  Outcome: Progressing Towards Goal  Goal: *Absence of deep venous thrombosis signs and symptoms(Stroke Metric)  Outcome: Progressing Towards Goal  Goal: *Ability to perform ADLs and demonstrates progressive mobility and function  Outcome: Progressing Towards Goal  Goal: *Stroke education started(Stroke Metric)  Outcome: Progressing Towards Goal  Goal: *Dysphagia screen performed(Stroke Metric)  Outcome: Progressing Towards Goal  Goal: *Rehab consulted(Stroke Metric)  Outcome: Progressing Towards Goal

## 2019-08-26 NOTE — PROGRESS NOTES
JAN Plan--Home with family. Patient is being discharged home with family. Wife is transporting him home. Called and left message at 1230pm re patient was downgraded per md to observation however code 40 not initiated.  emailed ur and they have not received callback from md. Patient given observation letter with explanation and copy placed on chart. Patient discharged at this time. Code 44 not initiated by ur department. Ofelia Damon RN BSN CRM        [phone]

## 2019-08-26 NOTE — PROGRESS NOTES
Speech Pathology:  Consult received, chart reviewed and discussed with RN. RN reports no difficulty swallowing. STAND completed with no difficulty noted. Met with patient who report no difficulty swallowing. Speech is fluent and clear. MRI negative for acute event. Formal speech/swallow evaluation not warranted. SLP to sign off at this time. Thank you.     Anne Paez, SLP

## 2019-08-26 NOTE — PROGRESS NOTES
DC papers reviewed with patient and spouse. Both verbalized understanding of all information provided.

## 2019-08-26 NOTE — DISCHARGE INSTRUCTIONS
HOSPITALIST DISCHARGE INSTRUCTIONS    NAME: Luis Hopper :  1950   MRN:  [id number]     Date/Time:  2019 2:08 PM    ADMIT DATE: 2019     DISCHARGE DATE: 2019     DISCHARGE DIAGNOSIS:  Likely TIA - started on ASA, statin  CVA ruled out with neg MRI  HTN  BPH  Neuropathy/chronic neck pain  GERD  Full code    Active Problems:    CVA (cerebral vascular accident) ([address] (2019)      TIA (transient ischemic attack) (2019)         MEDICATIONS:  As per medication reconciliation  list  · It is important that you take the medication exactly as they are prescribed. · Keep your medication in the bottles provided by the pharmacist and keep a list of the medication names, dosages, and times to be taken in your wallet. · Do not take other medications without consulting your doctor. Pain Management: per above medications    What to do at Home    Recommended diet:  Cardiac Diet    Recommended activity: Activity as tolerated    If you have questions regarding the hospital related prescriptions or hospital related issues please call [facility] joni Steele at [phone]. If you experience any of the following symptoms then please call your primary care physician or return to the emergency room if you cannot get hold of your doctor:  Fever, chills, nausea, vomiting, diarrhea, change in mentation, falling, bleeding, shortness of breath,     Follow Up:  Dr. Giorgi Curran MD  you are to call and set up an appointment to see them in 7-10 days. Dr Franck Caro (Neurology) in office as needed    Information obtained by :  I understand that if any problems occur once I am at home I am to contact my physician. I understand and acknowledge receipt of the instructions indicated above.                                                                                                                                            Physician's or R.N.'s Signature Date/Time                                                                                                                                              Patient or Representative Signature                                                          Date/Time

## 2019-08-27 ENCOUNTER — PATIENT OUTREACH (OUTPATIENT)
Dept: INTERNAL MEDICINE CLINIC | Age: 69
End: 2019-08-27

## 2019-08-27 NOTE — PROGRESS NOTES
Hospital Discharge Follow-Up      Date/Time:  2019 10:24 AM  Patient outreach performed: 2019 3:54 PM    Patient was admitted to [facility] on 19 and discharged on 19 for c/o dizziness, vision change (acute onset of double vision), left arm numbness. The physician discharge summary was available at the time of outreach. Patient was contacted within 2 business days of discharge. Top Challenges reviewed with the provider   - discharged with new order for lipitor, hospital discharge medication list also advises to continue taking fenofibrate post-discharge. Potential severe medication interaction between fenofibrate and lipitor is noted; NN reviewed with Dr. Abhi Turner 19 via telephone communication; Tatiana Arias per Dr. Abhi Turner, patient to continue lipitor as ordered and discontinue fenofibrate. Method of communication with provider :chart routing, phone    Inpatient RRAT score: 6  Was this a readmission? no   Patient stated reason for the readmission: n/a    Nurse Navigator (NN) contacted the patient by telephone to perform post hospital discharge assessment. Verified name and  with patient as identifiers. Provided introduction to self, and explanation of the Nurse Navigator role. Reviewed discharge instructions and red flags with patient who verbalized understanding. Patient given an opportunity to ask questions and does not have any further questions or concerns at this time. The patient agrees to contact the PCP office for questions related to their healthcare. NN provided contact information for future reference. Disease Specific:   N/A    Summary of patient's top problems:  1. Likely TIA: neurology consulted. CVA ruled out with negative MRI. Lipids within normal limits, LDL 77. 6(wnl); was taking fenofibrate prior to admission. Hgb A1c 5.3%. Echo 19 - left ventricular ejection fraction is 56 - 60%. Started on aspirin, statin.  To f/u with  Matilde Mann as outpatient. BP goal <140/90. No PT/OT needs identified by IP Team.  2. Medication Evaluation: lipitor, fenofibrate (see above yellow box)  3. Hypertension    Vitals:    08/27/19 1000 08/27/19 1600 08/28/19 1400   BP: 131/71  Comment: Home Reading-pt reported 137/76  Comment: Home Reading-pt reported 133/64  Comment: Home Reading-pt reported       Home Health orders at discharge: [address]: n/a  Date of initial visit: [facility] ordered/company: none  Durable Medical Equipment received: n/a    Barriers to care? none reported/identified at this time    Advance Care Planning:   Does patient have an Advance Directive:  reviewed and current     Medication(s):   New Medications at Discharge: aspirin, lipitor  Changed Medications at Discharge: none  Discontinued Medications at Discharge: none    Medication reconciliation was performed with patient, who verbalizes understanding of administration of home medications. There were no barriers to obtaining medications identified at this time. Referral to Pharm D needed: no     Med Rec during this Call  Current Outpatient Medications   Medication Sig    aspirin 81 mg chewable tablet Take 1 Tab by mouth daily.  atorvastatin (LIPITOR) 40 mg tablet Take 1 Tab by mouth nightly.  tamsulosin (FLOMAX) 0.4 mg capsule TAKE 1 CAPSULE BY MOUTH ONCE DAILY    pantoprazole (PROTONIX) 40 mg tablet TAKE 1 TABLET BY MOUTH ONCE DAILY    amLODIPine (NORVASC) 10 mg tablet Take 1 Tab by mouth daily.  gabapentin (NEURONTIN) 300 mg capsule Take 300 mg by mouth two (2) times a day.  triamcinolone (NASACORT) 55 mcg nasal inhaler 2 Sprays daily.  diclofenac (VOLTAREN) 1 % gel Apply two grams by topical route four times daily to the affected area (s). PRN    calcium carbonate (TUMS) 200 mg calcium (500 mg) chew Take 2 Tabs by mouth four (4) times daily as needed for Other (perioral tingling).     fexofenadine (ALLEGRA) 180 mg tablet Take 180 mg by mouth daily as needed.  cholecalciferol (VITAMIN D3) 1,000 unit tablet Take 2,000 Units by mouth daily.  fenofibrate (LOFIBRA) 160 mg tablet TAKE 1 TABLET BY MOUTH ONCE DAILY. (Patient not taking: Reported on 8/28/2019)    SHINGRIX, PF, 50 mcg/0.5 mL susr injection     montelukast (SINGULAIR) 10 mg tablet Take 1 Tab by mouth daily. (Patient not taking: Reported on 8/28/2019)    fluticasone (VERAMYST) 27.5 mcg/actuation nasal spray 2 Sprays by Both Nostrils route daily. (Patient not taking: Reported on 8/28/2019)     No current facility-administered medications for this visit. There are no discontinued medications. [facility] follow up appointment(s):   Future Appointments:  Future Appointments   Date Time Provider Chucho Prather   9/4/2019  1:30 PM MD MATT [address]   10/1/2019 12:00 PM MD Brody Obregon   10/2/2019  9:30 AM MD Mary Beth [address]        Last Appointment With Me:  Visit date not found     Last Appointment My Department:  4/2/2019      Non-[facility] follow up appointment(s): none  [facility]:  offered and patient declined     Goals Addressed                 This Visit's Progress       Post Hospitalization     Knowledge and adherence of prescribed medication (ie. action, side effects, missed dose, etc.). On track     8/28/2019  - discharged to home with new order for lipitor; patient reports adherence to medication  - hospital discharge medication list advises patient to continue taking fenofibrate post-discharge, however patient reports that he has not resumed taking fenofibrate post-discharge because he was under the impression that lipitor would replace fenofibrate  - risk for severe medication interaction noted between atorvastatin and fenofibrate (Micromedex).  NN will follow-up with Dr. Marcela Lee regarding current medication orders and to clarify whether patient is to be taking lipitor and fenofibrate concurrently. - reviewed hospital discharge medication list/orders and potential medication interaction with Dr. Irais Seals per Dr. Reinaldo Cerna, patient to continue lipitor as ordered and discontinue fenofibrate due to risk for medication interaction. NN notified patient; patient verbalizes understanding.  Prevent complications post hospitalization. On track     8/28/2019   - monitoring BP at home; flowsheet updated. Patient will continue and begin maintaining a written log of BP readings; pt will bring BP log to scheduled appts with Dr. Ciara Delgado and PCP for Provider review  - has followup appointment scheduled with Dr. Ciara Delgado (neuro) 10/1/19  - currently has [facility] appointment scheduled with Dr. Reinaldo Cerna 9/26/19; will request LPN  assistance with appt scheduling due to provider appt availability  - declines [facility] post-acute visit. NN provided [facility] contact information to patient as a resource.   - denies acute vision changes,dizziness/lightheadedness, numbness/tingling, weakness post-discharge  - reviewed red flags for stroke with patient; patient verbalizes understanding of when to contact EMS

## 2019-08-28 ENCOUNTER — TELEPHONE (OUTPATIENT)
Dept: INTERNAL MEDICINE CLINIC | Age: 69
End: 2019-08-28

## 2019-08-28 ENCOUNTER — TELEPHONE (OUTPATIENT)
Dept: NEUROLOGY | Age: 69
End: 2019-08-28

## 2019-08-28 VITALS — DIASTOLIC BLOOD PRESSURE: 64 MMHG | SYSTOLIC BLOOD PRESSURE: 133 MMHG

## 2019-09-04 ENCOUNTER — OFFICE VISIT (OUTPATIENT)
Dept: INTERNAL MEDICINE CLINIC | Age: 69
End: 2019-09-04

## 2019-09-04 VITALS
HEIGHT: 70 IN | OXYGEN SATURATION: 99 % | SYSTOLIC BLOOD PRESSURE: 136 MMHG | RESPIRATION RATE: 16 BRPM | BODY MASS INDEX: 28.52 KG/M2 | TEMPERATURE: 97.9 F | WEIGHT: 199.2 LBS | DIASTOLIC BLOOD PRESSURE: 66 MMHG | HEART RATE: 57 BPM

## 2019-09-04 DIAGNOSIS — G45.9 TIA (TRANSIENT ISCHEMIC ATTACK): Primary | ICD-10-CM

## 2019-09-04 DIAGNOSIS — I10 ESSENTIAL HYPERTENSION: ICD-10-CM

## 2019-09-04 DIAGNOSIS — E78.2 MIXED HYPERLIPIDEMIA: ICD-10-CM

## 2019-09-04 RX ORDER — ATORVASTATIN CALCIUM 40 MG/1
40 TABLET, FILM COATED ORAL
Qty: 90 TAB | Refills: 3 | Status: SHIPPED | OUTPATIENT
Start: 2019-09-04 | End: 2019-10-18 | Stop reason: DRUGHIGH

## 2019-09-04 NOTE — PATIENT INSTRUCTIONS
Office Policies    Phone calls/patient messages:            Please allow up to 24 hours for someone in the office to contact you about your call or message. Be mindful your provider may be out of the office or your message may require further review. We encourage you to use [employer] for your messages as this is a faster, more efficient way to communicate with our office                         Medication Refills:            Prescription medications require 48-72 business hours to process. We encourage you to use [employer] for your refills. For controlled medications: Please allow 72 business hours to process. Certain medications may require you to  a written prescription at our office. NO narcotic/controlled medications will be prescribed after 4pm Monday through Friday or on weekends              Form/Paperwork Completion:            Please note a $25 fee may incur for all paperwork for completed by our providers. We ask that you allow 7-10 business days. Pre-payment is due prior to picking up/faxing the completed form. You may also download your forms to [employer] to have your doctor print off. Blood pressure goal is less than 130/85. Minimum 3 days 30 minutes cardio. Low salt diet. DASH diet. DASH Diet: Care Instructions  Your Care Instructions    The DASH diet is an eating plan that can help lower your blood pressure. DASH stands for Dietary Approaches to Stop Hypertension. Hypertension is high blood pressure. The DASH diet focuses on eating foods that are high in calcium, potassium, and magnesium. These nutrients can lower blood pressure. The foods that are highest in these nutrients are fruits, vegetables, low-fat dairy products, nuts, seeds, and legumes. But taking calcium, potassium, and magnesium supplements instead of eating foods that are high in those nutrients does not have the same effect. The DASH diet also includes whole grains, fish, and poultry.   The DASH diet is one of several lifestyle changes your doctor may recommend to lower your high blood pressure. Your doctor may also want you to decrease the amount of sodium in your diet. Lowering sodium while following the DASH diet can lower blood pressure even further than just the DASH diet alone. Follow-up care is a key part of your treatment and safety. Be sure to make and go to all appointments, and call your doctor if you are having problems. It's also a good idea to know your test results and keep a list of the medicines you take. How can you care for yourself at home? Following the DASH diet  · Eat 4 to 5 servings of fruit each day. A serving is 1 medium-sized piece of fruit, ½ cup chopped or canned fruit, 1/4 cup dried fruit, or 4 ounces (½ cup) of fruit juice. Choose fruit more often than fruit juice. · Eat 4 to 5 servings of vegetables each day. A serving is 1 cup of lettuce or raw leafy vegetables, ½ cup of chopped or cooked vegetables, or 4 ounces (½ cup) of vegetable juice. Choose vegetables more often than vegetable juice. · Get 2 to 3 servings of low-fat and fat-free dairy each day. A serving is 8 ounces of milk, 1 cup of yogurt, or 1 ½ ounces of cheese. · Eat 6 to 8 servings of grains each day. A serving is 1 slice of bread, 1 ounce of dry cereal, or ½ cup of cooked rice, pasta, or cooked cereal. Try to choose whole-grain products as much as possible. · Limit lean meat, poultry, and fish to 2 servings each day. A serving is 3 ounces, about the size of a deck of cards. · Eat 4 to 5 servings of nuts, seeds, and legumes (cooked dried beans, lentils, and split peas) each week. A serving is 1/3 cup of nuts, 2 tablespoons of seeds, or ½ cup of cooked beans or peas. · Limit fats and oils to 2 to 3 servings each day. A serving is 1 teaspoon of vegetable oil or 2 tablespoons of salad dressing. · Limit sweets and added sugars to 5 servings or less a week.  A serving is 1 tablespoon jelly or jam, ½ cup sorbet, or 1 cup of lemonade. · Eat less than 2,300 milligrams (mg) of sodium a day. If you limit your sodium to 1,500 mg a day, you can lower your blood pressure even more. Tips for success  · Start small. Do not try to make dramatic changes to your diet all at once. You might feel that you are missing out on your favorite foods and then be more likely to not follow the plan. Make small changes, and stick with them. Once those changes become habit, add a few more changes. · Try some of the following:  ? Make it a goal to eat a fruit or vegetable at every meal and at snacks. This will make it easy to get the recommended amount of fruits and vegetables each day. ? Try yogurt topped with fruit and nuts for a snack or healthy dessert. ? Add lettuce, tomato, cucumber, and onion to sandwiches. ? Combine a ready-made pizza crust with low-fat mozzarella cheese and lots of vegetable toppings. Try using tomatoes, squash, spinach, broccoli, carrots, cauliflower, and onions. ? Have a variety of cut-up vegetables with a low-fat dip as an appetizer instead of chips and dip. ? Sprinkle sunflower seeds or chopped almonds over salads. Or try adding chopped walnuts or almonds to cooked vegetables. ? Try some vegetarian meals using beans and peas. Add garbanzo or kidney beans to salads. Make burritos and tacos with mashed fernando beans or black beans. Where can you learn more? Go to http://darcie-linden.info/. Enter K043 in the search box to learn more about \"DASH Diet: Care Instructions. \"  Current as of: July 22, 2018  Content Version: 12.1  © 1241-2227 Healthwise, Incorporated. Care instructions adapted under license by [employer] (which disclaims liability or warranty for this information).  If you have questions about a medical condition or this instruction, always ask your healthcare professional. [facility] any warranty or liability for your use of this information. Low Sodium Diet (2,000 Milligram): Care Instructions  Your Care Instructions    Too much sodium causes your body to hold on to extra water. This can raise your blood pressure and force your heart and kidneys to work harder. In very serious cases, this could cause you to be put in the hospital. It might even be life-threatening. By limiting sodium, you will feel better and lower your risk of serious problems. The most common source of sodium is salt. People get most of the salt in their diet from canned, prepared, and packaged foods. Fast food and restaurant meals also are very high in sodium. Your doctor will probably limit your sodium to less than 2,000 milligrams (mg) a day. This limit counts all the sodium in prepared and packaged foods and any salt you add to your food. Follow-up care is a key part of your treatment and safety. Be sure to make and go to all appointments, and call your doctor if you are having problems. It's also a good idea to know your test results and keep a list of the medicines you take. How can you care for yourself at home? Read food labels  · Read labels on cans and food packages. The labels tell you how much sodium is in each serving. Make sure that you look at the serving size. If you eat more than the serving size, you have eaten more sodium. · Food labels also tell you the Percent Daily Value for sodium. Choose products with low Percent Daily Values for sodium. · Be aware that sodium can come in forms other than salt, including monosodium glutamate (MSG), sodium citrate, and sodium bicarbonate (baking soda). MSG is often added to Asian food. When you eat out, you can sometimes ask for food without MSG or added salt. Buy low-sodium foods  · Buy foods that are labeled \"unsalted\" (no salt added), \"sodium-free\" (less than 5 mg of sodium per serving), or \"low-sodium\" (less than 140 mg of sodium per serving).  Foods labeled \"reduced-sodium\" and \"light sodium\" may still have too much sodium. Be sure to read the label to see how much sodium you are getting. · Buy fresh vegetables, or frozen vegetables without added sauces. Buy low-sodium versions of canned vegetables, soups, and other canned goods. Prepare low-sodium meals  · Cut back on the amount of salt you use in cooking. This will help you adjust to the taste. Do not add salt after cooking. One teaspoon of salt has about 2,300 mg of sodium. · Take the salt shaker off the table. · Flavor your food with garlic, lemon juice, onion, vinegar, herbs, and spices. Do not use soy sauce, lite soy sauce, steak sauce, onion salt, garlic salt, celery salt, mustard, or ketchup on your food. · Use low-sodium salad dressings, sauces, and ketchup. Or make your own salad dressings and sauces without adding salt. · Use less salt (or none) when recipes call for it. You can often use half the salt a recipe calls for without losing flavor. Other foods such as rice, pasta, and grains do not need added salt. · Rinse canned vegetables, and cook them in fresh water. This removes somebut not allof the salt. · Avoid water that is naturally high in sodium or that has been treated with water softeners, which add sodium. Call your local water company to find out the sodium content of your water supply. If you buy bottled water, read the label and choose a sodium-free brand. Avoid high-sodium foods  · Avoid eating:  ? Smoked, cured, salted, and canned meat, fish, and poultry. ? Ham, pollock, hot dogs, and luncheon meats. ? Regular, hard, and processed cheese and regular peanut butter. ? Crackers with salted tops, and other salted snack foods such as pretzels, chips, and salted popcorn. ? Frozen prepared meals, unless labeled low-sodium. ? Canned and dried soups, broths, and bouillon, unless labeled sodium-free or low-sodium. ? Canned vegetables, unless labeled sodium-free or low-sodium. ?  [place] fries, pizza, tacos, and other fast foods.  ? Pickles, olives, ketchup, and other condiments, especially soy sauce, unless labeled sodium-free or low-sodium. Where can you learn more? Go to http://darcie-linden.info/. Enter E293 in the search box to learn more about \"Low Sodium Diet (2,000 Milligram): Care Instructions. \"  Current as of: November 7, 2018  Content Version: 12.1  © 0149-0408 Healthwise, Incorporated. Care instructions adapted under license by [employer] (which disclaims liability or warranty for this information). If you have questions about a medical condition or this instruction, always ask your healthcare professional. [place] [address] any warranty or liability for your use of this information.

## 2019-09-04 NOTE — PROGRESS NOTES
Iván Anguiano. is a 68 y.o. adult who presents for hospital follow up. In with wife. Admit date: 2019  Discharge date and time: 2019     DISCHARGE DIAGNOSIS:     Likely TIA - started on ASA, statin  CVA ruled out with neg MRI    Presented with double vision and dizziness. Felt nauseated. No focal weakness. No speech changes. Resolved. Aspirin and lipitor 40mg daily added. LDL 78  . Was not on aspirin prior to hospitalization due to GI side effects in the past.  To see neurology, Dr. Damaso Hernandez, early October. BP at home 122-146/60-76. HR 54-60. Is active aquatics 2-3 days a week and gym 1-2 days a week, machines and bike, resumed at hospital discharge. Feels well with exertion. Vision is back to normal. Recent eye exam.        Past Medical History:   Diagnosis Date    Arthritis     Enlarged prostate     GERD (gastroesophageal reflux disease)     High cholesterol     Hx of seasonal allergies     Hypertension        Family History   Problem Relation Age of Onset    Cancer Mother         lung    Cancer Father         lung       Social History     Socioeconomic History    Marital status:      Spouse name: Not on file    Number of children: Not on file    Years of education: Not on file    Highest education level: Not on file   Occupational History    Not on file   Social Needs    Financial resource strain: Not on file    Food insecurity:     Worry: Not on file     Inability: Not on file    Transportation needs:     Medical: Not on file     Non-medical: Not on file   Tobacco Use    Smoking status: Former Smoker     Last attempt to quit: 9/3/1990     Years since quittin.0    Smokeless tobacco: Never Used   Substance and Sexual Activity    Alcohol use:  Yes     Alcohol/week: 2.0 standard drinks     Types: 1 Cans of beer, 1 Shots of liquor per week    Drug use: No    Sexual activity: Yes     Partners: Female     Birth control/protection: None   Lifestyle  Physical activity:     Days per week: Not on file     Minutes per session: Not on file    Stress: Not on file   Relationships    Social connections:     Talks on phone: Not on file     Gets together: Not on file     Attends Religion service: Not on file     Active member of club or organization: Not on file     Attends meetings of clubs or organizations: Not on file     Relationship status: Not on file    Intimate partner violence:     Fear of current or ex partner: Not on file     Emotionally abused: Not on file     Physically abused: Not on file     Forced sexual activity: Not on file   Other Topics Concern    Not on file   Social History Narrative    Not on file       Current Outpatient Medications on File Prior to Visit   Medication Sig Dispense Refill    aspirin 81 mg chewable tablet Take 1 Tab by mouth daily. 30 Tab 1    tamsulosin (FLOMAX) 0.4 mg capsule TAKE 1 CAPSULE BY MOUTH ONCE DAILY 90 Cap 1    pantoprazole (PROTONIX) 40 mg tablet TAKE 1 TABLET BY MOUTH ONCE DAILY 90 Tab 1    amLODIPine (NORVASC) 10 mg tablet Take 1 Tab by mouth daily. 90 Tab 3    gabapentin (NEURONTIN) 300 mg capsule Take 300 mg by mouth two (2) times a day.  triamcinolone (NASACORT) 55 mcg nasal inhaler 2 Sprays daily.  montelukast (SINGULAIR) 10 mg tablet Take 1 Tab by mouth daily. 30 Tab 2    diclofenac (VOLTAREN) 1 % gel Apply two grams by topical route four times daily to the affected area (s). PRN      fexofenadine (ALLEGRA) 180 mg tablet Take 180 mg by mouth daily as needed.  cholecalciferol (VITAMIN D3) 1,000 unit tablet Take 2,000 Units by mouth daily.  SHINGRIX, PF, 50 mcg/0.5 mL susr injection        No current facility-administered medications on file prior to visit. Review of Systems  Pertinent items are noted in HPI.     Objective:     Visit Vitals  /66 (BP 1 Location: Left arm, BP Patient Position: Sitting)   Pulse (!) 57   Temp 97.9 °F (36.6 °C) (Oral)   Resp 16   Ht 5' 10\" (1.778 m)   Wt 199 lb 3.2 oz (90.4 kg)   SpO2 99%   BMI 28.58 kg/m²     Gen: well appearing adult  HEENT:   PERRL,normal conjunctiva. External ear and canals normal, TMs no opacification or erythema,  OP no erythema, no exudates, MMM  Neck:  Supple. Thyroid normal size, nontender, without nodules. No masses or LAD  Resp:  No wheezing, no rhonchi, no rales. CV:  RRR, normal S1S2, no murmur. GI: soft, nontender, without masses. No hepatosplenomegaly. Extrem:  +2 pulses, no edema, warm distally  Neuro: alert, no focal deficits      Assessment/Plan:       ICD-10-CM ICD-9-CM    1. TIA (transient ischemic attack) G45.9 435.9    2. Mixed hyperlipidemia E78.2 272.2 atorvastatin (LIPITOR) 40 mg tablet      METABOLIC PANEL, COMPREHENSIVE      LIPID PANEL   3. Essential hypertension I10 401.9      Discussed secondary prevention. To see neurology in follow up. Follow-up and Dispositions    · Return for follow up as scheduled.   Shaun Franco MD

## 2019-09-06 ENCOUNTER — PATIENT OUTREACH (OUTPATIENT)
Dept: INTERNAL MEDICINE CLINIC | Age: 69
End: 2019-09-06

## 2019-09-06 NOTE — PROGRESS NOTES
Goals Addressed                 This Visit's Progress       Post Hospitalization     Knowledge and adherence of prescribed medication (ie. action, side effects, missed dose, etc.).        8/28/2019  - discharged to home with new order for lipitor; patient reports adherence to medication  - hospital discharge medication list advises patient to continue taking fenofibrate post-discharge, however patient reports that he has not resumed taking fenofibrate post-discharge because he was under the impression that lipitor would replace fenofibrate  - risk for severe medication interaction noted between atorvastatin and fenofibrate (Micromedex). NN will follow-up with Dr. Yarely Aguirre regarding current medication orders and to clarify whether patient is to be taking lipitor and fenofibrate concurrently. - reviewed hospital discharge medication list/orders and potential medication interaction with Dr. Yarely Aguirre. VORB per Dr. Yarely Aguirre, patient to continue lipitor as ordered and discontinue fenofibrate due to risk for medication interaction. NN notified patient; patient verbalizes understanding. 9/6/2019  - reports adherence to aspirin 81 mg daily. Reports history of taking aspirin 81 mg daily approximately 10 years ago, however was advised to discontinue due to imaging results of endoscopy- \"When they did the endoscopy the lining was red and there was some bleeding when he touched the stomach\"; denies history of GIB. Monitor for abdominal pain, nausea, heartburn, black/bloody/tarry stools. - reports adherence to lipitor 40 mg nightly       Prevent complications post hospitalization. 8/28/2019   - monitoring BP at home; flowsheet updated.  Patient will continue and begin maintaining a written log of BP readings; pt will bring BP log to scheduled appts with Dr. Brock Maurer and PCP for Provider review  - has followup appointment scheduled with Dr. Brock Maurer (neuro) 10/1/19  - currently has [facility] appointment scheduled with  [facility] 9/26/19; will request LPN  assistance with appt scheduling due to provider appt availability  - declines [facility] post-acute visit. NN provided [facility] contact information to patient as a resource. - denies acute vision changes,dizziness/lightheadedness, numbness/tingling, weakness post-discharge  - reviewed red flags for stroke with patient; patient verbalizes understanding of when to contact EMS    9/6/2019  - attended [facility] appt with PCP 9/4/19; cmp, lipid panel ordered (future orders to be collected 10/14). No new medications and/or medication changes noted. F/u with PCP as scheduled. - PCP f/u appt rescheduled from 10/2 to 10/18 so that lab results can be reviewed with patient during office visit  - Patient will continue to monitor BP at home, maintain BP Log.  NN will f/u next week to review home BP Log          Future Appointments:  Future Appointments   Date Time Provider Chucho Prather   10/1/2019 12:00 PM Pavan Chaudhari  [facility]   10/18/2019 10:30 AM Ailin Ramirez MD [address]        Last Appointment With Me:  Visit date not found     Last Appointment My Department:  9/4/2019

## 2019-09-11 ENCOUNTER — PATIENT OUTREACH (OUTPATIENT)
Dept: INTERNAL MEDICINE CLINIC | Age: 69
End: 2019-09-11

## 2019-09-11 NOTE — PROGRESS NOTES
Goals Addressed                 This Visit's Progress       Post Hospitalization     Knowledge and adherence of prescribed medication (ie. action, side effects, missed dose, etc.). On track     8/28/2019  - discharged to home with new order for lipitor; patient reports adherence to medication  - hospital discharge medication list advises patient to continue taking fenofibrate post-discharge, however patient reports that he has not resumed taking fenofibrate post-discharge because he was under the impression that lipitor would replace fenofibrate  - risk for severe medication interaction noted between atorvastatin and fenofibrate (Micromedex). NN will follow-up with Dr. Carlyn Severance regarding current medication orders and to clarify whether patient is to be taking lipitor and fenofibrate concurrently. - reviewed hospital discharge medication list/orders and potential medication interaction with Dr. Carlyn Severance. VORB per Dr. Carlyn Severance, patient to continue lipitor as ordered and discontinue fenofibrate due to risk for medication interaction. NN notified patient; patient verbalizes understanding. 9/6/2019  - reports adherence to aspirin 81 mg daily. Reports history of taking aspirin 81 mg daily approximately 10 years ago, however was advised to discontinue due to imaging results of endoscopy- \"When they did the endoscopy the lining was red and there was some bleeding when he touched the stomach\"; denies history of GIB. Monitor for abdominal pain, nausea, heartburn, black/bloody/tarry stools. - reports adherence to lipitor 40 mg nightly    9/11/2019  - taking aspirin 81 mg daily; denies abdominal pain, nausea, heartburn, black/bloody/tarry stools. - reports medication adherence and denies any questions/concerns at this time       Prevent complications post hospitalization. On track     8/28/2019   - monitoring BP at home; flowsheet updated.  Patient will continue and begin maintaining a written log of BP readings; pt will bring BP log to scheduled appts with Dr. Leo Dunlap and PCP for Provider review  - has followup appointment scheduled with Dr. Leo Dunlap (neuro) 10/1/19  - currently has [place] appointment scheduled with Dr. Kris uLx 9/26/19; will request LPN  assistance with appt scheduling due to provider appt availability  - declines [facility] post-acute visit. NN provided [facility] contact information to patient as a resource. - denies acute vision changes,dizziness/lightheadedness, numbness/tingling, weakness post-discharge  - reviewed red flags for stroke with patient; patient verbalizes understanding of when to contact EMS    9/6/2019  - attended [facility] appt with PCP 9/4/19; cmp, lipid panel ordered (future orders to be collected 10/14). No new medications and/or medication changes noted. F/u with PCP as scheduled. - PCP f/u appt rescheduled from 10/2 to 10/18 so that lab results can be reviewed with patient during office visit  - Patient will continue to monitor BP at home, maintain BP Log.  NN will f/u next week to review home BP Log    9/11/2019  - \"it's been going good\"  - monitoring BP at home; patient currently driving - he will submit home BP readings to NN/PCP via [employer] for review  - pt will continue to monitor BP at home  - NN f/u next week          Future Appointments:  Future Appointments   Date Time Provider Chucho Prather   10/1/2019 12:00 PM Marilee Poole  [facility]   10/18/2019 10:30 AM Ramírez Zamora MD [address]   10/18/2019 12:30 PM [facility]        Last Appointment With Me:  Visit date not found     Last Appointment My Department:  9/4/2019

## 2019-09-20 ENCOUNTER — PATIENT OUTREACH (OUTPATIENT)
Dept: INTERNAL MEDICINE CLINIC | Age: 69
End: 2019-09-20

## 2019-09-20 VITALS — DIASTOLIC BLOOD PRESSURE: 66 MMHG | HEART RATE: 55 BPM | SYSTOLIC BLOOD PRESSURE: 125 MMHG

## 2019-09-20 NOTE — PROGRESS NOTES
Vitals:    09/10/19 0930 09/11/19 0700 09/11/19 1800 09/12/19 0630   BP: 116/59  Comment: Home Reading-pt reported 121/61  Comment: Home Reading-pt reported 126/61  Comment: Home Reading-pt reported 125/66  Comment: Home Reading-pt reported   Pulse: 66 (!) 54 (!) 57 (!) 55     Goals Addressed                 This Visit's Progress       Post Hospitalization     Knowledge and adherence of prescribed medication (ie. action, side effects, missed dose, etc.). On track     8/28/2019  - discharged to home with new order for lipitor; patient reports adherence to medication  - hospital discharge medication list advises patient to continue taking fenofibrate post-discharge, however patient reports that he has not resumed taking fenofibrate post-discharge because he was under the impression that lipitor would replace fenofibrate  - risk for severe medication interaction noted between atorvastatin and fenofibrate (Micromedex). NN will follow-up with Dr. Sherita Velarde regarding current medication orders and to clarify whether patient is to be taking lipitor and fenofibrate concurrently. - reviewed hospital discharge medication list/orders and potential medication interaction with Dr. Sherita Velarde. VORB per Dr. Sherita Velarde, patient to continue lipitor as ordered and discontinue fenofibrate due to risk for medication interaction. NN notified patient; patient verbalizes understanding. 9/6/2019  - reports adherence to aspirin 81 mg daily. Reports history of taking aspirin 81 mg daily approximately 10 years ago, however was advised to discontinue due to imaging results of endoscopy- \"When they did the endoscopy the lining was red and there was some bleeding when he touched the stomach\"; denies history of GIB. Monitor for abdominal pain, nausea, heartburn, black/bloody/tarry stools.   - reports adherence to lipitor 40 mg nightly    9/11/2019  - taking aspirin 81 mg daily; denies abdominal pain, nausea, heartburn, black/bloody/tarry stools. - reports medication adherence and denies any questions/concerns at this time       Prevent complications post hospitalization. On track     8/28/2019   - monitoring BP at home; flowsheet updated. Patient will continue and begin maintaining a written log of BP readings; pt will bring BP log to scheduled appts with Dr. Nury Dumont and PCP for Provider review  - has followup appointment scheduled with Dr. Nury Dumont (neuro) 10/1/19  - currently has [facility] appointment scheduled with Dr. Gualberto Edwards 9/26/19; will request LPN  assistance with appt scheduling due to provider appt availability  - declines [facility] post-acute visit. NN provided [facility] contact information to patient as a resource. - denies acute vision changes,dizziness/lightheadedness, numbness/tingling, weakness post-discharge  - reviewed red flags for stroke with patient; patient verbalizes understanding of when to contact EMS    9/6/2019  - attended [facility] appt with PCP 9/4/19; cmp, lipid panel ordered (future orders to be collected 10/14). No new medications and/or medication changes noted. Blood pressure goal is less than 130/85, Minimum 3 days 30 minutes cardio, Low salt diet, DASH diet. F/u with PCP as scheduled. - PCP f/u appt rescheduled from 10/2 to 10/18 so that lab results can be reviewed with patient during office visit  - Patient will continue to monitor BP at home, maintain BP Log.  NN will f/u next week to review home BP Log    9/11/2019  - \"it's been going good\"  - monitoring BP at home; patient currently driving - he will submit home BP readings to NN/PCP via [employer] for review  - pt will continue to monitor BP at home  - NN f/u next week    9/20/2019   - flowsheet updated with most recent patient reported BP Readings(submitted by patient via [employer] on 9/12/19)  - monitoring BP at home; pt will submit most recent home BP readings to this NN/PCP via [employer]  - following low sodium diet - 1500 mg daily (reports advised by PCP at most recent office visit to follow low sodium diet)  - sodium level 8/26/19 - 142 (wnl)  - appt with Dr. Franck Caro (Neurology) 9/1/19  - will have cmp, lipid panel drawn on 10/14  - f/u appt with PCP 10/18/19          Future Appointments:  Future Appointments   Date Time Provider [place]   10/1/2019 12:00 PM Scarlett Pereira  [place]   10/18/2019 10:30 AM Giorgi Curran MD [place]    10/18/2019 12:30 PM [facility]        Last Appointment With Me:  Visit date not found     Last Appointment My Department:  9/4/2019

## 2019-10-01 ENCOUNTER — OFFICE VISIT (OUTPATIENT)
Dept: NEUROLOGY | Age: 69
End: 2019-10-01

## 2019-10-01 VITALS
WEIGHT: 196 LBS | BODY MASS INDEX: 28.06 KG/M2 | HEIGHT: 70 IN | HEART RATE: 97 BPM | OXYGEN SATURATION: 61 % | DIASTOLIC BLOOD PRESSURE: 62 MMHG | SYSTOLIC BLOOD PRESSURE: 122 MMHG

## 2019-10-01 DIAGNOSIS — G45.9 TIA (TRANSIENT ISCHEMIC ATTACK): Primary | ICD-10-CM

## 2019-10-01 DIAGNOSIS — E78.2 MIXED HYPERLIPIDEMIA: ICD-10-CM

## 2019-10-01 NOTE — PROGRESS NOTES
NEUROLOGY NOTE     Chief complaint: stroke    SUBJECTIVE:  No recurrence of symptoms  Exercising 30 mins 3 times per week    HPI  Muriel Khalil is a 68 y.o. adult who presents to the hospital because at around 515 am on 19 he woke up and felt strange. He felt disoriented and his vision was blurred and double vision. The power light for TV was double and moving around. He tried to stand up and was nauseated. He threw up twice. Double vision lasted for around 15 minutes. He was unsteady on walking. His speech was normal and not slurred. He was feeling better around 1030 am. On exam by teleneurology he did have left arm numbness and that has resolved. He does not smoke. No stroke in the past. - for dm and + for HTN. + HLD. No aspirin at home. Hx of GI bleed. ROS  A ten system review of constitutional, cardiovascular, respiratory, musculoskeletal, endocrine, skin, SHEENT, genitourinary, psychiatric and neurologic systems was obtained and is unremarkable except as stated in HPI     PMH  Past Medical History:   Diagnosis Date    Arthritis     Enlarged prostate     GERD (gastroesophageal reflux disease)     High cholesterol     Hx of seasonal allergies     Hypertension        FH  Family History   Problem Relation Age of Onset    Cancer Mother         lung    Cancer Father         lung       SH  Social History     Socioeconomic History    Marital status:      Spouse name: Not on file    Number of children: Not on file    Years of education: Not on file    Highest education level: Not on file   Tobacco Use    Smoking status: Former Smoker     Last attempt to quit: 9/3/1990     Years since quittin.0    Smokeless tobacco: Never Used   Substance and Sexual Activity    Alcohol use:  Yes     Alcohol/week: 2.0 standard drinks     Types: 1 Cans of beer, 1 Shots of liquor per week    Drug use: No    Sexual activity: Yes     Partners: Female     Birth control/protection: None ALLERGIES  Allergies   Allergen Reactions    Shellfish Derived Anaphylaxis    Ace Inhibitors Cough    Nsaids (Non-Steroidal Anti-Inflammatory Drug) Other (comments)     GI bleeding.  Pcn [Penicillins] Rash       PHYSICAL EXAMINATION:   Visit Vitals  /62   Pulse 97   Ht 5' 10\" (1.778 m)   Wt 196 lb (88.9 kg)   SpO2 (!) 61%   BMI 28.12 kg/m²        General:   General appearance: Pt is in no acute distress   Distal pulses are preserved    Neurological Examination:   Mental Status:  AAO x3. Speech is fluent. Follows commands, has normal fund of knowledge, attention, short term recall, comprehension and insight. Cranial Nerves: Visual fields are full. PERRL, Extraocular movements are full. Facial sensation intact. Facial movement intact. Hearing intact to conversation. Palate elevates symmetrically. Shoulder shrug symmetric. Tongue midline. Motor: Strength is 5/5 in all 4 ext. Normal tone. No atrophy. Sensation: Light touch - Normal    Reflexes: DTRs 2+ throughout. Plantar responses downgoing. Coordination/Cerebellar: Intact to finger-nose-finger     Skin: No significant bruising or lacerations. Results for orders placed or performed during the hospital encounter of 08/25/19   CBC WITH AUTOMATED DIFF   Result Value Ref Range    WBC 9.7 4.1 - 11.1 K/uL    RBC 5.63 4.10 - 5.70 M/uL    HGB 15.9 12.1 - 17.0 g/dL    HCT 49.9 36.6 - 50.3 %    MCV 88.6 80.0 - 99.0 FL    MCH 28.2 26.0 - 34.0 PG    MCHC 31.9 30.0 - 36.5 g/dL    RDW 13.6 11.5 - 14.5 %    PLATELET 788 024 - 156 K/uL    MPV 11.0 8.9 - 12.9 FL    NRBC 0.0 0  WBC    ABSOLUTE NRBC 0.00 0.00 - 0.01 K/uL    NEUTROPHILS 51 32 - 75 %    LYMPHOCYTES 38 12 - 49 %    MONOCYTES 9 5 - 13 %    EOSINOPHILS 1 0 - 7 %    BASOPHILS 0 0 - 1 %    IMMATURE GRANULOCYTES 1 (H) 0.0 - 0.5 %    ABS. NEUTROPHILS 4.9 1.8 - 8.0 K/UL    ABS. LYMPHOCYTES 3.7 (H) 0.8 - 3.5 K/UL    ABS. MONOCYTES 0.8 0.0 - 1.0 K/UL    ABS.  EOSINOPHILS 0.1 0.0 - 0.4 K/UL ABS. BASOPHILS 0.0 0.0 - 0.1 K/UL    ABS. IMM.  GRANS. 0.1 (H) 0.00 - 0.04 K/UL    DF AUTOMATED     METABOLIC PANEL, BASIC   Result Value Ref Range    Sodium 141 136 - 145 mmol/L    Potassium 3.3 (L) 3.5 - 5.1 mmol/L    Chloride 107 97 - 108 mmol/L    CO2 28 21 - 32 mmol/L    Anion gap 6 5 - 15 mmol/L    Glucose 124 (H) 65 - 100 mg/dL    BUN 17 6 - 20 MG/DL    Creatinine 1.03 0.70 - 1.30 MG/DL    BUN/Creatinine ratio 17 12 - 20      GFR est AA >60 >60 ml/min/1.73m2    GFR est non-AA >60 >60 ml/min/1.73m2    Calcium 9.6 8.5 - 10.1 MG/DL   PROTHROMBIN TIME + INR   Result Value Ref Range    INR 1.0 0.9 - 1.1      Prothrombin time 10.3 9.0 - 11.1 sec   TROPONIN I   Result Value Ref Range    Troponin-I, Qt. <0.05 <0.05 ng/mL   DRUG SCREEN, URINE   Result Value Ref Range    AMPHETAMINES NEGATIVE  NEG      BARBITURATES NEGATIVE  NEG      BENZODIAZEPINES NEGATIVE  NEG      COCAINE NEGATIVE  NEG      METHADONE NEGATIVE  NEG      OPIATES NEGATIVE  NEG      PCP(PHENCYCLIDINE) NEGATIVE  NEG      THC (TH-CANNABINOL) NEGATIVE  NEG      Drug screen comment (NOTE)    URINALYSIS W/ REFLEX CULTURE   Result Value Ref Range    Color YELLOW/STRAW      Appearance CLEAR CLEAR      Specific gravity 1.010 1.003 - 1.030      pH (UA) 7.5 5.0 - 8.0      Protein NEGATIVE  NEG mg/dL    Glucose NEGATIVE  NEG mg/dL    Ketone NEGATIVE  NEG mg/dL    Bilirubin NEGATIVE  NEG      Blood NEGATIVE  NEG      Urobilinogen 0.2 0.2 - 1.0 EU/dL    Nitrites NEGATIVE  NEG      Leukocyte Esterase NEGATIVE  NEG      UA:UC IF INDICATED CULTURE NOT INDICATED BY UA RESULT CNI      WBC 0-4 0 - 4 /hpf    RBC 0-5 0 - 5 /hpf    Epithelial cells FEW FEW /lpf    Bacteria NEGATIVE  NEG /hpf    Hyaline cast 0-2 0 - 5 /lpf   TROPONIN I   Result Value Ref Range    Troponin-I, Qt. <0.05 <0.05 ng/mL   TROPONIN I   Result Value Ref Range    Troponin-I, Qt. <0.05 <0.05 ng/mL   HEMOGLOBIN A1C WITH EAG   Result Value Ref Range    Hemoglobin A1c 5.3 4.2 - 6.3 %    Est. average glucose 105 mg/dL   PROTHROMBIN TIME + INR   Result Value Ref Range    INR 1.0 0.9 - 1.1      Prothrombin time 10.5 9.0 - 11.1 sec   CBC W/O DIFF   Result Value Ref Range    WBC 8.4 4.1 - 11.1 K/uL    RBC 5.16 4.10 - 5.70 M/uL    HGB 14.4 12.1 - 17.0 g/dL    HCT 45.5 36.6 - 50.3 %    MCV 88.2 80.0 - 99.0 FL    MCH 27.9 26.0 - 34.0 PG    MCHC 31.6 30.0 - 36.5 g/dL    RDW 13.7 11.5 - 14.5 %    PLATELET 166 166 - 482 K/uL    MPV 10.6 8.9 - 12.9 FL    NRBC 0.0 0  WBC    ABSOLUTE NRBC 0.00 0.00 - 0.01 K/uL   PTT   Result Value Ref Range    aPTT 26.8 22.1 - 32.0 sec    aPTT, therapeutic range     58.0 - 77.0 SECS   VITAMIN B12   Result Value Ref Range    Vitamin B12 307 193 - 986 pg/mL   TROPONIN I   Result Value Ref Range    Troponin-I, Qt. <0.05 <0.15 ng/mL   METABOLIC PANEL, COMPREHENSIVE   Result Value Ref Range    Sodium 142 136 - 145 mmol/L    Potassium 3.5 3.5 - 5.1 mmol/L    Chloride 110 (H) 97 - 108 mmol/L    CO2 26 21 - 32 mmol/L    Anion gap 6 5 - 15 mmol/L    Glucose 84 65 - 100 mg/dL    BUN 13 6 - 20 MG/DL    Creatinine 0.98 0.70 - 1.30 MG/DL    BUN/Creatinine ratio 13 12 - 20      GFR est AA >60 >60 ml/min/1.73m2    GFR est non-AA >60 >60 ml/min/1.73m2    Calcium 9.0 8.5 - 10.1 MG/DL    Bilirubin, total 0.8 0.2 - 1.0 MG/DL    ALT (SGPT) 24 12 - 78 U/L    AST (SGOT) 16 15 - 37 U/L    Alk.  phosphatase 34 (L) 45 - 117 U/L    Protein, total 6.3 (L) 6.4 - 8.2 g/dL    Albumin 3.4 (L) 3.5 - 5.0 g/dL    Globulin 2.9 2.0 - 4.0 g/dL    A-G Ratio 1.2 1.1 - 2.2     LIPID PANEL   Result Value Ref Range    LIPID PROFILE          Cholesterol, total 144 <200 MG/DL    Triglyceride 87 <150 MG/DL    HDL Cholesterol 49 MG/DL    LDL, calculated 77.6 0 - 100 MG/DL    VLDL, calculated 17.4 MG/DL    CHOL/HDL Ratio 2.9 0.0 - 5.0     TSH 3RD GENERATION   Result Value Ref Range    TSH 1.74 0.36 - 3.74 uIU/mL   GLUCOSE, POC   Result Value Ref Range    Glucose (POC) 123 (H) 65 - 100 mg/dL    Performed by Brock Morgan    EKG, 12 LEAD, INITIAL   Result Value Ref Range    Ventricular Rate 54 BPM    Atrial Rate 54 BPM    P-R Interval 174 ms    QRS Duration 154 ms    Q-T Interval 496 ms    QTC Calculation (Bezet) 470 ms    Calculated P Axis 35 degrees    Calculated R Axis -42 degrees    Calculated T Axis -9 degrees    Diagnosis       Sinus bradycardia  Left axis deviation  Right bundle branch block  When compared with ECG of 07-NOV-2016 10:25,  Right bundle branch block is now present  Confirmed by Efrain Glasgow ([id number]) on 8/25/2019 11:20:08 PM     ECHO ADULT COMPLETE   Result Value Ref Range    LA Volume 50.0 18 - 58 mL    Right Atrial Area 4C 18.46 cm2    LV E' Lateral Velocity 3.47 cm/s    LV E' Septal Velocity 8.61 cm/s    Aortic Valve Systolic Peak Velocity 603.28 cm/s    Aortic Valve Area by Continuity of Peak Velocity 2.4 cm2    AoV PG 6.3 mmHg    LVIDd 5.64 4.2 - 5.9 cm    LVPWd 1.14 (A) 0.6 - 1.0 cm    LVIDs 3.19 cm    IVSd 1.13 (A) 0.6 - 1.0 cm    LVOT d 1.96 cm    LVOT Peak Velocity 99.59 cm/s    LVOT Peak Gradient 4.0 mmHg    MV A Rodrigue 54.45 cm/s    MV E Rodrigue 58.04 cm/s    MV E/A 1.07     LA Vol 4C 41.56 18 - 58 mL    LA Vol 2C 49.86 18 - 58 mL    LA Area 2C 17.42 cm2    LA Area 4C 18.3 cm2    LV Mass .8 (A) 88 - 224 g    LV Mass AL Index 152.8 (A) 49 - 115 g/m2    E/E' lateral 16.73     E/E' septal 6.74     RVSP 17.4 mmHg    E/E' ratio (averaged) 11.73     Est. RA Pressure 10.0 mmHg    Mitral Regurgitant Peak Velocity 335.07 cm/s    Mitral Valve E Wave Deceleration Time 352.8 ms    Left Atrium Major Axis 4.04 cm    Triscuspid Valve Regurgitation Peak Gradient 7.4 mmHg    Pulmonic Valve Max Velocity 51.03 cm/s    TR Max Velocity 135.63 cm/s    LA Vol Index 24.27 16 - 28 ml/m2    PASP 17.4 mmHg    LA Vol Index 24.20 16 - 28 ml/m2    LA Vol Index 20.17 16 - 28 ml/m2    MR Peak Gradient 44.9 mmHg    SHABBIR/BSA Pk Rodrigue 1.2 cm2/m2    PV peak gradient 1.0 mmHg        Current Outpatient Medications   Medication Sig Dispense Refill    atorvastatin (LIPITOR) 40 mg tablet Take 1 Tab by mouth nightly. 90 Tab 3    aspirin 81 mg chewable tablet Take 1 Tab by mouth daily. 30 Tab 1    tamsulosin (FLOMAX) 0.4 mg capsule TAKE 1 CAPSULE BY MOUTH ONCE DAILY 90 Cap 1    pantoprazole (PROTONIX) 40 mg tablet TAKE 1 TABLET BY MOUTH ONCE DAILY 90 Tab 1    amLODIPine (NORVASC) 10 mg tablet Take 1 Tab by mouth daily. 90 Tab 3    gabapentin (NEURONTIN) 300 mg capsule Take 300 mg by mouth two (2) times a day.  SHINGRIX, PF, 50 mcg/0.5 mL susr injection       triamcinolone (NASACORT) 55 mcg nasal inhaler 2 Sprays daily.  montelukast (SINGULAIR) 10 mg tablet Take 1 Tab by mouth daily. 30 Tab 2    diclofenac (VOLTAREN) 1 % gel Apply two grams by topical route four times daily to the affected area (s). PRN      fexofenadine (ALLEGRA) 180 mg tablet Take 180 mg by mouth daily as needed.  cholecalciferol (VITAMIN D3) 1,000 unit tablet Take 2,000 Units by mouth daily. Stroke workup    MRI Brain  No evidence of acute or significant intracranial abnormality. CTA Head and neck  No acute process. No arterial thrombosis/occlusion, dissection, or flow-limiting  stenosis. TTE:   · Left Ventricle: Normal cavity size, wall thickness and systolic function (ejection fraction normal). Estimated left ventricular ejection fraction is 56 - 60%. Age-appropriate left ventricular diastolic function. Stroke labs:  HgBA1c    Lab Results   Component Value Date/Time    Hemoglobin A1c 5.3 08/26/2019 03:25 AM     LDL   Lab Results   Component Value Date/Time    LDL, calculated 77.6 08/26/2019 03:25 AM       IMPRESSION:  Neva Michel. is a 68 y.o. adult who presents with acute onset vertigo and left arm numbness. Suspect posterior circulation TIA. Rene Landry MRI brain is normal.    RECOMMENDATIONS:  - Telemetry  - BP goal is less than 140/90  - Continue ASA 81 mg daily  - Cont atorvastatin 40 mg daily       No follow up needed.        Nathalie Hoover MD  Neurologist

## 2019-10-14 DIAGNOSIS — E78.2 MIXED HYPERLIPIDEMIA: ICD-10-CM

## 2019-10-15 LAB
ALBUMIN SERPL-MCNC: 4.2 G/DL (ref 3.5–4.8)
ALBUMIN/GLOB SERPL: 1.8 {RATIO} (ref 1.2–2.2)
ALP SERPL-CCNC: 50 IU/L (ref 39–117)
ALT SERPL-CCNC: 25 IU/L (ref 0–44)
AST SERPL-CCNC: 20 IU/L (ref 0–40)
BILIRUB SERPL-MCNC: 1.7 MG/DL (ref 0–1.2)
BUN SERPL-MCNC: 15 MG/DL (ref 8–27)
BUN/CREAT SERPL: 15 (ref 10–24)
CALCIUM SERPL-MCNC: 9.6 MG/DL (ref 8.6–10.2)
CHLORIDE SERPL-SCNC: 101 MMOL/L (ref 96–106)
CHOLEST SERPL-MCNC: 91 MG/DL (ref 100–199)
CO2 SERPL-SCNC: 23 MMOL/L (ref 20–29)
CREAT SERPL-MCNC: 1.02 MG/DL (ref 0.76–1.27)
GLOBULIN SER CALC-MCNC: 2.4 G/DL (ref 1.5–4.5)
GLUCOSE SERPL-MCNC: 98 MG/DL (ref 65–99)
HDLC SERPL-MCNC: 48 MG/DL
LDLC SERPL CALC-MCNC: 28 MG/DL (ref 0–99)
POTASSIUM SERPL-SCNC: 3.7 MMOL/L (ref 3.5–5.2)
PROT SERPL-MCNC: 6.6 G/DL (ref 6–8.5)
SODIUM SERPL-SCNC: 142 MMOL/L (ref 134–144)
TRIGL SERPL-MCNC: 73 MG/DL (ref 0–149)
VLDLC SERPL CALC-MCNC: 15 MG/DL (ref 5–40)

## 2019-10-18 ENCOUNTER — OFFICE VISIT (OUTPATIENT)
Dept: INTERNAL MEDICINE CLINIC | Age: 69
End: 2019-10-18

## 2019-10-18 VITALS
TEMPERATURE: 97.2 F | OXYGEN SATURATION: 99 % | RESPIRATION RATE: 16 BRPM | SYSTOLIC BLOOD PRESSURE: 121 MMHG | DIASTOLIC BLOOD PRESSURE: 59 MMHG | HEART RATE: 57 BPM | HEIGHT: 70 IN | WEIGHT: 195.6 LBS | BODY MASS INDEX: 28 KG/M2

## 2019-10-18 DIAGNOSIS — Z00.00 MEDICARE ANNUAL WELLNESS VISIT, SUBSEQUENT: Primary | ICD-10-CM

## 2019-10-18 DIAGNOSIS — I10 ESSENTIAL HYPERTENSION: ICD-10-CM

## 2019-10-18 DIAGNOSIS — M50.30 DDD (DEGENERATIVE DISC DISEASE), CERVICAL: ICD-10-CM

## 2019-10-18 DIAGNOSIS — E78.2 MIXED HYPERLIPIDEMIA: ICD-10-CM

## 2019-10-18 DIAGNOSIS — Z13.31 SCREENING FOR DEPRESSION: ICD-10-CM

## 2019-10-18 DIAGNOSIS — G45.9 TIA (TRANSIENT ISCHEMIC ATTACK): ICD-10-CM

## 2019-10-18 DIAGNOSIS — M54.31 SCIATICA OF RIGHT SIDE: ICD-10-CM

## 2019-10-18 RX ORDER — METHYLPREDNISOLONE 4 MG/1
4 TABLET ORAL
Qty: 1 DOSE PACK | Refills: 0 | Status: SHIPPED | OUTPATIENT
Start: 2019-10-18 | End: 2019-12-03

## 2019-10-18 RX ORDER — ATORVASTATIN CALCIUM 20 MG/1
20 TABLET, FILM COATED ORAL DAILY
Qty: 90 TAB | Refills: 3 | Status: SHIPPED | OUTPATIENT
Start: 2019-10-18 | End: 2022-02-14

## 2019-10-18 RX ORDER — CYCLOBENZAPRINE HCL 10 MG
10 TABLET ORAL
Qty: 30 TAB | Refills: 1 | Status: SHIPPED | OUTPATIENT
Start: 2019-10-18 | End: 2020-11-03 | Stop reason: SDUPTHER

## 2019-10-18 RX ORDER — METHYLPREDNISOLONE 4 MG/1
4 TABLET ORAL
Qty: 1 DOSE PACK | Refills: 0 | Status: SHIPPED | OUTPATIENT
Start: 2019-10-18 | End: 2019-10-18 | Stop reason: SDUPTHER

## 2019-10-18 NOTE — PROGRESS NOTES
This is the Subsequent Medicare Annual Wellness Exam, performed 12 months or more after the Initial AWV or the last Subsequent AWV    I have reviewed the patient's medical history in detail and updated the computerized patient record. History     Past Medical History:   Diagnosis Date    Arthritis     Enlarged prostate     GERD (gastroesophageal reflux disease)     High cholesterol     Hx of seasonal allergies     Hypertension       Past Surgical History:   Procedure Laterality Date    COLONOSCOPY N/A 1/5/2018    COLONOSCOPY WITH BIOPSIES performed [facility] Mau Buckley [facility] AM[facility]ORY OR    HX CARPAL TUNNEL RELEASE Right     HX COLONOSCOPY      HX HEENT  2003    lymph node bx in neck (benign)    HX LAP CHOLECYSTECTOMY  2000    HX ORTHOPAEDIC  2008    Back Surgery lumbar    HX ORTHOPAEDIC Right 2016    thumb and palm under ring finger    HX PARATHYROIDECTOMY      one right sided 2015. Current Outpatient Medications   Medication Sig Dispense Refill    atorvastatin (LIPITOR) 40 mg tablet Take 1 Tab by mouth nightly. 90 Tab 3    aspirin 81 mg chewable tablet Take 1 Tab by mouth daily. 30 Tab 1    tamsulosin (FLOMAX) 0.4 mg capsule TAKE 1 CAPSULE BY MOUTH ONCE DAILY 90 Cap 1    pantoprazole (PROTONIX) 40 mg tablet TAKE 1 TABLET BY MOUTH ONCE DAILY 90 Tab 1    amLODIPine (NORVASC) 10 mg tablet Take 1 Tab by mouth daily. 90 Tab 3    gabapentin (NEURONTIN) 300 mg capsule Take 300 mg by mouth two (2) times a day.  triamcinolone (NASACORT) 55 mcg nasal inhaler 2 Sprays daily.  diclofenac (VOLTAREN) 1 % gel Apply two grams by topical route four times daily to the affected area (s). PRN      cholecalciferol (VITAMIN D3) 1,000 unit tablet Take 2,000 Units by mouth daily.  SHINGRIX, PF, 50 mcg/0.5 mL susr injection       montelukast (SINGULAIR) 10 mg tablet Take 1 Tab by mouth daily. 30 Tab 2    fexofenadine (ALLEGRA) 180 mg tablet Take 180 mg by mouth daily as needed. Allergies   Allergen Reactions    Shellfish Derived Anaphylaxis    Ace Inhibitors Cough    Nsaids (Non-Steroidal Anti-Inflammatory Drug) Other (comments)     GI bleeding.  Pcn [Penicillins] Rash     Family History   Problem Relation Age of Onset    Cancer Mother         lung    Cancer Father         lung     Social History     Tobacco Use    Smoking status: Former Smoker     Last attempt to quit: 9/3/1990     Years since quittin.1    Smokeless tobacco: Never Used   Substance Use Topics    Alcohol use: Yes     Alcohol/week: 2.0 standard drinks     Types: 1 Cans of beer, 1 Shots of liquor per week     Patient Active Problem List   Diagnosis Code    Gastroesophageal reflux disease without esophagitis K21.9    Essential hypertension I10    Benign prostatic hyperplasia N40.0    Mixed hyperlipidemia E78.2    Primary osteoarthritis of first carpometacarpal joint of right hand M18.11    Dupuytren's contracture of right hand M72.0    Carpal tunnel syndrome, left G56.02    Pain of right hand M79.641    BMI 27.0-27.9,adult Z68.27    CVA (cerebral vascular accident) ([facility] [address] I63.9    TIA (transient ischemic attack) G45.9       Depression Risk Factor Screening:     3 most recent PHQ Screens 10/18/2019   PHQ Not Done -   Little interest or pleasure in doing things Not at all   Feeling down, depressed, irritable, or hopeless Not at all   Total Score PHQ 2 0     Alcohol Risk Factor Screening: You do not drink alcohol or very rarely. Functional Ability and Level of Safety:   Hearing Loss  Hearing is good. Activities of Daily Living  The home contains: no safety equipment. Patient does total self care    Fall Risk  Fall Risk Assessment, last 12 mths 10/18/2019   Able to walk? Yes   Fall in past 12 months?  No       Abuse Screen  Patient is not abused    Cognitive Screening   Evaluation of Cognitive Function:  Has your family/caregiver stated any concerns about your memory: no  Normal    Patient Care Team   Patient Care Team:  Eliza Medellin MD as PCP - General (Internal Medicine)  Yarelis Pérez MD as Surgeon (General Surgery)  Nishi Mcwilliams MD (Ophthalmology)  Elizabeth Maldonado, [address] (Optometry)  Branden Marie MD (Urology)  Fanny Moctezuma MD (Gastroenterology)  Rohan Del Valle MD as Physician (Hand Surgery)  Rohan Del Valle MD as Consulting Provider (Hand Surgery)  Azar Gonzalez RN as Ambulatory Care Navigator  Vera Linda MD (Neurology)    Assessment/Plan   Education and counseling provided:  Are appropriate based on today's review and evaluation    Diagnoses and all orders for this visit:    1. Medicare annual wellness visit, subsequent    2.  Screening for depression  -     [place] Maintenance Due   Topic Date Due    MEDICARE YEARLY EXAM  10/03/2019

## 2019-10-18 NOTE — PROGRESS NOTES
Alexandra Gunn is a 68 y.o. adult who presents for followup. Seen in sept after TIA. On aspirin 81mg daily. On statin, some myalgias, not new since started the statin. LDL 28, normal liver. BP well controlled. Lowest  in September, in the past few weeks 115. No readings below 100. Amlodipine 10mg daily. Reports for 3-5 years he has felt muscle aches, mostly in legs and shoulders. Has upper back and neck pain at times, has OA in neck. Saw Dr. Salvatore Quach and Dr. Ariella Koehler, injections not effective, taking gabapentin 300mg BID, helps the neck pain. Does exercises regularly. Reports hip pain, on right. Saw Dr. Mary Figueroa, told it was muscular. Reports gluteal pain. Does aquatics. Stretches, ice. Avoids NSAIDS. Distant lumbar surgery. He thinks he is having right sciatica. Sees Dr. Tray Resendez, urology, due for follow up in December. Sees Dr. Missy Wick. Prior GIB. No NSAID use. Will take tylenol for pain at times. No problems with aspirin. Colonoscopy 2018. Up to date on immunization.         Past Medical History:   Diagnosis Date    Arthritis     Enlarged prostate     GERD (gastroesophageal reflux disease)     High cholesterol     Hx of seasonal allergies     Hypertension        Family History   Problem Relation Age of Onset    Cancer Mother         lung    Cancer Father         lung       Social History     Socioeconomic History    Marital status:      Spouse name: Not on file    Number of children: Not on file    Years of education: Not on file    Highest education level: Not on file   Occupational History    Not on file   Social Needs    Financial resource strain: Not on file    Food insecurity:     Worry: Not on file     Inability: Not on file    Transportation needs:     Medical: Not on file     Non-medical: Not on file   Tobacco Use    Smoking status: Former Smoker     Last attempt to quit: 9/3/1990     Years since quittin.1    Smokeless tobacco: Never Used   Substance and Sexual Activity    Alcohol use: Yes     Alcohol/week: 2.0 standard drinks     Types: 1 Cans of beer, 1 Shots of liquor per week    Drug use: No    Sexual activity: Yes     Partners: Female     Birth control/protection: None   Lifestyle    Physical activity:     Days per week: Not on file     Minutes per session: Not on file    Stress: Not on file   Relationships    Social connections:     Talks on phone: Not on file     Gets together: Not on file     Attends Alevism service: Not on file     Active member of club or organization: Not on file     Attends meetings of clubs or organizations: Not on file     Relationship status: Not on file    Intimate partner violence:     Fear of current or ex partner: Not on file     Emotionally abused: Not on file     Physically abused: Not on file     Forced sexual activity: Not on file   Other Topics Concern    Not on file   Social History Narrative    Not on file       Current Outpatient Medications on File Prior to Visit   Medication Sig Dispense Refill    aspirin 81 mg chewable tablet Take 1 Tab by mouth daily. 30 Tab 1    tamsulosin (FLOMAX) 0.4 mg capsule TAKE 1 CAPSULE BY MOUTH ONCE DAILY 90 Cap 1    pantoprazole (PROTONIX) 40 mg tablet TAKE 1 TABLET BY MOUTH ONCE DAILY 90 Tab 1    amLODIPine (NORVASC) 10 mg tablet Take 1 Tab by mouth daily. 90 Tab 3    gabapentin (NEURONTIN) 300 mg capsule Take 300 mg by mouth two (2) times a day.  triamcinolone (NASACORT) 55 mcg nasal inhaler 2 Sprays daily.  diclofenac (VOLTAREN) 1 % gel Apply two grams by topical route four times daily to the affected area (s). PRN      cholecalciferol (VITAMIN D3) 1,000 unit tablet Take 2,000 Units by mouth daily.  montelukast (SINGULAIR) 10 mg tablet Take 1 Tab by mouth daily. 30 Tab 2    fexofenadine (ALLEGRA) 180 mg tablet Take 180 mg by mouth daily as needed. No current facility-administered medications on file prior to visit. Review of Systems  Pertinent items are noted in HPI. Objective:     Visit Vitals  /59 (BP 1 Location: Left arm, BP Patient Position: Sitting)   Pulse (!) 57   Temp 97.2 °F (36.2 °C) (Oral)   Resp 16   Ht 5' 10\" (1.778 m)   Wt 195 lb 9.6 oz (88.7 kg)   SpO2 99%   BMI 28.07 kg/m²     Gen: well appearing adult  HEENT:   PERRL,normal conjunctiva. External ear and canals normal, TMs no opacification or erythema,  OP no erythema, no exudates, MMM  Neck:  Supple. Thyroid normal size, nontender, without nodules. No masses or LAD  Resp:  No wheezing, no rhonchi, no rales. CV:  RRR, normal S1S2, no murmur. GI: soft, nontender, without masses. No hepatosplenomegaly. Extrem:  +2 pulses, no edema, warm distally      Assessment/Plan:       ICD-10-CM ICD-9-CM    1. Medicare annual wellness visit, subsequent Z00.00 V70.0    2. Screening for depression Z13.31 V79.0 DEPRESSION SCREEN ANNUAL   3. Mixed hyperlipidemia E78.2 272.2    4. TIA (transient ischemic attack) G45.9 435.9 atorvastatin (LIPITOR) 20 mg tablet   5. DDD (degenerative disc disease), cervical M50.30 722.4    6. Essential hypertension I10 401.9    7. Sciatica of right side M54.31 724.3 methylPREDNISolone (MEDROL DOSEPACK) 4 mg tablet      DISCONTINUED: methylPREDNISolone (MEDROL DOSEPACK) 4 mg tablet   Recommend heart healthy diet and regular cardiovascular exercise. Follow-up and Dispositions    · Return in about 6 months (around 4/18/2020) for follow up on medications/fasting labs. Farheen Arriola MD    Discussed the patient's BMI with her. The BMI follow up plan is as follows:     dietary management education, guidance, and counseling  encourage exercise  monitor weight  prescribed dietary intake    An After Visit Summary was printed and given to the patient.

## 2019-10-18 NOTE — PATIENT INSTRUCTIONS
Office Policies Phone calls/patient messages: Please allow up to 24 hours for someone in the office to contact you about your call or message. Be mindful your provider may be out of the office or your message may require further review. We encourage you to use [employer] for your messages as this is a faster, more efficient way to communicate with our office Medication Refills: 
         
Prescription medications require 48-72 business hours to process. We encourage you to use [employer] for your refills. For controlled medications: Please allow 72 business hours to process. Certain medications may require you to  a written prescription at our office. NO narcotic/controlled medications will be prescribed after 4pm Monday through Friday or on weekends Form/Paperwork Completion: 
         
Please note a $25 fee may incur for all paperwork for completed by our providers. We ask that you allow 7-10 business days. Pre-payment is due prior to picking up/faxing the completed form. You may also download your forms to [employer] to have your doctor print off. Medicare Wellness Visit, Female The best way to live healthy is to have a lifestyle where you eat a well-balanced diet, exercise regularly, limit alcohol use, and quit all forms of tobacco/nicotine, if applicable. Regular preventive services are another way to keep healthy. Preventive services (vaccines, screening tests, monitoring & exams) can help personalize your care plan, which helps you manage your own care. Screening tests can find health problems at the earliest stages, when they are easiest to treat. [facility] follows the current, evidence-based guidelines published by the [facility] (USPSTF) when recommending preventive services for our patients.  Because we follow these guidelines, sometimes recommendations change over time as research supports it. (For example, mammograms used to be recommended annually. Even though Medicare will still pay for an annual mammogram, the newer guidelines recommend a mammogram every two years for women of average risk.) Of course, you and your doctor may decide to screen more often for some diseases, based on your risk and your health status. Preventive services for you include: - Medicare offers their members a free annual wellness visit, which is time for you and your primary care provider to discuss and plan for your preventive service needs. Take advantage of this benefit every year! 
-All adults over the age of 72 should receive the recommended pneumonia vaccines. Current USPSTF guidelines recommend a series of two vaccines for the best pneumonia protection.  
-All adults should have a flu vaccine yearly and a tetanus vaccine every 10 years. All adults age 61 and older should receive a shingles vaccine once in their lifetime.   
-A bone mass density test is recommended when a woman turns 65 to screen for osteoporosis. This test is only recommended one time, as a screening. Some providers will use this same test as a disease monitoring tool if you already have osteoporosis. -All adults age 38-68 who are overweight should have a diabetes screening test once every three years.  
-Other screening tests and preventive services for persons with diabetes include: an eye exam to screen for diabetic retinopathy, a kidney function test, a foot exam, and stricter control over your cholesterol.  
-Cardiovascular screening for adults with routine risk involves an electrocardiogram (ECG) at intervals determined by your doctor.  
-Colorectal cancer screenings should be done for adults age 54-65 with no increased risk factors for colorectal cancer. There are a number of acceptable methods of screening for this type of cancer. Each test has its own benefits and drawbacks.  Discuss with your doctor what is most appropriate for you during your annual wellness visit. The different tests include: colonoscopy (considered the best screening method), a fecal occult blood test, a fecal DNA test, and sigmoidoscopy. -Breast cancer screenings are recommended every other year for women of normal risk, age 54-69. 
-Cervical cancer screenings for women over age 72 are only recommended with certain risk factors.  
-All adults born between [facility] should be screened once for Hepatitis C. Here is a list of your current Health Maintenance items (your personalized list of preventive services) with a due date: 
Health Maintenance Due Topic Date Due  
 Annual Well Visit  10/03/2019 Medicare Wellness Visit, Male The best way to live healthy is to have a lifestyle where you eat a well-balanced diet, exercise regularly, limit alcohol use, and quit all forms of tobacco/nicotine, if applicable. Regular preventive services are another way to keep healthy. Preventive services (vaccines, screening tests, monitoring & exams) can help personalize your care plan, which helps you manage your own care. Screening tests can find health problems at the earliest stages, when they are easiest to treat. [address] follows the current, evidence-based guidelines published by the [facility] [place] (USPSTF) when recommending preventive services for our patients. Because we follow these guidelines, sometimes recommendations change over time as research supports it. (For example, a prostate screening blood test is no longer routinely recommended for men with no symptoms.) Of course, you and your doctor may decide to screen more often for some diseases, based on your risk and co-morbidities (chronic disease you are already diagnosed with). Preventive services for you include: - Medicare offers their members a free annual wellness visit, which is time for you and your primary care provider to discuss and plan for your preventive service needs. Take advantage of this benefit every year! 
-All adults over age 72 should receive the recommended pneumonia vaccines. Current USPSTF guidelines recommend a series of two vaccines for the best pneumonia protection.  
-All adults should have a flu vaccine yearly and an ECG. All adults age 61 and older should receive a shingles vaccine once in their lifetime.   
-All adults age 38-68 who are overweight should have a diabetes screening test once every three years.  
-Other screening tests & preventive services for persons with diabetes include: an eye exam to screen for diabetic retinopathy, a kidney function test, a foot exam, and stricter control over your cholesterol.  
-Cardiovascular screening for adults with routine risk involves an electrocardiogram (ECG) at intervals determined by the provider.  
-Colorectal cancer screening should be done for adults age 54-65 with no increased risk factors for colorectal cancer. There are a number of acceptable methods of screening for this type of cancer. Each test has its own benefits and drawbacks. Discuss with your provider what is most appropriate for you during your annual wellness visit. The different tests include: colonoscopy (considered the best screening method), a fecal occult blood test, a fecal DNA test, and sigmoidoscopy. 
-All adults born between [facility] should be screened once for Hepatitis C. 
-An Abdominal Aortic Aneurysm (AAA) Screening is recommended for men age 73-68 who has ever smoked in their lifetime. Reduce the atorvastatin to 1/2 tablet of the 40mg (20mg). New RX is for 20mg tablet. Can try two of the flomax 0.4mg at bedtime (0.8mg) to reduce the nighttime urination. Reduce nighttime fluid intake. Body Mass Index: Care Instructions Your Care Instructions Body mass index (BMI) can help you see if your weight is raising your risk for health problems. It uses a formula to compare how much you weigh with how tall you are. · A BMI lower than 18.5 is considered underweight. · A BMI between 18.5 and 24.9 is considered healthy. · A BMI between 25 and 29.9 is considered overweight. A BMI of 30 or higher is considered obese. If your BMI is in the normal range, it means that you have a lower risk for weight-related health problems. If your BMI is in the overweight or obese range, you may be at increased risk for weight-related health problems, such as high blood pressure, heart disease, stroke, arthritis or joint pain, and diabetes. If your BMI is in the underweight range, you may be at increased risk for health problems such as fatigue, lower protection (immunity) against illness, muscle loss, bone loss, hair loss, and hormone problems. BMI is just one measure of your risk for weight-related health problems. You may be at higher risk for health problems if you are not active, you eat an unhealthy diet, or you drink too much alcohol or use tobacco products. Follow-up care is a key part of your treatment and safety. Be sure to make and go to all appointments, and call your doctor if you are having problems. It's also a good idea to know your test results and keep a list of the medicines you take. How can you care for yourself at home? · Practice healthy eating habits. This includes eating plenty of fruits, vegetables, whole grains, lean protein, and low-fat dairy. · If your doctor recommends it, get more exercise. Walking is a good choice. Bit by bit, increase the amount you walk every day. Try for at least 30 minutes on most days of the week. · Do not smoke. Smoking can increase your risk for health problems. If you need help quitting, talk to your doctor about stop-smoking programs and medicines. These can increase your chances of quitting for good. · Limit alcohol to 2 drinks a day for men and 1 drink a day for women. Too much alcohol can cause health problems. If you have a BMI higher than 25 · Your doctor may do other tests to check your risk for weight-related health problems. This may include measuring the distance around your waist. A waist measurement of more than 40 inches in men or 35 inches in women can increase the risk of weight-related health problems. · Talk with your doctor about steps you can take to stay healthy or improve your health. You may need to make lifestyle changes to lose weight and stay healthy, such as changing your diet and getting regular exercise. If you have a BMI lower than 18.5 · Your doctor may do other tests to check your risk for health problems. · Talk with your doctor about steps you can take to stay healthy or improve your health. You may need to make lifestyle changes to gain or maintain weight and stay healthy, such as getting more healthy foods in your diet and doing exercises to build muscle. Where can you learn more? Go to http://darcie-linden.info/. Enter S176 in the search box to learn more about \"Body Mass Index: Care Instructions. \" Current as of: October 13, 2016 Content Version: 11.4 © 0861-5705 Healthwise, [employer]. Care instructions adapted under license by [employer] (which disclaims liability or warranty for this information). If you have questions about a medical condition or this instruction, always ask your healthcare professional. [place] [address] any warranty or liability for your use of this information.

## 2019-10-31 ENCOUNTER — PATIENT MESSAGE (OUTPATIENT)
Dept: INTERNAL MEDICINE CLINIC | Age: 69
End: 2019-10-31

## 2019-10-31 DIAGNOSIS — M79.605 PAIN IN BOTH LOWER EXTREMITIES: Primary | ICD-10-CM

## 2019-10-31 DIAGNOSIS — M79.604 PAIN IN BOTH LOWER EXTREMITIES: Primary | ICD-10-CM

## 2019-11-01 NOTE — TELEPHONE ENCOUNTER
----- Message from Emmie Zavala. sent at 11/1/2019  6:35 AM EDT -----  Regarding: RE: Referral Request  Contact: [phone]  yes, [facility] - with Diego Ramos if possible   ----- Message -----  From: Levi Hollis MD  Sent: 10/31/2019 11:01 PM EDT  To: Truman Licona. Subject: RE: Referral Request  Do you know where you want to do PT? We have [facility] on the first floor. ----- Message -----     From: Truman Licona. Sent: 10/31/2019 11:27 AM EDT       To: Levi Hollis MD  Subject: Referral Request    I need a rerral for physical therapy for pain and stiffness in both legs and gluteal pain.

## 2019-11-04 ENCOUNTER — PATIENT OUTREACH (OUTPATIENT)
Dept: INTERNAL MEDICINE CLINIC | Age: 69
End: 2019-11-04

## 2019-11-04 ENCOUNTER — HOSPITAL ENCOUNTER (OUTPATIENT)
Dept: PHYSICAL THERAPY | Age: 69
Discharge: HOME | End: 2019-11-04
Payer: MEDICARE

## 2019-11-04 PROCEDURE — 97110 THERAPEUTIC EXERCISES: CPT | Performed by: PHYSICAL THERAPIST

## 2019-11-04 PROCEDURE — 97162 PT EVAL MOD COMPLEX 30 MIN: CPT | Performed by: PHYSICAL THERAPIST

## 2019-11-04 NOTE — PROGRESS NOTES
Patient has graduated from the Transitions of Care Coordination  program.    Goals Addressed                 This Visit's Progress       Post Hospitalization     COMPLETED: Knowledge and adherence of prescribed medication (ie. action, side effects, missed dose, etc.).        8/28/2019  - discharged to home with new order for lipitor; patient reports adherence to medication  - hospital discharge medication list advises patient to continue taking fenofibrate post-discharge, however patient reports that he has not resumed taking fenofibrate post-discharge because he was under the impression that lipitor would replace fenofibrate  - risk for severe medication interaction noted between atorvastatin and fenofibrate (Micromedex). NN will follow-up with Dr. Claudene Rook regarding current medication orders and to clarify whether patient is to be taking lipitor and fenofibrate concurrently. - reviewed hospital discharge medication list/orders and potential medication interaction with Dr. Claudene Rook. VORB per Dr. Claudene Rook, patient to continue lipitor as ordered and discontinue fenofibrate due to risk for medication interaction. NN notified patient; patient verbalizes understanding. 9/6/2019  - reports adherence to aspirin 81 mg daily. Reports history of taking aspirin 81 mg daily approximately 10 years ago, however was advised to discontinue due to imaging results of endoscopy- \"When they did the endoscopy the lining was red and there was some bleeding when he touched the stomach\"; denies history of GIB. Monitor for abdominal pain, nausea, heartburn, black/bloody/tarry stools. - reports adherence to lipitor 40 mg nightly    9/11/2019  - taking aspirin 81 mg daily; denies abdominal pain, nausea, heartburn, black/bloody/tarry stools.   - reports medication adherence and denies any questions/concerns at this time    11/4/2019  - Per Dr. Angelique Jain (OV 10/1/19), continue ASA 81 mg daily, cont atorvastatin 40 mg daily        COMPLETED: Prevent complications post hospitalization. On track     8/28/2019   - monitoring BP at home; flowsheet updated. Patient will continue and begin maintaining a written log of BP readings; pt will bring BP log to scheduled appts with Dr. Betina Ramirez and PCP for Provider review  - has followup appointment scheduled with Dr. Betina Ramirez (neuro) 10/1/19  - currently has [facility] appointment scheduled with Dr. Gaston Nava 9/26/19; will request LPN  assistance with appt scheduling due to provider appt availability  - declines [facility] post-acute visit. NN provided [facility] contact information to patient as a resource. - denies acute vision changes,dizziness/lightheadedness, numbness/tingling, weakness post-discharge  - reviewed red flags for stroke with patient; patient verbalizes understanding of when to contact EMS    9/6/2019  - attended JAN appt with PCP 9/4/19; cmp, lipid panel ordered (future orders to be collected 10/14). No new medications and/or medication changes noted. Blood pressure goal is less than 130/85, Minimum 3 days 30 minutes cardio, Low salt diet, DASH diet. F/u with PCP as scheduled. - PCP f/u appt rescheduled from 10/2 to 10/18 so that lab results can be reviewed with patient during office visit  - Patient will continue to monitor BP at home, maintain BP Log.  NN will f/u next week to review home BP Log    9/11/2019  - \"it's been going good\"  - monitoring BP at home; patient currently driving - he will submit home BP readings to NN/PCP via [employer] for review  - pt will continue to monitor BP at home  - NN f/u next week    9/20/2019   - flowsheet updated with most recent patient reported BP Readings(submitted by patient via [employer] on 9/12/19)  - monitoring BP at home; pt will submit most recent home BP readings to this NN/PCP via [employer]  - following low sodium diet - 1500 mg daily (reports advised by PCP at most recent office visit to follow low sodium diet)  - sodium level 8/26/19 - 142 (wnl)  - appt with Dr. Teddy Stafford (Neurology) 9/1/19  - will have cmp, lipid panel drawn on 10/14  - f/u appt with PCP 10/18/19    11/4/2019   - attended [address] with Dr. Teddy Stafford (Neurology) 10/1/19; no new orders and/or medication changes/orders noted, BP goal is less than 140/90, Continue ASA 81 mg daily, Cont atorvastatin 40 mg daily , no f/u with Neurology needed  - lipid panel and cmp drawn 10/14; per PCP result note, cholesterol controlled  - attended OV with Dr. Aniya Marcano 10/18/19; prescribed flexeril and medrol dosepack for sciatica of right side, lipitor 20 mg tablet refilled, recommended heart healthy diet and regular cardiovascular exercise, f/u with PCP 6 months  - has routine f/u scheduled with PCP 4/20/20  - To the best of this NN's knowledge, this patient had no additional Inpatient Hospital Admissions during 30 day JAN period following admission to [facility] 8/25/19-8/26/19.   - JAN period has ended. Goal Completed.                     Future Appointments:  Future Appointments   Date Time Provider Chucho Prather   11/4/2019  3:00 PM Ronald Cash PT [facility]   11/6/2019 12:30 PM [facility]   12/16/2019  2:00 PM [facility]   4/20/2020 11:00 AM MD [facility]franc[facility]        Last Appointment With Me:  Visit date not found     Last Appointment My Department:  10/18/2019

## 2019-11-04 NOTE — PROGRESS NOTES
PT INITIAL EVALUATION NOTE - [facility] 2-15    Patient Name: Truman Licona. Date:2019  : 1950  [x]  Patient  Verified  Payor: Clotilde Joyce / Plan: [facility] MEDICARE CHOICE PPO/PFFS / Product Type: Managed Care Medicare /    In time:308  Out time:412  Total Treatment Time (min): 64  Total Timed Codes (min): 54  1:1 Treatment Time ( only): 47   Visit #: 1     Treatment Area: Pain in right leg [M79.604]  Pain in left leg [M79.605]    SUBJECTIVE  Pain Level (0-10 scale): 3  Any medication changes, allergies to medications, adverse drug reactions, diagnosis change, or new procedure performed?: [] No    [x] Yes (see summary sheet for update)  Subjective:    Pt reports that he has a history of leg pain that has worsened over the last few years. Previously he could stretch and foam roll and get relief. Recently he has had Right > Left groin pain. He complains of pain with prolonged sitting and first thing in the morning. He has trouble sleeping at night. He had x-rays in 2019 that showed mild arthritis in the right hip. He does have a history of L4/5 fusion 15 years ago. He denies numbness and tingling and has not had any changes in bowel or bladder. He is limited in his ability to participate in water aerobics because he cannot perform the kicks. He denies any clicking, popping, or catching. He is getting some relief with walking and stretching. OBJECTIVE/EXAMINATION  Posture:  Rounded shoulders and forward head. Other Observations:    Gait and Functional Mobility:  Toe out and wide base, antalgic with short stride length,  Generally stiff walking with decreased trunk rotation.     Palpation: tenderness along bilateral adductors and hip flexors    Joint Mobility Assessment: decreased hip inferior glides    Flexibility: decreased throughout bilaterally    LOWER QUARTER   MUSCLE STRENGTH  KEY       R  L  0 - No Contraction  Knee ext  5  5  1 - Trace flex  4+  4+  2 - Poor   Hip ext   NT  NT  3 - Fair          flex   4-P!  5  4 - Good         abd  4-  4-  5 - Normal         add  NT  NT      Ankle DF  5  5                PF  NT  NT                INV  NT  NT                EV  NT  NT    Neurological: Reflexes / Sensations: grossly intact  Special Tests: Trendelenberg: +    Stork: NT      Prosper: +     Adilia: NT                 H.S. SLR: +    Piriformis Ext: +      Long Sit: NT     Hip Scour: NT      Knee Varus/Valgus Stress: NT  Knee Lachmans: NT      Other: NT    Modality rationale: decrease inflammation and decrease pain to improve the patients ability to complete all activity   Min Type Additional Details                       10 [x]  Ice     []  Heat  []  Ice massage Position:supine  Location:hips and groin       25 min Therapeutic Exercise:  [x] See flow sheet :   Rationale: increase ROM, increase strength and improve coordination to improve the patients ability to complete all activity    Other Objective/Functional Measures: FOTO 49    Pain Level (0-10 scale) post treatment: 2    ASSESSMENT/Changes in Function:     [x]  See Plan of Melissa, PT 11/4/2019

## 2019-11-05 NOTE — PROGRESS NOTES
[facility] Physical Therapy  [address] ([place]), [address] [facility] [place]  Phone: [phone] Fax: [phone]     Plan of Care/Statement of Necessity for Physical Therapy Services  2-15    Patient name: Kathrine Clifford : 1950  Provider#: [id number]  Referral source: Cristopher Matthew MD      Medical/Treatment Diagnosis: Pain in right leg [M79.604]  Pain in left leg [M79.605]     Prior Hospitalization: see medical history     Comorbidities: arthritis, back pain, GI disease, HBP, sleed dysfunction, TIA  Prior Level of Function: 20 min at least 3 times a week  Medications: Verified on Patient Summary List  Start of Care: 19      Onset Date: chronic   The Plan of Care and following information is based on the information from the initial evaluation. Assessment/ key information: Pt is a 69 yo male who is in today to begin therapy for bilateral hip and lower extremity pain. Pt states that he has recently had an increase in groin pain right >left. He said that he has dealt with this for some time and previously was able to stretch and use a foam roller to improve his ROM and decrease pain but this time is is getting progressively worse. He denies numbness or tingling. Objective findings include antalgic toe out and wide based gait with decreased trunk rotation and limited hip mobility. He has decreased flexibility throughout the lower extremity and has severely limited hip internal rotation. There are trigger points and restrictions bilaterally along the ITB and along the adductors and hip flexor. Pt is a good candidate for PT and will benefit from skilled services to address these deficits and work toward the goals listed below.       Evaluation Complexity History HIGH Complexity :3+ comorbidities / personal factors will impact the outcome/ POC ; Examination HIGH Complexity : 4+ Standardized tests and measures addressing body structure, function, activity limitation and / or participation in recreation  ;Presentation MEDIUM Complexity : Evolving with changing characteristics  ; Clinical Decision Making MEDIUM Complexity : FOTO score of 26-74  Overall Complexity Rating: MEDIUM    Problem List: pain affecting function, decrease ROM, decrease strength, impaired gait/ balance, decrease ADL/ functional abilitiies, decrease activity tolerance, decrease flexibility/ joint mobility and decrease transfer abilities   Treatment Plan may include any combination of the following: Therapeutic exercise, Therapeutic activities, Neuromuscular re-education, Physical agent/modality, Gait/balance training, Manual therapy, Patient education, Self Care training, Functional mobility training, Home safety training and Stair training  Patient / Family readiness to learn indicated by: asking questions, trying to perform skills and interest  Persons(s) to be included in education: patient (P)  Barriers to Learning/Limitations: None  Patient Goal (s): relieve pain and stiffness  Patient Self Reported Health Status: good  Rehabilitation Potential: good    Short Term Goals: To be accomplished in 6-8 treatments:   Pt will be I with HEP   Pt will complain of pain 2-3/10 with all activity   Pt will increase flexibility to normalize gait and improve his ability to perform all ADL's  Long Term Goals: To be accomplished in 18-20 treatments:   Pt will complain of pain 0-1/10 with all activity   Pt will increase hip and pelvic mobility to improve function to complete all gym activity and yard work without increased symptoms   Pt will be able to transition sit to stand and drive for greater than 1 hour without increase symptoms when getting up  Frequency / Duration: Patient to be seen 2 times per week for 18-20 treatments.     Patient/ Caregiver education and instruction: self care, activity modification and exercises    [x]  Plan of care has been reviewed with PTA        Certification Period: 11/4/19-2/2/20  Maynor Arzate, PT 11/4/2019     ________________________________________________________________________    I certify that the above Therapy Services are being furnished while the patient is under my care. I agree with the treatment plan and certify that this therapy is necessary.     [de-identified] Signature:____________________  Date:____________Time: _________

## 2019-11-07 ENCOUNTER — HOSPITAL ENCOUNTER (OUTPATIENT)
Dept: PHYSICAL THERAPY | Age: 69
Discharge: HOME | End: 2019-11-07
Payer: MEDICARE

## 2019-11-07 PROCEDURE — 97110 THERAPEUTIC EXERCISES: CPT | Performed by: PHYSICAL THERAPIST

## 2019-11-07 PROCEDURE — 97140 MANUAL THERAPY 1/> REGIONS: CPT | Performed by: PHYSICAL THERAPIST

## 2019-11-07 NOTE — PROGRESS NOTES
PT DAILY TREATMENT NOTE - [facility] 2-15    Patient Name: Sarah Song. Date:2019  : 1950  [x]  Patient  Verified  Payor: Rick Ghotra / Plan: [facility] HUMAN MEDICARE CHOICE PPO/PFFS / Product Type: Managed Care Medicare /    In time:800  Out time:858  Total Treatment Time (min): 58  Total Timed Codes (min): 38  1:1 Treatment Time ( only): 38   Visit #:  2    Treatment Area: Pain in right leg [M79.604]  Pain in left leg [M79.605]    SUBJECTIVE  Pain Level (0-10 scale): 4  Any medication changes, allergies to medications, adverse drug reactions, diagnosis change, or new procedure performed?: [x] No    [] Yes (see summary sheet for update)  Subjective functional status/changes:   [] No changes reported  Pt reports that he had a massage yesterday and is a little looser today.       OBJECTIVE    Modality rationale: decrease pain and increase tissue extensibility to improve the patients ability to move through greater ROM   Min Type Additional Details       [] Estim: []Att   []Unatt    []TENS instruct                  []IFC  []Premod   []NMES                     []Other:  []w/US   []w/ice   []w/heat  Position:  Location:       []  Traction: [] Cervical       []Lumbar                       [] Prone          []Supine                       []Intermittent   []Continuous Lbs:  [] before manual  [] after manual  []w/heat    []  Ultrasound: []Continuous   [] Pulsed                       at: []1MHz   []3MHz Location:  W/cm2:    [] Paraffin         Location:   []w/heat   20 [x]  Ice 10 min    [x]  Heat 10 min  []  Ice massage Position:supine  Location:right hip and groin    []  Laser  []  Other: Position:  Location:      []  Vasopneumatic Device Pressure:       [] lo [] med [] hi   Temperature:      [x] Skin assessment post-treatment:  [x]intact []redness- no adverse reaction    []redness  adverse reaction:     23 min Therapeutic Exercise:  [x] See flow sheet :   Rationale: increase ROM, increase strength and improve coordination to improve the patients ability to complete all activity    15 min Manual Therapy: PROM all directions with STM to adductors, TFL, contract relax for adduction,     Rationale: decrease pain, increase ROM, increase tissue extensibility, decrease trigger points and increase postural awareness to improve the patients ability to complete all activity    Pain Level (0-10 scale) post treatment: 2-3    ASSESSMENT/Changes in Function:   Pt tolerated all activity well. He does complain of discomfort with stretching but was able to demonstrate improved walking and gait following treatment. Cues to engage gluteals with walking instead of hip felxors  Patient will continue to benefit from skilled PT services to modify and progress therapeutic interventions, address functional mobility deficits, address ROM deficits, address strength deficits, analyze and address soft tissue restrictions, analyze and cue movement patterns, analyze and modify body mechanics/ergonomics, assess and modify postural abnormalities and address imbalance/dizziness to attain remaining goals.      []  See Plan of Care  []  See progress note/recertification  []  See Discharge Summary     PLAN  [x]  Upgrade activities as tolerated     [x]  Continue plan of care  [x]  Update interventions per flow sheet       []  Discharge due to:_  []  Other:_      Sydnee Toussaint, PT 11/7/2019

## 2019-11-10 RX ORDER — PANTOPRAZOLE SODIUM 40 MG/1
TABLET, DELAYED RELEASE ORAL
Qty: 90 TAB | Refills: 1 | Status: SHIPPED | OUTPATIENT
Start: 2019-11-10 | End: 2020-03-14 | Stop reason: SDUPTHER

## 2019-11-11 ENCOUNTER — HOSPITAL ENCOUNTER (OUTPATIENT)
Dept: PHYSICAL THERAPY | Age: 69
Discharge: HOME | End: 2019-11-11
Payer: MEDICARE

## 2019-11-11 PROCEDURE — 97110 THERAPEUTIC EXERCISES: CPT

## 2019-11-11 PROCEDURE — 97140 MANUAL THERAPY 1/> REGIONS: CPT

## 2019-11-11 NOTE — PROGRESS NOTES
PT DAILY TREATMENT NOTE - [facility] 2-15    Patient Name: Alaina Richards.   Date:2019  : 1950  [x]  Patient  Verified  Payor: Alex Dickinson / Plan: [facility] [employer] MEDICARE CHOICE PPO/PFFS / Product Type: Managed Care Medicare /    In time:900  Out time:1012  Total Treatment Time (min): 72  Total Timed Codes (min): 52  1:1 Treatment Time ( only): 40   Visit #:  3    Treatment Area: Pain in right leg [M79.604]  Pain in left leg [M79.605]    SUBJECTIVE  Pain Level (0-10 scale): 4/10  Any medication changes, allergies to medications, adverse drug reactions, diagnosis change, or new procedure performed?: [x] No    [] Yes (see summary sheet for update)  Subjective functional status/changes:   [] No changes reported  Patient reports he felt much better after he was worked     OBJECTIVE    Modality rationale: decrease inflammation, decrease pain and increase tissue extensibility to improve the patients ability to perform ADLs and reduce pain levels   Min Type Additional Details       [] Estim: []Att   []Unatt    []TENS instruct                  []IFC  []Premod   []NMES                     []Other:  []w/US   []w/ice   []w/heat  Position:  Location:       []  Traction: [] Cervical       []Lumbar                       [] Prone          []Supine                       []Intermittent   []Continuous Lbs:  [] before manual  [] after manual  []w/heat    []  Ultrasound: []Continuous   [] Pulsed                       at: []1MHz   []3MHz Location:  W/cm2:    [] Paraffin         Location:   []w/heat   10 [x]  Ice     [x]  Heat  []  Ice massage Position: supine  Location: R adductors    []  Laser  []  Other: Position:  Location:      []  Vasopneumatic Device Pressure:       [] lo [] med [] hi   Temperature:      [x] Skin assessment post-treatment:  [x]intact []redness- no adverse reaction    []redness  adverse reaction:     42 min Therapeutic Exercise:  [x] See flow sheet :   Rationale: increase ROM and increase strength to improve the patients ability to perform ADLs and reduce pain levels    10 min Manual Therapy: PROM all directions with STM to adductors, TFL, contract relax for adduction,      Rationale: decrease pain, increase ROM, increase tissue extensibility and decrease trigger points to improve the patients ability to perform ADLs and reduce pain levels    With   [] TE   [] TA   [] neuro   [] other: Patient Education: [x] Review HEP    [] Progressed/Changed HEP based on:   [] positioning   [] body mechanics   [] transfers   [] heat/ice application    [] other:      Other Objective/Functional Measures: none noted     Pain Level (0-10 scale) post treatment: 0/10    ASSESSMENT/Changes in Function:   Patient is TTP and presents restrictions along the R adductor. Fair glute strength, did experience slight discomfort when performing hip IR but after going through them the discomfort faded. Continues to ambulate with little to no glute activation. Will continue to progress as tolerated. Patient will continue to benefit from skilled PT services to modify and progress therapeutic interventions, address functional mobility deficits, address ROM deficits, address strength deficits, analyze and address soft tissue restrictions, analyze and cue movement patterns, analyze and modify body mechanics/ergonomics and assess and modify postural abnormalities to attain remaining goals. [x]  See Plan of Care  []  See progress note/recertification  []  See Discharge Summary         Progress towards goals / Updated goals:  Patient is progressing towards goals.      PLAN  [x]  Upgrade activities as tolerated     [x]  Continue plan of care  [x]  Update interventions per flow sheet       []  Discharge due to:_  []  Other:_      Claudell Маринаwicho 11/11/2019

## 2019-11-14 ENCOUNTER — HOSPITAL ENCOUNTER (OUTPATIENT)
Dept: PHYSICAL THERAPY | Age: 69
Discharge: HOME | End: 2019-11-14
Payer: MEDICARE

## 2019-11-14 PROCEDURE — 97110 THERAPEUTIC EXERCISES: CPT | Performed by: PHYSICAL THERAPIST

## 2019-11-14 PROCEDURE — 97140 MANUAL THERAPY 1/> REGIONS: CPT | Performed by: PHYSICAL THERAPIST

## 2019-11-14 NOTE — PROGRESS NOTES
PT DAILY TREATMENT NOTE - [facility] 2-15    Patient Name: Muriel Drew. Date:2019  : 1950  [x]  Patient  Verified  Payor: Natalie Eloisa / Plan: [facility] [employer] MEDICARE CHOICE PPO/PFFS / Product Type: Managed Care Medicare /    In time:830  Out time:935  Total Treatment Time (min): 65  Total Timed Codes (min): 55  1:1 Treatment Time ( [address] only): 45  Visit #:  4    Treatment Area: Pain in right leg [M79.604]  Pain in left leg [M79.605]    SUBJECTIVE  Pain Level (0-10 scale): 2  Any medication changes, allergies to medications, adverse drug reactions, diagnosis change, or new procedure performed?: [x] No    [] Yes (see summary sheet for update)  Subjective functional status/changes:   [] No changes reported  Pt states that this morning is the loosest he has been first waking up in a while. He does show a large baseball sized bruise on the right medial thigh secondary to manual treatment. He also reports that he was in [place] for two days earlier this week and did a lot of walking.   He reports that he did well and once he was warmed up he felt like he could keep up with everyone else     OBJECTIVE    Modality rationale: decrease inflammation, decrease pain and increase tissue extensibility to improve the patients ability to perform ADLs and reduce pain levels   Min Type Additional Details                       10 [x]  Ice     []  Heat  []  Ice massage Position: supine  Location: R adductors and hip       40 min Therapeutic Exercise:  [x] See flow sheet :   Rationale: increase ROM and increase strength to improve the patients ability to perform ADLs and reduce pain levels    15 min Manual Therapy: PROM all directions with STM to adductors, TFL, contract relax for adduction,, prone hip flex and quad stretches, D1/D2 PNF      Rationale: decrease pain, increase ROM, increase tissue extensibility and decrease trigger points to improve the patients ability to perform ADLs and reduce pain levels    With   [] TE   [] TA   [] neuro   [] other: Patient Education: [x] Review HEP    [] Progressed/Changed HEP based on:   [] positioning   [] body mechanics   [] transfers   [] heat/ice application    [] other:      Other Objective/Functional Measures:    Pain Level (0-10 scale) post treatment: 0    ASSESSMENT/Changes in Function:   Pt was able to complete all activity well. He is demonstrating improved mobility and gait, but does remain tight throughout the hip and upper right leg. Today during PNF he has cramping in the left leg. Cues to engage his core are able to eliminate some of these complaints. Patient will continue to benefit from skilled PT services to modify and progress therapeutic interventions, address functional mobility deficits, address ROM deficits, address strength deficits, analyze and address soft tissue restrictions, analyze and cue movement patterns, analyze and modify body mechanics/ergonomics and assess and modify postural abnormalities to attain remaining goals. [x]  See Plan of Care  []  See progress note/recertification  []  See Discharge Summary         Progress towards goals / Updated goals:  Patient is progressing towards goals.      PLAN  [x]  Upgrade activities as tolerated     [x]  Continue plan of care  [x]  Update interventions per flow sheet       []  Discharge due to:_  []  Other:_      Maynor Arzate, PT 11/14/2019

## 2019-11-18 ENCOUNTER — HOSPITAL ENCOUNTER (OUTPATIENT)
Dept: PHYSICAL THERAPY | Age: 69
Discharge: HOME | End: 2019-11-18
Payer: MEDICARE

## 2019-11-18 PROCEDURE — 97140 MANUAL THERAPY 1/> REGIONS: CPT | Performed by: PHYSICAL THERAPIST

## 2019-11-18 PROCEDURE — 97110 THERAPEUTIC EXERCISES: CPT | Performed by: PHYSICAL THERAPIST

## 2019-11-18 NOTE — PROGRESS NOTES
PT DAILY TREATMENT NOTE - [facility] 2-15    Patient Name: Wilma Kang. Date:2019  : 1950  [x]  Patient  Verified  Payor: Lesly Duron / Plan: [facility] [employer] MEDICARE CHOICE PPO/PFFS / Product Type: Managed Care Medicare /    In time:202  Out time:321  Total Treatment Time (min): 79  Total Timed Codes (min): 64  1:1 Treatment Time ([facility] only): 40  Visit #:  5    Treatment Area: Pain in right leg [M79.604]  Pain in left leg [M79.605]    SUBJECTIVE  Pain Level (0-10 scale): 2  Any medication changes, allergies to medications, adverse drug reactions, diagnosis change, or new procedure performed?: [x] No    [] Yes (see summary sheet for update)  Subjective functional status/changes:   [] No changes reported  Pt reports that he is having more night time complaints than previously. He describes it as achy and deep. He is able to reposition and get some relief, but it is inconsistent.       OBJECTIVE    Modality rationale: decrease inflammation, decrease pain and increase tissue extensibility to improve the patients ability to perform ADLs and reduce pain levels   Min Type Additional Details                       10 [x]  Ice     []  Heat  []  Ice massage Position: supine  Location: R adductors and hip       54 min Therapeutic Exercise:  [x] See flow sheet :   Rationale: increase ROM and increase strength to improve the patients ability to perform ADLs and reduce pain levels    15 min Manual Therapy: PROM all directions with STM to adductors, TFL, contract relax for adduction,, prone hip flex and quad stretches, D1/D2 PNF      Rationale: decrease pain, increase ROM, increase tissue extensibility and decrease trigger points to improve the patients ability to perform ADLs and reduce pain levels    Other Objective/Functional Measures:    Pain Level (0-10 scale) post treatment: 0    ASSESSMENT/Changes in Function:   Pt continues to progress but does report that he remains stiff after sitting or standing still for too long. Night time discomfort and tightness is waking him at ~3AM each night. Patient will continue to benefit from skilled PT services to modify and progress therapeutic interventions, address functional mobility deficits, address ROM deficits, address strength deficits, analyze and address soft tissue restrictions, analyze and cue movement patterns, analyze and modify body mechanics/ergonomics and assess and modify postural abnormalities to attain remaining goals. [x]  See Plan of Care  []  See progress note/recertification  []  See Discharge Summary         Progress towards goals / Updated goals:  Patient is progressing towards goals.      PLAN  [x]  Upgrade activities as tolerated     [x]  Continue plan of care  [x]  Update interventions per flow sheet       []  Discharge due to:_  []  Other:_      Jesus Bob, PT 11/18/2019

## 2019-11-20 ENCOUNTER — HOSPITAL ENCOUNTER (OUTPATIENT)
Dept: PHYSICAL THERAPY | Age: 69
Discharge: HOME | End: 2019-11-20
Payer: MEDICARE

## 2019-11-20 PROCEDURE — 97016 VASOPNEUMATIC DEVICE THERAPY: CPT | Performed by: PHYSICAL THERAPIST

## 2019-11-20 PROCEDURE — 97110 THERAPEUTIC EXERCISES: CPT | Performed by: PHYSICAL THERAPIST

## 2019-11-20 NOTE — PROGRESS NOTES
PT DAILY TREATMENT NOTE - [facility] 2-15    Patient Name: Marylou Godoy. Date:2019  : 1950  [x]  Patient  Verified  Payor: [address] / Plan: [facility] HUMAN MEDICARE CHOICE PPO/PFFS / Product Type: Managed Care Medicare /    In time:131  Out time:238  Total Treatment Time (min): 67  Total Timed Codes (min): 52  1:1 Treatment Time ( [address] only): 42  Visit #:  6    Treatment Area: Pain in right leg [M79.604]  Pain in left leg [M79.605]    SUBJECTIVE  Pain Level (0-10 scale): 2  Any medication changes, allergies to medications, adverse drug reactions, diagnosis change, or new procedure performed?: [x] No    [] Yes (see summary sheet for update)  Subjective functional status/changes:   [] No changes reported  Pt continues to have groin pain and states that he is a little better after the massage this morning     OBJECTIVE    Modality rationale: decrease inflammation, decrease pain and increase tissue extensibility to improve the patients ability to perform ADLs and reduce pain levels   Min Type Additional Details                       15 vasopneumatic Position: supine  Location: R adductors and hip  Temp:34       52 min Therapeutic Exercise:  [x] See flow sheet :   Rationale: increase ROM and increase strength to improve the patients ability to perform ADLs and reduce pain levels    Other Objective/Functional Measures:    Pain Level (0-10 scale) post treatment: 1-2    ASSESSMENT/Changes in Function:   Pt is having the same pain as he has been, however it is a little tighter today. He is challenged with s/l adduction and states that it feels weak.   Will continue for 2-3 visits and transition toward HEP if able       Patient will continue to benefit from skilled PT services to modify and progress therapeutic interventions, address functional mobility deficits, address ROM deficits, address strength deficits, analyze and address soft tissue restrictions, analyze and cue movement patterns, analyze and modify body mechanics/ergonomics and assess and modify postural abnormalities to attain remaining goals. [x]  See Plan of Care  []  See progress note/recertification  []  See Discharge Summary         Progress towards goals / Updated goals:  Patient is progressing towards goals.      PLAN  [x]  Upgrade activities as tolerated     [x]  Continue plan of care  [x]  Update interventions per flow sheet       []  Discharge due to:_  []  Other:_      Laurent Aparicio, PT 11/20/2019

## 2019-11-24 RX ORDER — TAMSULOSIN HYDROCHLORIDE 0.4 MG/1
CAPSULE ORAL
Qty: 90 CAP | Refills: 1 | Status: SHIPPED | OUTPATIENT
Start: 2019-11-24 | End: 2019-11-27 | Stop reason: SDUPTHER

## 2019-11-26 ENCOUNTER — PATIENT MESSAGE (OUTPATIENT)
Dept: INTERNAL MEDICINE CLINIC | Age: 69
End: 2019-11-26

## 2019-11-26 ENCOUNTER — TELEPHONE (OUTPATIENT)
Dept: INTERNAL MEDICINE CLINIC | Age: 69
End: 2019-11-26

## 2019-11-26 NOTE — TELEPHONE ENCOUNTER
#[phone] pt states he needs an eval for severe stiffness. Patient has been in PT for a month and it is getting worse pt states. Please call pt to schedule as there are no appts for psr to schedule.

## 2019-11-26 NOTE — TELEPHONE ENCOUNTER
Called and spoke to patient. Gave patient an appointment for Dec 3.    Patient is having stiffness in both legs and hips

## 2019-11-27 RX ORDER — TAMSULOSIN HYDROCHLORIDE 0.4 MG/1
CAPSULE ORAL
Qty: 180 CAP | Refills: 3 | Status: SHIPPED | OUTPATIENT
Start: 2019-11-27 | End: 2020-07-13

## 2019-11-27 NOTE — TELEPHONE ENCOUNTER
----- Message from Audra Nichols LPN sent at 25/47/3599 11:32 AM EST -----  Regarding: FW: Prescription Question  Contact: [phone]    ----- Message -----  From: Muriel Drew. Sent: 11/26/2019  11:27 AM EST  To: [facility] Nurse Pool  Subject: Prescription Question                            My last refill request was sent to [facility] but only for once a day. At my last visit Dr. Darnell Díaz changed my Tamsulosin from once a day to twice a day. She was going to write a new RX for the twice a day. The recent refill request was for only once a day. The insurance will not approve the once a day until Dec. 12th. I run out of the medication this Friday.

## 2019-12-02 ENCOUNTER — HOSPITAL ENCOUNTER (OUTPATIENT)
Dept: PHYSICAL THERAPY | Age: 69
Discharge: HOME | End: 2019-12-02
Payer: MEDICARE

## 2019-12-02 PROCEDURE — 97110 THERAPEUTIC EXERCISES: CPT | Performed by: PHYSICAL THERAPIST

## 2019-12-02 NOTE — PROGRESS NOTES
PT DAILY TREATMENT NOTE - [facility] 2-15    Patient Name: Edna Kyle. Date:2019  : 1950  [x]  Patient  Verified  Payor: Wendy Santiago / Plan: [facility] [employer] MEDICARE CHOICE PPO/PFFS / Product Type: Managed Care Medicare /    In time:403  Out time:433  Total Treatment Time (min): 33  Total Timed Codes (min): 33  1:1 Treatment Time ( only): 20  Visit #:  7    Treatment Area: Pain in right leg [M79.604]  Pain in left leg [M79.605]    SUBJECTIVE  Pain Level (0-10 scale): 4  Any medication changes, allergies to medications, adverse drug reactions, diagnosis change, or new procedure performed?: [x] No    [] Yes (see summary sheet for update)  Subjective functional status/changes:   [] No changes reported  Pt is states that he is getting worse. States that he is getting so stiff at night that he can hardly move. He states that it is now affecting his shoulders and arms as well now. He reports that on the worst morning it was taking him over an hour of being in the floor stretching and forcing his extremities to move. OBJECTIVE      33 min Therapeutic Exercise:  [x] See flow sheet :   Rationale: increase ROM and increase strength to improve the patients ability to perform ADLs and reduce pain levels    Other Objective/Functional Measures:    Pain Level (0-10 scale) post treatment: 3-4    ASSESSMENT/Changes in Function:   Pt has had an increase in the amount of stiffness and the severity of this symptom over the last few days for no apparent reason. Patient is having to take longer in the morning to get himself moving and reports pain so much that he thought about going to the ED because he couldn't get his joints loosened. He is consistently complaining of proximal right hip pain that he indicates by grabbing his hip around the groin and and buttocks. He also has ongoing tightness throughout the thigh of the right leg.   He has recently started to complain of similar stiffness in the shoulders. He reports recent x-rays of the hip were negative. On review of the report for this there were arthritic changes noted as \"Mild arthritic changes in the hip.  Spherical femoral head and acetabulum. \". Given the nature of the complaint and the limited progress he has made it may be indicative to have a second x-ray to ensure there has been no progression of these changes. Patient will continue to benefit from skilled PT services to modify and progress therapeutic interventions, address functional mobility deficits, address ROM deficits, address strength deficits, analyze and address soft tissue restrictions, analyze and cue movement patterns, analyze and modify body mechanics/ergonomics and assess and modify postural abnormalities to attain remaining goals. [x]  See Plan of Care  []  See progress note/recertification  []  See Discharge Summary         Progress towards goals / Updated goals:  Patient is progressing towards goals.      PLAN  [x]  Upgrade activities as tolerated     [x]  Continue plan of care  [x]  Update interventions per flow sheet       []  Discharge due to:_  []  Other:_      Zeke Hurtado, PT 12/2/2019

## 2019-12-03 ENCOUNTER — OFFICE VISIT (OUTPATIENT)
Dept: INTERNAL MEDICINE CLINIC | Age: 69
End: 2019-12-03

## 2019-12-03 VITALS
OXYGEN SATURATION: 99 % | DIASTOLIC BLOOD PRESSURE: 62 MMHG | WEIGHT: 193 LBS | SYSTOLIC BLOOD PRESSURE: 116 MMHG | BODY MASS INDEX: 27.63 KG/M2 | HEIGHT: 70 IN | RESPIRATION RATE: 16 BRPM | TEMPERATURE: 97.8 F | HEART RATE: 70 BPM

## 2019-12-03 DIAGNOSIS — M25.512 ACUTE PAIN OF BOTH SHOULDERS: ICD-10-CM

## 2019-12-03 DIAGNOSIS — M25.552 PAIN OF BOTH HIP JOINTS: Primary | ICD-10-CM

## 2019-12-03 DIAGNOSIS — M25.551 PAIN OF BOTH HIP JOINTS: Primary | ICD-10-CM

## 2019-12-03 DIAGNOSIS — E78.01 FAMILIAL HYPERCHOLESTEROLEMIA: ICD-10-CM

## 2019-12-03 DIAGNOSIS — M25.511 ACUTE PAIN OF BOTH SHOULDERS: ICD-10-CM

## 2019-12-03 DIAGNOSIS — I10 ESSENTIAL HYPERTENSION: ICD-10-CM

## 2019-12-03 RX ORDER — PREDNISONE 20 MG/1
20 TABLET ORAL DAILY
Qty: 30 TAB | Refills: 1 | Status: SHIPPED | OUTPATIENT
Start: 2019-12-03 | End: 2020-01-06 | Stop reason: DRUGHIGH

## 2019-12-03 NOTE — PROGRESS NOTES
Licha Bryant. is a 68 y.o. adult who presents  With wife. Reports 2 months of stiffness in hips and thighs bilaterally, right is worse. Pain in right hip. Reports bilateral shoulder stiffness 7-10 days ago. In PT for one month. No change in symptoms. Prior xray right hip in 2019 with mild OA. Past Medical History:   Diagnosis Date    Arthritis     Enlarged prostate     GERD (gastroesophageal reflux disease)     High cholesterol     Hx of seasonal allergies     Hypertension        Family History   Problem Relation Age of Onset    Cancer Mother         lung    Cancer Father         lung       Social History     Socioeconomic History    Marital status:      Spouse name: Not on file    Number of children: Not on file    Years of education: Not on file    Highest education level: Not on file   Occupational History    Not on file   Social Needs    Financial resource strain: Not on file    Food insecurity:     Worry: Not on file     Inability: Not on file    Transportation needs:     Medical: Not on file     Non-medical: Not on file   Tobacco Use    Smoking status: Former Smoker     Last attempt to quit: 9/3/1990     Years since quittin.2    Smokeless tobacco: Never Used   Substance and Sexual Activity    Alcohol use:  Yes     Alcohol/week: 2.0 standard drinks     Types: 1 Cans of beer, 1 Shots of liquor per week     Frequency: 2-3 times a week     Drinks per session: 1 or 2    Drug use: No    Sexual activity: Yes     Partners: Female     Birth control/protection: None   Lifestyle    Physical activity:     Days per week: Not on file     Minutes per session: Not on file    Stress: Not on file   Relationships    Social connections:     Talks on phone: Not on file     Gets together: Not on file     Attends Muslim service: Not on file     Active member of club or organization: Not on file     Attends meetings of clubs or organizations: Not on file     Relationship status: Not on file    Intimate partner violence:     Fear of current or ex partner: Not on file     Emotionally abused: Not on file     Physically abused: Not on file     Forced sexual activity: Not on file   Other Topics Concern    Not on file   Social History Narrative    Not on file       Current Outpatient Medications on File Prior to Visit   Medication Sig Dispense Refill    tamsulosin (FLOMAX) 0.4 mg capsule TAKE 2 CAPSULES BY MOUTH AT BEDTIME 180 Cap 3    pantoprazole (PROTONIX) 40 mg tablet TAKE 1 TABLET BY MOUTH ONCE DAILY 90 Tab 1    atorvastatin (LIPITOR) 20 mg tablet Take 1 Tab by mouth daily. 90 Tab 3    cyclobenzaprine (FLEXERIL) 10 mg tablet Take 1 Tab by mouth nightly. As needed for muscle spasm. 30 Tab 1    aspirin 81 mg chewable tablet Take 1 Tab by mouth daily. 30 Tab 1    amLODIPine (NORVASC) 10 mg tablet Take 1 Tab by mouth daily. 90 Tab 3    gabapentin (NEURONTIN) 300 mg capsule Take 300 mg by mouth two (2) times a day.  triamcinolone (NASACORT) 55 mcg nasal inhaler 2 Sprays daily.  diclofenac (VOLTAREN) 1 % gel Apply two grams by topical route four times daily to the affected area (s). PRN      fexofenadine (ALLEGRA) 180 mg tablet Take 180 mg by mouth daily as needed.  cholecalciferol (VITAMIN D3) 1,000 unit tablet Take 2,000 Units by mouth daily.  montelukast (SINGULAIR) 10 mg tablet Take 1 Tab by mouth daily. 30 Tab 2     No current facility-administered medications on file prior to visit. Review of Systems  Pertinent items are noted in HPI. Objective:     Visit Vitals  /62 (BP 1 Location: Left arm, BP Patient Position: Sitting)   Pulse 70   Temp 97.8 °F (36.6 °C) (Oral)   Resp 16   Ht 5' 10\" (1.778 m)   Wt 193 lb (87.5 kg)   SpO2 99%   BMI 27.69 kg/m²     Gen: well appearing adult  HEENT:   PERRL,normal conjunctiva. OP no erythema, no exudates, MMM  Neck: No masses or LAD  Resp:  No wheezing, no rhonchi, no rales.   CV:  RRR, normal S1S2, no murmur. GI: soft, nontender, without masses. Extrem:  +2 pulses, no edema, warm distally  Back- decreased strength on testing of shoulders and hip flexors,  no vertebral pain  Neuro- alert, normal gait, negative SLR, 5/5 motor in lower extremities, normal sensory      Assessment/Plan:       ICD-10-CM ICD-9-CM    1. Pain of both hip joints M25.551 719.45 SED RATE (ESR)    M25.552  CBC WITH AUTOMATED DIFF      CRP, HIGH SENSITIVITY      TSH 3RD GENERATION      CK      predniSONE (DELTASONE) 20 mg tablet   2. Acute pain of both shoulders M25.511 719.41 SED RATE (ESR)    M25.512  CBC WITH AUTOMATED DIFF      CRP, HIGH SENSITIVITY      TSH 3RD GENERATION      CK      predniSONE (DELTASONE) 20 mg tablet   3. Familial hypercholesterolemia  E78.01 272.0 CRP, HIGH SENSITIVITY      CK   4. Essential hypertension I10 401.9 TSH 3RD GENERATION   suspected PMR. Start prednisone. Follow-up and Dispositions    · Return for follow up pending labs and response to medication.   Hudson Calvo MD

## 2019-12-03 NOTE — PATIENT INSTRUCTIONS
Polymyalgia Rheumatica: Care Instructions Your Care Instructions Polymyalgia rheumatica causes pain and swelling in joints and muscles, mainly in the hips, neck, and shoulders. Pain and swelling may be worse in the morning. This condition can occur quickly and often lasts for a year or two. Your doctor will treat you with medicine to reduce swelling. Your symptoms should get much better in 1 to 3 days and go away in 2 to 4 weeks. Still, you may need to take medicine to prevent it from coming back. You may be on the medicine for 1 to 2 years or longer. Some people who have this also get giant cell arteritis (temporal arteritis), which causes swelling of some blood vessels in the head. Tell your doctor if you have any headaches, jaw pain, or tightness or tenderness along the temple or scalp. This condition can cause blindness if it is not treated. Tell your doctor if you have problems with your vision, including blurring or seeing double. Follow-up care is a key part of your treatment and safety. Be sure to make and go to all appointments, and call your doctor if you are having problems. It's also a good idea to know your test results and keep a list of the medicines you take. How can you care for yourself at home? · Take your medicines exactly as prescribed. Call your doctor if you think you are having a problem with your medicine. You may get medicines to reduce pain and to keep your bones from getting thin. · Take anti-inflammatory medicines to reduce pain, if your doctor recommends them. These include ibuprofen (Advil, Motrin) and naproxen (Aleve). Read and follow all instructions on the label. · Eat a healthy diet. Make sure to drink milk and eat dairy products, such as low-fat cheese and yogurt. Ask your doctor how much calcium you need. If you cannot eat dairy products or you do not get enough calcium from food, you may take pills. · Do gentle weight lifting to protect your bones. This is very important for women who have gone through menopause. · Do not smoke or allow others to smoke around you. If you need help quitting, talk to your doctor about stop-smoking programs and medicines. These can increase your chances of quitting for good. When should you call for help? Call your doctor now or seek immediate medical care if: 
  · You have a headache, jaw pain, or problems seeing.  
 Watch closely for changes in your health, and be sure to contact your doctor if: 
  · Your joint and muscle pain or stiffness gets worse.  
  · You have side effects from your corticosteroid medicine, such as: 
? Signs of diabetes (feeling thirsty all the time, needing to urinate often). ? Signs of infection (fever, chills, cough, burning during urination, severe sore throat, or skin infection). ? A large weight gain. ? Mood changes. ? Trouble sleeping. ? Bruising easily.  
  · You have any other problems with your medicine.  
  · You do not get better as expected. Where can you learn more? Go to http://darcie-linden.info/. Enter M396 in the search box to learn more about \"Polymyalgia Rheumatica: Care Instructions. \" Current as of: April 1, 2019 Content Version: 12.2 © 2932-5800 [employer]. Care instructions adapted under license by [employer] (which disclaims liability or warranty for this information). If you have questions about a medical condition or this instruction, always ask your healthcare professional. [place] [address] any warranty or liability for your use of this information.

## 2019-12-04 LAB
BASOPHILS # BLD AUTO: 0 X10E3/UL (ref 0–0.2)
BASOPHILS NFR BLD AUTO: 0 %
CK SERPL-CCNC: 71 U/L (ref 24–204)
CRP SERPL HS-MCNC: 45.73 MG/L (ref 0–3)
EOSINOPHIL # BLD AUTO: 0 X10E3/UL (ref 0–0.4)
EOSINOPHIL NFR BLD AUTO: 0 %
ERYTHROCYTE [DISTWIDTH] IN BLOOD BY AUTOMATED COUNT: 11.7 % (ref 12.3–15.4)
ERYTHROCYTE [SEDIMENTATION RATE] IN BLOOD BY WESTERGREN METHOD: 29 MM/HR (ref 0–30)
HCT VFR BLD AUTO: 42.1 % (ref 37.5–51)
HGB BLD-MCNC: 13.5 G/DL (ref 13–17.7)
IMM GRANULOCYTES # BLD AUTO: 0.1 X10E3/UL (ref 0–0.1)
IMM GRANULOCYTES NFR BLD AUTO: 1 %
LYMPHOCYTES # BLD AUTO: 1.5 X10E3/UL (ref 0.7–3.1)
LYMPHOCYTES NFR BLD AUTO: 12 %
MCH RBC QN AUTO: 27.5 PG (ref 26.6–33)
MCHC RBC AUTO-ENTMCNC: 32.1 G/DL (ref 31.5–35.7)
MCV RBC AUTO: 86 FL (ref 79–97)
MONOCYTES # BLD AUTO: 0.8 X10E3/UL (ref 0.1–0.9)
MONOCYTES NFR BLD AUTO: 7 %
NEUTROPHILS # BLD AUTO: 9.6 X10E3/UL (ref 1.4–7)
NEUTROPHILS NFR BLD AUTO: 80 %
PLATELET # BLD AUTO: 302 X10E3/UL (ref 150–450)
RBC # BLD AUTO: 4.91 X10E6/UL (ref 4.14–5.8)
TSH SERPL DL<=0.005 MIU/L-ACNC: 1.14 UIU/ML (ref 0.45–4.5)
WBC # BLD AUTO: 12 X10E3/UL (ref 3.4–10.8)

## 2019-12-11 ENCOUNTER — TELEPHONE (OUTPATIENT)
Dept: INTERNAL MEDICINE CLINIC | Age: 69
End: 2019-12-11

## 2019-12-11 NOTE — TELEPHONE ENCOUNTER
Phone Number: [phone]             Appointment Request From: Lisbeth Franklin.      With Provider: Shivani Blum MD [facility]]     Preferred Date Range: 1/6/2020 - 1/8/2020     Preferred Times: Monday Morning, Tuesday Morning, Wednesday Morning     Reason for visit: Request an Appointment     Comments:   4 week Follow up appointment     Copy/Paste  [email or web address]

## 2019-12-11 NOTE — TELEPHONE ENCOUNTER
Called, spoke to pt. Two pt identifiers confirmed. Patient states he needs appointment for 4 week follow up on Fibromyalgia, medications and labs. Patient offered and accepted appointment 01/06/2020 at 9:00 am    Pt verbalized understanding of information discussed w/ no further questions at this time.

## 2019-12-16 ENCOUNTER — APPOINTMENT (OUTPATIENT)
Dept: PHYSICAL THERAPY | Age: 69
End: 2019-12-16

## 2020-01-05 NOTE — PROGRESS NOTES
Marianela Castanon. is a 68 y.o. adult who presents for follow up on suspected PMR. Last seen December 3. He had pain and weakness in both hips and shoulders. CRP significantly elevated, sed rate mildly elevated, had improvement on Prednisone 20mg daily. He reports by the second day he had no pain. Reports sinus pressure and headache. No discolored mucous. Past Medical History:   Diagnosis Date    Arthritis     Enlarged prostate     GERD (gastroesophageal reflux disease)     High cholesterol     Hx of seasonal allergies     Hypertension        Family History   Problem Relation Age of Onset    Cancer Mother         lung    Cancer Father         lung       Social History     Socioeconomic History    Marital status:      Spouse name: Not on file    Number of children: Not on file    Years of education: Not on file    Highest education level: Not on file   Occupational History    Not on file   Social Needs    Financial resource strain: Not on file    Food insecurity:     Worry: Not on file     Inability: Not on file    Transportation needs:     Medical: Not on file     Non-medical: Not on file   Tobacco Use    Smoking status: Former Smoker     Last attempt to quit: 9/3/1990     Years since quittin.3    Smokeless tobacco: Never Used   Substance and Sexual Activity    Alcohol use:  Yes     Alcohol/week: 2.0 standard drinks     Types: 1 Cans of beer, 1 Shots of liquor per week     Frequency: 2-3 times a week     Drinks per session: 1 or 2    Drug use: No    Sexual activity: Yes     Partners: Female     Birth control/protection: None   Lifestyle    Physical activity:     Days per week: Not on file     Minutes per session: Not on file    Stress: Not on file   Relationships    Social connections:     Talks on phone: Not on file     Gets together: Not on file     Attends Protestant service: Not on file     Active member of club or organization: Not on file     Attends meetings of clubs or organizations: Not on file     Relationship status: Not on file    Intimate partner violence:     Fear of current or ex partner: Not on file     Emotionally abused: Not on file     Physically abused: Not on file     Forced sexual activity: Not on file   Other Topics Concern    Not on file   Social History Narrative    Not on file       Current Outpatient Medications on File Prior to Visit   Medication Sig Dispense Refill    tamsulosin (FLOMAX) 0.4 mg capsule TAKE 2 CAPSULES BY MOUTH AT BEDTIME 180 Cap 3    pantoprazole (PROTONIX) 40 mg tablet TAKE 1 TABLET BY MOUTH ONCE DAILY 90 Tab 1    atorvastatin (LIPITOR) 20 mg tablet Take 1 Tab by mouth daily. 90 Tab 3    aspirin 81 mg chewable tablet Take 1 Tab by mouth daily. 30 Tab 1    amLODIPine (NORVASC) 10 mg tablet Take 1 Tab by mouth daily. 90 Tab 3    gabapentin (NEURONTIN) 300 mg capsule Take 300 mg by mouth two (2) times a day.  triamcinolone (NASACORT) 55 mcg nasal inhaler 2 Sprays daily.  cholecalciferol (VITAMIN D3) 1,000 unit tablet Take 2,000 Units by mouth daily.  [DISCONTINUED] predniSONE (DELTASONE) 20 mg tablet Take 20 mg by mouth daily. 30 Tab 1    cyclobenzaprine (FLEXERIL) 10 mg tablet Take 1 Tab by mouth nightly. As needed for muscle spasm. 30 Tab 1    montelukast (SINGULAIR) 10 mg tablet Take 1 Tab by mouth daily. 30 Tab 2    diclofenac (VOLTAREN) 1 % gel Apply two grams by topical route four times daily to the affected area (s). PRN      fexofenadine (ALLEGRA) 180 mg tablet Take 180 mg by mouth daily as needed. No current facility-administered medications on file prior to visit. Review of Systems  Pertinent items are noted in HPI.     Objective:     Visit Vitals  /67 (BP 1 Location: Left arm, BP Patient Position: Sitting)   Pulse (!) 59   Temp 97.5 °F (36.4 °C) (Oral)   Resp 16   Ht 5' 10\" (1.778 m)   Wt 200 lb 6.4 oz (90.9 kg)   SpO2 100%   BMI 28.75 kg/m²     Gen: well appearing adult   HEENT: PERRL, TM no opacification or erythema, OP MMM, no erythema. Resp:  No wheezing, no rhonchi, no rales. CV:  RRR, normal S1S2  Extrem:  +2 pulses, no edema, warm distally, 5/5 shoulder and hip girdle strength. Able to stand without assistance. Assessment/Plan:       ICD-10-CM ICD-9-CM    1. PMR (polymyalgia rheumatica) ([facility]) M35.3 725 SED RATE (ESR)      CRP, HIGH SENSITIVITY   2. Hyperlipidemia, unspecified  E78.5 272.4 CRP, HIGH SENSITIVITY   reduce prednisone to 15mg daily with gradual reduction to 10mg dose, follow up on 10mg dose in 3 months    Follow-up and Dispositions    · Return in about 3 months (around 4/6/2020) for FOLLOW UP ON PMR.  Yifan Hull MD

## 2020-01-06 ENCOUNTER — OFFICE VISIT (OUTPATIENT)
Dept: INTERNAL MEDICINE CLINIC | Age: 70
End: 2020-01-06

## 2020-01-06 VITALS
DIASTOLIC BLOOD PRESSURE: 67 MMHG | SYSTOLIC BLOOD PRESSURE: 130 MMHG | HEIGHT: 70 IN | TEMPERATURE: 97.5 F | WEIGHT: 200.4 LBS | OXYGEN SATURATION: 100 % | HEART RATE: 59 BPM | BODY MASS INDEX: 28.69 KG/M2 | RESPIRATION RATE: 16 BRPM

## 2020-01-06 DIAGNOSIS — E78.5 HYPERLIPIDEMIA, UNSPECIFIED: ICD-10-CM

## 2020-01-06 DIAGNOSIS — M35.3 PMR (POLYMYALGIA RHEUMATICA) (HCC): Primary | ICD-10-CM

## 2020-01-06 RX ORDER — PREDNISONE 5 MG/1
TABLET ORAL
Qty: 90 TAB | Refills: 0 | Status: SHIPPED | OUTPATIENT
Start: 2020-01-06 | End: 2020-03-14 | Stop reason: SDUPTHER

## 2020-01-06 NOTE — PATIENT INSTRUCTIONS
Office Policies    Phone calls/patient messages:            Please allow up to 24 hours for someone in the office to contact you about your call or message. Be mindful your provider may be out of the office or your message may require further review. We encourage you to use [employer] for your messages as this is a faster, more efficient way to communicate with our office                         Medication Refills:            Prescription medications require 48-72 business hours to process. We encourage you to use [employer] for your refills. For controlled medications: Please allow 72 business hours to process. Certain medications may require you to  a written prescription at our office. NO narcotic/controlled medications will be prescribed after 4pm Monday through Friday or on weekends              Form/Paperwork Completion:            Please note a $25 fee may incur for all paperwork for completed by our providers. We ask that you allow 7-10 business days. Pre-payment is due prior to picking up/faxing the completed form. You may also download your forms to [employer] to have your doctor print off. PREDNISONE REDUCE TO 15MG DAILY. AFTER 2 WEEKS, LET ME KNOW, IF FEELING WELL, START ALTERNATING 10MG WITH THE 15MG FOR ONE WEEK. IF FEELING WELL,  REDUCE TO 10MG DAILY AND CONTINUE.

## 2020-01-07 LAB
CRP SERPL HS-MCNC: 1.98 MG/L (ref 0–3)
ERYTHROCYTE [SEDIMENTATION RATE] IN BLOOD BY WESTERGREN METHOD: 2 MM/HR (ref 0–30)

## 2020-01-08 ENCOUNTER — APPOINTMENT (OUTPATIENT)
Dept: PHYSICAL THERAPY | Age: 70
End: 2020-01-08

## 2020-01-17 ENCOUNTER — TELEPHONE (OUTPATIENT)
Dept: INTERNAL MEDICINE CLINIC | Age: 70
End: 2020-01-17

## 2020-01-17 NOTE — TELEPHONE ENCOUNTER
----- Message from Sharon Bee MD sent at 1/16/2020  9:42 PM EST -----  Notify patient CRP and sed rate both in normal range. He is to keep in touch with us via my chart regarding how he feels on lower dose prednisone. If any recurrence of pain he is to let me know. Follow-up as scheduled.

## 2020-03-14 RX ORDER — PREDNISONE 5 MG/1
TABLET ORAL
Qty: 90 TAB | Refills: 0 | Status: SHIPPED | OUTPATIENT
Start: 2020-03-14 | End: 2020-04-20 | Stop reason: SDUPTHER

## 2020-03-14 RX ORDER — PANTOPRAZOLE SODIUM 40 MG/1
TABLET, DELAYED RELEASE ORAL
Qty: 90 TAB | Refills: 0 | Status: SHIPPED | OUTPATIENT
Start: 2020-03-14 | End: 2020-08-31

## 2020-04-14 DIAGNOSIS — I10 ESSENTIAL HYPERTENSION: ICD-10-CM

## 2020-04-14 RX ORDER — AMLODIPINE BESYLATE 10 MG/1
TABLET ORAL
Qty: 90 TAB | Refills: 0 | Status: SHIPPED | OUTPATIENT
Start: 2020-04-14 | End: 2020-07-12

## 2020-04-20 ENCOUNTER — VIRTUAL VISIT (OUTPATIENT)
Dept: INTERNAL MEDICINE CLINIC | Age: 70
End: 2020-04-20

## 2020-04-20 DIAGNOSIS — M35.3 PMR (POLYMYALGIA RHEUMATICA) (HCC): Primary | ICD-10-CM

## 2020-04-20 DIAGNOSIS — M50.30 DDD (DEGENERATIVE DISC DISEASE), CERVICAL: ICD-10-CM

## 2020-04-20 DIAGNOSIS — I10 ESSENTIAL HYPERTENSION: ICD-10-CM

## 2020-04-20 RX ORDER — PREDNISONE 5 MG/1
TABLET ORAL
Qty: 30 TAB | Refills: 1 | Status: SHIPPED | OUTPATIENT
Start: 2020-04-20 | End: 2020-05-11

## 2020-04-20 NOTE — PROGRESS NOTES
Alicia Champion. is a 68 y.o. adult who presents for follow up. Seen in January for follow up on treatment of PMR. Patient was improving and we started reducing steroids. Reduced to 10mg once a day. Has had tingling in left arm and neck. History of cervical DDD. Taking gabapentin 300mg BID, increased to TID, for one month. No change. With some relief. Denies neck pain now. Prior injections. Prior PT. Sees Dr. Valery Staton and Dr. Elsy Elizalde. BP controlled. Home readings. Highest 143/72. Lowest.  123/59. Is going for walks. This is an established visit conducted via telemedicine with video. The patient has been instructed that this meets HIPAA criteria and acknowledges and agrees to this method of visitation. Pursuant to the emergency declaration under the [facility], [facility] waiver authority and the [employer] Act, this Virtual Visit was conducted, with patient's consent, to reduce the patient's risk of exposure to COVID-19 and provide continuity of care for an established patient. Services were provided through a video synchronous discussion virtually to substitute for in-person clinic visit.         Past Medical History:   Diagnosis Date    Arthritis     Enlarged prostate     GERD (gastroesophageal reflux disease)     High cholesterol     Hx of seasonal allergies     Hypertension        Family History   Problem Relation Age of Onset    Cancer Mother         lung    Cancer Father         lung       Social History     Socioeconomic History    Marital status:      Spouse name: Not on file    Number of children: Not on file    Years of education: Not on file    Highest education level: Not on file   Occupational History    Not on file   Social Needs    Financial resource strain: Not on file    Food insecurity     Worry: Not on file     Inability: Not on file   [employer] needs Medical: Not on file     Non-medical: Not on file   Tobacco Use    Smoking status: Former Smoker     Last attempt to quit: 9/3/1990     Years since quittin.6    Smokeless tobacco: Never Used   Substance and Sexual Activity    Alcohol use: Yes     Alcohol/week: 2.0 standard drinks     Types: 1 Cans of beer, 1 Shots of liquor per week     Frequency: 2-3 times a week     Drinks per session: 1 or 2    Drug use: No    Sexual activity: Yes     Partners: Female     Birth control/protection: None   Lifestyle    Physical activity     Days per week: Not on file     Minutes per session: Not on file    Stress: Not on file   Relationships    Social connections     Talks on phone: Not on file     Gets together: Not on file     Attends Nondenominational service: Not on file     Active member of club or organization: Not on file     Attends meetings of clubs or organizations: Not on file     Relationship status: Not on file    Intimate partner violence     Fear of current or ex partner: Not on file     Emotionally abused: Not on file     Physically abused: Not on file     Forced sexual activity: Not on file   Other Topics Concern    Not on file   Social History Narrative    Not on file       Current Outpatient Medications on File Prior to Visit   Medication Sig Dispense Refill    amLODIPine (NORVASC) 10 mg tablet Take 1 tablet by mouth once daily 90 Tab 0    pantoprazole (PROTONIX) 40 mg tablet TAKE 1 TABLET BY MOUTH ONCE DAILY 90 Tab 0    tamsulosin (FLOMAX) 0.4 mg capsule TAKE 2 CAPSULES BY MOUTH AT BEDTIME 180 Cap 3    atorvastatin (LIPITOR) 20 mg tablet Take 1 Tab by mouth daily. 90 Tab 3    aspirin 81 mg chewable tablet Take 1 Tab by mouth daily. 30 Tab 1    gabapentin (NEURONTIN) 300 mg capsule Take 300 mg by mouth two (2) times a day.  triamcinolone (NASACORT) 55 mcg nasal inhaler 2 Sprays daily.  montelukast (SINGULAIR) 10 mg tablet Take 1 Tab by mouth daily.  30 Tab 2    cholecalciferol (VITAMIN D3) 1,000 unit tablet Take 2,000 Units by mouth daily.  [DISCONTINUED] predniSONE (DELTASONE) 5 mg tablet Take 3 tablets by mouth once daily 90 Tab 0    cyclobenzaprine (FLEXERIL) 10 mg tablet Take 1 Tab by mouth nightly. As needed for muscle spasm. 30 Tab 1    diclofenac (VOLTAREN) 1 % gel Apply two grams by topical route four times daily to the affected area (s). PRN      fexofenadine (ALLEGRA) 180 mg tablet Take 180 mg by mouth daily as needed. No current facility-administered medications on file prior to visit. Review of Systems  Pertinent items are noted in HPI. Objective:     Gen: well appearing adult  HEENT: normal conjunctiva, no audible congestion, patient does not see oral erythema, has MMM  Neck: patient does not feel enlarged or tender LAD or masses  Resp: normal respiratory effort, no audible wheezing. CV: patient does not feel palpitations or heart irregularity  Abd: patient does not feel abdominal tenderness or mass, patient does not notice distension  Extrem: patient does not see swelling in ankles or joints. Neuro: Alert and oriented, able to answer questions without difficulty, able to move all extremities and walk normally        Assessment/Plan:       ICD-10-CM ICD-9-CM    1. PMR (polymyalgia rheumatica) ([facility]) M35.3 725 predniSONE (DELTASONE) 5 mg tablet   2. Essential hypertension I10 401.9    3. DDD (degenerative disc disease), cervical M50.30 722.4    reduce prednisone to 5mg daily. Follow up with Dr. Cordelia Celis regarding cervical DDD. This was a telemedicine visit with video.         Genesis Combs MD

## 2020-04-21 LAB
BASOPHILS # BLD AUTO: 0 X10E3/UL (ref 0–0.2)
BASOPHILS NFR BLD AUTO: 0 %
CK SERPL-CCNC: 95 U/L (ref 41–331)
CRP SERPL HS-MCNC: 1.17 MG/L (ref 0–3)
EOSINOPHIL # BLD AUTO: 0.1 X10E3/UL (ref 0–0.4)
EOSINOPHIL NFR BLD AUTO: 1 %
ERYTHROCYTE [DISTWIDTH] IN BLOOD BY AUTOMATED COUNT: 13 % (ref 11.6–15.4)
HCT VFR BLD AUTO: 47.3 % (ref 37.5–51)
HGB BLD-MCNC: 15.1 G/DL (ref 13–17.7)
IMM GRANULOCYTES # BLD AUTO: 0.1 X10E3/UL (ref 0–0.1)
IMM GRANULOCYTES NFR BLD AUTO: 1 %
LYMPHOCYTES # BLD AUTO: 2.2 X10E3/UL (ref 0.7–3.1)
LYMPHOCYTES NFR BLD AUTO: 18 %
MCH RBC QN AUTO: 27.7 PG (ref 26.6–33)
MCHC RBC AUTO-ENTMCNC: 31.9 G/DL (ref 31.5–35.7)
MCV RBC AUTO: 87 FL (ref 79–97)
MONOCYTES # BLD AUTO: 0.8 X10E3/UL (ref 0.1–0.9)
MONOCYTES NFR BLD AUTO: 7 %
NEUTROPHILS # BLD AUTO: 9 X10E3/UL (ref 1.4–7)
NEUTROPHILS NFR BLD AUTO: 73 %
PLATELET # BLD AUTO: 213 X10E3/UL (ref 150–450)
RBC # BLD AUTO: 5.46 X10E6/UL (ref 4.14–5.8)
TSH SERPL DL<=0.005 MIU/L-ACNC: 1.38 UIU/ML (ref 0.45–4.5)
WBC # BLD AUTO: 12.3 X10E3/UL (ref 3.4–10.8)

## 2020-06-23 ENCOUNTER — VIRTUAL VISIT (OUTPATIENT)
Dept: INTERNAL MEDICINE CLINIC | Age: 70
End: 2020-06-23

## 2020-06-23 DIAGNOSIS — I10 ESSENTIAL HYPERTENSION: ICD-10-CM

## 2020-06-23 DIAGNOSIS — M35.3 PMR (POLYMYALGIA RHEUMATICA) (HCC): Primary | ICD-10-CM

## 2020-06-23 DIAGNOSIS — E78.00 PURE HYPERCHOLESTEROLEMIA: ICD-10-CM

## 2020-06-23 DIAGNOSIS — M50.30 DDD (DEGENERATIVE DISC DISEASE), CERVICAL: ICD-10-CM

## 2020-06-23 NOTE — PROGRESS NOTES
Rock Carrasco. is a 65 y.o. adult who presents for follow up. Seen April 20     Seen in January for follow up on treatment of PMR. Patient was improving and we started reducing steroids. Reduced to 5mg daily at last visit and reduced to every other day after that. Patient reports last week he was very stiff in shoulders. He thought he was having a relapse of PMR. Did not change the prednisone, 5mg every other day. The hip and shoulder pain has now resolved. Saw Dr. Danelle Quijano last week regarding left hand. To have EMG/NCV Dr. Juana Cornejo.       Has had tingling in left arm and left side neck. History of cervical DDD. Taking gabapentin 300mg BID, tried TID no change. The gabapentin helps the pain but not the tingling. Prior injections. Prior PT. Sees Dr. Marcelle Suresh and Dr. Roe Knapp.      BP controlled on medication. On statin, no myalgias. Low fat diet. On PPI, GERD controlled. This is an established visit conducted via telemedicine with video. The patient has been instructed that this meets HIPAA criteria and acknowledges and agrees to this method of visitation. Pursuant to the emergency declaration under the [facility] waiver authority and the [employer] Act, this Virtual Visit was conducted, with patient's consent, to reduce the patient's risk of exposure to COVID-19 and provide continuity of care for an established patient. Services were provided through a video synchronous discussion virtually to substitute for in-person clinic visit.         Past Medical History:   Diagnosis Date    Arthritis     Enlarged prostate     GERD (gastroesophageal reflux disease)     High cholesterol     Hx of seasonal allergies     Hypertension        Family History   Problem Relation Age of Onset    Cancer Mother         lung    Cancer Father         lung       Social History Socioeconomic History    Marital status:      Spouse name: Not on file    Number of children: Not on file    Years of education: Not on file    Highest education level: Not on file   Occupational History    Not on file   Social Needs    Financial resource strain: Not on file    Food insecurity     Worry: Not on file     Inability: Not on file    Transportation needs     Medical: Not on file     Non-medical: Not on file   Tobacco Use    Smoking status: Former Smoker     Last attempt to quit: 9/3/1990     Years since quittin.8    Smokeless tobacco: Never Used   Substance and Sexual Activity    Alcohol use:  Yes     Alcohol/week: 2.0 standard drinks     Types: 1 Cans of beer, 1 Shots of liquor per week     Frequency: 2-3 times a week     Drinks per session: 1 or 2    Drug use: No    Sexual activity: Yes     Partners: Female     Birth control/protection: None   Lifestyle    Physical activity     Days per week: Not on file     Minutes per session: Not on file    Stress: Not on file   Relationships    Social connections     Talks on phone: Not on file     Gets together: Not on file     Attends [employer] service: Not on file     Active member of club or organization: Not on file     Attends meetings of clubs or organizations: Not on file     Relationship status: Not on file    Intimate partner violence     Fear of current or ex partner: Not on file     Emotionally abused: Not on file     Physically abused: Not on file     Forced sexual activity: Not on file   Other Topics Concern    Not on file   Social History Narrative    Not on file       Current Outpatient Medications on File Prior to Visit   Medication Sig Dispense Refill    predniSONE (DELTASONE) 5 mg tablet Take 1 tablets by mouth once daily as directed 30 Tab 1    amLODIPine (NORVASC) 10 mg tablet Take 1 tablet by mouth once daily 90 Tab 0    pantoprazole (PROTONIX) 40 mg tablet TAKE 1 TABLET BY MOUTH ONCE DAILY 90 Tab 0    tamsulosin (FLOMAX) 0.4 mg capsule TAKE 2 CAPSULES BY MOUTH AT BEDTIME 180 Cap 3    atorvastatin (LIPITOR) 20 mg tablet Take 1 Tab by mouth daily. 90 Tab 3    cyclobenzaprine (FLEXERIL) 10 mg tablet Take 1 Tab by mouth nightly. As needed for muscle spasm. 30 Tab 1    aspirin 81 mg chewable tablet Take 1 Tab by mouth daily. 30 Tab 1    triamcinolone (NASACORT) 55 mcg nasal inhaler 2 Sprays daily.  fexofenadine (ALLEGRA) 180 mg tablet Take 180 mg by mouth daily as needed.  cholecalciferol (VITAMIN D3) 1,000 unit tablet Take 2,000 Units by mouth daily.  gabapentin (NEURONTIN) 300 mg capsule Take 300 mg by mouth two (2) times a day.  montelukast (SINGULAIR) 10 mg tablet Take 1 Tab by mouth daily. 30 Tab 2    diclofenac (VOLTAREN) 1 % gel Apply two grams by topical route four times daily to the affected area (s). PRN       No current facility-administered medications on file prior to visit. Review of Systems  Pertinent items are noted in HPI. Objective:     Gen: well appearing adult  HEENT: normal conjunctiva, no audible congestion, patient does not see oral erythema, has MMM  Neck: patient does not feel enlarged or tender LAD or masses  Resp: normal respiratory effort, no audible wheezing. CV: patient does not feel palpitations or heart irregularity  Abd: patient does not feel abdominal tenderness or mass, patient does not notice distension  Extrem: patient does not see swelling in ankles or joints. Neuro: Alert and oriented, able to answer questions without difficulty, able to move all extremities and walk normally        Assessment/Plan:       ICD-10-CM ICD-9-CM    1. PMR (polymyalgia rheumatica) ([facility]) M35.3 725    2. Essential hypertension C30 279.5 METABOLIC PANEL, COMPREHENSIVE      CBC W/O DIFF   3. DDD (degenerative disc disease), cervical M50.30 722.4    4. Pure hypercholesterolemia E78.00 272.0 LIPID PANEL   5.  Mixed hyperlipidemia E78.2 272.2        This was a telemedicine visit with video. Savita Aguirre MD    Follow-up and Dispositions    · Return for October medicare with fasting labs. Isak Contreras

## 2020-07-11 DIAGNOSIS — I10 ESSENTIAL HYPERTENSION: ICD-10-CM

## 2020-07-12 RX ORDER — AMLODIPINE BESYLATE 10 MG/1
TABLET ORAL
Qty: 90 TAB | Refills: 0 | Status: SHIPPED | OUTPATIENT
Start: 2020-07-12 | End: 2020-10-12

## 2020-07-13 RX ORDER — ASCORBIC ACID 500 MG
500 TABLET ORAL DAILY
COMMUNITY
End: 2021-12-01

## 2020-07-13 NOTE — PERIOP NOTES
[facility]  Ambulatory Surgery Unit  Pre-operative Instructions    Surgery/Procedure Date  7/20            Tentative Arrival Time 07:15am      1. On the day of your surgery/procedure, please report to the Ambulatory Surgery Unit Registration Desk and sign in at your designated time. The Ambulatory Surgery Unit is located in [facility] on the [facility] side of the [facility] across from the [address] [place] [address]. Please have all of your health insurance cards and a photo ID. 2. You must have someone with you to drive you home, as you should not drive a car for 24 hours following anesthesia. Please make arrangements for a responsible adult friend or family member to stay with you for at least the first 24 hours after your surgery. 3. Do not have anything to eat or drink (including water, gum, mints, coffee, juice) after 11:59 PM  7/19. This may not apply to medications prescribed by your physician. (Please note below the special instructions with medications to take the morning of surgery, if applicable.)    4. We recommend you do not drink any alcoholic beverages for 24 hours before and after your surgery. 5. Contact your surgeons office for instructions on the following medications: non-steroidal anti-inflammatory drugs (i.e. Advil, Aleve), vitamins, and supplements. (Some surgeons will want you to stop these medications prior to surgery and others may allow you to take them)   **If you are currently taking Plavix, Coumadin, Aspirin and/or other blood-thinning agents, contact your surgeon for instructions. ** Your surgeon will partner with the physician prescribing these medications to determine if it is safe to stop or if you need to continue taking. Please do not stop taking these medications without instructions from your surgeon.     6. In an effort to help prevent surgical site infection, we ask that you shower with an anti-bacterial soap (i.e. Dial/Safeguard, or the soap provided to you at your preadmission testing appointment) for 3 days prior to and on the morning of surgery, using a fresh towel after each shower. (Please begin this process with fresh bed linens.) Do not apply any lotions, powders, or deodorants after the shower on the day of your procedure. If applicable, please do not shave the operative site for 48 hours prior to surgery. 7. Wear comfortable clothes. Wear glasses instead of contacts. Do not bring any jewelry or money (other than copays or fees as instructed). Do not wear make-up, particularly mascara, the morning of your surgery. Do not wear nail polish, particularly if you are having foot /hand surgery. Wear your hair loose or down, no ponytails, buns, silas pins or clips. All body piercings must be removed. 8. You should understand that if you do not follow these instructions your surgery may be cancelled. If your physical condition changes (i.e. fever, cold or flu) please contact your surgeon as soon as possible. 9. It is important that you be on time. If a situation occurs where you may be late, or if you have any questions or problems, please call [phone].    10. Your surgery time may be subject to change. You will receive a phone call the day prior to surgery to confirm your arrival time. Special Instructions: Take all medications and inhalers, as prescribed, on the morning of surgery with a sip of water EXCEPT: none      I understand a pre-operative phone call will be made to verify my surgery time. In the event that I am not available, I give permission for a message to be left on my answering service and/or with another person? yes      Reviewed by phone with pt, verbalized understanding.  Pt aware of recommendation to self quarantine post covid testing.   ___________________      ___________________      ________________  (Signature of Patient)          (Witness)                   (Date and Time)

## 2020-07-16 ENCOUNTER — HOSPITAL ENCOUNTER (OUTPATIENT)
Dept: PREADMISSION TESTING | Age: 70
Discharge: HOME | End: 2020-07-16
Payer: MEDICARE

## 2020-07-16 PROCEDURE — 87635 SARS-COV-2 COVID-19 AMP PRB: CPT

## 2020-07-17 ENCOUNTER — ANESTHESIA EVENT (OUTPATIENT)
Dept: SURGERY | Age: 70
End: 2020-07-17
Payer: MEDICARE

## 2020-07-18 LAB
SARS-COV-2, COV2NT: NOT DETECTED
SOURCE, COVRS: NORMAL
SPECIMEN SOURCE, FCOV2M: NORMAL

## 2020-07-20 ENCOUNTER — ANESTHESIA (OUTPATIENT)
Dept: SURGERY | Age: 70
End: 2020-07-20
Payer: MEDICARE

## 2020-07-20 ENCOUNTER — HOSPITAL ENCOUNTER (OUTPATIENT)
Age: 70
Setting detail: OUTPATIENT SURGERY
Discharge: HOME | End: 2020-07-20
Attending: ORTHOPAEDIC SURGERY | Admitting: ORTHOPAEDIC SURGERY
Payer: MEDICARE

## 2020-07-20 VITALS
OXYGEN SATURATION: 99 % | HEART RATE: 61 BPM | RESPIRATION RATE: 12 BRPM | TEMPERATURE: 97.7 F | WEIGHT: 196.6 LBS | DIASTOLIC BLOOD PRESSURE: 72 MMHG | HEIGHT: 70 IN | SYSTOLIC BLOOD PRESSURE: 149 MMHG | BODY MASS INDEX: 28.15 KG/M2

## 2020-07-20 DIAGNOSIS — G89.18 POSTOPERATIVE PAIN: Primary | ICD-10-CM

## 2020-07-20 PROCEDURE — 77030021352 HC CBL LD SYS DISP COVD -B: Performed by: ORTHOPAEDIC SURGERY

## 2020-07-20 PROCEDURE — 77030002916 HC SUT ETHLN J&J -A: Performed by: ORTHOPAEDIC SURGERY

## 2020-07-20 PROCEDURE — 77030040356 HC CORD BPLR FRCP COVD -A: Performed by: ORTHOPAEDIC SURGERY

## 2020-07-20 PROCEDURE — 74011250636 HC RX REV CODE- 250/636: Performed by: ORTHOPAEDIC SURGERY

## 2020-07-20 PROCEDURE — 76060000061 HC AMB SURG ANES 0.5 TO 1 HR: Performed by: ORTHOPAEDIC SURGERY

## 2020-07-20 PROCEDURE — 74011250637 HC RX REV CODE- 250/637: Performed by: ORTHOPAEDIC SURGERY

## 2020-07-20 PROCEDURE — 77030000032 HC CUF TRNQT ZIMM -B: Performed by: ORTHOPAEDIC SURGERY

## 2020-07-20 PROCEDURE — 74011250636 HC RX REV CODE- 250/636: Performed by: ANESTHESIOLOGY

## 2020-07-20 PROCEDURE — 74011000250 HC RX REV CODE- 250: Performed by: ORTHOPAEDIC SURGERY

## 2020-07-20 PROCEDURE — 77030018836 HC SOL IRR NACL ICUM -A: Performed by: ORTHOPAEDIC SURGERY

## 2020-07-20 PROCEDURE — 74011250636 HC RX REV CODE- 250/636: Performed by: NURSE ANESTHETIST, CERTIFIED REGISTERED

## 2020-07-20 PROCEDURE — 74011000250 HC RX REV CODE- 250: Performed by: NURSE ANESTHETIST, CERTIFIED REGISTERED

## 2020-07-20 PROCEDURE — 76210000046 HC AMBSU PH II REC FIRST 0.5 HR: Performed by: ORTHOPAEDIC SURGERY

## 2020-07-20 PROCEDURE — 76210000035 HC AMBSU PH I REC 1 TO 1.5 HR: Performed by: ORTHOPAEDIC SURGERY

## 2020-07-20 PROCEDURE — 76030000000 HC AMB SURG OR TIME 0.5 TO 1: Performed by: ORTHOPAEDIC SURGERY

## 2020-07-20 RX ORDER — ONDANSETRON 2 MG/ML
4 INJECTION INTRAMUSCULAR; INTRAVENOUS AS NEEDED
Status: DISCONTINUED | OUTPATIENT
Start: 2020-07-20 | End: 2020-07-20 | Stop reason: HOSPADM

## 2020-07-20 RX ORDER — SODIUM CHLORIDE 0.9 % (FLUSH) 0.9 %
5-40 SYRINGE (ML) INJECTION AS NEEDED
Status: DISCONTINUED | OUTPATIENT
Start: 2020-07-20 | End: 2020-07-20 | Stop reason: HOSPADM

## 2020-07-20 RX ORDER — SODIUM CHLORIDE, SODIUM LACTATE, POTASSIUM CHLORIDE, CALCIUM CHLORIDE 600; 310; 30; 20 MG/100ML; MG/100ML; MG/100ML; MG/100ML
25 INJECTION, SOLUTION INTRAVENOUS CONTINUOUS
Status: DISCONTINUED | OUTPATIENT
Start: 2020-07-20 | End: 2020-07-20 | Stop reason: HOSPADM

## 2020-07-20 RX ORDER — MORPHINE SULFATE 10 MG/ML
2 INJECTION, SOLUTION INTRAMUSCULAR; INTRAVENOUS
Status: DISCONTINUED | OUTPATIENT
Start: 2020-07-20 | End: 2020-07-20 | Stop reason: HOSPADM

## 2020-07-20 RX ORDER — HYDROMORPHONE HYDROCHLORIDE 1 MG/ML
.2-.5 INJECTION, SOLUTION INTRAMUSCULAR; INTRAVENOUS; SUBCUTANEOUS ONCE
Status: DISCONTINUED | OUTPATIENT
Start: 2020-07-20 | End: 2020-07-20 | Stop reason: HOSPADM

## 2020-07-20 RX ORDER — MIDAZOLAM HYDROCHLORIDE 1 MG/ML
INJECTION, SOLUTION INTRAMUSCULAR; INTRAVENOUS AS NEEDED
Status: DISCONTINUED | OUTPATIENT
Start: 2020-07-20 | End: 2020-07-20 | Stop reason: HOSPADM

## 2020-07-20 RX ORDER — LIDOCAINE HYDROCHLORIDE 10 MG/ML
0.1 INJECTION, SOLUTION EPIDURAL; INFILTRATION; INTRACAUDAL; PERINEURAL AS NEEDED
Status: DISCONTINUED | OUTPATIENT
Start: 2020-07-20 | End: 2020-07-20 | Stop reason: HOSPADM

## 2020-07-20 RX ORDER — FENTANYL CITRATE 50 UG/ML
25 INJECTION, SOLUTION INTRAMUSCULAR; INTRAVENOUS
Status: DISCONTINUED | OUTPATIENT
Start: 2020-07-20 | End: 2020-07-20 | Stop reason: HOSPADM

## 2020-07-20 RX ORDER — VANCOMYCIN/0.9 % SOD CHLORIDE 1.5G/250ML
1500 PLASTIC BAG, INJECTION (ML) INTRAVENOUS ONCE
Status: COMPLETED | OUTPATIENT
Start: 2020-07-20 | End: 2020-07-20

## 2020-07-20 RX ORDER — DIPHENHYDRAMINE HYDROCHLORIDE 50 MG/ML
12.5 INJECTION, SOLUTION INTRAMUSCULAR; INTRAVENOUS AS NEEDED
Status: DISCONTINUED | OUTPATIENT
Start: 2020-07-20 | End: 2020-07-20 | Stop reason: HOSPADM

## 2020-07-20 RX ORDER — FENTANYL CITRATE 50 UG/ML
INJECTION, SOLUTION INTRAMUSCULAR; INTRAVENOUS AS NEEDED
Status: DISCONTINUED | OUTPATIENT
Start: 2020-07-20 | End: 2020-07-20 | Stop reason: HOSPADM

## 2020-07-20 RX ORDER — ONDANSETRON 2 MG/ML
INJECTION INTRAMUSCULAR; INTRAVENOUS AS NEEDED
Status: DISCONTINUED | OUTPATIENT
Start: 2020-07-20 | End: 2020-07-20 | Stop reason: HOSPADM

## 2020-07-20 RX ORDER — OXYCODONE AND ACETAMINOPHEN 5; 325 MG/1; MG/1
1 TABLET ORAL
Status: DISCONTINUED | OUTPATIENT
Start: 2020-07-20 | End: 2020-07-20 | Stop reason: HOSPADM

## 2020-07-20 RX ORDER — LIDOCAINE HYDROCHLORIDE 20 MG/ML
INJECTION, SOLUTION EPIDURAL; INFILTRATION; INTRACAUDAL; PERINEURAL AS NEEDED
Status: DISCONTINUED | OUTPATIENT
Start: 2020-07-20 | End: 2020-07-20 | Stop reason: HOSPADM

## 2020-07-20 RX ORDER — PROPOFOL 10 MG/ML
INJECTION, EMULSION INTRAVENOUS
Status: DISCONTINUED | OUTPATIENT
Start: 2020-07-20 | End: 2020-07-20 | Stop reason: HOSPADM

## 2020-07-20 RX ORDER — SODIUM CHLORIDE 0.9 % (FLUSH) 0.9 %
5-40 SYRINGE (ML) INJECTION EVERY 8 HOURS
Status: DISCONTINUED | OUTPATIENT
Start: 2020-07-20 | End: 2020-07-20 | Stop reason: HOSPADM

## 2020-07-20 RX ORDER — DEXAMETHASONE SODIUM PHOSPHATE 4 MG/ML
INJECTION, SOLUTION INTRA-ARTICULAR; INTRALESIONAL; INTRAMUSCULAR; INTRAVENOUS; SOFT TISSUE AS NEEDED
Status: DISCONTINUED | OUTPATIENT
Start: 2020-07-20 | End: 2020-07-20 | Stop reason: HOSPADM

## 2020-07-20 RX ORDER — HYDROCODONE BITARTRATE AND ACETAMINOPHEN 5; 325 MG/1; MG/1
1 TABLET ORAL
Qty: 20 TAB | Refills: 0 | Status: SHIPPED | OUTPATIENT
Start: 2020-07-20 | End: 2020-07-27

## 2020-07-20 RX ORDER — PROPOFOL 10 MG/ML
INJECTION, EMULSION INTRAVENOUS AS NEEDED
Status: DISCONTINUED | OUTPATIENT
Start: 2020-07-20 | End: 2020-07-20 | Stop reason: HOSPADM

## 2020-07-20 RX ADMIN — SODIUM CHLORIDE, SODIUM LACTATE, POTASSIUM CHLORIDE, AND CALCIUM CHLORIDE 25 ML/HR: 600; 310; 30; 20 INJECTION, SOLUTION INTRAVENOUS at 08:43

## 2020-07-20 RX ADMIN — DEXAMETHASONE SODIUM PHOSPHATE 4 MG: 4 INJECTION, SOLUTION INTRAMUSCULAR; INTRAVENOUS at 09:02

## 2020-07-20 RX ADMIN — VANCOMYCIN HYDROCHLORIDE 1500 MG: 10 INJECTION, POWDER, LYOPHILIZED, FOR SOLUTION INTRAVENOUS at 08:43

## 2020-07-20 RX ADMIN — ONDANSETRON HYDROCHLORIDE 4 MG: 2 INJECTION, SOLUTION INTRAMUSCULAR; INTRAVENOUS at 09:13

## 2020-07-20 RX ADMIN — PROPOFOL 80 MCG/KG/MIN: 10 INJECTION, EMULSION INTRAVENOUS at 09:00

## 2020-07-20 RX ADMIN — MIDAZOLAM HYDROCHLORIDE 1 MG: 1 INJECTION, SOLUTION INTRAMUSCULAR; INTRAVENOUS at 08:55

## 2020-07-20 RX ADMIN — PROPOFOL 70 MG: 10 INJECTION, EMULSION INTRAVENOUS at 09:00

## 2020-07-20 RX ADMIN — Medication 3 AMPULE: at 08:43

## 2020-07-20 RX ADMIN — FENTANYL CITRATE 25 MCG: 50 INJECTION, SOLUTION INTRAMUSCULAR; INTRAVENOUS at 09:10

## 2020-07-20 RX ADMIN — LIDOCAINE HYDROCHLORIDE 40 MG: 20 INJECTION, SOLUTION EPIDURAL; INFILTRATION; INTRACAUDAL; PERINEURAL at 09:00

## 2020-07-20 NOTE — DISCHARGE INSTRUCTIONS
[facility]  Post-operative instructions  For: Edna Kyle. Your first postop appointment should be scheduled with Dr. Quinton Banuelos for 2-3 weeks post-op. [address], [address]  [place]  Phone: [phone]  Hand Therapy Phone: [phone]  Fax: [phone]    Please follow these instructions for a safe and speedy recovery:    1. Surgical Bandage: Leave the bandage in place until 2 weeks after surgery. Please keep it clean and dry. To shower or bathe, apply a plastic bag or GLAD Press'n Seal® plastic wrap around the bandage or simply sponge bathe. After 2 weeks, you can remove the dressing and get incision wet but NO SOAKING. 2. Elevation: Hand swelling is best prevented by keeping your hand elevated above the level of your heart at all times, night and day. The opposite, dangling your hand below your waist, will cause additional pain, swelling, and later stiffness. You can elevate the hand in a sling or by propping it on a pillow at night. Occasionally, we will provide you with a custom-made foam block for elevating the arm. Ice compresses may help but do not rep[lace elevation. Frequently, extreme pain is caused by a tight bandage, which should be loosened. If pain is severe and progressive, call us at [phone] during the day (ask for immediate connection to Dr. Frank Hassan Team) or during the night (ask for the on-call physician). 3. Medication: You will be provided with an appropriate pain medication (over-the-counter or prescription). Please fill this at a pharmacy promptly so you will have it available when all local anesthetic wears off. Take this to relieve pain as directed on the bottle. Please refrain from driving, drinking alcohol, and making important medical decisions while taking the medication. Please call us if you need something stronger.  Medication changes or refills must be made before 5pm or through your pharmacy. 4. Weight bearing: Do NOT bear any weight on the operative extremity for the first 2 weeks after surgery. After 2 weeks, you have a 5 pound weight lifting restriction. I want to thank you for choosing us for your hand care needs. My staff and I are committed to providing all our customers with the highest quality hand surgery and subsequent hand therapy care as possible. We want your recovery to be comfortable. If you have clinical non-emergent questions about your surgery or other hand conditions please call [phone] and ask for my team. Your call will be returned within 24 hours. DO NOT TAKE TYLENOL/ACETAMINOPHEN WITH  NORCO. TAKE NARCOTIC PAIN MEDICATIONS WITH FOOD     Narcotics tend to be constipating, we suggest taking a stool softener such as Colace or Miralax (follow package instructions). DO NOT DRIVE WHILE TAKING NARCOTIC PAIN MEDICATIONS. DO NOT TAKE SLEEPING MEDICATIONS OR ANTIANXIETY MEDICATIONS WHILE TAKING NARCOTIC PAIN MEDICATIONS,  ESPECIALLY THE NIGHT OF ANESTHESIA! CPAP PATIENTS BE SURE TO WEAR MACHINE WHENEVER NAPPING OR SLEEPING! DISCHARGE SUMMARY from Nurse    The following personal items collected during your admission are returned to you:   Dental Appliance: Dental Appliances: None  Vision: Visual Aid: Glasses(pacu)  Hearing Aid:    Jewelry: Jewelry: None  Clothing: Clothing: Footwear, Pants, Shirt, Socks, Undergarments, With patient  Other Valuables: Other Valuables: Eyeglasses(pacu)  Valuables sent to safe:        PATIENT INSTRUCTIONS:    After General Anesthesia or Intravenous Sedation, for 24 hours or while taking prescription Narcotics:        Someone should be with you for the next 24 hours. For your own safety, a responsible adult must drive you home. · Limit your activities  · Recommended activity: Rest today, up with assistance today.  Do not climb stairs or shower unattended for the next 24 hours. · Please start with a soft bland diet and advance as tolerated (no nausea) to regular diet. · If you have a sore throat you should try the following: fluids, warm salt water gargles, or throat lozenges. If it does not improve after several days please follow up with your primary physician. · Do not drive and operate hazardous machinery  · Do not make important personal or business decisions  · Do  not drink alcoholic beverages  · If you have not urinated within 8 hours after discharge, please contact your surgeon on call. Report the following to your surgeon:  · Excessive pain, swelling, redness or odor of or around the surgical area  · Temperature over 100.5  · Nausea and vomiting lasting longer than 4 hours or if unable to take medications  · Any signs of decreased circulation or nerve impairment to extremity: change in color, persistent  numbness, tingling, coldness or increase pain      · You will receive a Post Operative Call from one of the Recovery Room Nurses on the day after your surgery to check on you. It is very important for us to know how you are recovering after your surgery. If you have an issue or need to speak with someone, please call your surgeon, do not wait for the post operative call. · You may receive an e-mail or letter in the mail from [employer] regarding your experience with us in the Ambulatory Surgery Unit. Your feedback is valuable to us and we appreciate your participation in the survey. · If the above instructions are not adequate or you are having problems after your surgery, call the physician at their office number. · We wish you a speedy recovery ? TO PREVENT AN INFECTION      1. [address] YOUR HANDS     To prevent infection, good handwashing is the most important thing you or your caregiver can do.  Wash your hands with soap and water or use the hand  we gave you before you touch any wounds.     2. SHOWER     Use the antibacterial soap we gave you when you take a shower.  Shower with this soap until your wounds are healed.  To reach all areas of your body, you may need someone to help you.  Dont forget to clean your belly button with every shower. 3.  USE CLEAN SHEETS     Use freshly cleaned sheets on your bed after surgery.  To keep the surgery site clean, do not allow pets to sleep with you while your wound is still healing. 4. STOP SMOKING     Stop smoking, or at least cut back on smoking     Smoking slows your healing. 5.  CONTROL YOUR BLOOD SUGAR     High blood sugars slow wound healing.  If you are diabetic, control your blood sugar levels before and after your surgery. Patient Education        Learning About Coronavirus [phone])  Coronavirus [phone]): Overview  What is coronavirus (VBYWL-12)? The coronavirus disease (COVID-19) is caused by a virus. It is an illness that was first found in [place], [place], in December 2019. It has since spread worldwide. The virus can cause fever, cough, and trouble breathing. In severe cases, it can cause pneumonia and make it hard to breathe without help. It can cause death. Coronaviruses are a large group of viruses. They cause the common cold. They also cause more serious illnesses like Middle East respiratory syndrome (MERS) and severe acute respiratory syndrome (SARS). COVID-19 is caused by a novel coronavirus. That means it's a new type that has not been seen in people before. This virus spreads person-to-person through droplets from coughing and sneezing. It can also spread when you are close to someone who is infected. And it can spread when you touch something that has the virus on it, such as a doorknob or a tabletop. What can you do to protect yourself from coronavirus (COVID-19)? The best way to protect yourself from getting sick is to:  · Avoid areas where there is an outbreak. · Avoid contact with people who may be infected.   · Wash your hands often with soap or alcohol-based hand sanitizers. · Avoid crowds and try to stay at least 6 feet away from other people. · Wash your hands often, especially after you cough or sneeze. Use soap and water, and scrub for at least 20 seconds. If soap and water aren't available, use an alcohol-based hand . · Avoid touching your mouth, nose, and eyes. What can you do to avoid spreading the virus to others? To help avoid spreading the virus to others:  · Cover your mouth with a tissue when you cough or sneeze. Then throw the tissue in the trash. · Use a disinfectant to clean things that you touch often. · Wear a cloth face cover if you have to go to public areas. · Stay home if you are sick or have been exposed to the virus. Don't go to school, work, or public areas. And don't use public transportation, ride-shares, or taxis unless you have no choice. · If you are sick:  ? Leave your home only if you need to get medical care. But call the doctor's office first so they know you're coming. And wear a face cover. ? Wear the face cover whenever you're around other people. It can help stop the spread of the virus when you cough or sneeze. ? Clean and disinfect your home every day. Use household  and disinfectant wipes or sprays. Take special care to clean things that you grab with your hands. These include doorknobs, remote controls, phones, and handles on your refrigerator and microwave. And don't forget countertops, tabletops, bathrooms, and computer keyboards. When to call for help  [id number] anytime you think you may need emergency care. For example, call if:  · You have severe trouble breathing. (You can't talk at all.)  · You have constant chest pain or pressure. · You are severely dizzy or lightheaded. · You are confused or can't think clearly. · Your face and lips have a blue color. · You pass out (lose consciousness) or are very hard to wake up.   Call your doctor now if you develop symptoms such as:  · Shortness of breath. · Fever. · Cough. If you need to get care, call ahead to the doctor's office for instructions before you go. Make sure you wear a face cover to prevent exposing other people to the virus. Where can you get the latest information? The following health organizations are tracking and studying this virus. Their websites contain the most up-to-date information. Obey Lozada also learn what to do if you think you may have been exposed to the virus. · U.S. Centers for Disease Control and Prevention (CDC): The CDC provides updated news about the disease and travel advice. The website also tells you how to prevent the spread of infection. www.cdc.gov  · World Health Organization [facility]): WHO offers information about the virus outbreaks. WHO also has travel advice. www.who.int  Current as of: May 8, 2020               Content Version: 12.5  © 2006-2020 [employer]. Care instructions adapted under license by [employer] (which disclaims liability or warranty for this information). If you have questions about a medical condition or this instruction, always ask your healthcare professional. [address] any warranty or liability for your use of this information. What to do at Home:      *  Please give a list of your current medications to your Primary Care Provider. *  Please update this list whenever your medications are discontinued, doses are      changed, or new medications (including over-the-counter products) are added. *  Please carry medication information at all times in case of emergency situations. If you have not received your influenza and/or pneumococcal vaccine, please follow up with your primary care physician. The discharge information has been reviewed with the patient and caregiver. The patient and caregiver verbalized understanding.

## 2020-07-20 NOTE — OP NOTES
PATIENT NAME:  Jefry Fields. SURGEON:  Nyla Platt MD    DATE OF SURGERY:  7/20/2020    LOCATION: [facility]    PREOPERATIVE DIAGNOSIS:   left carpal tunnel syndrome    POSTOPERATIVE DIAGNOSIS:  Same    PROCEDURE:  left Open carpal tunnel decompression             ANESTHESIA:  Local (1% lidocaine with 0.25% bupivicaine in a 1:1 mixture) with sedation     BLOOD LOSS:  Minimal    TOURNIQUET TIME:      OPERATIVE INDICATIONS: The patient is a 65 y.o. old adult who has developed progressive left carpal tunnel syndrome, unresponsive to all conservative treatment. Symptoms have failed to respond consistently to conservative treatment such that patient has elected to undergo carpal tunnel decompression. He understands the alternatives to surgery, the nature of this elective procedure, the usual recovery, possible variations in healing, and the potential for shortcomings and complications (including but not exclusive to bleeding, infection, scar tenderness,  weakness, residual numbness, or thenar muscle weakness). DESCRIPTION  OF PROCEDURE: Patient identified correctly in the pre-operative holding area and correct extremity marked. Was then taken stable to the operating room and placed supine with the operative extremity on a hand table. After sedation was administered by the anesthesia team, the left hand and forearm were prepped and draped in a sterile field. A timeout was taken and the operative site was confirmed. Local anesthesia was instilled into the wound. The extremity was then elevated and exsanguinated and an upper arm tourniquet was inflated to 250 mm of mercury. An incision was made in the proximal palm in line with the radial side of the ring finger from the distal wrist crease to Kaplans line. Dissection was carried through the subcutaneous tissue and the transverse carpal ligament was visualized.   This was released under direct visualization first distally followed by proximally and carried up past the wrist wrist crease. A complete decompression was confirmed by direct visualization of the decompressed median nerve as well as palpation. No other synovial pathology was noted. The tourniquet was then released. The nerve was noted to become hyperemic. Copious irrigation was performed. Hemostasis was obtained with bipolar cautery and the wound was closed with 3-0 nylon sutures. A sterile dressing was then applied leaving the fingers free for range of motion. The patient tolerated the procedure well and was discharged to the recovery area uneventfully. All instrument needle and lap counts were correct at the end of the case.

## 2020-07-20 NOTE — PERIOP NOTES
Cherelle Olga.  1950  [id number]    Situation:  Verbal report given from: Kimberly Sands and NEGRA Hidalgo RN  Procedure: Procedure(s):  LEFT OPEN CARPAL TUNNEL RELEASE (MAC WITH LOCAL)    Background:    Preoperative diagnosis: Carpal tunnel syndrome on left [G56.02]    Postoperative diagnosis: Carpal tunnel syndrome on left [G56.02]    :  Dr. Conan Paget    Assistant(s): Circ-1: Milly Donohue RN  Circ-Relief: Agapito Walker RN  Scrub Tech-1: Aguila BENEDICT    Specimens: * No specimens in log *    Assessment:  Intra-procedure medications         Anesthesia gave intra-procedure sedation and medications, see anesthesia flow sheet     Intravenous fluids: LR@ KVO     Vital signs stable       Recommendation:    Permission to share finding with wife Carmen Yan

## 2020-07-20 NOTE — ANESTHESIA PREPROCEDURE EVALUATION
Anesthetic History   No history of anesthetic complications            Review of Systems / Medical History  Patient summary reviewed, nursing notes reviewed and pertinent labs reviewed    Pulmonary  Within defined limits                 Neuro/Psych         TIA     Cardiovascular    Hypertension              Exercise tolerance: >4 METS  Comments: 08/19 ECHO= EF 56-60%, mild MR   GI/Hepatic/Renal     GERD: well controlled           Endo/Other        Arthritis     Other Findings   Comments: Left carpal tunnel release    Polymyalgia Rheumatica         Physical Exam    Airway  Mallampati: I  TM Distance: 4 - 6 cm  Neck ROM: normal range of motion   Mouth opening: Normal     Cardiovascular    Rhythm: regular  Rate: normal         Dental    Dentition: Caps/crowns and Bridges     Pulmonary  Breath sounds clear to auscultation               Abdominal  GI exam deferred       Other Findings            Anesthetic Plan    ASA: 2  Anesthesia type: MAC          Induction: Intravenous  Anesthetic plan and risks discussed with: Patient

## 2020-07-20 NOTE — PERIOP NOTES
Patient states that wife Yasir Mello will be with them for at least 24 hours following today's procedure. Permission received to review discharge instructions and discuss private health information with wife Yasir Mello. Pt. Has mistral blanket warmer, does not want air turned on.

## 2020-07-20 NOTE — ANESTHESIA POSTPROCEDURE EVALUATION
Procedure(s):  LEFT OPEN CARPAL TUNNEL RELEASE (MAC WITH LOCAL). MAC    Anesthesia Post Evaluation      Multimodal analgesia: multimodal analgesia used between 6 hours prior to anesthesia start to PACU discharge  Patient location during evaluation: PACU  Patient participation: complete - patient participated  Level of consciousness: awake and alert  Pain score: 0  Airway patency: patent  Anesthetic complications: no  Cardiovascular status: acceptable  Respiratory status: acceptable  Hydration status: acceptable  Post anesthesia nausea and vomiting:  none  Final Post Anesthesia Temperature Assessment:  Normothermia (36.0-37.5 degrees C)      INITIAL Post-op Vital signs:   Vitals Value Taken Time   /79 7/20/2020 10:45 AM   Temp 36.5 °C (97.7 °F) 7/20/2020 10:10 AM   Pulse 63 7/20/2020 10:57 AM   Resp 19 7/20/2020 10:57 AM   SpO2 99 % 7/20/2020 10:57 AM   Vitals shown include unvalidated device data.

## 2020-07-20 NOTE — H&P
Comes today for EMG follow-up. This was performed on 07/09/2020. This was notable for moderately severe left carpal tunnel syndrome. PMH, PSH and ROS reviewed on prior intake forn     Objective:   Constitutional:  No acute distress. Well nourished. Well developed. Eyes:  Sclera are nonicteric. Respiratory:  No labored breathing. Cardiovascular:  No marked edema. Skin:  No marked skin ulcers. Neurological:  see below  Psychiatric: Alert and oriented x3. Musculoskeletal   Examination of the left hand is stable from prior. Positive Tinel's at the wrist.  Decreased median nerve sensation. APB weakness. Radiographs:       No imaging obtained      Assessment:      1. Carpal tunnel syndrome, left         Plan: At this he does have severe carpal tunnel syndrome on the left. Have recommended surgical management. Risks benefits alternatives of an open carpal tunnel release as well as postoperative course are discussed. He does have a history significant for complex regional pain syndrome on the right still have prescribed him vitamin-C preemptively preop. Surgical date chosen. Date of Surgery Update:  Sandra Morales. was seen and examined. History and physical has been reviewed. The patient has been examined.  There have been no significant clinical changes since the completion of the originally dated History and Physical.    Signed By: Marina Painter MD     July 20, 2020 8:26 AM

## 2020-08-31 RX ORDER — PANTOPRAZOLE SODIUM 40 MG/1
TABLET, DELAYED RELEASE ORAL
Qty: 90 TAB | Refills: 0 | Status: SHIPPED | OUTPATIENT
Start: 2020-08-31 | End: 2020-12-20

## 2020-09-08 ENCOUNTER — VIRTUAL VISIT (OUTPATIENT)
Dept: INTERNAL MEDICINE CLINIC | Age: 70
End: 2020-09-08
Payer: MEDICARE

## 2020-09-08 DIAGNOSIS — G47.00 INSOMNIA, UNSPECIFIED TYPE: Primary | ICD-10-CM

## 2020-09-08 DIAGNOSIS — M25.551 RIGHT HIP PAIN: ICD-10-CM

## 2020-09-08 PROCEDURE — 99442 PR PHYS/QHP TELEPHONE EVALUATION 11-20 MIN: CPT | Performed by: FAMILY MEDICINE

## 2020-09-08 RX ORDER — GABAPENTIN 300 MG/1
CAPSULE ORAL
Qty: 90 CAP | Refills: 0
Start: 2020-09-08 | End: 2020-12-16

## 2020-09-08 NOTE — PROGRESS NOTES
Alexandra Gunn is a 65 y.o. adult who presents with complaints of 3 weeks of fatigue and stiffness. No fever. No focal weakness, tingling, or numbness in extremities. Sleep is more disrupted, suspected due to pain. Pain in right thigh and hip. He reports his symptoms are different from his PMR. Taking gabapentin 300mg BID (through Dr Skye Eli). This is an established visit conducted via telemedicine, by phone. The patient has been instructed that this meets HIPAA criteria and acknowledges and agrees to this method of visitation. Pursuant to the emergency declaration under the [facility], [facility] waiver authority and the [address] and [employer] Act, this Virtual Visit was conducted, with patient's consent, to reduce the patient's risk of exposure to COVID-19 and provide continuity of care for an established patient. Services were provided by phone to substitute for in-person clinic visit.        Past Medical History:   Diagnosis Date    Arthritis     Enlarged prostate     GERD (gastroesophageal reflux disease)     High cholesterol     Hx of seasonal allergies     Hypertension     PMR (polymyalgia rheumatica) (HCC)     TIA (transient ischemic attack) 2019       Family History   Problem Relation Age of Onset    Cancer Mother         lung    Cancer Father         lung       Social History     Socioeconomic History    Marital status:      Spouse name: Not on file    Number of children: Not on file    Years of education: Not on file    Highest education level: Not on file   Occupational History    Not on file   Social Needs    Financial resource strain: Not on file    Food insecurity     Worry: Not on file     Inability: Not on file    Transportation needs     Medical: Not on file     Non-medical: Not on file   Tobacco Use    Smoking status: Former Smoker     Last attempt to quit: 9/3/1990     Years since quittin.0    Smokeless tobacco: Never Used   Substance and Sexual Activity    Alcohol use: Yes     Alcohol/week: 2.0 standard drinks     Types: 1 Cans of beer, 1 Shots of liquor per week     Frequency: 2-3 times a week     Drinks per session: 1 or 2    Drug use: No    Sexual activity: Yes     Partners: Female     Birth control/protection: None   Lifestyle    Physical activity     Days per week: Not on file     Minutes per session: Not on file    Stress: Not on file   Relationships    Social connections     Talks on phone: Not on file     Gets together: Not on file     Attends [employer] service: Not on file     Active member of club or organization: Not on file     Attends meetings of clubs or organizations: Not on file     Relationship status: Not on file    Intimate partner violence     Fear of current or ex partner: Not on file     Emotionally abused: Not on file     Physically abused: Not on file     Forced sexual activity: Not on file   Other Topics Concern    Not on file   Social History Narrative    Not on file       Current Outpatient Medications on File Prior to Visit   Medication Sig Dispense Refill    pantoprazole (PROTONIX) 40 mg tablet Take 1 tablet by mouth once daily 90 Tab 0    ascorbic acid, vitamin C, (Vitamin C) 500 mg tablet Take 500 mg by mouth daily.  amLODIPine (NORVASC) 10 mg tablet Take 1 tablet by mouth once daily 90 Tab 0    atorvastatin (LIPITOR) 20 mg tablet Take 1 Tab by mouth daily. (Patient taking differently: Take 10 mg by mouth daily.) 90 Tab 3    cyclobenzaprine (FLEXERIL) 10 mg tablet Take 1 Tab by mouth nightly. As needed for muscle spasm. 30 Tab 1    aspirin 81 mg chewable tablet Take 1 Tab by mouth daily. 30 Tab 1    gabapentin (NEURONTIN) 300 mg capsule Take 300 mg by mouth two (2) times a day.  triamcinolone (NASACORT) 55 mcg nasal inhaler 2 Sprays by Both Nostrils route daily.  montelukast (SINGULAIR) 10 mg tablet Take 1 Tab by mouth daily. 30 Tab 2    diclofenac (VOLTAREN) 1 % gel Apply two grams by topical route four times daily to the affected area (s). PRN      fexofenadine (ALLEGRA) 180 mg tablet Take 180 mg by mouth daily as needed.  cholecalciferol (VITAMIN D3) 1,000 unit tablet Take 1,000 Units by mouth daily. No current facility-administered medications on file prior to visit. Review of Systems  Pertinent items are noted in HPI. Objective:     Gen: healthy sounding adult  HEENT: no audible congestion, patient does not see oral erythema and sees MMM  Neck: patient does not feel enlarged or tender LAD or masses  Resp: normal respiratory effort, no audible wheezing. CV: patient does not feel palpitations or heart irregularity  Abd: patient does not feel abdominal tenderness or mass, patient does not notice distension  Extrem: patient does not see swelling in ankles or joints. Neuro: Alert and oriented, able to answer questions without difficulty, patient reports able to move all extremities and walk normally        Assessment/Plan:       ICD-10-CM ICD-9-CM    1. Insomnia, unspecified type  G47.00 780.52 gabapentin (NEURONTIN) 300 mg capsule   2. Right hip pain  M25.551 719.45 gabapentin (NEURONTIN) 300 mg capsule   increase gabapentin to 600mg at bedtime. This was a telemedicine visit by phone, duration 14 minutes.          Bishop Susan MD

## 2020-10-10 DIAGNOSIS — I10 ESSENTIAL HYPERTENSION: ICD-10-CM

## 2020-10-12 RX ORDER — AMLODIPINE BESYLATE 10 MG/1
TABLET ORAL
Qty: 90 TAB | Refills: 0 | Status: SHIPPED | OUTPATIENT
Start: 2020-10-12 | End: 2021-01-09

## 2020-11-02 ENCOUNTER — APPOINTMENT (OUTPATIENT)
Dept: PHYSICAL THERAPY | Age: 70
End: 2020-11-02

## 2020-11-03 RX ORDER — CYCLOBENZAPRINE HCL 10 MG
10 TABLET ORAL
Qty: 30 TAB | Refills: 1 | Status: SHIPPED | OUTPATIENT
Start: 2020-11-03 | End: 2021-03-18

## 2020-11-30 ENCOUNTER — APPOINTMENT (OUTPATIENT)
Dept: PHYSICAL THERAPY | Age: 70
End: 2020-11-30

## 2020-12-15 NOTE — PATIENT INSTRUCTIONS
Medicare Wellness Visit, Male The best way to live healthy is to have a lifestyle where you eat a well-balanced diet, exercise regularly, limit alcohol use, and quit all forms of tobacco/nicotine, if applicable. Regular preventive services are another way to keep healthy. Preventive services (vaccines, screening tests, monitoring & exams) can help personalize your care plan, which helps you manage your own care. Screening tests can find health problems at the earliest stages, when they are easiest to treat. [address] follows the current, evidence-based guidelines published by the [facility] ([facility]) when recommending preventive services for our patients. Because we follow these guidelines, sometimes recommendations change over time as research supports it. (For example, a prostate screening blood test is no longer routinely recommended for men with no symptoms.) Of course, you and your doctor may decide to screen more often for some diseases, based on your risk and co-morbidities (chronic disease you are already diagnosed with). Preventive services for you include: - Medicare offers their members a free annual wellness visit, which is time for you and your primary care provider to discuss and plan for your preventive service needs. Take advantage of this benefit every year! 
-All adults over age 72 should receive the recommended pneumonia vaccines. Current USPSTF guidelines recommend a series of two vaccines for the best pneumonia protection.  
-All adults should have a flu vaccine yearly and an ECG. All adults age 48 and older should receive a shingles vaccine once in their lifetime.   
-All adults age 38-68 who are overweight should have a diabetes screening test once every three years. -Other screening tests & preventive services for persons with diabetes include: an eye exam to screen for diabetic retinopathy, a kidney function test, a foot exam, and stricter control over your cholesterol.  
-Cardiovascular screening for adults with routine risk involves an electrocardiogram (ECG) at intervals determined by the provider.  
-Colorectal cancer screening should be done for adults age 54-65 with no increased risk factors for colorectal cancer. There are a number of acceptable methods of screening for this type of cancer. Each test has its own benefits and drawbacks. Discuss with your provider what is most appropriate for you during your annual wellness visit. The different tests include: colonoscopy (considered the best screening method), a fecal occult blood test, a fecal DNA test, and sigmoidoscopy. 
-All adults born between [facility] should be screened once for Hepatitis C. 
-An Abdominal Aortic Aneurysm (AAA) Screening is recommended for men age 73-68 who has ever smoked in their lifetime. Here is a list of your current Health Maintenance items (your personalized list of preventive services) with a due date: 
Health Maintenance Due Topic Date Due  Glaucoma Screening   07/23/2020 [employer] Annual Well Visit  10/18/2020 Medicare Wellness Visit, Female The best way to live healthy is to have a lifestyle where you eat a well-balanced diet, exercise regularly, limit alcohol use, and quit all forms of tobacco/nicotine, if applicable. Regular preventive services are another way to keep healthy. Preventive services (vaccines, screening tests, monitoring & exams) can help personalize your care plan, which helps you manage your own care. Screening tests can find health problems at the earliest stages, when they are easiest to treat. [address] follows the current, evidence-based guidelines published by the [facility] Resendiz ([email or web address]ST[facility]) when recommending preventive services for our patients. Because we follow these guidelines, sometimes recommendations change over time as research supports it. (For example, mammograms used to be recommended annually. Even though Medicare will still pay for an annual mammogram, the newer guidelines recommend a mammogram every two years for women of average risk.) Of course, you and your doctor may decide to screen more often for some diseases, based on your risk and your health status. Preventive services for you include: - Medicare offers their members a free annual wellness visit, which is time for you and your primary care provider to discuss and plan for your preventive service needs. Take advantage of this benefit every year! 
-All adults over the age of 72 should receive the recommended pneumonia vaccines. Current USPSTF guidelines recommend a series of two vaccines for the best pneumonia protection.  
-All adults should have a flu vaccine yearly and a tetanus vaccine every 10 years. All adults age 48 and older should receive a shingles vaccine once in their lifetime.   
-A bone mass density test is recommended when a woman turns 65 to screen for osteoporosis. This test is only recommended one time, as a screening. Some providers will use this same test as a disease monitoring tool if you already have osteoporosis. -All adults age 38-68 who are overweight should have a diabetes screening test once every three years.  
-Other screening tests and preventive services for persons with diabetes include: an eye exam to screen for diabetic retinopathy, a kidney function test, a foot exam, and stricter control over your cholesterol.  
-Cardiovascular screening for adults with routine risk involves an electrocardiogram (ECG) at intervals determined by your doctor. -Colorectal cancer screenings should be done for adults age 54-65 with no increased risk factors for colorectal cancer. There are a number of acceptable methods of screening for this type of cancer. Each test has its own benefits and drawbacks. Discuss with your doctor what is most appropriate for you during your annual wellness visit. The different tests include: colonoscopy (considered the best screening method), a fecal occult blood test, a fecal DNA test, and sigmoidoscopy. -Breast cancer screenings are recommended every other year for women of normal risk, age 54-69. 
-Cervical cancer screenings for women over age 72 are only recommended with certain risk factors.  
-All adults born between [facility] should be screened once for Hepatitis C. Here is a list of your current Health Maintenance items (your personalized list of preventive services) with a due date: 
Health Maintenance Due Topic Date Due  Glaucoma Screening   07/23/2020 [address] [place] [address] Annual Well Visit  10/18/2020 Medicare Wellness Visit, Female The best way to live healthy is to have a lifestyle where you eat a well-balanced diet, exercise regularly, limit alcohol use, and quit all forms of tobacco/nicotine, if applicable. Regular preventive services are another way to keep healthy. Preventive services (vaccines, screening tests, monitoring & exams) can help personalize your care plan, which helps you manage your own care. Screening tests can find health problems at the earliest stages, when they are easiest to treat. [facility] follows the current, evidence-based guidelines published by the [facility] Resendiz ([facility]) when recommending preventive services for our patients. Because we follow these guidelines, sometimes recommendations change over time as research supports it. (For example, mammograms used to be recommended annually. Even though Medicare will still pay for an annual mammogram, the newer guidelines recommend a mammogram every two years for women of average risk). Of course, you and your doctor may decide to screen more often for some diseases, based on your risk and your co-morbidities (chronic disease you are already diagnosed with). Preventive services for you include: - Medicare offers their members a free annual wellness visit, which is time for you and your primary care provider to discuss and plan for your preventive service needs. Take advantage of this benefit every year! 
-All adults over the age of 72 should receive the recommended pneumonia vaccines. Current USPSTF guidelines recommend a series of two vaccines for the best pneumonia protection.  
-All adults should have a flu vaccine yearly and a tetanus vaccine every 10 years.  
-All adults age 48 and older should receive the shingles vaccines (series of two vaccines). -All adults age 38-68 who are overweight should have a diabetes screening test once every three years.  
-All adults born between [birth year] and 1967 should be screened once for Hepatitis C. 
-Other screening tests and preventive services for persons with diabetes include: an eye exam to screen for diabetic retinopathy, a kidney function test, a foot exam, and stricter control over your cholesterol.  
-Cardiovascular screening for adults with routine risk involves an electrocardiogram (ECG) at intervals determined by your doctor. -Colorectal cancer screenings should be done for adults age 54-65 with no increased risk factors for colorectal cancer. There are a number of acceptable methods of screening for this type of cancer. Each test has its own benefits and drawbacks. Discuss with your doctor what is most appropriate for you during your annual wellness visit. The different tests include: colonoscopy (considered the best screening method), a fecal occult blood test, a fecal DNA test, and sigmoidoscopy. 
 
-A bone mass density test is recommended when a woman turns 65 to screen for osteoporosis. This test is only recommended one time, as a screening. Some providers will use this same test as a disease monitoring tool if you already have osteoporosis. -Breast cancer screenings are recommended every other year for women of normal risk, age 54-69. 
-Cervical cancer screenings for women over age 72 are only recommended with certain risk factors. Here is a list of your current Health Maintenance items (your personalized list of preventive services) with a due date: 
Health Maintenance Due Topic Date Due  Glaucoma Screening   07/23/2020 [address] [place] [address] Annual Well Visit  10/18/2020 Medicare Wellness Visit, Male The best way to live healthy is to have a lifestyle where you eat a well-balanced diet, exercise regularly, limit alcohol use, and quit all forms of tobacco/nicotine, if applicable. Regular preventive services are another way to keep healthy. Preventive services (vaccines, screening tests, monitoring & exams) can help personalize your care plan, which helps you manage your own care. Screening tests can find health problems at the earliest stages, when they are easiest to treat. [facility] follows the current, evidence-based guidelines published by the [facility] ([facility]) when recommending preventive services for our patients. Because we follow these guidelines, sometimes recommendations change over time as research supports it. (For example, a prostate screening blood test is no longer routinely recommended for men with no symptoms). Of course, you and your doctor may decide to screen more often for some diseases, based on your risk and co-morbidities (chronic disease you are already diagnosed with). Preventive services for you include: - Medicare offers their members a free annual wellness visit, which is time for you and your primary care provider to discuss and plan for your preventive service needs. Take advantage of this benefit every year! 
-All adults over age 72 should receive the recommended pneumonia vaccines. Current USPSTF guidelines recommend a series of two vaccines for the best pneumonia protection.  
-All adults should have a flu vaccine yearly and tetanus vaccine every 10 years. 
-All adults age 48 and older should receive the shingles vaccines (series of two vaccines). -All adults age 38-68 who are overweight should have a diabetes screening test once every three years.  
-Other screening tests & preventive services for persons with diabetes include: an eye exam to screen for diabetic retinopathy, a kidney function test, a foot exam, and stricter control over your cholesterol.  
-Cardiovascular screening for adults with routine risk involves an electrocardiogram (ECG) at intervals determined by the provider. -Colorectal cancer screening should be done for adults age 54-65 with no increased risk factors for colorectal cancer. There are a number of acceptable methods of screening for this type of cancer. Each test has its own benefits and drawbacks. Discuss with your provider what is most appropriate for you during your annual wellness visit. The different tests include: colonoscopy (considered the best screening method), a fecal occult blood test, a fecal DNA test, and sigmoidoscopy. 
-All adults born between [facility] should be screened once for Hepatitis C. 
-An Abdominal Aortic Aneurysm (AAA) Screening is recommended for men age 73-68 who has ever smoked in their lifetime. Here is a list of your current Health Maintenance items (your personalized list of preventive services) with a due date: 
Health Maintenance Due Topic Date Due  Glaucoma Screening   07/23/2020 [facility] Annual Well Visit  10/18/2020 Personalized Recommendations for Dudley Glass. Here is a list of your current Health Maintenance items that are due (your personalized list of preventive services) Health Maintenance Due Topic Date Due  Glaucoma Screening   07/23/2020 Keep a list of your medications (prescribed and over the counter) and bring it to every appointment. Taking your medications as prescribed is important for your health and safety. Use this sample medication list to create your own. Update the list whenever changes are made. Medication Dosage Frequency Indication Example: Aspirin 81 mg Once daily in the morning Heart Health How to stay healthy between visits The best way to live healthy is to have a healthy lifestyle by eating a well-balanced diet, exercising regularly, limiting alcohol and stopping smoking if you are a smoker. Try to exercise at least 30 minutes a day, this is better than any prescription medicine to keep you healthy. Eat at least 5 servings of fruits and vegetables every daily and reduce red meats, processed meat (such as deli cold cuts), white breads and pastas. Limit or eliminate sweets and sugary drinks from your diet. Try to keep your weight healthy. Your body mass index (BMI) should be between 18 and 25. If it is between 22 and 30 you are overweight and if it is 30 or higher, that is called obesity. Being overweight or obese increases risk of high blood pressure, arthritis, sleep apnea, diabetes and many cancers. High blood pressure causes damage to your blood vessels, heart, kidneys and other organs if untreated. Your blood pressure should be under 140/90 with an ideal level of 120/80 or less to prevent heart disease, strokes and kidney disease. Wear your seat belt every time you are in a vehicle. Use sunscreen or cover your skin when you are outdoors. 1 in 61 people will develop melanoma (skin cancer) which is mostly preventable. Smoking makes all health problems worse. If you smoke, talk to your provider about stop-smoking programs and medicines. See a dentist one or two times a year for checkups and to have your teeth cleaned. Annual eye exams are recommended to screen for glaucoma and cataracts. Immunizations  Additional Information Everyone should have a yearly flu shot and a Tetanus, Diphtheria & Pertussis (TDaP) vaccine every 10 years. The shingles vaccine called Shingrix is recommended once in a lifetime after age 48. This is given as a 2-dose series and you can get it at your local pharmacy. Shingrix is now recommended instead of the older Zostavax as it is 90% effective compared to 50% for the Zostavax. You can get the Shingrix vaccine even if you had the Zostavax as long as one year has passed. All people over age 72 should have a pneumonia vaccine to prevent pneumonia. This is called Pneumovax-23 and is once in a lifetime vaccines except in special cases. Some people may benefit from a different vaccine called Prevnar-13 in addition to 1 [place]. Screening Tests  Additional Information Screening for diabetes mellitus with a blood sugar test should be done annually if you have risk factors such as high blood pressure, high cholesterol, are overweight, have a family history of diabetes or you have had high blood sugars in the past, especially during pregnancy (gestational diabetes). Cholesterol tests check for elevated lipids (fatty part of blood) which can lead to heart disease and strokes are recommended every 5 years after age 39. If you have elevated cholesterol, diabetes or have had a heart attack or stroke you should have testing more frequently. Men are eligible for a blood screening blood test for prostate cancer called PSA. The current recommendation is to consider this between ages 54 and 71 only as men over 79 do not seem to benefit from this screening. Men under 79 can benefit to some degree and whether to have this test is a decision that you should think about and discuss with your provider. If you have a family history of prostate cancer, the recommendation may be different. Colon cancer screening that evaluates for blood or polyps in your colon can prevent colon cancer and should be done yearly as a stool test or every 10 years as a colonoscopy up to age 76. Screening can be done up to age 80 if you are still very healthy but has higher risks after age 76. Colonoscopies may be recommended more frequently if precancerous lesions were found. A bone density test to screen for osteoporosis (thin or brittle bones) should be done at age 72 and periodically after that depending on the results and your history. Medicare will cover this up to every 2 years. Abdominal Aortic Aneurysm (AAA) screening at 72 is recommended if you have a family history of AAA. Lung Cancer Screening with an annual low-dose CT scan is recommended if you are between age 54 and 68, smoked at least 30 pack years (the equivalent of 1 pack per day for 30 years), and are a current smoker or quit less than 15 years ago. Hepatitis C screening is recommended one time for everyone between 18-79. HIV and syphilis screening are recommended if you are risk. Personalized Recommendations for Maude Teran. Here is a list of your current Health Maintenance items that are due (your personalized list of preventive services) Health Maintenance Due Topic Date Due  Glaucoma Screening   07/23/2020 Keep a list of your medications (prescribed and over the counter) and bring it to every appointment. Taking your medications as prescribed is important for your health and safety. Use this sample medication list to create your own. Update the list whenever changes are made. Medication Dosage Frequency Indication Example: Aspirin 81 mg Once daily in the morning Heart Health How to stay healthy between visits The best way to live healthy is to have a healthy lifestyle by eating a well-balanced diet, exercising regularly, limiting alcohol and stopping smoking if you are a smoker. Try to exercise at least 30 minutes a day, this is better than any prescription medicine to keep you healthy. Eat at least 5 servings of fruits and vegetables every daily and reduce red meats, processed meat (such as deli cold cuts), white breads and pastas. Limit or eliminate sweets and sugary drinks from your diet. Try to keep your weight healthy. Your body mass index (BMI) should be between 18 and 25. If it is between 22 and 30 you are overweight and if it is 30 or higher, that is called obesity. Being overweight or obese increases risk of high blood pressure, arthritis, sleep apnea, diabetes and many cancers. High blood pressure causes damage to your blood vessels, heart, kidneys and other organs if untreated. Your blood pressure should be under 140/90 with an ideal level of 120/80 or less to prevent heart disease, strokes and kidney disease. Wear your seat belt every time you are in a vehicle. Use sunscreen or cover your skin when you are outdoors. 1 in 61 people will develop melanoma (skin cancer) which is mostly preventable. Smoking makes all health problems worse. If you smoke, talk to your provider about stop-smoking programs and medicines. See a dentist one or two times a year for checkups and to have your teeth cleaned. Annual eye exams are recommended to screen for glaucoma and cataracts. Immunizations  Additional Information Everyone should have a yearly flu shot and a Tetanus, Diphtheria & Pertussis (TDaP) vaccine every 10 years. The shingles vaccine called Shingrix is recommended once in a lifetime after age 48. This is given as a 2-dose series and you can get it at your local pharmacy. Shingrix is now recommended instead of the older Zostavax as it is 90% effective compared to 50% for the Zostavax. You can get the Shingrix vaccine even if you had the Zostavax as long as one year has passed. All people over age 72 should have a pneumonia vaccine to prevent pneumonia. This is called Pneumovax-23 and is once in a lifetime vaccines except in special cases. Some people may benefit from a different vaccine called Prevnar-13 in addition to 1 [place]. Screening Tests  Additional Information Screening for diabetes mellitus with a blood sugar test should be done annually if you have risk factors such as high blood pressure, high cholesterol, are overweight, have a family history of diabetes or you have had high blood sugars in the past, especially during pregnancy (gestational diabetes). Cholesterol tests check for elevated lipids (fatty part of blood) which can lead to heart disease and strokes are recommended every 5 years after age 39. If you have elevated cholesterol, diabetes or have had a heart attack or stroke you should have testing more frequently. Screening for cervical cancer with a pap smear and pelvic exam is recommended for women every 3-5 years from age 24 until age 72 if previous Pap smears have been normal. If you have had abnormal pap smears or increased risk for cervical cancer then testing may be recommended more frequently. Breast cancer screening with a mammogram is recommended every 2 years for women age 54-69. More frequent screening has not been shown to reduce the risk of dying from breast cancer and increases the chance you might need a biopsy or get treatment for a breast cancer that would not have harmed you. If you are over 75, mammograms may do more harm than good and there have been no studies showing that screening in this age group reduces the chance of death. Screening between age 36 and 48 does reduce the risk for breast cancer death but is not as effective as it is over age 48 and can lead to more biopsies and false positive tests. Colon cancer screening that evaluates for blood or polyps in your colon can prevent colon cancer and should be done yearly as a stool test or every 10 years as a colonoscopy up to age 76. Screening can be done up to age 80 if you are still very healthy but has higher risks after age 76. Colonoscopies may be recommended more frequently if precancerous lesions were found. A bone density test to screen for osteoporosis (thin or brittle bones) should be done at age 72 and periodically after that depending on the results and your history. Medicare will cover this up to every 2 years. Abdominal Aortic Aneurysm (AAA) screening at 72 is recommended if you have a family history of AAA. Lung Cancer Screening with an annual low-dose CT scan is recommended if you are between age 54 and 68, smoked at least 30 pack years (the equivalent of 1 pack per day for 30 years), and are a current smoker or quit less than 15 years ago. Hepatitis C screening is recommended one time for everyone between 18-79. HIV and syphilis screening are recommended if you are risk. Medicare Wellness Visit, Female The best way to live healthy is to have a lifestyle where you eat a well-balanced diet, exercise regularly, limit alcohol use, and quit all forms of tobacco/nicotine, if applicable. Regular preventive services are another way to keep healthy. Preventive services (vaccines, screening tests, monitoring & exams) can help personalize your care plan, which helps you manage your own care. Screening tests can find health problems at the earliest stages, when they are easiest to treat. [facility] follows the current, evidence-based guidelines published by the [facility] [place] (USPSTF) when recommending preventive services for our patients. Because we follow these guidelines, sometimes recommendations change over time as research supports it. (For example, mammograms used to be recommended annually. Even though Medicare will still pay for an annual mammogram, the newer guidelines recommend a mammogram every two years for women of average risk). Of course, you and your doctor may decide to screen more often for some diseases, based on your risk and your co-morbidities (chronic disease you are already diagnosed with). Preventive services for you include: - Medicare offers their members a free annual wellness visit, which is time for you and your primary care provider to discuss and plan for your preventive service needs. Take advantage of this benefit every year! 
-All adults over the age of 72 should receive the recommended pneumonia vaccines. Current USPSTF guidelines recommend a series of two vaccines for the best pneumonia protection.  
-All adults should have a flu vaccine yearly and a tetanus vaccine every 10 years.  
-All adults age 48 and older should receive the shingles vaccines (series of two vaccines). -All adults age 38-68 who are overweight should have a diabetes screening test once every three years.  
-All adults born between [birth year] and 1967 should be screened once for Hepatitis C. 
-Other screening tests and preventive services for persons with diabetes include: an eye exam to screen for diabetic retinopathy, a kidney function test, a foot exam, and stricter control over your cholesterol.  
-Cardiovascular screening for adults with routine risk involves an electrocardiogram (ECG) at intervals determined by your doctor.  
-Colorectal cancer screenings should be done for adults age 54-65 with no increased risk factors for colorectal cancer. There are a number of acceptable methods of screening for this type of cancer. Each test has its own benefits and drawbacks. Discuss with your doctor what is most appropriate for you during your annual wellness visit. The different tests include: colonoscopy (considered the best screening method), a fecal occult blood test, a fecal DNA test, and sigmoidoscopy. 
 
-A bone mass density test is recommended when a woman turns 65 to screen for osteoporosis. This test is only recommended one time, as a screening. Some providers will use this same test as a disease monitoring tool if you already have osteoporosis. -Breast cancer screenings are recommended every other year for women of normal risk, age 54-69. 
-Cervical cancer screenings for women over age 72 are only recommended with certain risk factors. Here is a list of your current Health Maintenance items (your personalized list of preventive services) with a due date: 
Health Maintenance Due Topic Date Due  Glaucoma Screening   07/23/2020 Medicare Wellness Visit, Male The best way to live healthy is to have a lifestyle where you eat a well-balanced diet, exercise regularly, limit alcohol use, and quit all forms of tobacco/nicotine, if applicable. Regular preventive services are another way to keep healthy. Preventive services (vaccines, screening tests, monitoring & exams) can help personalize your care plan, which helps you manage your own care. Screening tests can find health problems at the earliest stages, when they are easiest to treat. [facility] follows the current, evidence-based guidelines published by the [facility] [place] ([facility]) when recommending preventive services for our patients. Because we follow these guidelines, sometimes recommendations change over time as research supports it. (For example, a prostate screening blood test is no longer routinely recommended for men with no symptoms). Of course, you and your doctor may decide to screen more often for some diseases, based on your risk and co-morbidities (chronic disease you are already diagnosed with). Preventive services for you include: - Medicare offers their members a free annual wellness visit, which is time for you and your primary care provider to discuss and plan for your preventive service needs. Take advantage of this benefit every year! 
-All adults over age 72 should receive the recommended pneumonia vaccines. Current USPSTF guidelines recommend a series of two vaccines for the best pneumonia protection. -All adults should have a flu vaccine yearly and tetanus vaccine every 10 years. 
-All adults age 48 and older should receive the shingles vaccines (series of two vaccines). -All adults age 38-68 who are overweight should have a diabetes screening test once every three years.  
-Other screening tests & preventive services for persons with diabetes include: an eye exam to screen for diabetic retinopathy, a kidney function test, a foot exam, and stricter control over your cholesterol.  
-Cardiovascular screening for adults with routine risk involves an electrocardiogram (ECG) at intervals determined by the provider.  
-Colorectal cancer screening should be done for adults age 54-65 with no increased risk factors for colorectal cancer. There are a number of acceptable methods of screening for this type of cancer. Each test has its own benefits and drawbacks. Discuss with your provider what is most appropriate for you during your annual wellness visit. The different tests include: colonoscopy (considered the best screening method), a fecal occult blood test, a fecal DNA test, and sigmoidoscopy. 
-All adults born between [facility] should be screened once for Hepatitis C. 
-An Abdominal Aortic Aneurysm (AAA) Screening is recommended for men age 73-68 who has ever smoked in their lifetime. Here is a list of your current Health Maintenance items (your personalized list of preventive services) with a due date: 
Health Maintenance Due Topic Date Due  Glaucoma Screening   07/23/2020 Personalized Recommendations for Heath Baeza. Here is a list of your current Health Maintenance items that are due (your personalized list of preventive services) Health Maintenance Due Topic Date Due  Glaucoma Screening   07/23/2020 Keep a list of your medications (prescribed and over the counter) and bring it to every appointment. Taking your medications as prescribed is important for your health and safety. Use this sample medication list to create your own. Update the list whenever changes are made. Medication Dosage Frequency Indication Example: Aspirin 81 mg Once daily in the morning Heart Health How to stay healthy between visits The best way to live healthy is to have a healthy lifestyle by eating a well-balanced diet, exercising regularly, limiting alcohol and stopping smoking if you are a smoker. Try to exercise at least 30 minutes a day, this is better than any prescription medicine to keep you healthy. Eat at least 5 servings of fruits and vegetables every daily and reduce red meats, processed meat (such as deli cold cuts), white breads and pastas. Limit or eliminate sweets and sugary drinks from your diet. Try to keep your weight healthy. Your body mass index (BMI) should be between 18 and 25. If it is between 22 and 30 you are overweight and if it is 30 or higher, that is called obesity. Being overweight or obese increases risk of high blood pressure, arthritis, sleep apnea, diabetes and many cancers. High blood pressure causes damage to your blood vessels, heart, kidneys and other organs if untreated. Your blood pressure should be under 140/90 with an ideal level of 120/80 or less to prevent heart disease, strokes and kidney disease. Wear your seat belt every time you are in a vehicle. Use sunscreen or cover your skin when you are outdoors. 1 in 61 people will develop melanoma (skin cancer) which is mostly preventable. Smoking makes all health problems worse. If you smoke, talk to your provider about stop-smoking programs and medicines. See a dentist one or two times a year for checkups and to have your teeth cleaned. Annual eye exams are recommended to screen for glaucoma and cataracts. Immunizations  Additional Information Everyone should have a yearly flu shot and a Tetanus, Diphtheria & Pertussis (TDaP) vaccine every 10 years. The shingles vaccine called Shingrix is recommended once in a lifetime after age 48. This is given as a 2-dose series and you can get it at your local pharmacy. Shingrix is now recommended instead of the older Zostavax as it is 90% effective compared to 50% for the Zostavax. You can get the Shingrix vaccine even if you had the Zostavax as long as one year has passed. All people over age 72 should have a pneumonia vaccine to prevent pneumonia. This is called Pneumovax-23 and is once in a lifetime vaccines except in special cases. Some people may benefit from a different vaccine called Prevnar-13 in addition to 1 [place]. Screening Tests  Additional Information Screening for diabetes mellitus with a blood sugar test should be done annually if you have risk factors such as high blood pressure, high cholesterol, are overweight, have a family history of diabetes or you have had high blood sugars in the past, especially during pregnancy (gestational diabetes). Cholesterol tests check for elevated lipids (fatty part of blood) which can lead to heart disease and strokes are recommended every 5 years after age 39. If you have elevated cholesterol, diabetes or have had a heart attack or stroke you should have testing more frequently. Men are eligible for a blood screening blood test for prostate cancer called PSA. The current recommendation is to consider this between ages 54 and 71 only as men over 79 do not seem to benefit from this screening. Men under 79 can benefit to some degree and whether to have this test is a decision that you should think about and discuss with your provider. If you have a family history of prostate cancer, the recommendation may be different. Colon cancer screening that evaluates for blood or polyps in your colon can prevent colon cancer and should be done yearly as a stool test or every 10 years as a colonoscopy up to age 76. Screening can be done up to age 80 if you are still very healthy but has higher risks after age 76. Colonoscopies may be recommended more frequently if precancerous lesions were found. A bone density test to screen for osteoporosis (thin or brittle bones) should be done at age 72 and periodically after that depending on the results and your history. Medicare will cover this up to every 2 years. Abdominal Aortic Aneurysm (AAA) screening at 72 is recommended if you have a family history of AAA. Lung Cancer Screening with an annual low-dose CT scan is recommended if you are between age 54 and 68, smoked at least 30 pack years (the equivalent of 1 pack per day for 30 years), and are a current smoker or quit less than 15 years ago. Hepatitis C screening is recommended one time for everyone between 18-79. HIV and syphilis screening are recommended if you are risk. Personalized Recommendations for Heath Baeza. Here is a list of your current Health Maintenance items that are due (your personalized list of preventive services) Health Maintenance Due Topic Date Due  Glaucoma Screening   07/23/2020 Keep a list of your medications (prescribed and over the counter) and bring it to every appointment. Taking your medications as prescribed is important for your health and safety. Use this sample medication list to create your own. Update the list whenever changes are made. Medication Dosage Frequency Indication Example: Aspirin 81 mg Once daily in the morning Heart Health How to stay healthy between visits The best way to live healthy is to have a healthy lifestyle by eating a well-balanced diet, exercising regularly, limiting alcohol and stopping smoking if you are a smoker. Try to exercise at least 30 minutes a day, this is better than any prescription medicine to keep you healthy. Eat at least 5 servings of fruits and vegetables every daily and reduce red meats, processed meat (such as deli cold cuts), white breads and pastas. Limit or eliminate sweets and sugary drinks from your diet. Try to keep your weight healthy. Your body mass index (BMI) should be between 18 and 25. If it is between 22 and 30 you are overweight and if it is 30 or higher, that is called obesity. Being overweight or obese increases risk of high blood pressure, arthritis, sleep apnea, diabetes and many cancers. High blood pressure causes damage to your blood vessels, heart, kidneys and other organs if untreated. Your blood pressure should be under 140/90 with an ideal level of 120/80 or less to prevent heart disease, strokes and kidney disease. Wear your seat belt every time you are in a vehicle. Use sunscreen or cover your skin when you are outdoors. 1 in 61 people will develop melanoma (skin cancer) which is mostly preventable. Smoking makes all health problems worse. If you smoke, talk to your provider about stop-smoking programs and medicines. See a dentist one or two times a year for checkups and to have your teeth cleaned. Annual eye exams are recommended to screen for glaucoma and cataracts. Immunizations  Additional Information Everyone should have a yearly flu shot and a Tetanus, Diphtheria & Pertussis (TDaP) vaccine every 10 years. The shingles vaccine called Shingrix is recommended once in a lifetime after age 48. This is given as a 2-dose series and you can get it at your local pharmacy. Shingrix is now recommended instead of the older Zostavax as it is 90% effective compared to 50% for the Zostavax. You can get the Shingrix vaccine even if you had the Zostavax as long as one year has passed. All people over age 72 should have a pneumonia vaccine to prevent pneumonia. This is called Pneumovax-23 and is once in a lifetime vaccines except in special cases. Some people may benefit from a different vaccine called Prevnar-13 in addition to 1 [place]. Screening Tests  Additional Information Screening for diabetes mellitus with a blood sugar test should be done annually if you have risk factors such as high blood pressure, high cholesterol, are overweight, have a family history of diabetes or you have had high blood sugars in the past, especially during pregnancy (gestational diabetes). Cholesterol tests check for elevated lipids (fatty part of blood) which can lead to heart disease and strokes are recommended every 5 years after age 39. If you have elevated cholesterol, diabetes or have had a heart attack or stroke you should have testing more frequently. Screening for cervical cancer with a pap smear and pelvic exam is recommended for women every 3-5 years from age 24 until age 72 if previous Pap smears have been normal. If you have had abnormal pap smears or increased risk for cervical cancer then testing may be recommended more frequently. Breast cancer screening with a mammogram is recommended every 2 years for women age 54-69. More frequent screening has not been shown to reduce the risk of dying from breast cancer and increases the chance you might need a biopsy or get treatment for a breast cancer that would not have harmed you. If you are over 75, mammograms may do more harm than good and there have been no studies showing that screening in this age group reduces the chance of death. Screening between age 36 and 48 does reduce the risk for breast cancer death but is not as effective as it is over age 48 and can lead to more biopsies and false positive tests. Colon cancer screening that evaluates for blood or polyps in your colon can prevent colon cancer and should be done yearly as a stool test or every 10 years as a colonoscopy up to age 76. Screening can be done up to age 80 if you are still very healthy but has higher risks after age 76. Colonoscopies may be recommended more frequently if precancerous lesions were found. A bone density test to screen for osteoporosis (thin or brittle bones) should be done at age 72 and periodically after that depending on the results and your history. Medicare will cover this up to every 2 years. Abdominal Aortic Aneurysm (AAA) screening at 72 is recommended if you have a family history of AAA. Lung Cancer Screening with an annual low-dose CT scan is recommended if you are between age 54 and 68, smoked at least 30 pack years (the equivalent of 1 pack per day for 30 years), and are a current smoker or quit less than 15 years ago. Hepatitis C screening is recommended one time for everyone between 18-79. HIV and syphilis screening are recommended if you are risk. Medicare Wellness Visit, Female The best way to live healthy is to have a lifestyle where you eat a well-balanced diet, exercise regularly, limit alcohol use, and quit all forms of tobacco/nicotine, if applicable. Regular preventive services are another way to keep healthy. Preventive services (vaccines, screening tests, monitoring & exams) can help personalize your care plan, which helps you manage your own care. Screening tests can find health problems at the earliest stages, when they are easiest to treat. [facility] follows the current, evidence-based guidelines published by the [facility] [place] ([facility]) when recommending preventive services for our patients. Because we follow these guidelines, sometimes recommendations change over time as research supports it. (For example, mammograms used to be recommended annually. Even though Medicare will still pay for an annual mammogram, the newer guidelines recommend a mammogram every two years for women of average risk). Of course, you and your doctor may decide to screen more often for some diseases, based on your risk and your co-morbidities (chronic disease you are already diagnosed with). Preventive services for you include: - Medicare offers their members a free annual wellness visit, which is time for you and your primary care provider to discuss and plan for your preventive service needs. Take advantage of this benefit every year! 
-All adults over the age of 72 should receive the recommended pneumonia vaccines. Current USPSTF guidelines recommend a series of two vaccines for the best pneumonia protection.  
-All adults should have a flu vaccine yearly and a tetanus vaccine every 10 years.  
-All adults age 48 and older should receive the shingles vaccines (series of two vaccines).      
-All adults age 38-68 who are overweight should have a diabetes screening test once every three years.  
-All adults born between [birth year] and 1967 should be screened once for Hepatitis C. 
 -Other screening tests and preventive services for persons with diabetes include: an eye exam to screen for diabetic retinopathy, a kidney function test, a foot exam, and stricter control over your cholesterol.  
-Cardiovascular screening for adults with routine risk involves an electrocardiogram (ECG) at intervals determined by your doctor.  
-Colorectal cancer screenings should be done for adults age 54-65 with no increased risk factors for colorectal cancer. There are a number of acceptable methods of screening for this type of cancer. Each test has its own benefits and drawbacks. Discuss with your doctor what is most appropriate for you during your annual wellness visit. The different tests include: colonoscopy (considered the best screening method), a fecal occult blood test, a fecal DNA test, and sigmoidoscopy. 
 
-A bone mass density test is recommended when a woman turns 65 to screen for osteoporosis. This test is only recommended one time, as a screening. Some providers will use this same test as a disease monitoring tool if you already have osteoporosis. -Breast cancer screenings are recommended every other year for women of normal risk, age 54-69. 
-Cervical cancer screenings for women over age 72 are only recommended with certain risk factors. Here is a list of your current Health Maintenance items (your personalized list of preventive services) with a due date: 
Health Maintenance Due Topic Date Due  Glaucoma Screening   07/23/2020 Medicare Wellness Visit, Male The best way to live healthy is to have a lifestyle where you eat a well-balanced diet, exercise regularly, limit alcohol use, and quit all forms of tobacco/nicotine, if applicable. Regular preventive services are another way to keep healthy. Preventive services (vaccines, screening tests, monitoring & exams) can help personalize your care plan, which helps you manage your own care. Screening tests can find health problems at the earliest stages, when they are easiest to treat. [facility] follows the current, evidence-based guidelines published by the [facility] [place] ([facility]) when recommending preventive services for our patients. Because we follow these guidelines, sometimes recommendations change over time as research supports it. (For example, a prostate screening blood test is no longer routinely recommended for men with no symptoms). Of course, you and your doctor may decide to screen more often for some diseases, based on your risk and co-morbidities (chronic disease you are already diagnosed with). Preventive services for you include: - Medicare offers their members a free annual wellness visit, which is time for you and your primary care provider to discuss and plan for your preventive service needs. Take advantage of this benefit every year! 
-All adults over age 72 should receive the recommended pneumonia vaccines. Current USPSTF guidelines recommend a series of two vaccines for the best pneumonia protection.  
-All adults should have a flu vaccine yearly and tetanus vaccine every 10 years. 
-All adults age 48 and older should receive the shingles vaccines (series of two vaccines). -All adults age 38-68 who are overweight should have a diabetes screening test once every three years.  
-Other screening tests & preventive services for persons with diabetes include: an eye exam to screen for diabetic retinopathy, a kidney function test, a foot exam, and stricter control over your cholesterol. -Cardiovascular screening for adults with routine risk involves an electrocardiogram (ECG) at intervals determined by the provider.  
-Colorectal cancer screening should be done for adults age 54-65 with no increased risk factors for colorectal cancer. There are a number of acceptable methods of screening for this type of cancer. Each test has its own benefits and drawbacks. Discuss with your provider what is most appropriate for you during your annual wellness visit. The different tests include: colonoscopy (considered the best screening method), a fecal occult blood test, a fecal DNA test, and sigmoidoscopy. 
-All adults born between [facility] should be screened once for Hepatitis C. 
-An Abdominal Aortic Aneurysm (AAA) Screening is recommended for men age 73-68 who has ever smoked in their lifetime. Here is a list of your current Health Maintenance items (your personalized list of preventive services) with a due date: 
Health Maintenance Due Topic Date Due  Glaucoma Screening   07/23/2020 Office Policies Phone calls/patient messages: Please allow up to 24 hours for someone in the office to contact you about your call or message. Be mindful your provider may be out of the office or your message may require further review. We encourage you to use [employer] for your messages as this is a faster, more efficient way to communicate with our office Medication Refills: 
         
Prescription medications require 48-72 business hours to process. We encourage you to use [employer] for your refills. For controlled medications: Please allow 72 business hours to process. Certain medications may require you to  a written prescription at our office. NO narcotic/controlled medications will be prescribed after 4pm Monday through Friday or on weekends Form/Paperwork Completion: Please note a $25 fee may incur for all paperwork for completed by our providers. We ask that you allow 7-10 business days. Pre-payment is due prior to picking up/faxing the completed form. You may also download your forms to [employer] to have your doctor print off.

## 2020-12-15 NOTE — PROGRESS NOTES
This is the Subsequent Medicare Annual Wellness Exam, performed 12 months or more after the Initial AWV or the last Subsequent AWV    I have reviewed the patient's medical history in detail and updated the computerized patient record. Depression Risk Factor Screening:     3 most recent PHQ Screens 1/6/2020   PHQ Not Done -   Little interest or pleasure in doing things Not at all   Feeling down, depressed, irritable, or hopeless Not at all   Total Score PHQ 2 0       Alcohol Risk Screen   Do you average more than 1 drink per night or more than 7 drinks a week: No    In the past three months have you have had more than 4 drinks containing alcohol on one occasion: No        Functional Ability and Level of Safety:   Hearing: Hearing is good. Activities of Daily Living: The home contains: no safety equipment. Patient does total self care     Ambulation: with no difficulty     Fall Risk:  Fall Risk Assessment, last 12 mths 1/6/2020   Able to walk? Yes   Fall in past 12 months? No     Abuse Screen:  Patient is not abused       Cognitive Screening   Has your family/caregiver stated any concerns about your memory: no     Cognitive Screening: Normal - Verbal Fluency Test    Assessment/Plan   Education and counseling provided:  Are appropriate based on today's review and evaluation    Diagnoses and all orders for this visit:    1. Screening for depression  -     DEPRESSION SCREEN ANNUAL    2.  Medicare annual wellness visit, subsequent        Health Maintenance Due     Health Maintenance Due   Topic Date Due    GLAUCOMA SCREENING Q2Y  07/23/2020    Medicare Yearly Exam  10/18/2020       Patient Care Team   Patient Care Team:  Carlos Eduardo Spears MD as PCP - General (Internal Medicine)  Carlos Eduardo Spears [facility]  Tim Dickinson MD as Surgeon (General Surgery)  Wendy Aragon MD (Ophthalmology)  Yissel Montana OD (Optometry)  Frannie Lyle MD (Urology)  Gómez Long MD (Gastroenterology)  Sussy Goddard MD as Physician (Hand Surgery)  Sussy Goddard MD as Consulting Provider (Hand Surgery)  Jonathan Goddard MD (Neurology)    History     Patient Active Problem List   Diagnosis Code    Gastroesophageal reflux disease without esophagitis K21.9    Essential hypertension I10    Benign prostatic hyperplasia N40.0    Mixed hyperlipidemia E78.2    Primary osteoarthritis of first carpometacarpal joint of right hand M18.11    Dupuytren's contracture of right hand M72.0    Carpal tunnel syndrome, left G56.02    Pain of right hand M79.641    BMI 27.0-27.9,adult Z68.27    CVA (cerebral vascular accident) ([facility] [address] I63.9    TIA (transient ischemic attack) G45.9    DDD (degenerative disc disease), cervical M50.30     Past Medical History:   Diagnosis Date    Arthritis     Enlarged prostate     GERD (gastroesophageal reflux disease)     High cholesterol     Hx of seasonal allergies     Hypertension     PMR (polymyalgia rheumatica) ([facility] [address]     TIA (transient ischemic attack) 2019      Past Surgical History:   Procedure Laterality Date    COLONOSCOPY N/A 1/5/2018    COLONOSCOPY WITH BIOPSIES performed by Megha Blanco MD at [facility] AMBULATORY OR    HX CARPAL TUNNEL RELEASE Right     HX COLONOSCOPY      HX HEENT  2003    lymph node bx in neck (benign)    HX LAP CHOLECYSTECTOMY  2000    HX ORTHOPAEDIC  2008    Back Surgery lumbar    HX ORTHOPAEDIC Right 2016    thumb and palm under ring finger    HX PARATHYROIDECTOMY      one right sided 2015. Current Outpatient Medications   Medication Sig Dispense Refill    cyclobenzaprine (FLEXERIL) 10 mg tablet Take 1 Tab by mouth nightly. As needed for muscle spasm. 30 Tab 1    amLODIPine (NORVASC) 10 mg tablet Take 1 tablet by mouth once daily 90 Tab 0    gabapentin (NEURONTIN) 300 mg capsule Take 1 capsule in morning and 2 capsule in bedtime.  90 Cap 0    pantoprazole (PROTONIX) 40 mg tablet Take 1 tablet by mouth once daily 90 Tab 0    ascorbic acid, vitamin C, (Vitamin C) 500 mg tablet Take 500 mg by mouth daily.  atorvastatin (LIPITOR) 20 mg tablet Take 1 Tab by mouth daily. (Patient taking differently: Take 10 mg by mouth daily.) 90 Tab 3    aspirin 81 mg chewable tablet Take 1 Tab by mouth daily. 30 Tab 1    gabapentin (NEURONTIN) 300 mg capsule Take 300 mg by mouth two (2) times a day.  triamcinolone (NASACORT) 55 mcg nasal inhaler 2 Sprays by Both Nostrils route daily.  montelukast (SINGULAIR) 10 mg tablet Take 1 Tab by mouth daily. 30 Tab 2    diclofenac (VOLTAREN) 1 % gel Apply two grams by topical route four times daily to the affected area (s). PRN      fexofenadine (ALLEGRA) 180 mg tablet Take 180 mg by mouth daily as needed.  cholecalciferol (VITAMIN D3) 1,000 unit tablet Take 1,000 Units by mouth daily. Allergies   Allergen Reactions    Shellfish Derived Anaphylaxis    Ace Inhibitors Cough    Nsaids (Non-Steroidal Anti-Inflammatory Drug) Other (comments)     GI bleeding.  Pcn [Penicillins] Rash       Family History   Problem Relation Age of Onset    Cancer Mother         lung    Cancer Father         lung     Social History     Tobacco Use    Smoking status: Former Smoker     Last attempt to quit: 9/3/1990     Years since quittin.3    Smokeless tobacco: Never Used   Substance Use Topics    Alcohol use:  Yes     Alcohol/week: 2.0 standard drinks     Types: 1 Cans of beer, 1 Shots of liquor per week     Frequency: 2-3 times a week     Drinks per session: 1 or 2

## 2020-12-15 NOTE — PROGRESS NOTES
Marianela Castanon. is a 65 y.o. adult who presents for follow-up. Last seen by virtual visit in September with right hip pain and insomnia. Reports still has the right hip pain. Has seen DR Ed Estevez ortho, in the past and told it was muscular. Stretching and walking regularly. He has been treated for PMR in the past.     He has chronic left arm tingling and neck pain. History of cervical degenerative disc disease. He sees Dr. Guerrero Current and Dr. Jimmy Phalen. He is taking gabapentin 300mg in amand 600mg in pm, now reduced back to 300mg BID. Treated for hypertension. BP elevated today Recheck better and at home 130/60. No dizziness or visual changes. Up to date on eye exam, Dr Mannie Reid. Sees Dr. Gaurav Haile, urology, annual follow up. Had urolift, helpful. Less nocturia, better flow.      Sees Dr. Arnaud Thomason. Prior GIB. No NSAID use. Will take tylenol for pain at times. No problems with aspirin. Colonoscopy 2018.      Up to date on immunization.         Past Medical History:   Diagnosis Date    Arthritis     Enlarged prostate     GERD (gastroesophageal reflux disease)     High cholesterol     Hx of seasonal allergies     Hypertension     PMR (polymyalgia rheumatica) (HCC)     TIA (transient ischemic attack) 2019       Family History   Problem Relation Age of Onset    Cancer Mother         lung    Cancer Father         lung       Social History     Socioeconomic History    Marital status:      Spouse name: Not on file    Number of children: Not on file    Years of education: Not on file    Highest education level: Not on file   Occupational History    Not on file   Social Needs    Financial resource strain: Not on file    Food insecurity     Worry: Not on file     Inability: Not on file    Transportation needs     Medical: Not on file     Non-medical: Not on file   Tobacco Use    Smoking status: Former Smoker     Packs/day: 1.50     Years: 15.00     Pack years: 22.50     Quit date: 9/3/1990     Years since quittin.3    Smokeless tobacco: Never Used   Substance and Sexual Activity    Alcohol use: Yes     Alcohol/week: 2.0 standard drinks     Types: 1 Cans of beer, 1 Shots of liquor per week     Frequency: 2-3 times a week     Drinks per session: 1 or 2    Drug use: No    Sexual activity: Yes     Partners: Female     Birth control/protection: None   Lifestyle    Physical activity     Days per week: Not on file     Minutes per session: Not on file    Stress: Not on file   Relationships    Social connections     Talks on phone: Not on file     Gets together: Not on file     Attends [employer] service: Not on file     Active member of club or organization: Not on file     Attends meetings of clubs or organizations: Not on file     Relationship status: Not on file    Intimate partner violence     Fear of current or ex partner: Not on file     Emotionally abused: Not on file     Physically abused: Not on file     Forced sexual activity: Not on file   Other Topics Concern    Not on file   Social History Narrative    Not on file       Current Outpatient Medications on File Prior to Visit   Medication Sig Dispense Refill    fluorouraciL (EFUDEX) 5 % chemo cream APPLY EXTERNALLY TO AFFECTED AREA TWICE DAILY FOR 6 WEEKS      cyclobenzaprine (FLEXERIL) 10 mg tablet Take 1 Tab by mouth nightly. As needed for muscle spasm. 30 Tab 1    amLODIPine (NORVASC) 10 mg tablet Take 1 tablet by mouth once daily 90 Tab 0    pantoprazole (PROTONIX) 40 mg tablet Take 1 tablet by mouth once daily 90 Tab 0    ascorbic acid, vitamin C, (Vitamin C) 500 mg tablet Take 500 mg by mouth daily.  atorvastatin (LIPITOR) 20 mg tablet Take 1 Tab by mouth daily. (Patient taking differently: Take 10 mg by mouth daily.) 90 Tab 3    aspirin 81 mg chewable tablet Take 1 Tab by mouth daily. 30 Tab 1    gabapentin (NEURONTIN) 300 mg capsule Take 300 mg by mouth two (2) times a day.       triamcinolone (NASACORT) 55 mcg nasal inhaler 2 Sprays by Both Nostrils route daily.  diclofenac (VOLTAREN) 1 % gel Apply two grams by topical route four times daily to the affected area (s). PRN      fexofenadine (ALLEGRA) 180 mg tablet Take 180 mg by mouth daily as needed.  cholecalciferol (VITAMIN D3) 1,000 unit tablet Take 1,000 Units by mouth daily.  [DISCONTINUED] gabapentin (NEURONTIN) 300 mg capsule Take 1 capsule in morning and 2 capsule in bedtime. 90 Cap 0    [DISCONTINUED] montelukast (SINGULAIR) 10 mg tablet Take 1 Tab by mouth daily. 30 Tab 2     No current facility-administered medications on file prior to visit. Review of Systems  Pertinent items are noted in HPI. Objective:     Visit Vitals  /76 (BP 1 Location: Left arm, BP Patient Position: Sitting)   Pulse 64   Temp 96.8 °F (36 °C) (Temporal)   Resp 16   Ht 5' 10\" (1.778 m)   Wt 203 lb 12.8 oz (92.4 kg)   SpO2 99%   BMI 29.24 kg/m²     Gen: well appearing adult  HEENT:   PERRL,normal conjunctiva. External ear and canals normal, TMs no opacification or erythema,  OP no erythema, no exudates, MMM  Neck:  Supple. Thyroid normal size, nontender, without nodules. No masses or LAD  Resp:  No wheezing, no rhonchi, no rales. CV:  RRR, normal S1S2, no murmur. GI: soft, nontender, without masses. No hepatosplenomegaly. Extrem:  +2 pulses, no edema, warm distally      Assessment/Plan:       ICD-10-CM ICD-9-CM    1. Essential hypertension  I10 401.9    2. Screening for depression  Z13.31 V79.0 [address]   3. Medicare annual wellness visit, subsequent  Z00.00 V70.0    4. Cerebrovascular accident (CVA), unspecified mechanism ([facility] [address]  I63.9 434.91    5. DDD (degenerative disc disease), cervical  M50.30 722.4    6. Primary osteoarthritis of first carpometacarpal joint of right hand  M18.11 715.14    7.  Benign prostatic hyperplasia, unspecified whether lower urinary tract symptoms present  N40.0 600. 00    8. Mixed hyperlipidemia  E78.2 272.2    9. Gastroesophageal reflux disease without esophagitis  K21.9 530.81    10. Right hip pain  M25.551 719.45 REFERRAL TO PHYSICAL THERAPY   11. Right leg pain  M79.604 729.5 REFERRAL TO PHYSICAL THERAPY       Follow-up and Dispositions    · Return in about 1 year (around 12/16/2021) for follow up on medications.  Rosalina Yanes MD

## 2020-12-16 ENCOUNTER — OFFICE VISIT (OUTPATIENT)
Dept: INTERNAL MEDICINE CLINIC | Age: 70
End: 2020-12-16
Payer: MEDICARE

## 2020-12-16 ENCOUNTER — TELEPHONE (OUTPATIENT)
Dept: INTERNAL MEDICINE CLINIC | Age: 70
End: 2020-12-16

## 2020-12-16 VITALS
RESPIRATION RATE: 16 BRPM | HEIGHT: 70 IN | DIASTOLIC BLOOD PRESSURE: 76 MMHG | OXYGEN SATURATION: 99 % | WEIGHT: 203.8 LBS | TEMPERATURE: 96.8 F | SYSTOLIC BLOOD PRESSURE: 133 MMHG | HEART RATE: 64 BPM | BODY MASS INDEX: 29.18 KG/M2

## 2020-12-16 DIAGNOSIS — M18.11 PRIMARY OSTEOARTHRITIS OF FIRST CARPOMETACARPAL JOINT OF RIGHT HAND: ICD-10-CM

## 2020-12-16 DIAGNOSIS — Z00.00 MEDICARE ANNUAL WELLNESS VISIT, SUBSEQUENT: ICD-10-CM

## 2020-12-16 DIAGNOSIS — I63.9 CEREBROVASCULAR ACCIDENT (CVA), UNSPECIFIED MECHANISM (HCC): ICD-10-CM

## 2020-12-16 DIAGNOSIS — E78.2 MIXED HYPERLIPIDEMIA: ICD-10-CM

## 2020-12-16 DIAGNOSIS — Z13.31 SCREENING FOR DEPRESSION: ICD-10-CM

## 2020-12-16 DIAGNOSIS — M25.551 RIGHT HIP PAIN: ICD-10-CM

## 2020-12-16 DIAGNOSIS — I10 ESSENTIAL HYPERTENSION: Primary | ICD-10-CM

## 2020-12-16 DIAGNOSIS — K21.9 GASTROESOPHAGEAL REFLUX DISEASE WITHOUT ESOPHAGITIS: ICD-10-CM

## 2020-12-16 DIAGNOSIS — M79.604 RIGHT LEG PAIN: ICD-10-CM

## 2020-12-16 DIAGNOSIS — M50.30 DDD (DEGENERATIVE DISC DISEASE), CERVICAL: ICD-10-CM

## 2020-12-16 DIAGNOSIS — N40.0 BENIGN PROSTATIC HYPERPLASIA, UNSPECIFIED WHETHER LOWER URINARY TRACT SYMPTOMS PRESENT: ICD-10-CM

## 2020-12-16 PROCEDURE — G8536 NO DOC ELDER MAL SCRN: HCPCS | Performed by: FAMILY MEDICINE

## 2020-12-16 PROCEDURE — G8427 DOCREV CUR MEDS BY ELIG CLIN: HCPCS | Performed by: FAMILY MEDICINE

## 2020-12-16 PROCEDURE — G8752 SYS BP LESS 140: HCPCS | Performed by: FAMILY MEDICINE

## 2020-12-16 PROCEDURE — G8754 DIAS BP LESS 90: HCPCS | Performed by: FAMILY MEDICINE

## 2020-12-16 PROCEDURE — G8510 SCR DEP NEG, NO PLAN REQD: HCPCS | Performed by: FAMILY MEDICINE

## 2020-12-16 PROCEDURE — 1101F PT FALLS ASSESS-DOCD LE1/YR: CPT | Performed by: FAMILY MEDICINE

## 2020-12-16 PROCEDURE — G8419 CALC BMI OUT NRM PARAM NOF/U: HCPCS | Performed by: FAMILY MEDICINE

## 2020-12-16 PROCEDURE — 99214 OFFICE O/P EST MOD 30 MIN: CPT | Performed by: FAMILY MEDICINE

## 2020-12-16 PROCEDURE — 3017F COLORECTAL CA SCREEN DOC REV: CPT | Performed by: FAMILY MEDICINE

## 2020-12-16 PROCEDURE — G0439 PPPS, SUBSEQ VISIT: HCPCS | Performed by: FAMILY MEDICINE

## 2020-12-16 RX ORDER — FLUOROURACIL 50 MG/G
CREAM TOPICAL
Status: ON HOLD | COMMUNITY
Start: 2020-11-16 | End: 2021-06-16

## 2020-12-16 NOTE — TELEPHONE ENCOUNTER
----- Message from Gael De La Torre MD sent at 12/16/2020  2:19 PM EST -----  Please contact DR Kerrie Cerna for eye exam. Not with [facility] anymore

## 2020-12-16 NOTE — PROGRESS NOTES
This is the Subsequent Medicare Annual Wellness Exam, performed 12 months or more after the Initial AWV or the last Subsequent AWV    I have reviewed the patient's medical history in detail and updated the computerized patient record. Depression Risk Factor Screening:     3 most recent PHQ Screens 1/6/2020   PHQ Not Done -   Little interest or pleasure in doing things Not at all   Feeling down, depressed, irritable, or hopeless Not at all   Total Score PHQ 2 0       Alcohol Risk Screen    Do you average more than 1 drink per night or more than 7 drinks a week: No    In the past three months have you have had more than 4 drinks containing alcohol on one occasion: No        Functional Ability and Level of Safety:    Hearing: Hearing is good. Activities of Daily Living: The home contains: no safety equipment. Patient does total self care      Ambulation: with no difficulty     Fall Risk:  Fall Risk Assessment, last 12 mths 1/6/2020   Able to walk? Yes   Fall in past 12 months? No      Abuse Screen:  Patient is not abused       Cognitive Screening    Has your family/caregiver stated any concerns about your memory: no     Cognitive Screening: Normal - Verbal Fluency Test    Assessment/Plan   Education and counseling provided:  Are appropriate based on today's review and evaluation    Diagnoses and all orders for this visit:    1. Essential hypertension    2. Screening for depression  -     DEPRESSION SCREEN ANNUAL    3. Medicare annual wellness visit, subsequent    4. Cerebrovascular accident (CVA), unspecified mechanism ([address]    5. DDD (degenerative disc disease), cervical    6. Primary osteoarthritis of first carpometacarpal joint of right hand    7. Benign prostatic hyperplasia, unspecified whether lower urinary tract symptoms present    8. Mixed hyperlipidemia    9.  Gastroesophageal reflux disease without esophagitis        Health Maintenance Due     Health Maintenance Due   Topic Date Due    GLAUCOMA SCREENING Q2Y  07/23/2020       Patient Care Team   Patient Care Team:  Vanita Munoz MD as PCP - General (Internal Medicine)  Vanita Munoz MD as PCP - [facility]  Jessica Genao MD as Surgeon (General Surgery)  Juana Ayoub MD (Ophthalmology)  Humaira Bauman, OD (Optometry)  Marleen Hammans., MD (Urology)  Tarah Arzola MD (Gastroenterology)  Luis Cha MD as Physician (Hand Surgery)  Luis Cha MD as Consulting Provider (Hand Surgery)  Michelle Gomez MD (Neurology)    History     Patient Active Problem List   Diagnosis Code    Gastroesophageal reflux disease without esophagitis K21.9    Essential hypertension I10    Benign prostatic hyperplasia N40.0    Mixed hyperlipidemia E78.2    Primary osteoarthritis of first carpometacarpal joint of right hand M18.11    Dupuytren's contracture of right hand M72.0    Carpal tunnel syndrome, left G56.02    Pain of right hand M79.641    BMI 27.0-27.9,adult Z68.27    CVA (cerebral vascular accident) ([facility] I63.9    TIA (transient ischemic attack) G45.9    DDD (degenerative disc disease), cervical M50.30     Past Medical History:   Diagnosis Date    Arthritis     Enlarged prostate     GERD (gastroesophageal reflux disease)     High cholesterol     Hx of seasonal allergies     Hypertension     PMR (polymyalgia rheumatica) ([facility]     TIA (transient ischemic attack) 2019      Past Surgical History:   Procedure Laterality Date    COLONOSCOPY N/A 1/5/2018    COLONOSCOPY WITH BIOPSIES performed by Suzette Wing MD at [facility] AMBULATORY OR    HX CARPAL TUNNEL RELEASE Right     HX COLONOSCOPY      HX HEENT  2003    lymph node bx in neck (benign)    HX LAP CHOLECYSTECTOMY  2000    HX ORTHOPAEDIC  2008    Back Surgery lumbar    HX ORTHOPAEDIC Right 2016    thumb and palm under ring finger    HX PARATHYROIDECTOMY      one right sided 2015.        Current Outpatient Medications   Medication Sig Dispense Refill    cyclobenzaprine (FLEXERIL) 10 mg tablet Take 1 Tab by mouth nightly. As needed for muscle spasm. 30 Tab 1    amLODIPine (NORVASC) 10 mg tablet Take 1 tablet by mouth once daily 90 Tab 0    gabapentin (NEURONTIN) 300 mg capsule Take 1 capsule in morning and 2 capsule in bedtime. 90 Cap 0    pantoprazole (PROTONIX) 40 mg tablet Take 1 tablet by mouth once daily 90 Tab 0    ascorbic acid, vitamin C, (Vitamin C) 500 mg tablet Take 500 mg by mouth daily.  atorvastatin (LIPITOR) 20 mg tablet Take 1 Tab by mouth daily. (Patient taking differently: Take 10 mg by mouth daily.) 90 Tab 3    aspirin 81 mg chewable tablet Take 1 Tab by mouth daily. 30 Tab 1    gabapentin (NEURONTIN) 300 mg capsule Take 300 mg by mouth two (2) times a day.  triamcinolone (NASACORT) 55 mcg nasal inhaler 2 Sprays by Both Nostrils route daily.  montelukast (SINGULAIR) 10 mg tablet Take 1 Tab by mouth daily. 30 Tab 2    diclofenac (VOLTAREN) 1 % gel Apply two grams by topical route four times daily to the affected area (s). PRN      fexofenadine (ALLEGRA) 180 mg tablet Take 180 mg by mouth daily as needed.  cholecalciferol (VITAMIN D3) 1,000 unit tablet Take 1,000 Units by mouth daily. Allergies   Allergen Reactions    Shellfish Derived Anaphylaxis    Ace Inhibitors Cough    Nsaids (Non-Steroidal Anti-Inflammatory Drug) Other (comments)     GI bleeding.  Pcn [Penicillins] Rash       Family History   Problem Relation Age of Onset    Cancer Mother         lung    Cancer Father         lung     Social History     Tobacco Use    Smoking status: Former Smoker     Quit date: 9/3/1990     Years since quittin.3    Smokeless tobacco: Never Used   Substance Use Topics    Alcohol use:  Yes     Alcohol/week: 2.0 standard drinks     Types: 1 Cans of beer, 1 Shots of liquor per week     Frequency: 2-3 times a week     Drinks per session: 1 or 906 [address] [facility] WELLNESS VISIT       CHIEF COMPLAINT     Flip Ram., 65 y.o. adult, presents for No chief complaint on file. Rene Burrows HPI       OBJECTIVE   There were no vitals taken for this visit. BP Readings from Last 3 Encounters:   07/20/20 149/72   01/06/20 130/67   12/03/19 116/62      Wt Readings from Last 3 Encounters:   07/20/20 196 lb 9.6 oz (89.2 kg)   01/06/20 200 lb 6.4 oz (90.9 kg)   12/03/19 193 lb (87.5 kg)     Physical Exam      ASSESSMENT AND PLAN     Diagnoses and all orders for this visit:    1. Essential hypertension    2. Screening for depression  -     DEPRESSION SCREEN ANNUAL    3. Medicare annual wellness visit, subsequent    4. Cerebrovascular accident (CVA), unspecified mechanism ([address]    5. DDD (degenerative disc disease), cervical    6. Primary osteoarthritis of first carpometacarpal joint of right hand    7. Benign prostatic hyperplasia, unspecified whether lower urinary tract symptoms present    8. Mixed hyperlipidemia    9. Gastroesophageal reflux disease without esophagitis        WELLNESS EVALUATION & HEALTH RISK ASSESSMENT     DEPRESSION SCREENING  3 most recent PHQ Screens 1/6/2020   PHQ Not Done -   Little interest or pleasure in doing things Not at all   Feeling down, depressed, irritable, or hopeless Not at all   Total Score PHQ 2 0          FALL RISK SCREENING  Fall Risk Assessment, last 12 mths 1/6/2020   Able to walk? Yes   Fall in past 12 months?  No       GENERAL       HEALTH HABITS AND NUTRITION       HEARING/VISION/SKIN       SLEEP/MEMORY/ANXIETY       SUBSTANCE AND OPIOID USE       SAFETY       ADLS       PHYSICAL ACTIVITY        TIMED UP AND GO TEST (If indicated)       MINI-COG SCORE (If indicated)       STOP-BANG SCORE (If indicated)       [address] [employer] Maintenance Topics with due status: Overdue       Topic Date Due    GLAUCOMA SCREENING Q2Y 07/23/2020     Health Maintenance Topics with due status: Not Due       Topic Last Completion Date    DTaP/Tdap/Td series 09/20/2017    Colorectal Cancer Screening Combo 01/05/2018    Lipid Screen 10/02/2020    Medicare Yearly Exam 12/16/2020     Health Maintenance Topics with due status: Completed       Topic Last Completion Date    Pneumococcal 65+ years 05/04/2015    Hepatitis C Screening 09/20/2017    Shingrix Vaccine Age 50> 08/14/2018    Flu Vaccine 09/08/2020         Colon cancer:  Recommendation: Colonoscopy every 10y or annual FIT test from 50-75 or every 3 year stool DNA based test with consideration of ongoing screening from 76-85. Lung cancer (LDCT): Recommendation: Yearly LDCT for pts 55-77 w 30-pack year hx and currently smoke or quit <15 yr ago. Hepatitis C:  Recommendation: One time screening for all patient's aged 18-79. Diabetes:   Recommendation: USPSTF recommends screening ages 38-69 y/o if overweight or obese. Medicare covers screening in those patients who are overweight, obese, have HTN or dyslipiemia, a personal history of gestational diabetes or prior elevated blood sugar, a family hx of DM, or any patient over age 72    Lipids:   Recommendation: screening for hyperlipidemia every 5 years after age 39      PREVENTIVE CARE - MALE SCREENINGS     Prostate cancer: Recommendation: Consider PSA screening every 2-4 yr from age 53-78 after review of pros and cons. Medicare covers annual PSA screening.     AAA:   Recommendation: One-time screening if family history of AAA or smoking history of at least 100 cigarettes lifetime      IMMUNIZATIONS     Immunization History   Administered Date(s) Administered    DTP 05/04/2015    Influenza High Dose Vaccine PF 10/29/2015, 10/02/2019, 09/08/2020    Influenza Vaccine 09/08/2020    Influenza Vaccine (Quad) PF (>6 Mo Flulaval, Fluarix, and >3 Yrs Afluria, Fluzone [id number]) 09/20/2017, 10/02/2018    Pneumococcal Conjugate (PCV-13) 05/04/2015    Pneumococcal Polysaccharide (PPSV-23) 01/16/2014    Tdap 09/20/2017    Zoster Recombinant 05/15/2018, 08/14/2018    Zoster Vaccine, Live 09/20/2017       Pneumovax:   Recommendation: PPSV23 once for all >65 and high risk <65     Prevnar:   Recommendation: PCV13 only if >65 and immunocompromised or residing in a nursing home, or in areas of low childhood Pneumococcal vaccination and Not Indicated    Influenza:   Recommendation: Vaccination annually, high dose if 65 or older    Shingrix:  Recommendation: Vaccination 2 shots 2-6 months apart for all age >47    TDaP:    Recommendation: Vaccination Booster with TDaP every 10 yr.      INDIVIDUALIZED SCREENING, EDUCATION, AND PLAN     The patient and any caregiver(s) were counseled on: Healthcare maintenance and preventive items as above. The patient is not on high risk medication(s) including benzodiaepines. Based on my evaluation of the patient and the Health Risk Assessment performed today, there is not evidence of cognitive impairment. Medications, allergies, problem list, previous encounters, recent results, medical, social and family history reviewed in the electronic health record. Medications and problems are also viewable in the Encounter tab for this visit. Current Outpatient Medications   Medication Sig    cyclobenzaprine (FLEXERIL) 10 mg tablet Take 1 Tab by mouth nightly. As needed for muscle spasm.  amLODIPine (NORVASC) 10 mg tablet Take 1 tablet by mouth once daily    gabapentin (NEURONTIN) 300 mg capsule Take 1 capsule in morning and 2 capsule in bedtime.  pantoprazole (PROTONIX) 40 mg tablet Take 1 tablet by mouth once daily    ascorbic acid, vitamin C, (Vitamin C) 500 mg tablet Take 500 mg by mouth daily.  atorvastatin (LIPITOR) 20 mg tablet Take 1 Tab by mouth daily. (Patient taking differently: Take 10 mg by mouth daily.)    aspirin 81 mg chewable tablet Take 1 Tab by mouth daily.  gabapentin (NEURONTIN) 300 mg capsule Take 300 mg by mouth two (2) times a day.     triamcinolone (NASACORT) 55 mcg nasal inhaler 2 Sprays by Both Nostrils route daily.  montelukast (SINGULAIR) 10 mg tablet Take 1 Tab by mouth daily.  diclofenac (VOLTAREN) 1 % gel Apply two grams by topical route four times daily to the affected area (s). PRN    fexofenadine (ALLEGRA) 180 mg tablet Take 180 mg by mouth daily as needed.  cholecalciferol (VITAMIN D3) 1,000 unit tablet Take 1,000 Units by mouth daily. No current facility-administered medications for this visit. There are no discontinued medications. Allergies   Allergen Reactions    Shellfish Derived Anaphylaxis    Ace Inhibitors Cough    Nsaids (Non-Steroidal Anti-Inflammatory Drug) Other (comments)     GI bleeding.  Pcn [Penicillins] Rash     Past Medical History:   Diagnosis Date    Arthritis     Enlarged prostate     GERD (gastroesophageal reflux disease)     High cholesterol     Hx of seasonal allergies     Hypertension     PMR (polymyalgia rheumatica) (HCC)     TIA (transient ischemic attack)      Past Surgical History:   Procedure Laterality Date    COLONOSCOPY N/A 2018    COLONOSCOPY WITH BIOPSIES performed by [facility]ined[facility] at [facility] AM[facility]ULAT[facility]    HX CARPAL TUNNEL RELEASE Right     HX COLONOSCOPY      HX HEENT  2003    lymph node bx in neck (benign)    HX LAP CHOLECYSTECTOMY  2000    HX ORTHOPAEDIC  2008    Back Surgery lumbar    HX ORTHOPAEDIC Right 2016    thumb and palm under ring finger    HX PARATHYROIDECTOMY      one right sided .        Family History   Problem Relation Age of Onset    Cancer Mother         lung    Cancer Father         lung     Social History     Socioeconomic History    Marital status:      Spouse name: Not on file    Number of children: Not on file    Years of education: Not on file    Highest education level: Not on file   Tobacco Use    Smoking status: Former Smoker     Quit date: 9/3/1990     Years since quittin.3    Smokeless tobacco: Never Used   Substance and Sexual Activity    Alcohol use:  Yes     Alcohol/week: 2.0 standard drinks     Types: 1 Cans of beer, 1 Shots of liquor per week     Frequency: 2-3 times a week     Drinks per session: 1 or 2    Drug use: No    Sexual activity: Yes     Partners: Female     Birth control/protection: None     Social History     Social History Narrative    Not on file        Patient Care Team:  Ngoc Boykin MD as PCP - General (Internal Medicine)  Ngoc Boykin MD as PCP - [address] [place] [address] Provider  Sharon Benoit MD as Surgeon (General Surgery)  Mj Fish MD (Ophthalmology)  Segun Abrams OD (Optometry)  Mirian Shore MD (Urology)  Junito Littlejohn MD (Gastroenterology)  Carmencita Connor MD as Physician (Hand Surgery)  Carmencita Connor MD as Consulting Provider (Hand Surgery)  Brandon Upton MD (Neurology)      Future Appointments   Date Time Provider Chucho Prather   12/16/2020  2:00 PM Ngoc Boykin MD [facility] BS AMB   12/21/2020  8:45 AM [facility] Eugune Canavan, MD, 12/16/2020  This encounter has been electronically signed

## 2020-12-20 RX ORDER — PANTOPRAZOLE SODIUM 40 MG/1
TABLET, DELAYED RELEASE ORAL
Qty: 90 TAB | Refills: 0 | Status: SHIPPED | OUTPATIENT
Start: 2020-12-20 | End: 2021-03-18

## 2020-12-30 ENCOUNTER — HOSPITAL ENCOUNTER (OUTPATIENT)
Dept: PHYSICAL THERAPY | Age: 70
Discharge: HOME | End: 2020-12-30
Payer: MEDICARE

## 2020-12-30 PROCEDURE — 97140 MANUAL THERAPY 1/> REGIONS: CPT | Performed by: PHYSICAL THERAPIST

## 2020-12-30 PROCEDURE — 97110 THERAPEUTIC EXERCISES: CPT | Performed by: PHYSICAL THERAPIST

## 2020-12-30 PROCEDURE — 97162 PT EVAL MOD COMPLEX 30 MIN: CPT | Performed by: PHYSICAL THERAPIST

## 2020-12-30 NOTE — PROGRESS NOTES
PT INITIAL EVALUATION NOTE 2-15    Patient Name: Lefty Douglas Jr.  Date:2020  : 1950  [x]  Patient  Verified  Payor: [employer] MEDICARE / Plan: [facility] [employer] MEDICARE CHOICE PPO/PFFS / Product Type: Managed Care Medicare /    In time:1135  Out time:1240  Total Treatment Time (min): 65  Visit #: 1     Treatment Area: Right hip pain [M25.551]  Right leg pain [M79.604]    SUBJECTIVE  Pain Level (0-10 scale): 3-4  Any medication changes, allergies to medications, adverse drug reactions, diagnosis change, or new procedure performed?: [] No    [x] Yes (see summary sheet for update)  Subjective:     Pt reports that he is having right leg and hip pain that started 2 years ago.  He recently has had increased discomfort in the gluteals and thigh which has had him seek additional therapy.  He was going to the gym and getting frequent massages that were helping but that has been more intermittent because of COVID.  He indicates the ITB and greater trochanter.  He has tried stretching and rolling on tennis balls.  He wants to learn new stretches and strengthening exercises that could help.  He also wonders if he needs an adjustment.  In his day to day activity he has the most trouble with fast paced walking, getting up and down from the floor, prolonged driving/sitting.  He denies numbness and tingling or symptoms traveling down the leg    OBJECTIVE/EXAMINATION  Posture:  Seated unremarkable for posture.    Other Observations:  Decreased hip extension with walking/movement  Gait and Functional Mobility:  Within functional limits  Palpation: tenderness in the right ITB and gluteal region over the piriformis    Lumbar ROM:  Flex 100%  Ext 25%  Rotation right 50% Left 25%  Side Bending right 25% left 75%      Joint Mobility Assessment: decreased hip mobility right greater than left    Flexibility: decreased hamstring and calf    LOWER QUARTER   MUSCLE STRENGTH  KEY       R  L  0 - No Contraction  Knee ext  5  5  1  - Trace            flex  4  3  2 - Poor   Hip ext   2  2  3 - Fair          flex   3  4  4 - Good         abd  NT  NT  5 - Normal         add  NT  NT      Ankle DF  4  3                PF  NT  NT                INV  NT  NT                EV  NT  NT      Neurological: Reflexes / Sensations: minor decreases in light touch on the left ankle   Special Tests: Trendelenberg: +    Stork: NT      Prosper: +     Adilia: NT                 H.S. SLR: NT    Piriformis Ext: NT      Long Sit: +     Hip Scour: +Right      Knee Varus/Valgus Stress: NT  Knee Lachmans: NT      Other: Slump + tightness      25 min Therapeutic Exercise:  [x] See flow sheet :   Rationale: increase ROM, increase strength, improve coordination, improve balance and increase proprioception to improve the patients ability to complete all activity    10 min Manual Therapy:  Corrected left anterior innominate and right posterior innominate with MET   Rationale: decrease pain, increase ROM, increase tissue extensibility, decrease trigger points and increase postural awareness  to improve the patients ability to complete all activity      Other Objective/Functional Measures:FOTO 58     Pain Level (0-10 scale) post treatment: 2-3      ASSESSMENT:      [x]  See Plan of Melissa, PT 12/30/2020

## 2020-12-30 NOTE — PROGRESS NOTES
[facility]  [address] [place] [address] [address]  Phone: [phone] Fax: [phone]    Plan of Care/Statement of Necessity for Physical Therapy Services  2-15    Patient name: Viola Macedo. : 1950  Provider#: [id number]  Referral source: Fito Peres MD      Medical/Treatment Diagnosis: Right hip pain [M25.551]  Right leg pain [M79.604]     Prior Hospitalization: see medical history     Comorbidities: arthritis, GI disease, back pain, HBP, prior surgery, stroke/TIA  Prior Level of Function: 20 min of exercise at least 3 times a week  Medications: Verified on Patient Summary List    Start of Care: 2020      Onset Date: chronic       The Plan of Care and following information is based on the information from the initial evaluation. Assessment/ key information: Pt is a 64 yo male who is in today to begin therapy for right hip and leg pain. He reports that this has been going on for some time and has recently gotten worse again. In the past therapy and massage have helped so he is here to begin a new course of care. He presents with decreased flexibility in the hamstring, piriformis and hip flexors as well as hip adductors. He does have some hip immobility in the right that is not present on the left. This is evident by the lack of ER and IR available and positive scour test.  He does have a leg length discrepancy Right < Left that responded to MET corrections. Pt is a good candidate for PT and will benefit from skilled services to address these deficits and work toward the goals listed below.       Evaluation Complexity History HIGH Complexity :3+ comorbidities / personal factors will impact the outcome/ POC ; Examination HIGH Complexity : 4+ Standardized tests and measures addressing body structure, function, activity limitation and / or participation in recreation  ;Presentation MEDIUM Complexity : Evolving with changing characteristics  ; Clinical Decision Making MEDIUM Complexity : FOTO score of 26-74  Overall Complexity Rating: MEDIUM    Problem List: pain affecting function, decrease ROM, decrease strength, impaired gait/ balance, decrease ADL/ functional abilitiies, decrease activity tolerance and decrease flexibility/ joint mobility   Treatment Plan may include any combination of the following: Therapeutic exercise, Therapeutic activities, Neuromuscular re-education, Physical agent/modality, Gait/balance training, Manual therapy, Patient education, Self Care training, Functional mobility training, Home safety training and Stair training  Patient / Family readiness to learn indicated by: asking questions, trying to perform skills and interest  Persons(s) to be included in education: patient (P)  Barriers to Learning/Limitations: None  Patient Goal (s): eliminate/reduce pain and spasm and sleep better  Patient Self Reported Health Status: good  Rehabilitation Potential: good    Short Term Goals: To be accomplished in 6-8 treatments:   . Pt will be I with HEP  Pt will complain of pain 1-2/10 with all activity  Pt will increase ROM to complete all ADL's more efficiently  Pt will be able to maintain positions for 30 min without increased symptoms    Long Term Goals: To be accomplished in 16-18 treatments:   Pt will complain of pain 0-1/10 with all activity  Pt will increase strength to maintain good core engagement with all activity to improve his posture during all activity and walking   Pt will increase FOTO score by 9 points to meet MDC and improve their function  Pt will increased flexibility to reach into all ranges to complete all ADL's more efficiently  Pt will return to all gym activity    Frequency / Duration: Patient to be seen 2 times per week for 16-18 treatments.     Patient/ Caregiver education and instruction: self care, activity modification and exercises    [x]  Plan of care has been reviewed with TONNY Aquino, PT 12/30/2020     ________________________________________________________________________    I certify that the above Therapy Services are being furnished while the patient is under my care. I agree with the treatment plan and certify that this therapy is necessary.     [de-identified] Signature:____________________  Date:____________Time: _________

## 2021-01-04 ENCOUNTER — HOSPITAL ENCOUNTER (OUTPATIENT)
Dept: PHYSICAL THERAPY | Age: 71
Discharge: HOME | End: 2021-01-04
Payer: MEDICARE

## 2021-01-04 PROCEDURE — 97110 THERAPEUTIC EXERCISES: CPT

## 2021-01-04 PROCEDURE — 97140 MANUAL THERAPY 1/> REGIONS: CPT

## 2021-01-04 NOTE — PROGRESS NOTES
PT DAILY TREATMENT NOTE 2-15    Patient Name: Alicia Champion. Date:2021  : 1950  [x]  Patient  Verified  Payor: Giovanna Rito / Plan: [facility] HUMAN MEDICARE CHOICE PPO/PFFS / Product Type: Managed Care Medicare /    In time:247  Out time:341  Total Treatment Time (min): 54  Visit #:  2    Treatment Area: Right hip pain [M25.551]  Right leg pain [M79.604]    SUBJECTIVE  Pain Level (0-10 scale): 4/1-0  Any medication changes, allergies to medications, adverse drug reactions, diagnosis change, or new procedure performed?: [x] No    [] Yes (see summary sheet for update)  Subjective functional status/changes:   [] No changes reported  Patient reports he has been doing his best to work out the muscles surrounding his hip, but it will always tighten back up after a day. OBJECTIVE    39 min Therapeutic Exercise:  [x] See flow sheet :   Rationale: increase ROM and increase strength to improve the patients ability to perform ADLs and reduce pain levels    15 min Manual Therapy:  STM ITB, Adductors, glutes, and hip flexors. Rationale: decrease pain, increase ROM, increase tissue extensibility and decrease trigger points  to improve the patients ability to perform ADLs and reduce pain levels    With   [] TE   [] TA   [] neuro   [] other: Patient Education: [x] Review HEP    [] Progressed/Changed HEP based on:   [] positioning   [] body mechanics   [] transfers   [] heat/ice application    [] other:      Other Objective/Functional Measures: none noted     Pain Level (0-10 scale) post treatment: 3/10    ASSESSMENT/Changes in Function:   Patient tolerated all therex well, will continue to progress as tolerated.   Patient will continue to benefit from skilled PT services to modify and progress therapeutic interventions, address functional mobility deficits, address ROM deficits, address strength deficits, analyze and address soft tissue restrictions, analyze and cue movement patterns, analyze and modify body mechanics/ergonomics and assess and modify postural abnormalities to attain remaining goals.     [x]  See Plan of Care  []  See progress note/recertification  []  See Discharge Summary         Progress towards goals / Updated goals:  Patient is progressing towards goals.     PLAN  [x]  Upgrade activities as tolerated     [x]  Continue plan of care  [x]  Update interventions per flow sheet       []  Discharge due to:_  []  Other:_      Nash Bhandari, PTA 1/4/2021

## 2021-01-06 ENCOUNTER — HOSPITAL ENCOUNTER (OUTPATIENT)
Dept: PHYSICAL THERAPY | Age: 71
Discharge: HOME | End: 2021-01-06
Payer: MEDICARE

## 2021-01-06 PROCEDURE — 97110 THERAPEUTIC EXERCISES: CPT

## 2021-01-06 PROCEDURE — 97140 MANUAL THERAPY 1/> REGIONS: CPT

## 2021-01-06 NOTE — PROGRESS NOTES
PT DAILY TREATMENT NOTE 2-15    Patient Name: Nelson Mercedes. Date:2021  : 1950  [x]  Patient  Verified  Payor: [employer]  / Plan: [facility] MEDICARE CHOICE PPO/PFFS / Product Type: Managed Care Medicare /    In time:830  Out time:825  Total Treatment Time (min): 55  Visit #:  3    Treatment Area: Right hip pain [M25.551]  Right leg pain [M79.604]    SUBJECTIVE  Pain Level (0-10 scale): 5/10  Any medication changes, allergies to medications, adverse drug reactions, diagnosis change, or new procedure performed?: [x] No    [] Yes (see summary sheet for update)  Subjective functional status/changes:   [] No changes reported  Patient reports he is a bit more stiff this morning, primarily in the Adductors. OBJECTIVE    40 min Therapeutic Exercise:  [x]? See flow sheet :   Rationale: increase ROM and increase strength to improve the patients ability to perform ADLs and reduce pain levels     15 min Manual Therapy:  STM ITB, Adductors, glutes, and hip flexors. Lateral hip mobilization. Rationale: decrease pain, increase ROM, increase tissue extensibility and decrease trigger points  to improve the patients ability to perform ADLs and reduce pain levels        With   [] TE   [] TA   [] neuro   [] other: Patient Education: [x] Review HEP    [] Progressed/Changed HEP based on:   [] positioning   [] body mechanics   [] transfers   [] heat/ice application    [] other:      Other Objective/Functional Measures: none noted     Pain Level (0-10 scale) post treatment: 2/10    ASSESSMENT/Changes in Function:    Patient showed more trigger points around the glutes and adductors compared to last session. Improved glute med activation. Slight knee pain when performing hip extension. Will continue to progress as tolerated.   Patient will continue to benefit from skilled PT services to modify and progress therapeutic interventions, address functional mobility deficits, address ROM deficits, address strength deficits, analyze and address soft tissue restrictions, analyze and cue movement patterns, analyze and modify body mechanics/ergonomics and assess and modify postural abnormalities to attain remaining goals. [x]  See Plan of Care  []  See progress note/recertification  []  See Discharge Summary         Progress towards goals / Updated goals:  Patient is progressing towards goals.      PLAN  [x]  Upgrade activities as tolerated     [x]  Continue plan of care  [x]  Update interventions per flow sheet       []  Discharge due to:_  []  Other:_      Leonor Judge, TONNY 1/6/2021

## 2021-01-09 DIAGNOSIS — I10 ESSENTIAL HYPERTENSION: ICD-10-CM

## 2021-01-09 RX ORDER — AMLODIPINE BESYLATE 10 MG/1
TABLET ORAL
Qty: 90 TAB | Refills: 0 | Status: SHIPPED | OUTPATIENT
Start: 2021-01-09 | End: 2021-04-04 | Stop reason: SDUPTHER

## 2021-01-11 ENCOUNTER — HOSPITAL ENCOUNTER (OUTPATIENT)
Dept: PHYSICAL THERAPY | Age: 71
Discharge: HOME | End: 2021-01-11
Payer: MEDICARE

## 2021-01-11 PROCEDURE — 97110 THERAPEUTIC EXERCISES: CPT | Performed by: PHYSICAL THERAPIST

## 2021-01-11 NOTE — PROGRESS NOTES
PT DAILY TREATMENT NOTE 2-15    Patient Name: Jenni Weinberg. Date:2021  : 1950  [x]  Patient  Verified  Payor: [place] O[place]nell / Plan: [facility] [employer] MEDICARE CHOICE PPO/PFFS / Product Type: Managed Care Medicare /    In time:1200  Out time:100  Total Treatment Time (min): 60  Visit #:  4    Treatment Area: Right hip pain [M25.551]  Right leg pain [M79.604]    SUBJECTIVE  Pain Level (0-10 scale): 10  Any medication changes, allergies to medications, adverse drug reactions, diagnosis change, or new procedure performed?: [x] No    [] Yes (see summary sheet for update)  Subjective functional status/changes:   [] No changes reported  Pt reports that he awoke Saturday wiith increased stiffness and pain in the right groin. He reports that after icing it a couple of times the pain was better. Today is states that he is doing ok      OBJECTIVE  Modalities:  Ice: 10 min to bilateral groin in supine to decrease inflammation and pain in order for the patient to complete all activity    50 min Therapeutic Exercise:  [x]? See flow sheet :   Rationale: increase ROM and increase strength to improve the patients ability to perform ADLs and reduce pain levels     0 min Manual Therapy:  STM ITB, Adductors, glutes, and hip flexors. Lateral hip mobilization. Rationale: decrease pain, increase ROM, increase tissue extensibility and decrease trigger points  to improve the patients ability to perform ADLs and reduce pain levels        With   [x] TE   [] TA   [] neuro   [] other: Patient Education: [x] Review HEP    [] Progressed/Changed HEP based on:   [x] positioning   [x] body mechanics   [] transfers   [] heat/ice application    [] other:      Other Objective/Functional Measures: none noted     Pain Level (0-10 scale) post treatment: 2-3/10    ASSESSMENT/Changes in Function:    Pt is able to tolerate all activity nicely. Pt is challenged when asked to complete there-ex slower and under control.   Remains weak in the right gluteals compared to the left. Need to include hip IR with there-ex and was added in today in a standing position. Continue progressing as able    Patient will continue to benefit from skilled PT services to modify and progress therapeutic interventions, address functional mobility deficits, address ROM deficits, address strength deficits, analyze and address soft tissue restrictions, analyze and cue movement patterns, analyze and modify body mechanics/ergonomics and assess and modify postural abnormalities to attain remaining goals. [x]  See Plan of Care  []  See progress note/recertification  []  See Discharge Summary         Progress towards goals / Updated goals:  Patient is progressing towards goals.      PLAN  [x]  Upgrade activities as tolerated     [x]  Continue plan of care  [x]  Update interventions per flow sheet       []  Discharge due to:_  []  Other:_      Jaqui Verdin, PT 1/11/2021

## 2021-01-13 ENCOUNTER — HOSPITAL ENCOUNTER (OUTPATIENT)
Dept: PHYSICAL THERAPY | Age: 71
Discharge: HOME | End: 2021-01-13
Payer: MEDICARE

## 2021-01-13 PROCEDURE — 97140 MANUAL THERAPY 1/> REGIONS: CPT

## 2021-01-13 PROCEDURE — 97110 THERAPEUTIC EXERCISES: CPT

## 2021-01-13 NOTE — PROGRESS NOTES
PT DAILY TREATMENT NOTE 2-15    Patient Name: Alicia Champion. Date:2021  : 1950  [x]  Patient  Verified  Payor: Giovanna Holderrogerio / Plan: [facility] HUMAN MEDICARE CHOICE PPO/PFFS / Product Type: Managed Care Medicare /    In time:919  Out time:1015  Total Treatment Time (min): 56  Visit #:  5    Treatment Area: Right hip pain [M25.551]  Right leg pain [M79.604]    SUBJECTIVE  Pain Level (0-10 scale): 310  Any medication changes, allergies to medications, adverse drug reactions, diagnosis change, or new procedure performed?: [x] No    [] Yes (see summary sheet for update)  Subjective functional status/changes:   [] No changes reported  Patient reports no major changes since last visit. OBJECTIVE    46 min Therapeutic Exercise:  [x]? ? See flow sheet :   Rationale: increase ROM and increase strength to improve the patients ability to perform ADLs and reduce pain levels     10 min Manual Therapy:  STM ITB, Adductors, glutes, and hip flexors. Lateral hip mobilization. Rationale: decrease pain, increase ROM, increase tissue extensibility and decrease trigger points  to improve the patients ability to perform ADLs and reduce pain levels            With   [] TE   [] TA   [] Neuro   [] SC   [] other: Patient Education: [x] Review HEP    [] Progressed/Changed HEP based on:   [] positioning   [] body mechanics   [] transfers   [] heat/ice application    [] other:      Other Objective/Functional Measures: none noted     Pain Level (0-10 scale) post treatment: 3/10    ASSESSMENT/Changes in Function:   Patient continues to be tender along the R glute and along the ITB. Continues to fatigue quickly with glute strengthening therex. Will continue to progress as tolerated.   Patient will continue to benefit from skilled PT services to modify and progress therapeutic interventions, address functional mobility deficits, address ROM deficits, address strength deficits, analyze and address soft tissue restrictions, analyze and cue movement patterns, analyze and modify body mechanics/ergonomics and assess and modify postural abnormalities to attain remaining goals. [x]  See Plan of Care  []  See progress note/recertification  []  See Discharge Summary         Progress towards goals / Updated goals:  Patient is progressing towards goals.      PLAN  [x]  Upgrade activities as tolerated     [x]  Continue plan of care  [x]  Update interventions per flow sheet       []  Discharge due to:_  []  Other:_      Mayte Earl, TONNY 1/13/2021

## 2021-01-18 ENCOUNTER — HOSPITAL ENCOUNTER (OUTPATIENT)
Dept: PHYSICAL THERAPY | Age: 71
Discharge: HOME | End: 2021-01-18
Payer: MEDICARE

## 2021-01-18 PROCEDURE — 97110 THERAPEUTIC EXERCISES: CPT

## 2021-01-18 PROCEDURE — 97140 MANUAL THERAPY 1/> REGIONS: CPT

## 2021-01-18 NOTE — PROGRESS NOTES
PT DAILY TREATMENT NOTE 2-15    Patient Name: Alicia Champion. Date:2021  : 1950  [x]  Patient  Verified  Payor: Giovanna Veras / Plan: [facility] MEDICARE CHOICE PPO/PFFS / Product Type: Managed Care Medicare /    In time:830  Out time:824  Total Treatment Time (min): 54  Visit #:  6    Treatment Area: Right hip pain [M25.551]  Right leg pain [M79.604]    SUBJECTIVE  Pain Level (0-10 scale): 5/10  Any medication changes, allergies to medications, adverse drug reactions, diagnosis change, or new procedure performed?: [x] No    [] Yes (see summary sheet for update)  Subjective functional status/changes:   [] No changes reported  Patient reports he was performing the hip flexor stretch and he felt a pop, he began to feel pain along his hip flexor. He iced it after which helped to reduce the pain. Since he has been much more genitale with the stretches and it has been feeling better. OBJECTIVE    45 min Therapeutic Exercise:  [x]? ?? See flow sheet :   Rationale: increase ROM and increase strength to improve the patients ability to perform ADLs and reduce pain levels     9 min Manual Therapy:  STM ITB, Adductors, glutes, and hip flexors. Lateral hip mobilization. Rationale: decrease pain, increase ROM, increase tissue extensibility and decrease trigger points  to improve the patients ability to perform ADLs and reduce pain levels    With   [] TE   [] TA   [] Neuro   [] SC   [] other: Patient Education: [x] Review HEP    [] Progressed/Changed HEP based on:   [] positioning   [] body mechanics   [] transfers   [] heat/ice application    [] other:      Other Objective/Functional Measures: none noted     Pain Level (0-10 scale) post treatment: 2/10    ASSESSMENT/Changes in Function:   Patient tolerated all strengthening therex well, will continue to progress as tolerated.   Patient will continue to benefit from skilled PT services to modify and progress therapeutic interventions, address functional mobility deficits, address ROM deficits, address strength deficits, analyze and address soft tissue restrictions, analyze and cue movement patterns, analyze and modify body mechanics/ergonomics and assess and modify postural abnormalities to attain remaining goals. [x]  See Plan of Care  []  See progress note/recertification  []  See Discharge Summary         Progress towards goals / Updated goals:  Patient is progressing towards goals.      PLAN  [x]  Upgrade activities as tolerated     [x]  Continue plan of care  [x]  Update interventions per flow sheet       []  Discharge due to:_  []  Other:_      Skylar Pop, PTA 1/18/2021

## 2021-01-20 ENCOUNTER — HOSPITAL ENCOUNTER (OUTPATIENT)
Dept: PHYSICAL THERAPY | Age: 71
Discharge: HOME | End: 2021-01-20
Payer: MEDICARE

## 2021-01-20 PROCEDURE — 97110 THERAPEUTIC EXERCISES: CPT

## 2021-01-20 NOTE — PROGRESS NOTES
PT DAILY TREATMENT NOTE 2-15    Patient Name: Abiel Cartwright. Date:2021  : 1950  [x]  Patient  Verified  Payor: Eduardo Jaeger / Plan: [facility] MEDICARE CHOICE PPO/PFFS / Product Type: Managed Care Medicare /    In time:908  Out time:1005  Total Treatment Time (min): 62  Visit #:  7  Treatment Area: Right hip pain [M25.551]  Right leg pain [M79.604]    SUBJECTIVE  Pain Level (0-10 scale): 310  Any medication changes, allergies to medications, adverse drug reactions, diagnosis change, or new procedure performed?: [x] No    [] Yes (see summary sheet for update)  Subjective functional status/changes:   [] No changes reported  Patient reports no major changes since last visit. OBJECTIVE    57 min Therapeutic Exercise:  [x] See flow sheet :   Rationale: increase ROM and increase strength to improve the patients ability to perform ADLs and reduce pain levels    With   [] TE   [] TA   [] Neuro   [] SC   [] other: Patient Education: [x] Review HEP    [] Progressed/Changed HEP based on:   [] positioning   [] body mechanics   [] transfers   [] heat/ice application    [] other:      Other Objective/Functional Measures: none noted     Pain Level (0-10 scale) post treatment: 3/10    ASSESSMENT/Changes in Function:   Patient tolerated all progressed therex well, will continue to progress as tolerated. Patient will continue to benefit from skilled PT services to modify and progress therapeutic interventions, address functional mobility deficits, address ROM deficits, address strength deficits, analyze and address soft tissue restrictions, analyze and cue movement patterns, analyze and modify body mechanics/ergonomics and assess and modify postural abnormalities to attain remaining goals. [x]  See Plan of Care  []  See progress note/recertification  []  See Discharge Summary         Progress towards goals / Updated goals:  Patient is progressing towards goals.      PLAN  [x]  Upgrade activities as tolerated     [x]  Continue plan of care  [x]  Update interventions per flow sheet       []  Discharge due to:_  []  Other:_      Meghna Ramsey PTA 1/20/2021

## 2021-01-25 ENCOUNTER — HOSPITAL ENCOUNTER (OUTPATIENT)
Dept: PHYSICAL THERAPY | Age: 71
Discharge: HOME | End: 2021-01-25
Payer: MEDICARE

## 2021-01-25 PROCEDURE — 97110 THERAPEUTIC EXERCISES: CPT

## 2021-01-27 ENCOUNTER — HOSPITAL ENCOUNTER (OUTPATIENT)
Dept: PHYSICAL THERAPY | Age: 71
Discharge: HOME | End: 2021-01-27
Payer: MEDICARE

## 2021-01-27 PROCEDURE — 97110 THERAPEUTIC EXERCISES: CPT

## 2021-01-27 NOTE — PROGRESS NOTES
PT DAILY TREATMENT NOTE 2-15    Patient Name: Alicia Champion. Date:2021  : 1950  [x]  Patient  Verified  Payor: Giovanna Veras / Plan: [facility] MEDICARE CHOICE PPO/PFFS / Product Type: Managed Care Medicare /    In time:915  Out time:1014  Total Treatment Time (min): 59  Visit #:  9  Treatment Area: Right hip pain [M25.551]  Right leg pain [M79.604]    SUBJECTIVE  Pain Level (0-10 scale): 10  Any medication changes, allergies to medications, adverse drug reactions, diagnosis change, or new procedure performed?: [x] No    [] Yes (see summary sheet for update)  Subjective functional status/changes:   [] No changes reported  Patient believes he is 60-70% better. Patient reports he was foam rolling his adductors and has been extremely sore since. Continues to feel like he does not have the strength that he should have, continues to have tightness throughout the ITB. He has been walking around a mile multiple times a week. OBJECTIVE    59 min Therapeutic Exercise:  [x] See flow sheet :   Rationale: increase ROM and increase strength to improve the patients ability to perform ADLs and reduce pain levels    With   [] TE   [] TA   [] Neuro   [] SC   [] other: Patient Education: [x] Review HEP    [] Progressed/Changed HEP based on:   [] positioning   [] body mechanics   [] transfers   [] heat/ice application    [] other:      Other Objective/Functional Measures: FOTO:  next visit    Pain Level (0-10 scale) post treatment: 3/10    ASSESSMENT/Changes in Function:   Patient continues to show improved glute activation. Good hip ROM. Tender along the ITB and adductors. Will continue to progress as tolerated.   Patient will continue to benefit from skilled PT services to modify and progress therapeutic interventions, address functional mobility deficits, address ROM deficits, address strength deficits, analyze and address soft tissue restrictions, analyze and cue movement patterns, analyze and modify body mechanics/ergonomics and assess and modify postural abnormalities to attain remaining goals. [x]  See Plan of Care  []  See progress note/recertification  []  See Discharge Summary         Progress towards goals / Updated goals:     Short Term Goals: To be accomplished in 6-8 treatments:              Pt will be I with HEP met              Pt will complain of pain 2-3/10 with all activity progressing towards              Pt will increase flexibility to normalize gait and improve his ability to perform all ADL's met  Long Term Goals:  To be accomplished in 18-20 treatments:              Pt will complain of pain 0-1/10 with all activity progressing towards              Pt will increase hip and pelvic mobility to improve function to complete all gym activity and yard work without increased symptoms not applicable               Pt will be able to transition sit to stand and drive for greater than 1 hour without increase symptoms when getting up not met     PLAN  [x]  Upgrade activities as tolerated     [x]  Continue plan of care  [x]  Update interventions per flow sheet       []  Discharge due to:_  []  Other:_      Mayte Earl, TONNY 1/27/2021

## 2021-02-01 ENCOUNTER — HOSPITAL ENCOUNTER (OUTPATIENT)
Dept: PHYSICAL THERAPY | Age: 71
Discharge: HOME | End: 2021-02-01
Payer: MEDICARE

## 2021-02-01 PROCEDURE — 97110 THERAPEUTIC EXERCISES: CPT

## 2021-02-01 NOTE — PROGRESS NOTES
PT DAILY TREATMENT NOTE 2-15    Patient Name: Subhash Limon. Date:2021  : 1950  [x]  Patient  Verified  Payor: Arti Matthews / Plan: [facility] MEDICARE CHOICE PPO/PFFS / Product Type: Managed Care Medicare /    In time:958  Out time:959  Total Treatment Time (min): 61  Visit #:  10    Treatment Area: Right hip pain [M25.551]  Right leg pain [M79.604]    SUBJECTIVE  Pain Level (0-10 scale): 10  Any medication changes, allergies to medications, adverse drug reactions, diagnosis change, or new procedure performed?: [x] No    [] Yes (see summary sheet for update)  Subjective functional status/changes:   [] No changes reported  Patient reports he is a bit sore and stiff along the adductors and ITB. OBJECTIVE    61 min Therapeutic Exercise:  [x] See flow sheet :   Rationale: increase ROM and increase strength to improve the patients ability to perform ADLs and reduce pain levels    With   [] TE   [] TA   [] Neuro   [] SC   [] other: Patient Education: [x] Review HEP    [] Progressed/Changed HEP based on:   [] positioning   [] body mechanics   [] transfers   [] heat/ice application    [] other:      Other Objective/Functional Measures: none noted     Pain Level (0-10 scale) post treatment: 3/10    ASSESSMENT/Changes in Function:   Patient demonstrates good over all stability. Was very challenged with TRX split squats. Tolerated all progressed therex well, will continue to progress as tolerated. Patient will continue to benefit from skilled PT services to modify and progress therapeutic interventions, address functional mobility deficits, address ROM deficits, address strength deficits, analyze and address soft tissue restrictions, analyze and cue movement patterns, analyze and modify body mechanics/ergonomics and assess and modify postural abnormalities to attain remaining goals.      [x]  See Plan of Care  []  See progress note/recertification  []  See Discharge Summary         Progress towards goals / Updated goals:  Patient is progressing towards goals.      PLAN  [x]  Upgrade activities as tolerated     [x]  Continue plan of care  [x]  Update interventions per flow sheet       []  Discharge due to:_  []  Other:_      Severo Hartigan, PTA 2/1/2021

## 2021-02-08 ENCOUNTER — HOSPITAL ENCOUNTER (OUTPATIENT)
Dept: PHYSICAL THERAPY | Age: 71
Discharge: HOME | End: 2021-02-08
Payer: MEDICARE

## 2021-02-08 PROCEDURE — 97110 THERAPEUTIC EXERCISES: CPT

## 2021-02-08 NOTE — PROGRESS NOTES
PT DAILY TREATMENT NOTE 2-15    Patient Name: Cady Elam. Date:2021  : 1950  [x]  Patient  Verified  Payor: Gio Atkinson / Plan: [facility] MEDICARE CHOICE PPO/PFFS / Product Type: Managed Care Medicare /    In time:915  Out time:1027  Total Treatment Time (min): 72  Visit #:  11    Treatment Area: Right hip pain [M25.551]  Right leg pain [M79.604]    SUBJECTIVE  Pain Level (0-10 scale): 5/10  Any medication changes, allergies to medications, adverse drug reactions, diagnosis change, or new procedure performed?: [x] No    [] Yes (see summary sheet for update)  Subjective functional status/changes:   [] No changes reported  Patient reports he attempted a drive to [place] in 2 hours. When he got there he experienced discomfort and tightness in the ITB, TFL, and piriformis. OBJECTIVE    72 min Therapeutic Exercise:  [] See flow sheet :   Rationale: increase ROM and increase strength to improve the patients ability to perform ADLs and reduce pain levels    With   [] TE   [] TA   [] Neuro   [] SC   [] other: Patient Education: [x] Review HEP    [] Progressed/Changed HEP based on:   [] positioning   [] body mechanics   [] transfers   [] heat/ice application    [] other:      Other Objective/Functional Measures: none noted     Pain Level (0-10 scale) post treatment: 3/10    ASSESSMENT/Changes in Function:   Patient will have difficulty activating the glutes unless focusing directly on them during dynamic exercises. Tolerated therex, will continue to progress as tolerated. Patient will continue to benefit from skilled PT services to modify and progress therapeutic interventions, address functional mobility deficits, address ROM deficits, address strength deficits, analyze and address soft tissue restrictions, analyze and cue movement patterns, analyze and modify body mechanics/ergonomics and assess and modify postural abnormalities to attain remaining goals.      []  See Plan of Care  []  See progress note/recertification  []  See Discharge Summary         Progress towards goals / Updated goals:  Patient is progressing towards goals.      PLAN  []  Upgrade activities as tolerated     []  Continue plan of care  []  Update interventions per flow sheet       []  Discharge due to:_  []  Other:_      Josie Matthews, TONNY 2/8/2021

## 2021-02-15 ENCOUNTER — HOSPITAL ENCOUNTER (OUTPATIENT)
Dept: PHYSICAL THERAPY | Age: 71
Discharge: HOME | End: 2021-02-15
Payer: MEDICARE

## 2021-02-15 ENCOUNTER — TELEPHONE (OUTPATIENT)
Dept: INTERNAL MEDICINE CLINIC | Age: 71
End: 2021-02-15

## 2021-02-15 PROCEDURE — 97110 THERAPEUTIC EXERCISES: CPT

## 2021-02-15 NOTE — TELEPHONE ENCOUNTER
#[phone] pt states he received a call to get his AWV and is asking if the CPE in December can count towards this? Please call to let pt know for sure. Thanks.

## 2021-02-15 NOTE — PROGRESS NOTES
PT DAILY TREATMENT NOTE 2-15    Patient Name: Aparna Tom. Date:2/15/2021  : 1950  [x]  Patient  Verified  Payor: [employer] / Plan: [facility] MEDICARE CHOICE PPO/PFFS / Product Type: Managed Care Medicare /    In time:915  Out time:1009  Total Treatment Time (min): 54  Visit #:  12  Treatment Area: Right hip pain [M25.551]  Right leg pain [M79.604]    SUBJECTIVE  Pain Level (0-10 scale): 10  Any medication changes, allergies to medications, adverse drug reactions, diagnosis change, or new procedure performed?: [x] No    [] Yes (see summary sheet for update)  Subjective functional status/changes:   [] No changes reported  Patient believes he is 75% better. Primary feels tightness and R sided piriformis discomfort. He has been active with walking every day to 5 days a weeks. OBJECTIVE    54 min Therapeutic Exercise:  [x] See flow sheet :   Rationale: increase ROM and increase strength to improve the patients ability to perform ADLs and reduce pain levels    With   [] TE   [] TA   [] Neuro   [] SC   [] other: Patient Education: [x] Review HEP    [] Progressed/Changed HEP based on:   [] positioning   [] body mechanics   [] transfers   [] heat/ice application    [] other:      Other Objective/Functional Measures: FOTO:  75/100    Pain Level (0-10 scale) post treatment: 1/10    ASSESSMENT/Changes in Function:   Patient demonstrates a good understanding of proper form with all therex.      [x]  See Plan of Care  []  See progress note/recertification  []  See Discharge Summary         Progress towards goals / Updated goals:  Short Term Goals: To be accomplished in 6-8 treatments:              Pt will be I with HEP met              Pt will complain of pain 2-3/10 with all activity met              Pt will increase flexibility to normalize gait and improve his ability to perform all ADL's met  Long Term Goals: To be accomplished in 18-20 treatments:              Pt will complain of pain 0-1/10 with all activity progressing towards              Pt will increase hip and pelvic mobility to improve function to complete all gym activity and yard work without increased symptoms met              Pt will be able to transition sit to stand and drive for greater than 1 hour without increase symptoms when getting up progressing towards    PLAN  [x]  Upgrade activities as tolerated     [x]  Continue plan of care  [x]  Update interventions per flow sheet       []  Discharge due to:_  []  Other:_      Josie Matthews, PTA 2/15/2021

## 2021-02-15 NOTE — TELEPHONE ENCOUNTER
Spoke with patient. Two pt identifiers confirmed. Patient advised that he is not due for his AWV until December. Pt verbalized understanding of information discussed w/ no further questions at this time.

## 2021-02-17 NOTE — ANCILLARY DISCHARGE INSTRUCTIONS
[facility] [address] [place], [facility] [address] Phone: [phone] Fax: [phone] Discharge Summary  2-15 Patient name: Sherry Marcelo. : 1950  Provider#: [id number] Referral source: Jessica Riley MD     
Medical/Treatment Diagnosis: Right hip pain [M25.551] Right leg pain [M79.604] Prior Hospitalization: see medical history Comorbidities: See Plan of Care Prior Level of Function:See Plan of Care Medications: Verified on Patient Summary List 
 
Start of Care: 20      Onset Date:chronic Visits from Start of Care: 12     Missed Visits: 0 Reporting Period : 20 to 2/15/21 ASSESSMENT/SUMMARY OF CARE:  
Patient has completed twelve sessions of outpatient physical therapy for his chronic R hip and leg pain. He has been able to go on walks at a minimum of 5 times a week with little to no discomfort. Patient will continue to experience adductor and ITB tightness when stagnate, but will do his exercises after which will help reduce those restrictions. He will continue to perform his HEP in order to maintain benefits of physical therapy. Short Term Goals: To be accomplished in 6-8 treatments: 
            Pt will be I with HEP met 
            Pt will complain of pain 2-3/10 with all activity met 
            Pt will increase flexibility to normalize gait and improve his ability to perform all ADL's met Long Term Goals: To be accomplished in 18-20 treatments: 
            Pt will complain of pain 0-1/10 with all activity progressing towards 
            Pt will increase hip and pelvic mobility to improve function to complete all gym activity and yard work without increased symptoms met 
            Pt will be able to transition sit to stand and drive for greater than 1 hour without increase symptoms when getting up progressing towards Other Objective/Functional Measures: FOTO:  75/100 RECOMMENDATIONS: 
[x]Discontinue therapy: [x]Patient has reached or is progressing toward set goals []Patient is non-compliant or has abdicated 
    []Due to lack of appreciable progress towards set goals []Other Ashok Ying, PT 2/17/2021

## 2021-02-26 ENCOUNTER — VIRTUAL VISIT (OUTPATIENT)
Dept: INTERNAL MEDICINE CLINIC | Age: 71
End: 2021-02-26
Payer: MEDICARE

## 2021-02-26 ENCOUNTER — PATIENT MESSAGE (OUTPATIENT)
Dept: INTERNAL MEDICINE CLINIC | Age: 71
End: 2021-02-26

## 2021-02-26 DIAGNOSIS — M89.319 CLAVICLE ENLARGEMENT: Primary | ICD-10-CM

## 2021-02-26 PROCEDURE — G8419 CALC BMI OUT NRM PARAM NOF/U: HCPCS | Performed by: FAMILY MEDICINE

## 2021-02-26 PROCEDURE — G8432 DEP SCR NOT DOC, RNG: HCPCS | Performed by: FAMILY MEDICINE

## 2021-02-26 PROCEDURE — 3017F COLORECTAL CA SCREEN DOC REV: CPT | Performed by: FAMILY MEDICINE

## 2021-02-26 PROCEDURE — G8756 NO BP MEASURE DOC: HCPCS | Performed by: FAMILY MEDICINE

## 2021-02-26 PROCEDURE — 1101F PT FALLS ASSESS-DOCD LE1/YR: CPT | Performed by: FAMILY MEDICINE

## 2021-02-26 PROCEDURE — G8428 CUR MEDS NOT DOCUMENT: HCPCS | Performed by: FAMILY MEDICINE

## 2021-02-26 PROCEDURE — G8536 NO DOC ELDER MAL SCRN: HCPCS | Performed by: FAMILY MEDICINE

## 2021-02-26 PROCEDURE — 99212 OFFICE O/P EST SF 10 MIN: CPT | Performed by: FAMILY MEDICINE

## 2021-02-26 NOTE — PROGRESS NOTES
Rock Ramirez is a 65 y.o. male who presents with concern of prominent collar bone on right side. Nontender. Noticed this last night. No respiratory symptoms. Former smoker. This is an established visit conducted via telemedicine with video. The patient has been instructed that this meets HIPAA criteria and acknowledges and agrees to this method of visitation. Pursuant to the emergency declaration under the [address] [place] [address] [facility] authority and the [employer] Act, this Virtual Visit was conducted, with patient's consent, to reduce the patient's risk of exposure to COVID-19 and provide continuity of care for an established patient. Services were provided through a video synchronous discussion virtually to substitute for in-person clinic visit.         Past Medical History:   Diagnosis Date    Arthritis     Enlarged prostate     GERD (gastroesophageal reflux disease)     High cholesterol     Hx of seasonal allergies     Hypertension     PMR (polymyalgia rheumatica) (HCC)     TIA (transient ischemic attack) 2019       Family History   Problem Relation Age of Onset    Cancer Mother         lung    Cancer Father         lung       Social History     Socioeconomic History    Marital status:      Spouse name: Not on file    Number of children: Not on file    Years of education: Not on file    Highest education level: Not on file   Occupational History    Not on file   Social Needs    Financial resource strain: Not on file    Food insecurity     Worry: Not on file     Inability: Not on file    Transportation needs     Medical: Not on file     Non-medical: Not on file   Tobacco Use    Smoking status: Former Smoker     Packs/day: 1.50     Years: 15.00     Pack years: 22.50     Quit date: 9/3/1990     Years since quittin.5    Smokeless tobacco: Never Used   Substance and Sexual Activity    Alcohol use: Yes     Alcohol/week: 2.0 standard drinks     Types: 1 Cans of beer, 1 Shots of liquor per week     Frequency: 2-3 times a week     Drinks per session: 1 or 2    Drug use: No    Sexual activity: Yes     Partners: Female     Birth control/protection: None   Lifestyle    Physical activity     Days per week: Not on file     Minutes per session: Not on file    Stress: Not on file   Relationships    Social connections     Talks on phone: Not on file     Gets together: Not on file     Attends Taoist service: Not on file     Active member of club or organization: Not on file     Attends meetings of clubs or organizations: Not on file     Relationship status: Not on file    Intimate partner violence     Fear of current or ex partner: Not on file     Emotionally abused: Not on file     Physically abused: Not on file     Forced sexual activity: Not on file   Other Topics Concern    Not on file   Social History Narrative    Not on file       Current Outpatient Medications on File Prior to Visit   Medication Sig Dispense Refill    atorvastatin (LIPITOR) 40 mg tablet Take 1 tablet by mouth nightly 90 Tab 3    amLODIPine (NORVASC) 10 mg tablet Take 1 tablet by mouth once daily 90 Tab 0    pantoprazole (PROTONIX) 40 mg tablet Take 1 tablet by mouth once daily 90 Tab 0    fluorouraciL (EFUDEX) 5 % chemo cream APPLY EXTERNALLY TO AFFECTED AREA TWICE DAILY FOR 6 WEEKS      cyclobenzaprine (FLEXERIL) 10 mg tablet Take 1 Tab by mouth nightly. As needed for muscle spasm. 30 Tab 1    ascorbic acid, vitamin C, (Vitamin C) 500 mg tablet Take 500 mg by mouth daily.  atorvastatin (LIPITOR) 20 mg tablet Take 1 Tab by mouth daily. (Patient taking differently: Take 10 mg by mouth daily.) 90 Tab 3    aspirin 81 mg chewable tablet Take 1 Tab by mouth daily. 30 Tab 1    gabapentin (NEURONTIN) 300 mg capsule Take 300 mg by mouth two (2) times a day.       triamcinolone (NASACORT) 55 mcg nasal inhaler 2 Sprays by Both Nostrils route daily.  diclofenac (VOLTAREN) 1 % gel Apply two grams by topical route four times daily to the affected area (s). PRN      fexofenadine (ALLEGRA) 180 mg tablet Take 180 mg by mouth daily as needed.  cholecalciferol (VITAMIN D3) 1,000 unit tablet Take 1,000 Units by mouth daily. No current facility-administered medications on file prior to visit. Review of Systems  Pertinent items are noted in HPI. Objective:     Gen: well appearing male  HEENT: normal conjunctiva, no audible congestion, patient does not see oral erythema, has MMM  Neck: patient does not feel enlarged or tender LAD or masses  Resp: normal respiratory effort, no audible wheezing. CV: patient does not feel palpitations or heart irregularity  Chest: Patient has enlargement of the right clavicle,  no mass or lymph node swelling  Neuro: Alert and oriented, able to answer questions without difficulty    Assessment/Plan:       ICD-10-CM ICD-9-CM    1. Clavicle enlargement  M89.319 733.99 XR CHEST PA LAT      XR CLAVICLE RT       This was a telemedicine visit with video.         Mireya Heaton MD

## 2021-03-18 RX ORDER — CYCLOBENZAPRINE HCL 10 MG
TABLET ORAL
Qty: 30 TAB | Refills: 0 | Status: SHIPPED | OUTPATIENT
Start: 2021-03-18 | End: 2021-07-29

## 2021-03-18 RX ORDER — PANTOPRAZOLE SODIUM 40 MG/1
TABLET, DELAYED RELEASE ORAL
Qty: 90 TAB | Refills: 0 | Status: SHIPPED | OUTPATIENT
Start: 2021-03-18 | End: 2021-06-13

## 2021-04-03 DIAGNOSIS — I10 ESSENTIAL HYPERTENSION: ICD-10-CM

## 2021-04-04 RX ORDER — AMLODIPINE BESYLATE 10 MG/1
TABLET ORAL
Qty: 90 TAB | Refills: 0 | Status: SHIPPED | OUTPATIENT
Start: 2021-04-04 | End: 2021-07-11

## 2021-06-13 RX ORDER — PANTOPRAZOLE SODIUM 40 MG/1
TABLET, DELAYED RELEASE ORAL
Qty: 90 TABLET | Refills: 0 | Status: SHIPPED | OUTPATIENT
Start: 2021-06-13 | End: 2021-12-01

## 2021-06-16 ENCOUNTER — ANESTHESIA (OUTPATIENT)
Dept: ENDOSCOPY | Age: 71
End: 2021-06-16
Payer: MEDICARE

## 2021-06-16 ENCOUNTER — HOSPITAL ENCOUNTER (OUTPATIENT)
Age: 71
Setting detail: OUTPATIENT SURGERY
Discharge: HOME | End: 2021-06-16
Attending: INTERNAL MEDICINE | Admitting: INTERNAL MEDICINE
Payer: MEDICARE

## 2021-06-16 ENCOUNTER — ANESTHESIA EVENT (OUTPATIENT)
Dept: ENDOSCOPY | Age: 71
End: 2021-06-16
Payer: MEDICARE

## 2021-06-16 VITALS
SYSTOLIC BLOOD PRESSURE: 121 MMHG | WEIGHT: 182.98 LBS | DIASTOLIC BLOOD PRESSURE: 87 MMHG | TEMPERATURE: 96.9 F | OXYGEN SATURATION: 98 % | HEIGHT: 70 IN | BODY MASS INDEX: 26.2 KG/M2 | HEART RATE: 68 BPM | RESPIRATION RATE: 18 BRPM

## 2021-06-16 PROCEDURE — 76060000032 HC ANESTHESIA 0.5 TO 1 HR: Performed by: INTERNAL MEDICINE

## 2021-06-16 PROCEDURE — 76040000007: Performed by: INTERNAL MEDICINE

## 2021-06-16 PROCEDURE — 74011250637 HC RX REV CODE- 250/637: Performed by: INTERNAL MEDICINE

## 2021-06-16 PROCEDURE — 88305 TISSUE EXAM BY PATHOLOGIST: CPT

## 2021-06-16 PROCEDURE — 74011000250 HC RX REV CODE- 250: Performed by: NURSE ANESTHETIST, CERTIFIED REGISTERED

## 2021-06-16 PROCEDURE — 2709999900 HC NON-CHARGEABLE SUPPLY: Performed by: INTERNAL MEDICINE

## 2021-06-16 PROCEDURE — 74011250636 HC RX REV CODE- 250/636: Performed by: NURSE ANESTHETIST, CERTIFIED REGISTERED

## 2021-06-16 PROCEDURE — 77030021593 HC FCPS BIOP ENDOSC BSC -A: Performed by: INTERNAL MEDICINE

## 2021-06-16 RX ORDER — SODIUM CHLORIDE 9 MG/ML
50 INJECTION, SOLUTION INTRAVENOUS CONTINUOUS
Status: DISCONTINUED | OUTPATIENT
Start: 2021-06-16 | End: 2021-06-16 | Stop reason: HOSPADM

## 2021-06-16 RX ORDER — DEXTROMETHORPHAN/PSEUDOEPHED 2.5-7.5/.8
1.2 DROPS ORAL
Status: DISCONTINUED | OUTPATIENT
Start: 2021-06-16 | End: 2021-06-16 | Stop reason: HOSPADM

## 2021-06-16 RX ORDER — NALOXONE HYDROCHLORIDE 0.4 MG/ML
0.4 INJECTION, SOLUTION INTRAMUSCULAR; INTRAVENOUS; SUBCUTANEOUS
Status: DISCONTINUED | OUTPATIENT
Start: 2021-06-16 | End: 2021-06-16 | Stop reason: HOSPADM

## 2021-06-16 RX ORDER — SODIUM CHLORIDE 0.9 % (FLUSH) 0.9 %
5-40 SYRINGE (ML) INJECTION EVERY 8 HOURS
Status: DISCONTINUED | OUTPATIENT
Start: 2021-06-16 | End: 2021-06-16 | Stop reason: HOSPADM

## 2021-06-16 RX ORDER — EPINEPHRINE 0.1 MG/ML
1 INJECTION INTRACARDIAC; INTRAVENOUS
Status: DISCONTINUED | OUTPATIENT
Start: 2021-06-16 | End: 2021-06-16 | Stop reason: HOSPADM

## 2021-06-16 RX ORDER — SODIUM CHLORIDE 0.9 % (FLUSH) 0.9 %
5-40 SYRINGE (ML) INJECTION AS NEEDED
Status: DISCONTINUED | OUTPATIENT
Start: 2021-06-16 | End: 2021-06-16 | Stop reason: HOSPADM

## 2021-06-16 RX ORDER — LIDOCAINE HYDROCHLORIDE 20 MG/ML
INJECTION, SOLUTION EPIDURAL; INFILTRATION; INTRACAUDAL; PERINEURAL AS NEEDED
Status: DISCONTINUED | OUTPATIENT
Start: 2021-06-16 | End: 2021-06-16 | Stop reason: HOSPADM

## 2021-06-16 RX ORDER — ATROPINE SULFATE 0.1 MG/ML
0.5 INJECTION INTRAVENOUS
Status: DISCONTINUED | OUTPATIENT
Start: 2021-06-16 | End: 2021-06-16 | Stop reason: HOSPADM

## 2021-06-16 RX ORDER — SODIUM CHLORIDE 9 MG/ML
INJECTION, SOLUTION INTRAVENOUS
Status: DISCONTINUED | OUTPATIENT
Start: 2021-06-16 | End: 2021-06-16 | Stop reason: HOSPADM

## 2021-06-16 RX ORDER — PROPOFOL 10 MG/ML
INJECTION, EMULSION INTRAVENOUS AS NEEDED
Status: DISCONTINUED | OUTPATIENT
Start: 2021-06-16 | End: 2021-06-16 | Stop reason: HOSPADM

## 2021-06-16 RX ORDER — FLUMAZENIL 0.1 MG/ML
0.2 INJECTION INTRAVENOUS
Status: DISCONTINUED | OUTPATIENT
Start: 2021-06-16 | End: 2021-06-16 | Stop reason: HOSPADM

## 2021-06-16 RX ADMIN — PROPOFOL 50 MG: 10 INJECTION, EMULSION INTRAVENOUS at 14:27

## 2021-06-16 RX ADMIN — PROPOFOL 50 MG: 10 INJECTION, EMULSION INTRAVENOUS at 14:23

## 2021-06-16 RX ADMIN — PROPOFOL 50 MG: 10 INJECTION, EMULSION INTRAVENOUS at 14:07

## 2021-06-16 RX ADMIN — PROPOFOL 50 MG: 10 INJECTION, EMULSION INTRAVENOUS at 14:00

## 2021-06-16 RX ADMIN — PROPOFOL 50 MG: 10 INJECTION, EMULSION INTRAVENOUS at 14:11

## 2021-06-16 RX ADMIN — PROPOFOL 100 MG: 10 INJECTION, EMULSION INTRAVENOUS at 13:53

## 2021-06-16 RX ADMIN — SODIUM CHLORIDE: 900 INJECTION, SOLUTION INTRAVENOUS at 13:42

## 2021-06-16 RX ADMIN — LIDOCAINE HYDROCHLORIDE 50 MG: 20 INJECTION, SOLUTION EPIDURAL; INFILTRATION; INTRACAUDAL; PERINEURAL at 13:53

## 2021-06-16 RX ADMIN — PROPOFOL 50 MG: 10 INJECTION, EMULSION INTRAVENOUS at 14:19

## 2021-06-16 RX ADMIN — PROPOFOL 50 MG: 10 INJECTION, EMULSION INTRAVENOUS at 14:03

## 2021-06-16 RX ADMIN — PROPOFOL 50 MG: 10 INJECTION, EMULSION INTRAVENOUS at 13:56

## 2021-06-16 RX ADMIN — PROPOFOL 50 MG: 10 INJECTION, EMULSION INTRAVENOUS at 14:15

## 2021-06-16 NOTE — ANESTHESIA POSTPROCEDURE EVALUATION
Post-Anesthesia Evaluation and Assessment    Patient: Jay Boyd MRN: [id number]  SSN: [id number]    YOB: 1950  Age: 66 y.o. Sex: male      I have evaluated the patient and they are stable and ready for discharge from the PACU. Cardiovascular Function/Vital Signs  Visit Vitals  /68   Pulse 62   Temp 36.1 °C (96.9 °F)   Resp 14   Ht 5' 10\" (1.778 m)   Wt 83 kg (182 lb 15.7 oz)   SpO2 95%   BMI 26.26 kg/m²       Patient is status post MAC anesthesia for Procedure(s):  COLONOSCOPY,EGD  .  ESOPHAGOGASTRODUODENAL (EGD) BIOPSY. Nausea/Vomiting: None    Postoperative hydration reviewed and adequate. Pain:  Pain Scale 1: Adult Nonverbal Pain Scale (06/16/21 1435)  Pain Intensity 1: 0 (06/16/21 1325)   Managed    Neurological Status: At baseline    Mental Status, Level of Consciousness: Alert and  oriented to person, place, and time    Pulmonary Status:   O2 Device: None (Room air) (06/16/21 1435)   Adequate oxygenation and airway patent    Complications related to anesthesia: None    Post-anesthesia assessment completed. No concerns    Signed By: Kalpana Greer MD     June 16, 2021              Procedure(s):  COLONOSCOPY,EGD  .  ESOPHAGOGASTRODUODENAL (EGD) BIOPSY. MAC    <BSHSIANPOST>    INITIAL Post-op Vital signs:   Vitals Value Taken Time   /87 06/16/21 1445   Temp 36.1 °C (96.9 °F) 06/16/21 1433   Pulse 70 06/16/21 1446   Resp 16 06/16/21 1446   SpO2 98 % 06/16/21 1446   Vitals shown include unvalidated device data.

## 2021-06-16 NOTE — H&P
[address] [facility] [address]  217 [facility] 140 [facility], [address]  [phone]                                History and Physical     NAME: Jazmine Galeana. :  1950   MRN:  [id number]     HPI:  The patient was seen and examined. Past Surgical History:   Procedure Laterality Date    COLONOSCOPY N/A 2018    COLONOSCOPY WITH BIOPSIES performed by Seamus Cordero MD at [facility] AMBULATORY OR    HX CARPAL TUNNEL RELEASE Right     HX COLONOSCOPY      HX HEENT      lymph node bx in neck (benign)    HX LAP CHOLECYSTECTOMY  2000    HX ORTHOPAEDIC  2008    Back Surgery lumbar    HX ORTHOPAEDIC Right 2016    thumb and palm under ring finger    HX PARATHYROIDECTOMY      one right sided . Past Medical History:   Diagnosis Date    Arthritis     Autoimmune disease ([facility] [address]     Patient stated \" HX of autoimmune muscular disorder\"    Enlarged prostate     GERD (gastroesophageal reflux disease)     High cholesterol     Hx of seasonal allergies     Hypertension     PMR (polymyalgia rheumatica) (HCC)     TIA (transient ischemic attack) 2019     Social History     Tobacco Use    Smoking status: Former Smoker     Packs/day: 1.50     Years: 15.00     Pack years: 22.50     Quit date: 9/3/1990     Years since quittin.8    Smokeless tobacco: Never Used   Vaping Use    Vaping Use: Never used   Substance Use Topics    Alcohol use: Yes     Alcohol/week: 2.0 standard drinks     Types: 1 Cans of beer, 1 Shots of liquor per week    Drug use: No     Allergies   Allergen Reactions    Shellfish Derived Anaphylaxis    Ace Inhibitors Cough    Nsaids (Non-Steroidal Anti-Inflammatory Drug) Other (comments)     GI bleeding.      Pcn [Penicillins] Rash     Family History   Problem Relation Age of Onset    Cancer Mother         lung    Cancer Father         lung     Current Facility-Administered Medications   Medication Dose Route Frequency    0.9% sodium chloride infusion  50 mL/hr IntraVENous CONTINUOUS    sodium chloride (NS) flush 5-40 mL  5-40 mL IntraVENous Q8H    sodium chloride (NS) flush 5-40 mL  5-40 mL IntraVENous PRN    naloxone (NARCAN) injection 0.4 mg  0.4 mg IntraVENous Multiple    flumazeniL (ROMAZICON) 0.1 mg/mL injection 0.2 mg  0.2 mg IntraVENous Multiple    simethicone (MYLICON) 57WY/6.4ZR oral drops 80 mg  1.2 mL Oral Multiple    atropine injection 0.5 mg  0.5 mg IntraVENous ONCE PRN    EPINEPHrine (ADRENALIN) 0.1 mg/mL syringe 1 mg  1 mg Endoscopically ONCE PRN     Facility-Administered Medications Ordered in Other Encounters   Medication Dose Route Frequency    0.9% sodium chloride infusion   IntraVENous CONTINUOUS         PHYSICAL EXAM:  General: WD, WN. Alert, cooperative, no acute distress    HEENT: NC, Atraumatic. PERRLA, EOMI. Anicteric sclerae. Lungs:  CTA Bilaterally. No Wheezing/Rhonchi/Rales. Heart:  Regular  rhythm,  No murmur, No Rubs, No Gallops  Abdomen: Soft, Non distended, Non tender. +Bowel sounds, no HSM  Extremities: No c/c/e  Neurologic:  CN 2-12 gi, Alert and oriented X 3. No acute neurological distress   Psych:   Good insight. Not anxious nor agitated. The heart, lungs and mental status were satisfactory for the administration of MAC sedation and for the procedure. Mallampati score: 3     The patient was counseled at length about the risks of lorie Covid-19 in the suri-operative and post-operative states including the recovery window of their procedure. The patient was made aware that lorie Covid-19 after a surgical procedure may worsen their prognosis for recovering from the virus and lend to a higher morbidity and or mortality risk. The patient was given the options of postponing their procedure. All of the risks, benefits, and alternatives were discussed. The patient does  wish to proceed with the procedure.       Assessment:   · GERD  · Constipation    Plan:   · Endoscopic procedure  · MAC sedation   ·

## 2021-06-16 NOTE — PROGRESS NOTES
Martha Aldo.  1950  [id number]    Situation:  Verbal report received from: Cesar Kulkarni  Procedure: Procedure(s):  COLONOSCOPY,EGD  .  ESOPHAGOGASTRODUODENAL (EGD) BIOPSY    Background:    Preoperative diagnosis: GERD, CHRONIC IDIOPATHIC CONSTIPATION  Postoperative diagnosis: EGD - Gastritis, hiatal hernia    :  Dr. Poppy Britton  Assistant(s): Endoscopy Technician-1: Dat Mayo  Endoscopy RN-1: Chris Klein RN    Specimens:   ID Type Source Tests Collected by Time Destination   1 : Gastric Preservative Gastric  Mary Potter MD 6/16/2021 1409 Pathology     H. Pylori  no    Assessment:    Anesthesia gave intra-procedure sedation and medications, see anesthesia flow sheet yes    Intravenous fluids: NS@ KVO     Vital signs stable     Abdominal assessment: round and soft     Recommendation:  Discharge patient per MD order.     Family  Permission to share finding with family or friend yes

## 2021-06-16 NOTE — DISCHARGE INSTRUCTIONS
118 [facility].  [address] Magdaleno Buerger.  [id number]  1950    DISCOMFORT:  Redness at IV site- apply warm compress to area; if redness or soreness persist- contact your physician  There may be a slight amount of blood passed from the rectum  Gaseous discomfort- walking, belching will help relieve any discomfort    DIET:   High fiber diet. GERD diet: avoid fried and fatty foods. peppermint, chocolate, alcohol, coffee, citrus fruits and juices, tomoato products; avoid lying down for 2 to 3 hours after eating.   - however -  remember your colon is empty and a heavy meal will produce gas. Avoid these foods:  vegetables, fried / greasy foods, carbonated drinks for today   You may not  drink alcoholic beverages for at least 12 hours      ACTIVITY  Spend the remainder of the day resting -  avoid any strenuous activity, then you may resume your normal daily activities   You may not operate a vehicle for 12 hours  You may not  engage in an occupation involving machinery or appliances for rest of today  Avoid making any critical decisions for at least 24 hour    CALL M.D. ANY SIGN OF   Increasing pain, nausea, vomiting  Abdominal distension (swelling)  New increased bleeding (oral or rectal)  Fever (chills)  Pain in chest area  Bloody discharge from nose or mouth  Shortness of breath    You may not  take any Advil, Aspirin, Ibuprofen, Motrin, Aleve, or Goodys for 7 days, ONLY  Tylenol as needed for pain. Stop Pantoprazole and start Dexilant (e-scipt sent)  Post procedure diagnosis: EGD - Gastritis, hiatal hernia      Follow-up Instructions:   Call Dr. Jodie Swan for any questions or problems. If we took a biopsy please call the office within 2 weeks to discuss your pathology results.  Telephone # [phone]         Learning About Coronavirus [phone])  Coronavirus [phone]): Overview  What is coronavirus (NKTMQ-10)? The coronavirus disease (COVID-19) is caused by a virus. It is an illness that was first found in [place], [place], in December 2019. It has since spread worldwide. The virus can cause fever, cough, and trouble breathing. In severe cases, it can cause pneumonia and make it hard to breathe without help. It can cause death. Coronaviruses are a large group of viruses. They cause the common cold. They also cause more serious illnesses like Middle East respiratory syndrome (MERS) and severe acute respiratory syndrome (SARS). COVID-19 is caused by a novel coronavirus. That means it's a new type that has not been seen in people before. This virus spreads person-to-person through droplets from coughing and sneezing. It can also spread when you are close to someone who is infected. And it can spread when you touch something that has the virus on it, such as a doorknob or a tabletop. What can you do to protect yourself from coronavirus (COVID-19)? The best way to protect yourself from getting sick is to:  · Avoid areas where there is an outbreak. · Avoid contact with people who may be infected. · Wash your hands often with soap or alcohol-based hand sanitizers. · Avoid crowds and try to stay at least 6 feet away from other people. · Wash your hands often, especially after you cough or sneeze. Use soap and water, and scrub for at least 20 seconds. If soap and water aren't available, use an alcohol-based hand . · Avoid touching your mouth, nose, and eyes. What can you do to avoid spreading the virus to others? To help avoid spreading the virus to others:  · Cover your mouth with a tissue when you cough or sneeze. Then throw the tissue in the trash. · Use a disinfectant to clean things that you touch often. · Stay home if you are sick or have been exposed to the virus. Don't go to school, work, or public areas. And don't use public transportation.   · If you are sick:  ? Leave your home only if you need to get medical care. But call the doctor's office first so they know you're coming. And wear a face mask, if you have one.  ? If you have a face mask, wear it whenever you're around other people. It can help stop the spread of the virus when you cough or sneeze. ? Clean and disinfect your home every day. Use household  and disinfectant wipes or sprays. Take special care to clean things that you grab with your hands. These include doorknobs, remote controls, phones, and handles on your refrigerator and microwave. And don't forget countertops, tabletops, bathrooms, and computer keyboards. When to call for help  Call 911 anytime you think you may need emergency care. For example, call if:  · You have severe trouble breathing. (You can't talk at all.)  · You have constant chest pain or pressure. · You are severely dizzy or lightheaded. · You are confused or can't think clearly. · Your face and lips have a blue color. · You pass out (lose consciousness) or are very hard to wake up. Call your doctor now if you develop symptoms such as:  · Shortness of breath. · Fever. · Cough. If you need to get care, call ahead to the doctor's office for instructions before you go. Make sure you wear a face mask, if you have one, to prevent exposing other people to the virus. Where can you get the latest information? The following health organizations are tracking and studying this virus. Their websites contain the most up-to-date information. Lala Triplett also learn what to do if you think you may have been exposed to the virus. · U.S. Centers for Disease Control and Prevention (CDC): The CDC provides updated news about the disease and travel advice. The website also tells you how to prevent the spread of infection. www.cdc.gov  · World Health Organization [facility]): WHO offers information about the virus outbreaks.  WHO also has travel advice. www.who.int  Current as of: April 1, 2020 Content Version: 12.4  © 8508-1521 [employer]. Care instructions adapted under license by your healthcare professional. If you have questions about a medical condition or this instruction, always ask your healthcare professional. [address] any warranty or liability for your use of this information.

## 2021-06-16 NOTE — PROCEDURES
118 [facility]  217 [facility] [address], [address]  [phone]                           Colonoscopy and EGD Procedure Note      Indications:    Constipation, GERD     :  Oseas Muñoz MD    Staff: Endoscopy Technician-1: Paul Capps  Endoscopy RN-1: Noemy Carrasco RN     Implants: none    Referring Provider: Mic Doss MD    Sedation:  MAC anesthesia    Procedure Details:  After informed consent was obtained with all risks and benefits of procedure explained and preoperative exam completed, the patient was taken to the endoscopy suite and placed in the left lateral decubitus position. Upon sequential sedation as per above, a digital rectal exam was performed  And was normal.  The Olympus videocolonoscope  was inserted in the rectum and carefully advanced to the cecum, which was identified by the ileocecal valve and appendiceal orifice. The quality of preparation was good. The colonoscope was slowly withdrawn with careful evaluation between folds. Retroflexion in the rectum was performed and was normal..     Colon Findings:   Rectum: no mucosal lesion appreciated  Grade 2 internal hemorrhoid(s); Sigmoid: no mucosal lesion appreciated      - Diverticulosis  Descending Colon: no mucosal lesion appreciated  Transverse Colon: no mucosal lesion appreciated  Ascending Colon: no mucosal lesion appreciated  Cecum: no mucosal lesion appreciated  Terminal Ileum: not intubated      Following sequential administration of sedation as per above, the [id number] gastroscope was inserted into the mouth and advanced under direct vision to second portion of the duodenum. A careful inspection was made as the gastroscope was withdrawn, including a retroflexed view of the proximal stomach; findings and interventions are described below. EGD Findings:  Esophagus: Normal mucosa entire esophagus.   Small sliding hiatal hernia was noted with Z-line at 38 cm and diaphragmatic pinch at 40 cm. Stomach: Moderate patchy erythema and congestion were noted in the gastric antrum and body. Abnormal prominent and congested gastric folds were noted in the gastric body. No ulcer or mass was noted. Duodenum/jejunum:normal      Therapies:  biopsy of stomach body, antrum    Complications:   None; patient tolerated the procedure well. Impression:    See Postoperative diagnosis above    Recommendations:  -Continue acid suppression. , -Await pathology. , -Follow symptoms. , -High fiber diet. GERD diet: avoid fried and fatty foods. peppermint, chocolate, alcohol, coffee, citrus fruits and juices, tomoato products; avoid lying down for 2 to 3 hours after eating.  -Follow up in the office as scheduled.     -Stop Pantoprazole and start [facility] (e-script sent to pharmacy). Interventions:  biopsy of stomach body, antrum    Complications: None. Specimen Removed:    ID Type Source Tests Collected by Time Destination   1 : Gastric Preservative Gastric  Clotpaola Valdes MD 6/16/2021 1409 Pathology       EBL:  None. Discharge Disposition:  Home in the company of a  when able to ambulate.     Cristal Calles MD  6/16/2021  2:33 PM

## 2021-06-16 NOTE — ANESTHESIA PREPROCEDURE EVALUATION
Relevant Problems   NEUROLOGY   (+) CVA (cerebral vascular accident) ([facility] [address]   (+) TIA (transient ischemic attack)      CARDIOVASCULAR   (+) Essential hypertension      GASTROINTESTINAL   (+) Gastroesophageal reflux disease without esophagitis       Anesthetic History   No history of anesthetic complications            Review of Systems / Medical History  Patient summary reviewed, nursing notes reviewed and pertinent labs reviewed    Pulmonary  Within defined limits                 Neuro/Psych         TIA     Cardiovascular    Hypertension                   GI/Hepatic/Renal     GERD           Endo/Other        Arthritis     Other Findings              Physical Exam    Airway  Mallampati: II  TM Distance: > 6 cm  Neck ROM: normal range of motion   Mouth opening: Normal     Cardiovascular  Regular rate and rhythm,  S1 and S2 normal,  no murmur, click, rub, or gallop             Dental  No notable dental hx       Pulmonary  Breath sounds clear to auscultation               Abdominal  GI exam deferred       Other Findings            Anesthetic Plan    ASA: 2  Anesthesia type: MAC            Anesthetic plan and risks discussed with: Patient

## 2021-07-10 DIAGNOSIS — I10 ESSENTIAL HYPERTENSION: ICD-10-CM

## 2021-07-11 RX ORDER — AMLODIPINE BESYLATE 10 MG/1
TABLET ORAL
Qty: 90 TABLET | Refills: 0 | Status: SHIPPED | OUTPATIENT
Start: 2021-07-11 | End: 2021-10-02

## 2021-07-29 RX ORDER — CYCLOBENZAPRINE HCL 10 MG
TABLET ORAL
Qty: 30 TABLET | Refills: 0 | Status: SHIPPED | OUTPATIENT
Start: 2021-07-29 | End: 2021-11-22

## 2021-10-02 DIAGNOSIS — I10 ESSENTIAL HYPERTENSION: ICD-10-CM

## 2021-10-02 RX ORDER — AMLODIPINE BESYLATE 10 MG/1
TABLET ORAL
Qty: 90 TABLET | Refills: 0 | Status: SHIPPED | OUTPATIENT
Start: 2021-10-02 | End: 2022-01-02

## 2021-11-22 RX ORDER — CYCLOBENZAPRINE HCL 10 MG
TABLET ORAL
Qty: 30 TABLET | Refills: 0 | Status: SHIPPED | OUTPATIENT
Start: 2021-11-22

## 2021-11-24 NOTE — PROGRESS NOTES
PT DAILY TREATMENT NOTE 2-15    Patient Name: Vy Kaur Date:2021  : 1950  [x]  Patient  Verified  Payor: Jesus Chung / Plan: [employer] MEDICARE CHOICE PPO/PFFS / Product Type: Managed Care Medicare /    In time:917  Out time:1014  Total Treatment Time (min): 62  Visit #:  8  Treatment Area: Right hip pain [M25.551]  Right leg pain [M79.604]    SUBJECTIVE  Pain Level (0-10 scale): 10  Any medication changes, allergies to medications, adverse drug reactions, diagnosis change, or new procedure performed?: [x] No    [] Yes (see summary sheet for update)  Subjective functional status/changes:   [] No changes reported  Patient reports no major changes since last visit. OBJECTIVE    57 min Therapeutic Exercise:  [] See flow sheet :   Rationale: increase ROM and increase strength to improve the patients ability to perform ADLs and reduce pain levels    With   [] TE   [] TA   [] Neuro   [] SC   [] other: Patient Education: [x] Review HEP    [] Progressed/Changed HEP based on:   [] positioning   [] body mechanics   [] transfers   [] heat/ice application    [] other:      Other Objective/Functional Measures: none noted     Pain Level (0-10 scale) post treatment: 3/10    ASSESSMENT/Changes in Function:   Patient tolerated all progressed therex well, will continue to progress as tolerated. Patient will continue to benefit from skilled PT services to modify and progress therapeutic interventions, address functional mobility deficits, address ROM deficits, address strength deficits, analyze and address soft tissue restrictions, analyze and cue movement patterns, analyze and modify body mechanics/ergonomics and assess and modify postural abnormalities to attain remaining goals. [x]  See Plan of Care  []  See progress note/recertification  []  See Discharge Summary         Progress towards goals / Updated goals:  Patient is progressing towards goals.      PLAN  [x]  Upgrade activities as tolerated     [x]  Continue plan of care  [x]  Update interventions per flow sheet       []  Discharge due to:_  []  Other:_      Michael Poole, PTA 1/25/2021 No

## 2021-12-01 ENCOUNTER — OFFICE VISIT (OUTPATIENT)
Dept: URGENT CARE | Age: 71
End: 2021-12-01
Payer: MEDICARE

## 2021-12-01 VITALS — RESPIRATION RATE: 16 BRPM | OXYGEN SATURATION: 97 % | TEMPERATURE: 98.1 F | HEART RATE: 70 BPM

## 2021-12-01 DIAGNOSIS — R05.9 COUGH: ICD-10-CM

## 2021-12-01 DIAGNOSIS — J01.10 ACUTE NON-RECURRENT FRONTAL SINUSITIS: Primary | ICD-10-CM

## 2021-12-01 LAB
FLUAV+FLUBV AG NOSE QL IA.RAPID: NEGATIVE
FLUAV+FLUBV AG NOSE QL IA.RAPID: NEGATIVE
SARS-COV-2 POC: NEGATIVE
VALID INTERNAL CONTROL?: YES

## 2021-12-01 PROCEDURE — G8427 DOCREV CUR MEDS BY ELIG CLIN: HCPCS | Performed by: NURSE PRACTITIONER

## 2021-12-01 PROCEDURE — G8432 DEP SCR NOT DOC, RNG: HCPCS | Performed by: NURSE PRACTITIONER

## 2021-12-01 PROCEDURE — G8419 CALC BMI OUT NRM PARAM NOF/U: HCPCS | Performed by: NURSE PRACTITIONER

## 2021-12-01 PROCEDURE — 1101F PT FALLS ASSESS-DOCD LE1/YR: CPT | Performed by: NURSE PRACTITIONER

## 2021-12-01 PROCEDURE — 99203 OFFICE O/P NEW LOW 30 MIN: CPT | Performed by: NURSE PRACTITIONER

## 2021-12-01 PROCEDURE — 87426 SARSCOV CORONAVIRUS AG IA: CPT | Performed by: NURSE PRACTITIONER

## 2021-12-01 PROCEDURE — G8756 NO BP MEASURE DOC: HCPCS | Performed by: NURSE PRACTITIONER

## 2021-12-01 PROCEDURE — 87804 INFLUENZA ASSAY W/OPTIC: CPT | Performed by: NURSE PRACTITIONER

## 2021-12-01 PROCEDURE — G8536 NO DOC ELDER MAL SCRN: HCPCS | Performed by: NURSE PRACTITIONER

## 2021-12-01 PROCEDURE — 3017F COLORECTAL CA SCREEN DOC REV: CPT | Performed by: NURSE PRACTITIONER

## 2021-12-01 RX ORDER — METHYLPREDNISOLONE 4 MG/1
TABLET ORAL
Qty: 1 DOSE PACK | Refills: 0 | Status: SHIPPED | OUTPATIENT
Start: 2021-12-01 | End: 2022-02-14

## 2021-12-01 RX ORDER — ALBUTEROL SULFATE 90 UG/1
2 AEROSOL, METERED RESPIRATORY (INHALATION)
Qty: 18 G | Refills: 0 | Status: SHIPPED | OUTPATIENT
Start: 2021-12-01

## 2021-12-01 NOTE — PROGRESS NOTES
This patient was seen at [address] [place] [address] Urgent Care while in their vehicle due to COVID-19 pandemic with PPE and focused examination in order to decrease community viral transmission. The patient/guardian gave verbal consent to treat. Marley North is a 66 y.o. male who presents for evaluation of cough, sinus congestion, chest tightness and wheezing, sinus pressure and drainage since x 5 days. Was exposed to COVID-19 by co-worker. Denies any symptoms such as SOB, ST, HA, n/v/d, fever etc. No known exposure to COVID or sick contacts. Wife with similar symptoms about two weeks ago but tested negative for COVID per patient. No other complaints or concerns at this time. Patient completed COVID vaccinations x 2 more than one month ago. PMH: History of \"autoimmue muscular disease\", BRAD, HLD, Seasonal allergies, HTN, TIA. Non-smoker. Past Medical History:   Diagnosis Date    Arthritis     [facility]     Patient stated \" HX of autoimmune muscular disorder\"    Enlarged prostate     GERD (gastroesophageal reflux disease)     High cholesterol     Hx of seasonal allergies     Hypertension     PMR (polymyalgia rheumatica) (HCC)     TIA (transient ischemic attack) 2019        Past Surgical History:   Procedure Laterality Date    COLONOSCOPY N/A 1/5/2018    COLONOSCOPY WITH BIOPSIES performed by Chris Fang MD at [place] [address] COLONOSCOPY N/A 6/16/2021    COLONOSCOPY,EGD performed by Diane Jaimes MD at [address] Right     HX COLONOSCOPY      HX HEENT  2003    lymph node bx in neck (benign)    HX LAP CHOLECYSTECTOMY  2000    HX ORTHOPAEDIC  2008    Back Surgery lumbar    HX ORTHOPAEDIC Right 2016    thumb and palm under ring finger    HX PARATHYROIDECTOMY      one right sided 2015.            Family History   Problem Relation Age of Onset    Cancer Mother         lung    Cancer Father         lung        Social History     Socioeconomic History    Marital status:      Spouse name: Not on file    Number of children: Not on file    Years of education: Not on file    Highest education level: Not on file   Occupational History    Not on file   Tobacco Use    Smoking status: Former Smoker     Packs/day: 1.50     Years: 15.00     Pack years: 22.50     Quit date: 9/3/1990     Years since quittin.2    Smokeless tobacco: Never Used   Vaping Use    Vaping Use: Never used   Substance and Sexual Activity    Alcohol use: Yes     Alcohol/week: 2.0 standard drinks     Types: 1 Cans of beer, 1 Shots of liquor per week    Drug use: No    Sexual activity: Yes     Partners: Female     Birth control/protection: None   Other Topics Concern    Not on file   Social History Narrative    Not on file     Social Determinants of Health     Financial Resource Strain:     Difficulty of Paying Living Expenses: Not on file   Food Insecurity:     Worried About Running Out of Food in the Last Year: Not on file    Chaim of Food in the Last Year: Not on file   Transportation Needs:     Lack of Transportation (Medical): Not on file    Lack of Transportation (Non-Medical):  Not on file   Physical Activity:     Days of Exercise per Week: Not on file    Minutes of Exercise per Session: Not on file   Stress:     Feeling of Stress : Not on file   Social Connections:     Frequency of Communication with Friends and Family: Not on file    Frequency of Social Gatherings with Friends and Family: Not on file    Attends Roman [employer] Services: Not on file    Active Member of Clubs or Organizations: Not on file    Attends Club or Organization Meetings: Not on file    Marital Status: Not on file   Intimate Partner Violence:     Fear of Current or Ex-Partner: Not on file    Emotionally Abused: Not on file    Physically Abused: Not on file    Sexually Abused: Not on file   Housing Stability:     Unable to Pay for Housing in the Last Year: Not on file    Number of Places Lived in the Last Year: Not on file    Unstable Housing in the Last Year: Not on file                ALLERGIES: Shellfish derived, Ace inhibitors, Nsaids (non-steroidal anti-inflammatory drug), and Pcn [penicillins]    Review of Systems   Constitutional: Negative for activity change, appetite change, chills, diaphoresis, fatigue and fever. HENT: Positive for congestion, postnasal drip, sinus pressure and sinus pain. Negative for ear pain, rhinorrhea and sore throat. Respiratory: Positive for cough, chest tightness and wheezing. Negative for shortness of breath. Cardiovascular: Negative for chest pain. Gastrointestinal: Negative for abdominal pain, diarrhea, nausea and vomiting. Musculoskeletal: Negative for myalgias. Skin: Negative for rash. Neurological: Negative for dizziness, light-headedness and headaches. Vitals:    12/01/21 1652   Pulse: 70   Resp: 16   Temp: 98.1 °F (36.7 °C)   SpO2: 97%       Physical Exam  Vitals and nursing note reviewed. Constitutional:       General: He is not in acute distress. Appearance: Normal appearance. He is not ill-appearing. HENT:      Head: Normocephalic and atraumatic. Right Ear: Tympanic membrane and ear canal normal.      Left Ear: Tympanic membrane and ear canal normal.      Nose: Congestion and rhinorrhea present. Right Sinus: Frontal sinus tenderness present. No maxillary sinus tenderness. Left Sinus: Frontal sinus tenderness present. No maxillary sinus tenderness. Mouth/Throat:      Pharynx: Oropharynx is clear. Uvula midline. No pharyngeal swelling or posterior oropharyngeal erythema. Tonsils: No tonsillar exudate. 0 on the right. 0 on the left. Comments: +PND  Eyes:      Conjunctiva/sclera: Conjunctivae normal.      Pupils: Pupils are equal, round, and reactive to light. Cardiovascular:      Rate and Rhythm: Normal rate and regular rhythm.       Heart sounds: Normal heart sounds. No murmur heard. Pulmonary:      Effort: Pulmonary effort is normal. No tachypnea or respiratory distress. Breath sounds: Normal breath sounds. No stridor. No wheezing or rhonchi. Comments: Speaking in full sentences without noted difficulty  ausculted wheezing in right upper lobe cleared with patient cough    Musculoskeletal:         General: Normal range of motion. Cervical back: Normal range of motion and neck supple. Skin:     General: Skin is warm and dry. Findings: No rash. Neurological:      Mental Status: He is alert and oriented to person, place, and time. Psychiatric:         Mood and Affect: Mood normal.         Thought Content: Thought content normal.         MDM    Procedures      ICD-10-CM ICD-9-CM   1. Acute non-recurrent frontal sinusitis  J01.10 461.1   2. Cough  R05.9 786.2       Orders Placed This Encounter    AMB POC SARS-COV-2 ANTIGEN     Order Specific Question:   Is this test for diagnosis or screening? Answer:   Diagnosis of ill patient     Order Specific Question:   Symptomatic for COVID-19 as defined by CDC? Answer:   Yes     Order Specific Question:   Date of Symptom Onset     Answer:   11/26/2021     Order Specific Question:   Hospitalized for COVID-19? Answer:   No     Order Specific Question:   Admitted to ICU for COVID-19? Answer:   No     Order Specific Question:   Employed in healthcare setting? Answer:   Unknown     Order Specific Question:   Resident in a congregate (group) care setting? Answer:   Unknown     Order Specific Question:   Previously tested for COVID-19? Answer: Yes    AMB POC DENISE INFLUENZA A/B TEST    albuterol (PROVENTIL HFA, VENTOLIN HFA, PROAIR HFA) 90 mcg/actuation inhaler     Sig: Take 2 Puffs by inhalation every six (6) hours as needed for Wheezing or Shortness of Breath.      Dispense:  18 g     Refill:  0    methylPREDNISolone (MEDROL DOSEPACK) 4 mg tablet     Sig: Complete as directed     Dispense:  1 Dose Pack     Refill:  0      Results for orders placed or performed in visit on 12/01/21   AMB POC SARS-COV-2   Result Value Ref Range    SARS-COV-2 POC Negative Negative   AMB POC DENISE INFLUENZA A/B TEST   Result Value Ref Range    VALID INTERNAL CONTROL POC Yes     Influenza A Ag POC Negative Negative    Influenza B Ag POC Negative Negative     Discussed presentation with patient and treatment recommendations. Advised on viral vs bacterial illness and following treatment plan developed. Will start on medrol dose pack to decrease inflammation- discouraged NSAIDs during use. Start on proventil as needed for SOB or wheezing. Advised on mech of action and potential side effects of medications. Encouraged to push fluids, tylenol as needed for pain, warm salt water gargles, OTC mucinex etc.     The patient is to follow up with PCP PRN. Red flag signs and symptoms discussed with patient/parent indicated need for ED evaluation and treatment.     Signed By: Ashly Novoa NP     December 1, 2021

## 2022-01-01 DIAGNOSIS — I10 ESSENTIAL HYPERTENSION: ICD-10-CM

## 2022-01-02 RX ORDER — AMLODIPINE BESYLATE 10 MG/1
TABLET ORAL
Qty: 90 TABLET | Refills: 0 | Status: SHIPPED | OUTPATIENT
Start: 2022-01-02 | End: 2022-02-14 | Stop reason: SDUPTHER

## 2022-02-12 NOTE — PROGRESS NOTES
Arlee Bernheim. is a 66 y.o. male who presents for follow up. Weight 203# in Dec 2020. Today 189#. Started as intentional weight loss and then had GI issues. Dr Karina Weaver. EGD with gastritis. Changed diet. Started on dexilant. Colonoscopy hemorrhoids. June 2021. Started on metamucil daily. tried miralax regularly, changed to prn, off now. Reports having BM every 3-4 days. Taking MOM. Drinking a lot of water. Reports fiber in diet. He has been treated for PMR in the past, resolved.      He has chronic left arm tingling and neck pain. History of cervical degenerative disc disease. Saw Dr. Nichole Henson and Dr. Charles Mahoney. He is taking gabapentin 300mg BID. some stretches    Has left knee pain, using a brace. Some leg exercises. Seen by ortho.       Treated for hypertension. BP elevated today   No dizziness or visual changes. Up to date on eye exam, Dr Deniece Olszewski. Active without symptoms. On lipitor 1/2 of 40mg daily for years. No myalgias.      Sees Dr. April Avendano, urology, annual follow up. Had urolift, Dr Mike Humphries, it was helpful.  Less nocturia, better flow.      Up to date on immunization.        Past Medical History:   Diagnosis Date    Arthritis     Autoimmune disease ([facility] [address]     Patient stated \" HX of autoimmune muscular disorder\"    Enlarged prostate     GERD (gastroesophageal reflux disease)     High cholesterol     Hx of seasonal allergies     Hypertension     PMR (polymyalgia rheumatica) (HCC)     TIA (transient ischemic attack) 2019       Family History   Problem Relation Age of Onset    Cancer Mother         lung    Cancer Father         lung       Social History     Socioeconomic History    Marital status:      Spouse name: Not on file    Number of children: Not on file    Years of education: Not on file    Highest education level: Not on file   Occupational History    Not on file   Tobacco Use    Smoking status: Former Smoker     Packs/day: 1.50     Years: 15.00 Pack years: 22.50     Quit date: 9/3/1990     Years since quittin.4    Smokeless tobacco: Never Used   Vaping Use    Vaping Use: Never used   Substance and Sexual Activity    Alcohol use: Yes     Alcohol/week: 2.0 standard drinks     Types: 1 Cans of beer, 1 Shots of liquor per week     Comment: occas    Drug use: No    Sexual activity: Yes     Partners: Female     Birth control/protection: None   Other Topics Concern    Not on file   Social History Narrative    Not on file     Social Determinants of Health     Financial Resource Strain:     Difficulty of Paying Living Expenses: Not on file   Food Insecurity:     Worried About Running Out of Food in the Last Year: Not on file    Chaim of Food in the Last Year: Not on file   Transportation Needs:     Lack of Transportation (Medical): Not on file    Lack of Transportation (Non-Medical): Not on file   Physical Activity:     Days of Exercise per Week: Not on file    Minutes of Exercise per Session: Not on file   Stress:     Feeling of Stress : Not on file   Social Connections:     Frequency of Communication with Friends and Family: Not on file    Frequency of Social Gatherings with Friends and Family: Not on file    Attends Buddhism Services: Not on file    Active Member of [address] [place] [address] [email or web address] or Organizations: Not on file    Attends Club or Organization Meetings: Not on file    Marital Status: Not on file   Intimate Partner Violence:     Fear of Current or Ex-Partner: Not on file    Emotionally Abused: Not on file    Physically Abused: Not on file    Sexually Abused: Not on file   Housing Stability:     Unable to Pay for Housing in the Last Year: Not on file    Number of [place] in the Last Year: Not on file    Unstable Housing in the Last Year: Not on file       Current Outpatient Medications on File Prior to Visit   Medication Sig Dispense Refill    psyllium (METAMUCIL) powd Take  by mouth.  Daily      albuterol (PROVENTIL HFA, VENTOLIN HFA, PROAIR HFA) 90 mcg/actuation inhaler Take 2 Puffs by inhalation every six (6) hours as needed for Wheezing or Shortness of Breath. 18 g 0    cyclobenzaprine (FLEXERIL) 10 mg tablet TAKE 1 TABLET BY MOUTH NIGHTLY AS NEEDED FOR MUSCLE SPASM 30 Tablet 0    aspirin 81 mg chewable tablet Take 1 Tab by mouth daily. 30 Tab 1    gabapentin (NEURONTIN) 300 mg capsule Take 300 mg by mouth two (2) times a day.  diclofenac (VOLTAREN) 1 % gel Apply two grams by topical route four times daily to the affected area (s). PRN      fexofenadine (ALLEGRA) 180 mg tablet Take 180 mg by mouth daily as needed.  cholecalciferol (VITAMIN D3) 1,000 unit tablet Take 1,000 Units by mouth daily.  polyethylene glycol 3350 (MIRALAX PO) Take  by mouth. Powder prn (Patient not taking: Reported on 2/14/2022)      [DISCONTINUED] atorvastatin (LIPITOR) 40 mg tablet Take 40 mg by mouth daily.  [DISCONTINUED] amLODIPine (NORVASC) 10 mg tablet Take 1 tablet by mouth once daily 90 Tablet 0    [DISCONTINUED] methylPREDNISolone (MEDROL DOSEPACK) 4 mg tablet Complete as directed (Patient not taking: Reported on 2/14/2022) 1 Dose Pack 0    [DISCONTINUED] atorvastatin (LIPITOR) 20 mg tablet Take 1 Tab by mouth daily. (Patient taking differently: Take 10 mg by mouth daily.) 90 Tab 3    [DISCONTINUED] triamcinolone (NASACORT) 55 mcg nasal inhaler 2 Sprays by Both Nostrils route daily. No current facility-administered medications on file prior to visit. Review of Systems  Pertinent items are noted in HPI. Objective:     Visit Vitals  /63 (BP 1 Location: Left arm, BP Patient Position: Sitting, BP Cuff Size: Adult)   Pulse 61   Temp 97.3 °F (36.3 °C) (Temporal)   Resp 17   Ht 5' 10\" (1.778 m)   Wt 189 lb (85.7 kg)   SpO2 99%   BMI 27.12 kg/m²     Gen: well appearing male  HEENT:   PERRL,normal conjunctiva.  External ear and canals normal, TMs no opacification or erythema,  OP no erythema, no exudates, MMM  Neck: Supple. Thyroid normal size, nontender, without nodules. No masses or LAD  Resp:  No wheezing, no rhonchi, no rales. CV:  RRR, normal S1S2, no murmur. GI: soft, nontender, without masses. No hepatosplenomegaly. Extrem:  +2 pulses, no edema, warm distally      Assessment/Plan:       ICD-10-CM ICD-9-CM    1. Essential hypertension  M49 568.0 METABOLIC PANEL, COMPREHENSIVE      CBC W/O DIFF      URINALYSIS W/ RFLX MICROSCOPIC      amLODIPine (NORVASC) 10 mg tablet   2. Medicare annual wellness visit, subsequent  Z00.00 V70.0    3. Slow transit constipation  K59.01 564.01    4. Mixed hyperlipidemia  E78.2 272.2    5. Acute pain of left knee  M25.562 719.46    6. Benign prostatic hyperplasia, unspecified whether lower urinary tract symptoms present  N40.0 600.00 PSA W/ REFLX FREE PSA   7. Pure hypercholesterolemia  C25.62 096.3 METABOLIC PANEL, COMPREHENSIVE      LIPID PANEL      atorvastatin (LIPITOR) 20 mg tablet     Changed to lipitor 20mg tablet. Fasting labs. Follow-up and Dispositions    · Return in about 6 months (around 8/14/2022) for follow up on medications. Rin Zavaleta MD    This is the Subsequent Medicare Annual Wellness Exam, performed 12 months or more after the Initial AWV or the last Subsequent AWV    I have reviewed the patient's medical history in detail and updated the computerized patient record. Assessment/Plan   Education and counseling provided:  Are appropriate based on today's review and evaluation    1. Essential hypertension  -     METABOLIC PANEL, COMPREHENSIVE; Future  -     CBC W/O DIFF; Future  -     URINALYSIS W/ RFLX MICROSCOPIC; Future  -     amLODIPine (NORVASC) 10 mg tablet; Take 1 Tablet by mouth daily. , Normal, Disp-90 Tablet, R-3HOLD UNTIL NEEDED  2. Medicare annual wellness visit, subsequent  3. Slow transit constipation  4. Mixed hyperlipidemia  5. Acute pain of left knee  6.  Benign prostatic hyperplasia, unspecified whether lower urinary tract symptoms present  -     PSA W/ REFLX FREE PSA; Future  7. Pure hypercholesterolemia  -     METABOLIC PANEL, COMPREHENSIVE; Future  -     LIPID PANEL; Future  -     atorvastatin (LIPITOR) 20 mg tablet; Take 1 Tablet by mouth daily. , Normal, Disp-90 Tablet, R-3Cancel refills on 40mg dose       Depression Risk Factor Screening     3 most recent PHQ Screens 2/14/2022   PHQ Not Done -   Little interest or pleasure in doing things Not at all   Feeling down, depressed, irritable, or hopeless Not at all   Total Score PHQ 2 0       Alcohol & Drug Abuse Risk Screen    Do you average more than 1 drink per night or more than 7 drinks a week: No    In the past three months have you have had more than 4 drinks containing alcohol on one occasion: No          Functional Ability and Level of Safety    Hearing: The patient wears hearing aids. Activities of Daily Living: The home contains: no safety equipment. Patient does total self care      Ambulation: with no difficulty     Fall Risk:  Fall Risk Assessment, last 12 mths 2/14/2022   Able to walk? Yes   Fall in past 12 months? 0   Do you feel unsteady?  0   Are you worried about falling 0      Abuse Screen:  Patient is not abused       Cognitive Screening    Has your family/caregiver stated any concerns about your memory: no     Cognitive Screening: Normal - Verbal Fluency Test    Health Maintenance Due     Health Maintenance Due   Topic Date Due    Lipid Screen  10/02/2021    Depression Screen  12/16/2021       Patient Care Team   Patient Care Team:  Azalea Fuchs MD as PCP - General (Internal Medicine)  Azalea Fuchs MD as PCP - [facility] Empaneled Provider  Radha Rivera MD as Surgeon (General Surgery)  Little Morejon MD (Ophthalmology)  Alfa Sierra OD (Optometry)  Lois Sawyer MD (Urology)  Shari Avalos MD (Gastroenterology)  Hernan Solis MD as Physician (Hand Surgery)  Hernan Solis MD as Consulting Provider (Hand Surgery)  Roslyn Mendes MD (Neurology)    History     Patient Active Problem List   Diagnosis Code    Gastroesophageal reflux disease without esophagitis K21.9    Essential hypertension I10    Benign prostatic hyperplasia N40.0    Mixed hyperlipidemia E78.2    Primary osteoarthritis of first carpometacarpal joint of right hand M18.11    Dupuytren's contracture of right hand M72.0    Carpal tunnel syndrome, left G56.02    Pain of right hand M79.641    BMI 27.0-27.9,adult Z68.27    CVA (cerebral vascular accident) ([address] I63.9    TIA (transient ischemic attack) G45.9    DDD (degenerative disc disease), cervical M50.30     Past Medical History:   Diagnosis Date    Arthritis     Autoimmune disease ([address]     Patient stated \" HX of autoimmune muscular disorder\"    Enlarged prostate     GERD (gastroesophageal reflux disease)     High cholesterol     Hx of seasonal allergies     Hypertension     PMR (polymyalgia rheumatica) ([address]     TIA (transient ischemic attack) 2019      Past Surgical History:   Procedure Laterality Date    COLONOSCOPY N/A 1/5/2018    COLONOSCOPY WITH BIOPSIES performed by Leyla Galindo MD at [facility] AMBULATORY OR    COLONOSCOPY N/A 6/16/2021    COLONOSCOPY,EGD performed [facility] D[facility] Hanks MD at [facility] ENDOSCOPY    HX [address] Right     HX COLONOSCOPY      HX ENDOSCOPY  2021    HX HEENT  2003    lymph node bx in neck (benign)    HX LAP CHOLECYSTECTOMY  2000    HX ORTHOPAEDIC  2008    Back Surgery lumbar    HX ORTHOPAEDIC Right 2016    thumb and palm under ring finger    HX PARATHYROIDECTOMY      one right sided 2015. Current Outpatient Medications   Medication Sig Dispense Refill    psyllium (METAMUCIL) powd Take  by mouth. Daily      atorvastatin (LIPITOR) 20 mg tablet Take 1 Tablet by mouth daily. 90 Tablet 3    amLODIPine (NORVASC) 10 mg tablet Take 1 Tablet by mouth daily.  90 Tablet 3    albuterol (PROVENTIL HFA, VENTOLIN HFA, PROAIR HFA) 90 mcg/actuation inhaler Take 2 Puffs by inhalation every six (6) hours as needed for Wheezing or Shortness of Breath. 18 g 0    cyclobenzaprine (FLEXERIL) 10 mg tablet TAKE 1 TABLET BY MOUTH NIGHTLY AS NEEDED FOR MUSCLE SPASM 30 Tablet 0    aspirin 81 mg chewable tablet Take 1 Tab by mouth daily. 30 Tab 1    gabapentin (NEURONTIN) 300 mg capsule Take 300 mg by mouth two (2) times a day.  diclofenac (VOLTAREN) 1 % gel Apply two grams by topical route four times daily to the affected area (s). PRN      fexofenadine (ALLEGRA) 180 mg tablet Take 180 mg by mouth daily as needed.  cholecalciferol (VITAMIN D3) 1,000 unit tablet Take 1,000 Units by mouth daily.  polyethylene glycol 3350 (MIRALAX PO) Take  by mouth. Powder prn (Patient not taking: Reported on 2022)       Allergies   Allergen Reactions    Shellfish Derived Anaphylaxis     Reports not allergic now based on labs   2022    Ace Inhibitors Cough    Nsaids (Non-Steroidal Anti-Inflammatory Drug) Other (comments)     GI bleeding.  Pcn [Penicillins] Rash       Family History   Problem Relation Age of Onset    Cancer Mother         lung    Cancer Father         lung     Social History     Tobacco Use    Smoking status: Former Smoker     Packs/day: 1.50     Years: 15.00     Pack years: 22.50     Quit date: 9/3/1990     Years since quittin.4    Smokeless tobacco: Never Used   Substance Use Topics    Alcohol use:  Yes     Alcohol/week: 2.0 standard drinks     Types: 1 Cans of beer, 1 Shots of liquor per week     Comment: Desmond Gallegos MD

## 2022-02-12 NOTE — PATIENT INSTRUCTIONS
Medicare Wellness Visit, Male    The best way to live healthy is to have a lifestyle where you eat a well-balanced diet, exercise regularly, limit alcohol use, and quit all forms of tobacco/nicotine, if applicable. Regular preventive services are another way to keep healthy. Preventive services (vaccines, screening tests, monitoring & exams) can help personalize your care plan, which helps you manage your own care. Screening tests can find health problems at the earliest stages, when they are easiest to treat. [facility] follows the current, evidence-based guidelines published by the [facility] ([facility]) when recommending preventive services for our patients. Because we follow these guidelines, sometimes recommendations change over time as research supports it. (For example, a prostate screening blood test is no longer routinely recommended for men with no symptoms). Of course, you and your doctor may decide to screen more often for some diseases, based on your risk and co-morbidities (chronic disease you are already diagnosed with). Preventive services for you include:  - Medicare offers their members a free annual wellness visit, which is time for you and your primary care provider to discuss and plan for your preventive service needs. Take advantage of this benefit every year!  -All adults over age 72 should receive the recommended pneumonia vaccines. Current USPSTF guidelines recommend a series of two vaccines for the best pneumonia protection.   -All adults should have a flu vaccine yearly and tetanus vaccine every 10 years.  -All adults age 48 and older should receive the shingles vaccines (series of two vaccines).        -All adults age 38-68 who are overweight should have a diabetes screening test once every three years.   -Other screening tests & preventive services for persons with diabetes include: an eye exam to screen for diabetic retinopathy, a kidney function test, a foot exam, and stricter control over your cholesterol.   -Cardiovascular screening for adults with routine risk involves an electrocardiogram (ECG) at intervals determined by the provider.   -Colorectal cancer screening should be done for adults age 54-65 with no increased risk factors for colorectal cancer. There are a number of acceptable methods of screening for this type of cancer. Each test has its own benefits and drawbacks. Discuss with your provider what is most appropriate for you during your annual wellness visit. The different tests include: colonoscopy (considered the best screening method), a fecal occult blood test, a fecal DNA test, and sigmoidoscopy.  -All adults born between [facility] should be screened once for Hepatitis C.  -An Abdominal Aortic Aneurysm (AAA) Screening is recommended for men age 73-68 who has ever smoked in their lifetime. Water intake 6-8 glasses a day, add 30 grams of fiber to diet- fiber gummies, whole grain bread or cereal, oatmeal.  If no BM for 2-3 days, use miralax laxative or MOM as needed. Use a step in the bathroom to get into a squat for bowel movement. Can consider linzess or amitiza for constipation.

## 2022-02-14 ENCOUNTER — TELEPHONE (OUTPATIENT)
Dept: INTERNAL MEDICINE CLINIC | Age: 72
End: 2022-02-14

## 2022-02-14 ENCOUNTER — OFFICE VISIT (OUTPATIENT)
Dept: INTERNAL MEDICINE CLINIC | Age: 72
End: 2022-02-14
Payer: MEDICARE

## 2022-02-14 VITALS
HEART RATE: 61 BPM | BODY MASS INDEX: 27.06 KG/M2 | OXYGEN SATURATION: 99 % | DIASTOLIC BLOOD PRESSURE: 63 MMHG | TEMPERATURE: 97.3 F | WEIGHT: 189 LBS | HEIGHT: 70 IN | RESPIRATION RATE: 17 BRPM | SYSTOLIC BLOOD PRESSURE: 128 MMHG

## 2022-02-14 DIAGNOSIS — E78.2 MIXED HYPERLIPIDEMIA: ICD-10-CM

## 2022-02-14 DIAGNOSIS — E78.00 PURE HYPERCHOLESTEROLEMIA: ICD-10-CM

## 2022-02-14 DIAGNOSIS — K59.01 SLOW TRANSIT CONSTIPATION: ICD-10-CM

## 2022-02-14 DIAGNOSIS — M25.562 ACUTE PAIN OF LEFT KNEE: ICD-10-CM

## 2022-02-14 DIAGNOSIS — Z00.00 MEDICARE ANNUAL WELLNESS VISIT, SUBSEQUENT: ICD-10-CM

## 2022-02-14 DIAGNOSIS — I10 ESSENTIAL HYPERTENSION: Primary | ICD-10-CM

## 2022-02-14 DIAGNOSIS — N40.0 BENIGN PROSTATIC HYPERPLASIA, UNSPECIFIED WHETHER LOWER URINARY TRACT SYMPTOMS PRESENT: ICD-10-CM

## 2022-02-14 PROCEDURE — 3017F COLORECTAL CA SCREEN DOC REV: CPT | Performed by: FAMILY MEDICINE

## 2022-02-14 PROCEDURE — G8754 DIAS BP LESS 90: HCPCS | Performed by: FAMILY MEDICINE

## 2022-02-14 PROCEDURE — 99214 OFFICE O/P EST MOD 30 MIN: CPT | Performed by: FAMILY MEDICINE

## 2022-02-14 PROCEDURE — 1101F PT FALLS ASSESS-DOCD LE1/YR: CPT | Performed by: FAMILY MEDICINE

## 2022-02-14 PROCEDURE — G8510 SCR DEP NEG, NO PLAN REQD: HCPCS | Performed by: FAMILY MEDICINE

## 2022-02-14 PROCEDURE — G8752 SYS BP LESS 140: HCPCS | Performed by: FAMILY MEDICINE

## 2022-02-14 PROCEDURE — G8419 CALC BMI OUT NRM PARAM NOF/U: HCPCS | Performed by: FAMILY MEDICINE

## 2022-02-14 PROCEDURE — G0439 PPPS, SUBSEQ VISIT: HCPCS | Performed by: FAMILY MEDICINE

## 2022-02-14 PROCEDURE — G8536 NO DOC ELDER MAL SCRN: HCPCS | Performed by: FAMILY MEDICINE

## 2022-02-14 PROCEDURE — G8427 DOCREV CUR MEDS BY ELIG CLIN: HCPCS | Performed by: FAMILY MEDICINE

## 2022-02-14 RX ORDER — AMLODIPINE BESYLATE 10 MG/1
10 TABLET ORAL DAILY
Qty: 90 TABLET | Refills: 3 | Status: SHIPPED | OUTPATIENT
Start: 2022-02-14 | End: 2022-04-04

## 2022-02-14 RX ORDER — ATORVASTATIN CALCIUM 20 MG/1
20 TABLET, FILM COATED ORAL DAILY
Qty: 90 TABLET | Refills: 3 | Status: SHIPPED | OUTPATIENT
Start: 2022-02-14

## 2022-02-14 RX ORDER — ATORVASTATIN CALCIUM 40 MG/1
40 TABLET, FILM COATED ORAL DAILY
COMMUNITY
End: 2022-02-14

## 2022-02-14 NOTE — TELEPHONE ENCOUNTER
----- Message from Mary Ludwig sent at 2/14/2022  3:47 PM EST -----  Subject: Message to Provider    QUESTIONS  Information for Provider? Patient is needing to be schedule for around   8/14/2022 it does not show that far for me if you can call the patient and   schedule him for that.  ---------------------------------------------------------------------------  --------------  CALL BACK INFO  What is the best way for the office to contact you? OK to leave message on   voicemail  Preferred Call Back Phone Number? [phone]  ---------------------------------------------------------------------------  --------------  SCRIPT ANSWERS  Relationship to Patient?  Self

## 2022-02-15 ENCOUNTER — APPOINTMENT (OUTPATIENT)
Dept: INTERNAL MEDICINE CLINIC | Age: 72
End: 2022-02-15

## 2022-02-15 DIAGNOSIS — E78.00 PURE HYPERCHOLESTEROLEMIA: ICD-10-CM

## 2022-02-15 DIAGNOSIS — I10 ESSENTIAL HYPERTENSION: ICD-10-CM

## 2022-02-15 DIAGNOSIS — N40.0 BENIGN PROSTATIC HYPERPLASIA, UNSPECIFIED WHETHER LOWER URINARY TRACT SYMPTOMS PRESENT: ICD-10-CM

## 2022-02-15 LAB
ALBUMIN SERPL-MCNC: 3.9 G/DL (ref 3.5–5)
ALBUMIN/GLOB SERPL: 1.3 {RATIO} (ref 1.1–2.2)
ALP SERPL-CCNC: 61 U/L (ref 45–117)
ALT SERPL-CCNC: 48 U/L (ref 12–78)
ANION GAP SERPL CALC-SCNC: 3 MMOL/L (ref 5–15)
APPEARANCE UR: CLEAR
AST SERPL-CCNC: 22 U/L (ref 15–37)
BACTERIA URNS QL MICRO: NEGATIVE /HPF
BILIRUB SERPL-MCNC: 1.3 MG/DL (ref 0.2–1)
BILIRUB UR QL: NEGATIVE
BUN SERPL-MCNC: 13 MG/DL (ref 6–20)
BUN/CREAT SERPL: 13 (ref 12–20)
CALCIUM SERPL-MCNC: 9.4 MG/DL (ref 8.5–10.1)
CHLORIDE SERPL-SCNC: 105 MMOL/L (ref 97–108)
CHOLEST SERPL-MCNC: 109 MG/DL
CO2 SERPL-SCNC: 32 MMOL/L (ref 21–32)
COLOR UR: ABNORMAL
CREAT SERPL-MCNC: 0.98 MG/DL (ref 0.7–1.3)
EPITH CASTS URNS QL MICRO: ABNORMAL /LPF
ERYTHROCYTE [DISTWIDTH] IN BLOOD BY AUTOMATED COUNT: 13.6 % (ref 11.5–14.5)
GLOBULIN SER CALC-MCNC: 3 G/DL (ref 2–4)
GLUCOSE SERPL-MCNC: 94 MG/DL (ref 65–100)
GLUCOSE UR STRIP.AUTO-MCNC: NEGATIVE MG/DL
HCT VFR BLD AUTO: 50 % (ref 36.6–50.3)
HDLC SERPL-MCNC: 56 MG/DL
HDLC SERPL: 1.9 {RATIO} (ref 0–5)
HGB BLD-MCNC: 15.2 G/DL (ref 12.1–17)
HGB UR QL STRIP: NEGATIVE
KETONES UR QL STRIP.AUTO: NEGATIVE MG/DL
LDLC SERPL CALC-MCNC: 37.2 MG/DL (ref 0–100)
LEUKOCYTE ESTERASE UR QL STRIP.AUTO: NEGATIVE
MCH RBC QN AUTO: 27.5 PG (ref 26–34)
MCHC RBC AUTO-ENTMCNC: 30.4 G/DL (ref 30–36.5)
MCV RBC AUTO: 90.4 FL (ref 80–99)
NITRITE UR QL STRIP.AUTO: NEGATIVE
NRBC # BLD: 0 K/UL (ref 0–0.01)
NRBC BLD-RTO: 0 PER 100 WBC
PH UR STRIP: 6 [PH] (ref 5–8)
PLATELET # BLD AUTO: 238 K/UL (ref 150–400)
PMV BLD AUTO: 11.5 FL (ref 8.9–12.9)
POTASSIUM SERPL-SCNC: 4 MMOL/L (ref 3.5–5.1)
PROT SERPL-MCNC: 6.9 G/DL (ref 6.4–8.2)
PROT UR STRIP-MCNC: ABNORMAL MG/DL
RBC # BLD AUTO: 5.53 M/UL (ref 4.1–5.7)
RBC #/AREA URNS HPF: ABNORMAL /HPF (ref 0–5)
SODIUM SERPL-SCNC: 140 MMOL/L (ref 136–145)
SP GR UR REFRACTOMETRY: 1.02 (ref 1–1.03)
TRIGL SERPL-MCNC: 79 MG/DL (ref ?–150)
UROBILINOGEN UR QL STRIP.AUTO: 0.2 EU/DL (ref 0.2–1)
VLDLC SERPL CALC-MCNC: 15.8 MG/DL
WBC # BLD AUTO: 9 K/UL (ref 4.1–11.1)
WBC URNS QL MICRO: ABNORMAL /HPF (ref 0–4)

## 2022-02-17 LAB
PSA SERPL-MCNC: 0.8 NG/ML (ref 0–4)
REFLEX CRITERIA: NORMAL

## 2022-02-20 ENCOUNTER — PATIENT MESSAGE (OUTPATIENT)
Dept: INTERNAL MEDICINE CLINIC | Age: 72
End: 2022-02-20

## 2022-02-23 NOTE — PERIOP NOTES
[facility]  Ambulatory Surgery Unit  Pre-operative Instructions    Surgery/Procedure Date  3/7/2022            Tentative Arrival Time TBD      1. On the day of your surgery/procedure, please report to the Ambulatory Surgery Unit Registration Desk and sign in at your designated time. The Ambulatory Surgery Unit is located in [facility] on the [facility] side of the [facility] across from the [facility] building. Please have all of your health insurance cards and a photo ID. **Due to current COVID restrictions, only ONE adult may accompany you the day of the procedure. We have limited seating available. If our waiting room is at capacity, your ride may be asked to remain in their vehicle. No children are allowed in the waiting room. 2. You must have someone with you to drive you home, as you should not drive a car for 24 hours following anesthesia. Please make arrangements for a responsible adult friend or family member to stay with you for at least the first 24 hours after your surgery. 3. Do not have anything to eat or drink (including water, gum, mints, coffee, juice) after 11:59 PM  3/6/2022. This may not apply to medications prescribed by your physician. (Please note below the special instructions with medications to take the morning of surgery, if applicable.)    4. We recommend you do not drink any alcoholic beverages for 24 hours before and after your surgery. 5. Contact your surgeons office for instructions on the following medications: non-steroidal anti-inflammatory drugs (i.e. Advil, Aleve), vitamins, and supplements. (Some surgeons will want you to stop these medications prior to surgery and others may allow you to take them)   **If you are currently taking Plavix, Coumadin, Aspirin and/or other blood-thinning agents, contact your surgeon for instructions. ** Your surgeon will partner with the physician prescribing these medications to determine if it is safe to stop or if you need to continue taking. Please do not stop taking these medications without instructions from your surgeon. 6. In an effort to help prevent surgical site infection, we ask that you shower with an anti-bacterial soap (i.e. Dial/Safeguard, or the soap provided to you at your preadmission testing appointment) for 3 days prior to and on the morning of surgery, using a fresh towel after each shower. (Please begin this process with fresh bed linens.) Do not apply any lotions, powders, or deodorants after the shower on the day of your procedure. If applicable, please do not shave the operative site for 48 hours prior to surgery. 7. Wear comfortable clothes. Wear glasses instead of contacts. Do not bring any jewelry or money (other than copays or fees as instructed). Do not wear make-up, particularly mascara, the morning of your surgery. Do not wear nail polish, particularly if you are having foot /hand surgery. Wear your hair loose or down, no ponytails, buns, silas pins or clips. All body piercings must be removed. 8. You should understand that if you do not follow these instructions your surgery may be cancelled. If your physical condition changes (i.e. fever, cold or flu) please contact your surgeon as soon as possible. 9. It is important that you be on time. If a situation occurs where you may be late, or if you have any questions or problems, please call [phone].    10. Your surgery time may be subject to change. You will receive a phone call the day prior to surgery to confirm your arrival time. 11. Pediatric patients: please bring a change of clothes, diapers, bottle/sippy cup, pacifier, etc.      Special Instructions: Take all medications and inhalers, as prescribed, on the morning of surgery with a sip of water EXCEPT: n/a        Insulin Dependent Diabetic patients: Take your diabetic medications as prescribed the day before surgery.   Hold all diabetic medications the day of surgery. If you are scheduled to arrive for surgery after 8:00 AM, and your AM blood sugar is >200, please call Ambulatory Surgery. I understand a pre-operative phone call will be made to verify my surgery time. In the event that I am not available, I give permission for a message to be left on my answering service and/or with another person?       Yes       Reviewed instructions and given Covid test date, patient able to verbalized understanding.         ___________________      ___________________      ________________  (Signature of Patient)          (Witness)                   (Date and Time

## 2022-03-03 ENCOUNTER — HOSPITAL ENCOUNTER (OUTPATIENT)
Dept: PREADMISSION TESTING | Age: 72
Discharge: HOME | End: 2022-03-03
Payer: MEDICARE

## 2022-03-03 PROCEDURE — U0005 INFEC AGEN DETEC AMPLI PROBE: HCPCS

## 2022-03-04 ENCOUNTER — ANESTHESIA EVENT (OUTPATIENT)
Dept: SURGERY | Age: 72
End: 2022-03-04
Payer: MEDICARE

## 2022-03-04 LAB
SARS-COV-2, XPLCVT: NOT DETECTED
SOURCE, COVRS: NORMAL

## 2022-03-07 ENCOUNTER — ANESTHESIA (OUTPATIENT)
Dept: SURGERY | Age: 72
End: 2022-03-07
Payer: MEDICARE

## 2022-03-07 ENCOUNTER — HOSPITAL ENCOUNTER (OUTPATIENT)
Age: 72
Setting detail: OUTPATIENT SURGERY
Discharge: HOME | End: 2022-03-07
Attending: ORTHOPAEDIC SURGERY | Admitting: ORTHOPAEDIC SURGERY
Payer: MEDICARE

## 2022-03-07 VITALS
BODY MASS INDEX: 26.92 KG/M2 | HEART RATE: 56 BPM | RESPIRATION RATE: 18 BRPM | TEMPERATURE: 97.9 F | HEIGHT: 70 IN | SYSTOLIC BLOOD PRESSURE: 141 MMHG | DIASTOLIC BLOOD PRESSURE: 62 MMHG | OXYGEN SATURATION: 99 % | WEIGHT: 188 LBS

## 2022-03-07 DIAGNOSIS — G89.18 POST-OP PAIN: Primary | ICD-10-CM

## 2022-03-07 PROCEDURE — 74011250636 HC RX REV CODE- 250/636: Performed by: ORTHOPAEDIC SURGERY

## 2022-03-07 PROCEDURE — 74011000250 HC RX REV CODE- 250: Performed by: ORTHOPAEDIC SURGERY

## 2022-03-07 PROCEDURE — 76210000046 HC AMBSU PH II REC FIRST 0.5 HR: Performed by: ORTHOPAEDIC SURGERY

## 2022-03-07 PROCEDURE — 77030040356 HC CORD BPLR FRCP COVD -A: Performed by: ORTHOPAEDIC SURGERY

## 2022-03-07 PROCEDURE — 74011250636 HC RX REV CODE- 250/636: Performed by: ANESTHESIOLOGY

## 2022-03-07 PROCEDURE — 74011250636 HC RX REV CODE- 250/636: Performed by: NURSE ANESTHETIST, CERTIFIED REGISTERED

## 2022-03-07 PROCEDURE — 76030000000 HC AMB SURG OR TIME 0.5 TO 1: Performed by: ORTHOPAEDIC SURGERY

## 2022-03-07 PROCEDURE — 2709999900 HC NON-CHARGEABLE SUPPLY: Performed by: ORTHOPAEDIC SURGERY

## 2022-03-07 PROCEDURE — 77030002916 HC SUT ETHLN J&J -A: Performed by: ORTHOPAEDIC SURGERY

## 2022-03-07 PROCEDURE — 76210000040 HC AMBSU PH I REC FIRST 0.5 HR: Performed by: ORTHOPAEDIC SURGERY

## 2022-03-07 PROCEDURE — 77030000032 HC CUF TRNQT ZIMM -B: Performed by: ORTHOPAEDIC SURGERY

## 2022-03-07 PROCEDURE — 77030021352 HC CBL LD SYS DISP COVD -B: Performed by: ORTHOPAEDIC SURGERY

## 2022-03-07 PROCEDURE — 76060000061 HC AMB SURG ANES 0.5 TO 1 HR: Performed by: ORTHOPAEDIC SURGERY

## 2022-03-07 PROCEDURE — 74011000250 HC RX REV CODE- 250: Performed by: ANESTHESIOLOGY

## 2022-03-07 RX ORDER — ONDANSETRON 2 MG/ML
4 INJECTION INTRAMUSCULAR; INTRAVENOUS AS NEEDED
Status: DISCONTINUED | OUTPATIENT
Start: 2022-03-07 | End: 2022-03-07 | Stop reason: HOSPADM

## 2022-03-07 RX ORDER — TRAMADOL HYDROCHLORIDE 50 MG/1
50 TABLET ORAL
Qty: 20 TABLET | Refills: 0 | Status: SHIPPED | OUTPATIENT
Start: 2022-03-07 | End: 2022-03-12

## 2022-03-07 RX ORDER — OXYCODONE AND ACETAMINOPHEN 5; 325 MG/1; MG/1
1 TABLET ORAL
Status: DISCONTINUED | OUTPATIENT
Start: 2022-03-07 | End: 2022-03-07 | Stop reason: HOSPADM

## 2022-03-07 RX ORDER — SODIUM CHLORIDE 0.9 % (FLUSH) 0.9 %
5-40 SYRINGE (ML) INJECTION AS NEEDED
Status: DISCONTINUED | OUTPATIENT
Start: 2022-03-07 | End: 2022-03-07 | Stop reason: HOSPADM

## 2022-03-07 RX ORDER — SODIUM CHLORIDE 0.9 % (FLUSH) 0.9 %
5-40 SYRINGE (ML) INJECTION EVERY 8 HOURS
Status: DISCONTINUED | OUTPATIENT
Start: 2022-03-07 | End: 2022-03-07 | Stop reason: HOSPADM

## 2022-03-07 RX ORDER — ASCORBIC ACID 500 MG
500 TABLET ORAL DAILY
Qty: 50 TABLET | Refills: 0 | Status: SHIPPED | OUTPATIENT
Start: 2022-03-07 | End: 2022-04-26

## 2022-03-07 RX ORDER — SODIUM CHLORIDE, SODIUM LACTATE, POTASSIUM CHLORIDE, CALCIUM CHLORIDE 600; 310; 30; 20 MG/100ML; MG/100ML; MG/100ML; MG/100ML
25 INJECTION, SOLUTION INTRAVENOUS CONTINUOUS
Status: DISCONTINUED | OUTPATIENT
Start: 2022-03-07 | End: 2022-03-07 | Stop reason: HOSPADM

## 2022-03-07 RX ORDER — DIPHENHYDRAMINE HYDROCHLORIDE 50 MG/ML
12.5 INJECTION, SOLUTION INTRAMUSCULAR; INTRAVENOUS AS NEEDED
Status: DISCONTINUED | OUTPATIENT
Start: 2022-03-07 | End: 2022-03-07 | Stop reason: HOSPADM

## 2022-03-07 RX ORDER — LIDOCAINE HYDROCHLORIDE 10 MG/ML
0.1 INJECTION, SOLUTION EPIDURAL; INFILTRATION; INTRACAUDAL; PERINEURAL AS NEEDED
Status: DISCONTINUED | OUTPATIENT
Start: 2022-03-07 | End: 2022-03-07 | Stop reason: HOSPADM

## 2022-03-07 RX ORDER — TRIAMCINOLONE ACETONIDE 40 MG/ML
INJECTION, SUSPENSION INTRA-ARTICULAR; INTRAMUSCULAR AS NEEDED
Status: DISCONTINUED | OUTPATIENT
Start: 2022-03-07 | End: 2022-03-07 | Stop reason: HOSPADM

## 2022-03-07 RX ORDER — PROPOFOL 10 MG/ML
INJECTION, EMULSION INTRAVENOUS AS NEEDED
Status: DISCONTINUED | OUTPATIENT
Start: 2022-03-07 | End: 2022-03-07 | Stop reason: HOSPADM

## 2022-03-07 RX ORDER — FENTANYL CITRATE 50 UG/ML
25 INJECTION, SOLUTION INTRAMUSCULAR; INTRAVENOUS
Status: DISCONTINUED | OUTPATIENT
Start: 2022-03-07 | End: 2022-03-07 | Stop reason: HOSPADM

## 2022-03-07 RX ORDER — VANCOMYCIN/0.9 % SOD CHLORIDE 1.5G/250ML
1500 PLASTIC BAG, INJECTION (ML) INTRAVENOUS ONCE
Status: DISCONTINUED | OUTPATIENT
Start: 2022-03-07 | End: 2022-03-07

## 2022-03-07 RX ADMIN — PROPOFOL 25 MG: 10 INJECTION, EMULSION INTRAVENOUS at 10:47

## 2022-03-07 RX ADMIN — PROPOFOL 50 MG: 10 INJECTION, EMULSION INTRAVENOUS at 10:29

## 2022-03-07 RX ADMIN — PROPOFOL 25 MG: 10 INJECTION, EMULSION INTRAVENOUS at 10:35

## 2022-03-07 RX ADMIN — SODIUM CHLORIDE, POTASSIUM CHLORIDE, SODIUM LACTATE AND CALCIUM CHLORIDE 25 ML/HR: 600; 310; 30; 20 INJECTION, SOLUTION INTRAVENOUS at 10:05

## 2022-03-07 RX ADMIN — PROPOFOL 25 MG: 10 INJECTION, EMULSION INTRAVENOUS at 10:32

## 2022-03-07 RX ADMIN — PROPOFOL 25 MG: 10 INJECTION, EMULSION INTRAVENOUS at 10:38

## 2022-03-07 RX ADMIN — PROPOFOL 25 MG: 10 INJECTION, EMULSION INTRAVENOUS at 10:40

## 2022-03-07 RX ADMIN — WATER 2 G: 1 INJECTION INTRAMUSCULAR; INTRAVENOUS; SUBCUTANEOUS at 10:26

## 2022-03-07 NOTE — ANESTHESIA PREPROCEDURE EVALUATION
Relevant Problems   NEUROLOGY   (+) CVA (cerebral vascular accident) ([facility] [address]   (+) TIA (transient ischemic attack)      CARDIOVASCULAR   (+) Essential hypertension      GASTROINTESTINAL   (+) Gastroesophageal reflux disease without esophagitis       Anesthetic History   No history of anesthetic complications            Review of Systems / Medical History  Patient summary reviewed, nursing notes reviewed and pertinent labs reviewed    Pulmonary  Within defined limits                 Neuro/Psych         TIA     Cardiovascular    Hypertension          Hyperlipidemia    Exercise tolerance: >4 METS     GI/Hepatic/Renal     GERD           Endo/Other        Arthritis     Other Findings   Comments: PMR (polymyalgia rheumatica)            Physical Exam    Airway  Mallampati: II  TM Distance: 4 - 6 cm  Neck ROM: normal range of motion   Mouth opening: Normal     Cardiovascular  Regular rate and rhythm,  S1 and S2 normal,  no murmur, click, rub, or gallop             Dental  No notable dental hx       Pulmonary  Breath sounds clear to auscultation               Abdominal  GI exam deferred       Other Findings            Anesthetic Plan    ASA: 2  Anesthesia type: MAC          Induction: Intravenous  Anesthetic plan and risks discussed with: Patient

## 2022-03-07 NOTE — OP NOTES
PATIENT NAME:  Lauren Girard. SURGEON:  Xochitl Worthington MD    DATE OF SURGERY:  3/7/2022    LOCATION: [facility]    PREOPERATIVE DIAGNOSIS:   Right long and ring finger trigger digit    POSTOPERATIVE DIAGNOSIS:  Same    PROCEDURE:  Right long and ring finger trigger release             ANESTHESIA:  Local 0.25% bupivicaine with 1% lidocaine with sedation    BLOOD LOSS:  Minimal    Tourniquet time: 11 min    Assistant: Diane Ricks PA-C    OPERATIVE INDICATIONS:INDICATIONS FOR OPERATION:      The patient is a 66 y.o. old male who has developed a persistent right long and ring finger trigger digit, unresponsive to all conservative treatment. The patient has elected to undergo an A1 pulley release. He understands the alternatives to surgery, the nature of this elective procedure, the usual recovery, possible variations in healing, and the potential for shortcomings and complications ( including but not exclusive to bleeding, infection, scar tenderness,  weakness, ). DESCRIPTION  OF PROCEDURE: After satisfactory sedation had been attained, the right hand and forearm were prepped and draped in a sterile field. A timeout was taken and the operative site was confirmed. Local anesthesia was instilled into the incision site. The extremity was then elevated and exsanguinated and a forearm tourniquet was inflated to 200 mmHg. A small vertical incision was made in the distal palm over the A1 pulley of the ring finger. Dissection was carried through the subcutaneous tissue and digital nerves were protected. The A1 annular pulley was reelased after which there was no evidence of triggering with flexion and extension of the digit. The flexor tendons were elevated out of the wound and noted to be without adhesions. In a similar fashion, the middle finger was released. A small vertical incision was made in the distal palm over the A1 pulley.   Dissection was carried through the subcutaneous tissue and digital nerves were protected. The A1 annular pulley was reelased after which there was no evidence of triggering with flexion and extension of the digit. The flexor tendons were elevated out of the wound and noted to be without adhesions. The tourniquet was released. Hemostasis was confirmed and the wound was copiously irrigated and closed with a interupted nylon sutures. A sterile dressing was then applied leaving the fingers free for range of motion. The patient tolerated the procedure well and was discharged to the recovery area uneventfully.

## 2022-03-07 NOTE — PERIOP NOTES
Permission received to review discharge instructions and discuss private health information with Alyson Stokes, wife.

## 2022-03-07 NOTE — ANESTHESIA POSTPROCEDURE EVALUATION
Procedure(s):  RIGHT LONG AND RING FINGER TRIGGER FINGER RELEASE, LEFT BASAL JOINT INJECTION. MAC    Anesthesia Post Evaluation      Multimodal analgesia: multimodal analgesia used between 6 hours prior to anesthesia start to PACU discharge  Patient location during evaluation: PACU  Level of consciousness: sleepy but conscious  Pain management: adequate  Airway patency: patent  Anesthetic complications: no  Cardiovascular status: acceptable  Respiratory status: acceptable  Hydration status: acceptable  Comments: +Post-Anesthesia Evaluation and Assessment    Patient: Arlee Bernheim. MRN: [id number]  SSN: [id number]   YOB: 1950  Age: 66 y.o. Sex: male      Cardiovascular Function/Vital Signs    BP (!) 141/62 (BP 1 Location: Left upper arm, BP Patient Position: At rest)   Pulse (!) 56   Temp 36.6 °C (97.9 °F)   Resp 18   Ht 5' 10\" (1.778 m)   Wt 85.3 kg (188 lb)   SpO2 99%   BMI 26.98 kg/m²     Patient is status post Procedure(s):  RIGHT LONG AND RING FINGER TRIGGER FINGER RELEASE, LEFT BASAL JOINT INJECTION. Nausea/Vomiting: Controlled. Postoperative hydration reviewed and adequate. Pain:  Pain Scale 1: Numeric (0 - 10) (03/07/22 1120)  Pain Intensity 1: 0 (03/07/22 1120)   Managed. Neurological Status:   Neuro (WDL): Within Defined Limits (03/07/22 1120)   At baseline. Mental Status and Level of Consciousness: Arousable. Pulmonary Status:   O2 Device: None (Room air) (03/07/22 1120)   Adequate oxygenation and airway patent. Complications related to anesthesia: None    Post-anesthesia assessment completed. No concerns.     Signed By: Angella Gallego MD    3/7/2022  Post anesthesia nausea and vomiting:  controlled  Final Post Anesthesia Temperature Assessment:  Normothermia (36.0-37.5 degrees C)      INITIAL Post-op Vital signs:   Vitals Value Taken Time   /75 03/07/22 1115   Temp 36.6 °C (97.9 °F) 03/07/22 1115   Pulse 57 03/07/22 1115   Resp 15 03/07/22 1115 SpO2 100 % 03/07/22 2965

## 2022-03-07 NOTE — PERIOP NOTES
Discharge instructions reviewed w/pts. Wife. She verbalized understanding. Vss. Denies pain. Dressing to right hand intact. bandaid to left wrist intact. Pt discharged via wheelchair to car, accompanied by RN. Pt discharged awake and alert, respirations equal and unlabored, skin warm, dry, and intact. Pt and family members' questions and concerns addressed prior to discharge.

## 2022-03-07 NOTE — PERIOP NOTES
Lauren Girard.  1950  [id number]    Situation:  Verbal report given from: Yulisa Rodríguez CRNA, Hunter Arriaga RN  Procedure: Procedure(s):  RIGHT LONG AND RING FINGER TRIGGER FINGER RELEASE, LEFT BASAL JOINT INJECTION    Background:    Preoperative diagnosis: Trigger middle finger of right hand [M65.331]  Trigger ring finger of right hand [M65.341]    Postoperative diagnosis: Trigger middle finger of right hand [M65.331]  Trigger ring finger of r    :  Dr. Christian Veliz    Assistant(s): Circ-1: Irina Garcia RN  Scrub Tech-1: Tere Dexter    Specimens: * No specimens in log *    Assessment:  Intra-procedure medications         Anesthesia gave intra-procedure sedation and medications, see anesthesia flow sheet     Intravenous fluids: LR@ KVO     Vital signs stable       Recommendation:    Permission to share finding with wife : yes

## 2022-03-07 NOTE — H&P
Comes today for follow-up of his bilateral hands. Have previously seen him for right ring finger trigger finger and have performed thorough injections. Last injection wore off about 2 months ago. Symptoms have recurred and worsened. Also has a history of a right long finger trigger finger. Also reporting left wrist pain as well as base of thumb pain. Have performed a left basal joint injection in 2021 in January. Reports symptoms for this have recurred and worsened. No numbness on the left. Does get pain that comes and goes. PMH, PSH, ROS reviewed on intake form    Objective:   Constitutional: No acute distress. Well nourished. Well developed. Eyes: Sclera are nonicteric. Respiratory: No labored breathing. Cardiovascular: No marked edema. Skin: No marked skin ulcers. Neurological: see below  Psychiatric: Alert and oriented x3. Musculoskeletal     Examination of bilateral hands demonstrates at the right ring finger there is tenderness to palpation at the A1 pulley. There is active triggering here. There is mild tenderness at the right long finger A1 pulley. Neurovascularly intact distally. Examination of the left hand demonstrates there is significant basal joint tenderness. No significant MP hyperextension. No significant tenderness at the 1st dorsal compartment. Neurovascular intact distally. There is a positive Tinel's at the wrist.  Radiographs:         No imaging obtained     Assessment:     1. Trigger finger, right middle finger   2. Trigger ring finger of right hand   3. Carpal tunnel syndrome of left wrist   4. Primary osteoarthritis of first carpometacarpal joint of left hand     Plan:   Discussed nature condition as well as treatment options. He has failed meaningful nonsurgical management for his right-sided trigger fingers. Does desire surgical management. Risks benefits and alternatives as well as postoperative course are discussed. He consents to proceed. Surgical date chosen.  For the left side have given him a wrist thumb spica brace for nighttime carpal tunnel as well as basal joint protection. We will inject the left basal joint intraoperatively under sedation. Date of Surgery Update:  Mala Pham was seen and examined. History and physical has been reviewed. The patient has been examined.  There have been no significant clinical changes since the completion of the originally dated History and Physical.    Signed By: Adán Fowler MD     March 7, 2022 10:04 AM

## 2022-03-07 NOTE — DISCHARGE INSTRUCTIONS
[facility]  Post-operative instructions  For: Tegan Urias. Your first postop appointment should be scheduled with Dr. Pj Chandra for 2 weeks post-op. [address], [address]  [place]  Phone: [phone]  Hand Therapy Phone: [phone]  Fax: [phone]    Please follow these instructions for a safe and speedy recovery:    1. Surgical Bandage: Leave the bandage in place until we remove it. Please keep it clean and dry. To shower or bathe, apply a plastic bag or GLAD Press'n Seal® plastic wrap around the bandage or simply sponge bathe. 2. Elevation: Hand swelling is best prevented by keeping your hand elevated above the level of your heart at all times, night and day. The opposite, dangling your hand below your waist, will cause additional pain, swelling, and later stiffness. You can elevate the hand in a sling or by propping it on a pillow at night. Occasionally, we will provide you with a custom-made foam block for elevating the arm. Ice compresses may help but do not rep[lace elevation. Frequently, extreme pain is caused by a tight bandage, which should be loosened. If pain is severe and progressive, call us at [phone] during the day (ask for immediate connection to Dr. Dina Morrison Team) or during the night (ask for the on-call physician). 3. Medication: You will be provided with an appropriate pain medication (over-the-counter or prescription). Please fill this at a pharmacy promptly so you will have it available when all local anesthetic wears off. Take this to relieve pain as directed on the bottle. Please refrain from driving, drinking alcohol, and making important medical decisions while taking the medication. Please call us if you need something stronger. Medication changes or refills must be made before 5pm or through your pharmacy.     4. Weight bearing: Do NOT bear any weight on the operative extremity. I want to thank you for choosing us for your hand care needs. My staff and I are committed to providing all our customers with the highest quality hand surgery and subsequent hand therapy care as possible. We want your recovery to be comfortable. If you have clinical non-emergent questions about your surgery or other hand conditions please call [phone] and ask for my team. Your call will be returned within 24 hours. TAKE NARCOTIC PAIN MEDICATIONS WITH FOOD     Narcotics tend to be constipating, we suggest taking a stool softener such as Colace or Miralax (follow package instructions). DO NOT DRIVE WHILE TAKING NARCOTIC PAIN MEDICATIONS. DO NOT TAKE SLEEPING MEDICATIONS OR ANTIANXIETY MEDICATIONS WHILE TAKING NARCOTIC PAIN MEDICATIONS,  ESPECIALLY THE NIGHT OF ANESTHESIA! CPAP PATIENTS BE SURE TO WEAR MACHINE WHENEVER NAPPING OR SLEEPING! DISCHARGE SUMMARY from Nurse    The following personal items collected during your admission are returned to you:   Dental Appliance: Dental Appliances: None  Vision: Visual Aid: Glasses,At home  Hearing Aid:    Jewelry: Jewelry: None  Clothing: Clothing: With patient  Other Valuables: Other Valuables: None  Valuables sent to safe:        PATIENT INSTRUCTIONS:    After General Anesthesia or Intravenous Sedation, for 24 hours or while taking prescription Narcotics:        Someone should be with you for the next 24 hours. For your own safety, a responsible adult must drive you home. · Limit your activities  · Recommended activity: Rest today, up with assistance today. Do not climb stairs or shower unattended for the next 24 hours. · Please start with a soft bland diet and advance as tolerated (no nausea) to regular diet. · If you have a sore throat you should try the following: fluids, warm salt water gargles, or throat lozenges.  If it does not improve after several days please follow up with your primary physician. · Do not drive and operate hazardous machinery  · Do not make important personal or business decisions  · Do  not drink alcoholic beverages  · If you have not urinated within 8 hours after discharge, please contact your surgeon on call. Report the following to your surgeon:  · Excessive pain, swelling, redness or odor of or around the surgical area  · Temperature over 100.5  · Nausea and vomiting lasting longer than 4 hours or if unable to take medications  · Any signs of decreased circulation or nerve impairment to extremity: change in color, persistent  numbness, tingling, coldness or increase pain      · You will receive a Post Operative Call from one of the Recovery Room Nurses on the day after your surgery to check on you. It is very important for us to know how you are recovering after your surgery. If you have an issue or need to speak with someone, please call your surgeon, do not wait for the post operative call. · You may receive an e-mail or letter in the mail from [employer] regarding your experience with us in the Ambulatory Surgery Unit. Your feedback is valuable to us and we appreciate your participation in the survey. · If the above instructions are not adequate or you are having problems after your surgery, call the physician at their office number. · We wish you a speedy recovery ? What to do at Home:      *  Please give a list of your current medications to your Primary Care Provider. *  Please update this list whenever your medications are discontinued, doses are      changed, or new medications (including over-the-counter products) are added. *  Please carry medication information at all times in case of emergency situations. If you have not received your influenza and/or pneumococcal vaccine, please follow up with your primary care physician. The discharge information has been reviewed with the patient and caregiver.   The patient and caregiver verbalized understanding. TO PREVENT AN INFECTION      1. [address] YOUR HANDS     To prevent infection, good handwashing is the most important thing you or your caregiver can do.  Wash your hands with soap and water or use the hand  we gave you before you touch any wounds. 2. SHOWER     Use the antibacterial soap we gave you when you take a shower.  Shower with this soap until your wounds are healed.  To reach all areas of your body, you may need someone to help you.  Dont forget to clean your belly button with every shower. 3.  USE CLEAN SHEETS     Use freshly cleaned sheets on your bed after surgery.  To keep the surgery site clean, do not allow pets to sleep with you while your wound is still healing.

## 2022-03-19 PROBLEM — M50.30 DDD (DEGENERATIVE DISC DISEASE), CERVICAL: Status: ACTIVE | Noted: 2019-10-18

## 2022-03-19 PROBLEM — G56.02 CARPAL TUNNEL SYNDROME, LEFT: Status: ACTIVE | Noted: 2017-02-16

## 2022-03-19 PROBLEM — M79.641 PAIN OF RIGHT HAND: Status: ACTIVE | Noted: 2017-09-20

## 2022-03-19 PROBLEM — G45.9 TIA (TRANSIENT ISCHEMIC ATTACK): Status: ACTIVE | Noted: 2019-08-26

## 2022-03-19 PROBLEM — I63.9 CVA (CEREBRAL VASCULAR ACCIDENT) (HCC): Status: ACTIVE | Noted: 2019-08-25

## 2022-04-02 DIAGNOSIS — I10 ESSENTIAL HYPERTENSION: ICD-10-CM

## 2022-04-04 RX ORDER — AMLODIPINE BESYLATE 10 MG/1
TABLET ORAL
Qty: 90 TABLET | Refills: 0 | Status: SHIPPED | OUTPATIENT
Start: 2022-04-04 | End: 2022-06-23

## 2022-06-23 DIAGNOSIS — I10 ESSENTIAL HYPERTENSION: ICD-10-CM

## 2022-06-23 RX ORDER — AMLODIPINE BESYLATE 10 MG/1
TABLET ORAL
Qty: 90 TABLET | Refills: 0 | Status: SHIPPED | OUTPATIENT
Start: 2022-06-23 | End: 2022-10-02

## 2022-07-13 LAB — SARS-COV-2, NAA: NOT DETECTED

## 2022-08-14 NOTE — PROGRESS NOTES
Coleen Sorensen. is a 74 y.o. male who presents for follow up. BP elevated today. Not checking at home recently, usually 120/50. Weight stable. Walking regularly, stretching and weights upper body. Sees Dr Taty Krishnan. EGD with gastritis. Changed diet. On nexium daily. Colonoscopy hemorrhoids. 2021. On metamucil daily. BM regular. He has been treated for PMR in the past, resolved. He has chronic left arm tingling and neck pain. History of cervical degenerative disc disease. Saw Dr. Neli Ruffin and Dr. Orquidea Walker. He is taking gabapentin 300mg BID. some stretches, prior PT. Has left knee pain, using a brace. Some leg exercises. Seen by ortho. Up to date on eye exam, Dr Franics Heaton. Active without symptoms. On lipitor 1/2 of 40mg daily for years. No myalgias. Sees Dr. Leo An, urology, annual follow up. Had urolift, Dr Lupillo Parmar, it was helpful. Less nocturia, better flow. COVID immunization x 4, had covid twice.          Past Medical History:   Diagnosis Date    Arthritis     Autoimmune disease ([facility] [address]     Patient stated \" HX of autoimmune muscular disorder\"    Enlarged prostate     GERD (gastroesophageal reflux disease)     High cholesterol     Hx of seasonal allergies     Hypertension     PMR (polymyalgia rheumatica) (HCC)     TIA (transient ischemic attack) 2019       Family History   Problem Relation Age of Onset    Cancer Mother         lung    Cancer Father         lung       Social History     Socioeconomic History    Marital status:      Spouse name: Not on file    Number of children: Not on file    Years of education: Not on file    Highest education level: Not on file   Occupational History    Not on file   Tobacco Use    Smoking status: Former     Packs/day: 1.50     Years: 15.00     Pack years: 22.50     Types: Cigarettes     Quit date: 9/3/1990     Years since quittin.9    Smokeless tobacco: Never   Vaping Use    Vaping Use: Never used   Substance and Sexual Activity    Alcohol use: Yes     Alcohol/week: 2.0 standard drinks     Types: 2 Shots of liquor per week    Drug use: No    Sexual activity: Yes     Partners: Female     Birth control/protection: None   Other Topics Concern    Not on file   Social History Narrative    Not on file     Social Determinants of Health     Financial Resource Strain: Not on file   Food Insecurity: Not on file   Transportation Needs: Not on file   Physical Activity: Not on file   Stress: Not on file   Social Connections: Not on file   Intimate Partner Violence: Not on file   Housing Stability: Not on file       Current Outpatient Medications on File Prior to Visit   Medication Sig Dispense Refill    esomeprazole (NEXIUM) 40 mg granules for oral suspension Take 40 mg by mouth. amLODIPine (NORVASC) 10 mg tablet Take 1 tablet by mouth once daily 90 Tablet 0    psyllium (METAMUCIL) powd Take  by mouth. Daily      polyethylene glycol 3350 (MIRALAX PO) Take  by mouth. Powder prn       atorvastatin (LIPITOR) 20 mg tablet Take 1 Tablet by mouth daily. 90 Tablet 3    albuterol (PROVENTIL HFA, VENTOLIN HFA, PROAIR HFA) 90 mcg/actuation inhaler Take 2 Puffs by inhalation every six (6) hours as needed for Wheezing or Shortness of Breath. 18 g 0    cyclobenzaprine (FLEXERIL) 10 mg tablet TAKE 1 TABLET BY MOUTH NIGHTLY AS NEEDED FOR MUSCLE SPASM 30 Tablet 0    aspirin 81 mg chewable tablet Take 1 Tab by mouth daily. (Patient taking differently: Take 81 mg by mouth in the morning. Indications: pt. said it was prescribed after having a TIA) 30 Tab 1    gabapentin (NEURONTIN) 300 mg capsule Take 300 mg by mouth two (2) times a day. cholecalciferol (VITAMIN D3) (1000 Units /25 mcg) tablet Take 1,000 Units by mouth daily. fexofenadine (ALLEGRA) 180 mg tablet Take 180 mg by mouth daily as needed. (Patient not taking: Reported on 8/15/2022)       No current facility-administered medications on file prior to visit.        Review of Systems  Pertinent items are noted in HPI. Objective:     Visit Vitals  BP (!) 150/78   Pulse (!) 53   Temp (!) 95 °F (35 °C) (Temporal)   Resp 16   Ht 5' 10\" (1.778 m)   Wt 184 lb (83.5 kg)   SpO2 100%   BMI 26.40 kg/m²     Gen: well appearing male  HEENT:   PERRL,normal conjunctiva. External ear and canals normal, TMs no opacification or erythema,  OP no erythema, no exudates, MMM  Neck:  Supple. Thyroid normal size, nontender, without nodules. No masses or LAD  Resp:  No wheezing, no rhonchi, no rales. CV:  RRR, normal S1S2, no murmur. GI: soft, nontender, without masses. No hepatosplenomegaly. Extrem:  +2 pulses, no edema, warm distally      Assessment/Plan:     1. Essential hypertension    2. Mixed hyperlipidemia    3. Benign prostatic hyperplasia, unspecified whether lower urinary tract symptoms present    Continue present medications and stay active. Follow-up and Dispositions    Return for MEDICARE WELLNESS/FASTING LABS.   Vita Wolff MD

## 2022-08-15 ENCOUNTER — OFFICE VISIT (OUTPATIENT)
Dept: INTERNAL MEDICINE CLINIC | Age: 72
End: 2022-08-15
Payer: MEDICARE

## 2022-08-15 VITALS
SYSTOLIC BLOOD PRESSURE: 150 MMHG | WEIGHT: 184 LBS | HEART RATE: 53 BPM | BODY MASS INDEX: 26.34 KG/M2 | TEMPERATURE: 95 F | RESPIRATION RATE: 16 BRPM | OXYGEN SATURATION: 100 % | DIASTOLIC BLOOD PRESSURE: 78 MMHG | HEIGHT: 70 IN

## 2022-08-15 DIAGNOSIS — I10 ESSENTIAL HYPERTENSION: Primary | ICD-10-CM

## 2022-08-15 DIAGNOSIS — E78.2 MIXED HYPERLIPIDEMIA: ICD-10-CM

## 2022-08-15 DIAGNOSIS — N40.0 BENIGN PROSTATIC HYPERPLASIA, UNSPECIFIED WHETHER LOWER URINARY TRACT SYMPTOMS PRESENT: ICD-10-CM

## 2022-08-15 LAB
ALBUMIN SERPL-MCNC: 3.9 G/DL (ref 3.5–5)
ALBUMIN/GLOB SERPL: 1.3 {RATIO} (ref 1.1–2.2)
ALP SERPL-CCNC: 56 U/L (ref 45–117)
ALT SERPL-CCNC: 62 U/L (ref 12–78)
ANION GAP SERPL CALC-SCNC: 8 MMOL/L (ref 5–15)
AST SERPL-CCNC: 30 U/L (ref 15–37)
BILIRUB SERPL-MCNC: 1.4 MG/DL (ref 0.2–1)
BUN SERPL-MCNC: 16 MG/DL (ref 6–20)
BUN/CREAT SERPL: 18 (ref 12–20)
CALCIUM SERPL-MCNC: 9.3 MG/DL (ref 8.5–10.1)
CHLORIDE SERPL-SCNC: 105 MMOL/L (ref 97–108)
CHOLEST SERPL-MCNC: 112 MG/DL
CO2 SERPL-SCNC: 28 MMOL/L (ref 21–32)
CREAT SERPL-MCNC: 0.88 MG/DL (ref 0.7–1.3)
GLOBULIN SER CALC-MCNC: 3.1 G/DL (ref 2–4)
GLUCOSE SERPL-MCNC: 99 MG/DL (ref 65–100)
HDLC SERPL-MCNC: 58 MG/DL
HDLC SERPL: 1.9 {RATIO} (ref 0–5)
LDLC SERPL CALC-MCNC: 33 MG/DL (ref 0–100)
POTASSIUM SERPL-SCNC: 3.6 MMOL/L (ref 3.5–5.1)
PROT SERPL-MCNC: 7 G/DL (ref 6.4–8.2)
SODIUM SERPL-SCNC: 141 MMOL/L (ref 136–145)
TRIGL SERPL-MCNC: 105 MG/DL (ref ?–150)
VLDLC SERPL CALC-MCNC: 21 MG/DL

## 2022-08-15 PROCEDURE — 3017F COLORECTAL CA SCREEN DOC REV: CPT | Performed by: FAMILY MEDICINE

## 2022-08-15 PROCEDURE — G8417 CALC BMI ABV UP PARAM F/U: HCPCS | Performed by: FAMILY MEDICINE

## 2022-08-15 PROCEDURE — 1101F PT FALLS ASSESS-DOCD LE1/YR: CPT | Performed by: FAMILY MEDICINE

## 2022-08-15 PROCEDURE — G8754 DIAS BP LESS 90: HCPCS | Performed by: FAMILY MEDICINE

## 2022-08-15 PROCEDURE — G8536 NO DOC ELDER MAL SCRN: HCPCS | Performed by: FAMILY MEDICINE

## 2022-08-15 PROCEDURE — 99214 OFFICE O/P EST MOD 30 MIN: CPT | Performed by: FAMILY MEDICINE

## 2022-08-15 PROCEDURE — G8427 DOCREV CUR MEDS BY ELIG CLIN: HCPCS | Performed by: FAMILY MEDICINE

## 2022-08-15 PROCEDURE — G8753 SYS BP > OR = 140: HCPCS | Performed by: FAMILY MEDICINE

## 2022-08-15 PROCEDURE — G8510 SCR DEP NEG, NO PLAN REQD: HCPCS | Performed by: FAMILY MEDICINE

## 2022-08-15 RX ORDER — ESOMEPRAZOLE MAGNESIUM 40 MG/1
40 FOR SUSPENSION ORAL
COMMUNITY

## 2022-08-15 NOTE — PROGRESS NOTES
1. \"Have you been to the ER, urgent care clinic since your last visit? Hospitalized since your last visit? \" Yes urgent care, Covid    2. \"Have you seen or consulted any other health care providers outside of the [address] [place] [address] since your last visit? \" No     3. For patients aged 39-70: Has the patient had a colonoscopy / FIT/ Cologuard? No      If the patient is female:    4. For patients aged 41-77: Has the patient had a mammogram within the past 2 years? NA - based on age or sex      11. For patients aged 21-65: Has the patient had a pap smear?  NA - based on age or sex

## 2022-08-19 ENCOUNTER — PATIENT MESSAGE (OUTPATIENT)
Dept: INTERNAL MEDICINE CLINIC | Age: 72
End: 2022-08-19

## 2022-10-01 DIAGNOSIS — I10 ESSENTIAL HYPERTENSION: ICD-10-CM

## 2022-10-02 RX ORDER — AMLODIPINE BESYLATE 10 MG/1
TABLET ORAL
Qty: 90 TABLET | Refills: 0 | Status: SHIPPED | OUTPATIENT
Start: 2022-10-02

## 2022-10-17 ENCOUNTER — TRANSCRIBE ORDER (OUTPATIENT)
Dept: SCHEDULING | Age: 72
End: 2022-10-17

## 2022-10-17 DIAGNOSIS — M54.2 CERVICALGIA: Primary | ICD-10-CM

## 2022-10-17 DIAGNOSIS — M50.30 DDD (DEGENERATIVE DISC DISEASE), CERVICAL: ICD-10-CM

## 2022-10-17 DIAGNOSIS — M47.812 SPONDYLOSIS OF CERVICAL SPINE WITHOUT MYELOPATHY: ICD-10-CM

## 2022-10-17 DIAGNOSIS — M54.12 CERVICAL RADICULOPATHY: ICD-10-CM

## 2022-10-17 DIAGNOSIS — M48.02 CERVICAL STENOSIS OF SPINE: ICD-10-CM

## 2022-10-26 ENCOUNTER — HOSPITAL ENCOUNTER (OUTPATIENT)
Dept: MRI IMAGING | Age: 72
Discharge: HOME | End: 2022-10-26
Attending: NURSE PRACTITIONER
Payer: MEDICARE

## 2022-10-26 DIAGNOSIS — M54.2 CERVICALGIA: ICD-10-CM

## 2022-10-26 DIAGNOSIS — M48.02 CERVICAL STENOSIS OF SPINE: ICD-10-CM

## 2022-10-26 DIAGNOSIS — M50.30 DDD (DEGENERATIVE DISC DISEASE), CERVICAL: ICD-10-CM

## 2022-10-26 DIAGNOSIS — M54.12 CERVICAL RADICULOPATHY: ICD-10-CM

## 2022-10-26 DIAGNOSIS — M47.812 SPONDYLOSIS OF CERVICAL SPINE WITHOUT MYELOPATHY: ICD-10-CM

## 2022-10-26 PROCEDURE — 72141 MRI NECK SPINE W/O DYE: CPT

## 2022-11-17 ENCOUNTER — OFFICE VISIT (OUTPATIENT)
Dept: INTERNAL MEDICINE CLINIC | Age: 72
End: 2022-11-17
Payer: MEDICARE

## 2022-11-17 VITALS
HEART RATE: 63 BPM | BODY MASS INDEX: 27.49 KG/M2 | TEMPERATURE: 97.2 F | OXYGEN SATURATION: 100 % | SYSTOLIC BLOOD PRESSURE: 136 MMHG | HEIGHT: 70 IN | WEIGHT: 192 LBS | RESPIRATION RATE: 16 BRPM | DIASTOLIC BLOOD PRESSURE: 65 MMHG

## 2022-11-17 DIAGNOSIS — M25.552 LEFT HIP PAIN: Primary | ICD-10-CM

## 2022-11-17 PROCEDURE — 1101F PT FALLS ASSESS-DOCD LE1/YR: CPT | Performed by: FAMILY MEDICINE

## 2022-11-17 PROCEDURE — G8417 CALC BMI ABV UP PARAM F/U: HCPCS | Performed by: FAMILY MEDICINE

## 2022-11-17 PROCEDURE — G8754 DIAS BP LESS 90: HCPCS | Performed by: FAMILY MEDICINE

## 2022-11-17 PROCEDURE — G8427 DOCREV CUR MEDS BY ELIG CLIN: HCPCS | Performed by: FAMILY MEDICINE

## 2022-11-17 PROCEDURE — G8752 SYS BP LESS 140: HCPCS | Performed by: FAMILY MEDICINE

## 2022-11-17 PROCEDURE — 3017F COLORECTAL CA SCREEN DOC REV: CPT | Performed by: FAMILY MEDICINE

## 2022-11-17 PROCEDURE — 99213 OFFICE O/P EST LOW 20 MIN: CPT | Performed by: FAMILY MEDICINE

## 2022-11-17 PROCEDURE — G8510 SCR DEP NEG, NO PLAN REQD: HCPCS | Performed by: FAMILY MEDICINE

## 2022-11-17 PROCEDURE — G8536 NO DOC ELDER MAL SCRN: HCPCS | Performed by: FAMILY MEDICINE

## 2022-11-17 NOTE — PROGRESS NOTES
1. \"Have you been to the ER, urgent care clinic since your last visit? Hospitalized since your last visit? \" No    2. \"Have you seen or consulted any other health care providers outside of the [address] [place] [address] since your last visit? \" Yes [facility]        3. For patients aged 39-70: Has the patient had a colonoscopy / FIT/ Cologuard? Yes - Care Gap present.  Most recent result on file

## 2022-11-17 NOTE — PROGRESS NOTES
Linda Duggan. is a 74 y.o. male who presents with concern of pain in left hip, groin and thigh. Doing exercises, not improving. Interested in returning to PT. Reports the pain runs down entire thigh, worse with sitting. No symptoms below knee. No neuro sx. Past Medical History:   Diagnosis Date    Arthritis     Autoimmune disease ([facility] [address]     Patient stated \" HX of autoimmune muscular disorder\"    Enlarged prostate     GERD (gastroesophageal reflux disease)     High cholesterol     Hx of seasonal allergies     Hypertension     PMR (polymyalgia rheumatica) (HCC)     TIA (transient ischemic attack) 2019       Family History   Problem Relation Age of Onset    Cancer Mother         lung    Cancer Father         lung       Social History     Socioeconomic History    Marital status:      Spouse name: Not on file    Number of children: Not on file    Years of education: Not on file    Highest education level: Not on file   Occupational History    Not on file   Tobacco Use    Smoking status: Former     Packs/day: 1.50     Years: 15.00     Pack years: 22.50     Types: Cigarettes     Quit date: 9/3/1990     Years since quittin.2    Smokeless tobacco: Never   Vaping Use    Vaping Use: Never used   Substance and Sexual Activity    Alcohol use:  Yes     Alcohol/week: 2.0 standard drinks     Types: 2 Shots of liquor per week    Drug use: No    Sexual activity: Yes     Partners: Female     Birth control/protection: None   Other Topics Concern    Not on file   Social History Narrative    Not on file     Social Determinants of Health     Financial Resource Strain: Low Risk     Difficulty of Paying Living Expenses: Not very hard   Food Insecurity: No Food Insecurity    Worried About Running Out of Food in the Last Year: Never true    Ran Out of Food in the Last Year: Never true   Transportation Needs: Not on file   Physical Activity: Not on file   Stress: Not on file   Social Connections: Not on file Intimate Partner Violence: Not on file   Housing Stability: Not on file       Current Outpatient Medications on File Prior to Visit   Medication Sig Dispense Refill    amLODIPine (NORVASC) 10 mg tablet Take 1 tablet by mouth once daily 90 Tablet 0    esomeprazole (NEXIUM) 40 mg granules for oral suspension Take 40 mg by mouth. psyllium (METAMUCIL) powd Take  by mouth. Daily      polyethylene glycol 3350 (MIRALAX PO) Take  by mouth. Powder prn       atorvastatin (LIPITOR) 20 mg tablet Take 1 Tablet by mouth daily. 90 Tablet 3    albuterol (PROVENTIL HFA, VENTOLIN HFA, PROAIR HFA) 90 mcg/actuation inhaler Take 2 Puffs by inhalation every six (6) hours as needed for Wheezing or Shortness of Breath. 18 g 0    cyclobenzaprine (FLEXERIL) 10 mg tablet TAKE 1 TABLET BY MOUTH NIGHTLY AS NEEDED FOR MUSCLE SPASM 30 Tablet 0    aspirin 81 mg chewable tablet Take 1 Tab by mouth daily. (Patient taking differently: Take 81 mg by mouth daily. Indications: pt. said it was prescribed after having a TIA) 30 Tab 1    gabapentin (NEURONTIN) 300 mg capsule Take 300 mg by mouth two (2) times a day. fexofenadine (ALLEGRA) 180 mg tablet Take 180 mg by mouth daily as needed. cholecalciferol (VITAMIN D3) (1000 Units /25 mcg) tablet Take 1,000 Units by mouth daily. No current facility-administered medications on file prior to visit. Review of Systems  Pertinent items are noted in HPI. Objective:     Visit Vitals  BP (!) 142/64   Pulse 63   Temp 97.2 °F (36.2 °C) (Temporal)   Resp 16   Ht 5' 10\" (1.778 m)   Wt 192 lb (87.1 kg)   SpO2 100%   BMI 27.55 kg/m²     Gen: well appearing male  Resp:  No wheezing, no rhonchi, no rales. CV:  RRR, normal S1S2, no murmur. GI: soft, nontender, without masses. No hepatosplenomegaly.   Extrem:  +2 pulses, no edema, warm distally, fair ROM both hips with abduction  Back- no paraspinous muscle pain on palpation, no vertebral pain, able to move on exam table without difficulty Neuro- alert, normal gait, negative SLR, 5/5 motor in lower extremities, normal sensory       Assessment/Plan:       ICD-10-CM ICD-9-CM    1.  Left hip pain  M25.552 719.45 REFERRAL TO PHYSICAL THERAPY               Eddy Hall MD

## 2022-11-23 ENCOUNTER — HOSPITAL ENCOUNTER (OUTPATIENT)
Dept: PHYSICAL THERAPY | Age: 72
Discharge: HOME | End: 2022-11-23
Payer: MEDICARE

## 2022-11-23 DIAGNOSIS — M25.552 LEFT HIP PAIN: Primary | ICD-10-CM

## 2022-11-23 PROCEDURE — 97161 PT EVAL LOW COMPLEX 20 MIN: CPT | Performed by: PHYSICAL THERAPIST

## 2022-11-23 PROCEDURE — 97110 THERAPEUTIC EXERCISES: CPT | Performed by: PHYSICAL THERAPIST

## 2022-11-23 NOTE — PROGRESS NOTES
PT INITIAL EVALUATION NOTE - [facility] 2-15    Patient Name: Linda Duggan. Date:2022  : 1950  [x]  Patient  Verified  Payor: Zaira Shah / Plan: [facility] HUMANA MEDICARE CHOICE PPO/PFFS / Product Type: Managed Care Medicare /    In time: 4031  Out time: ***  Total Treatment Time (min): ***  Total Timed Codes (min): ***  1:1 Treatment Time ( only): ***   Visit #: 1     Treatment Area: Left hip pain [M25.552]    SUBJECTIVE  Pain Level (0-10 scale): 4  Any medication changes, allergies to medications, adverse drug reactions, diagnosis change, or new procedure performed?: [] No    [x] Yes (see summary sheet for update)  Subjective:    Pt reports that he started having left hip and thigh pain about 5 weeks ago. He reports that the pain came on insidiously. He does remember that he was had started doing more stationary biking in combination with his normal walking routine. He states that the pain was pretty bad for about 3 weeks. He denies any numbness or tingling. He got some relief about 1 week ago after backing off on the walking to some degree and trying to stretch more. He reports that he can get to feeling pretty good if he does his whole stretching and HEP routine but that takes about 40 minutes. Pt is planning to move to [place] in the next few weeks. He doesn't report any instances during the packing or moving that caused him any issues. He has been sleeping better the last few weeks but initially he was struggling to sleep at all. OBJECTIVE/EXAMINATION  Posture:  rounded shoulders and forward head.     Other Observations:  ***  Gait and Functional Mobility:  ***  Palpation: ***      Lumbar AROM  Flexion 100%  Ext 25%  Right Rotation 75%  Left Rotation 75%    Joint Mobility Assessment: ***    Flexibility: ***      LOWER QUARTER   MUSCLE STRENGTH  KEY       R  L  0 - No Contraction  Knee ext  5  4  1 - Trace            flex  4  3  2 - Poor   Hip ext   NT  NT  3 - Fair flex   4  2  4 - Good         abd  4  3  5 - Normal         add  NT  NT               ER  4  3      Ankle DF  ***  ***                PF  ***  ***                INV  ***  ***                EV  ***  ***      MMT: ***  Neurological: Reflexes / Sensations: ***  Special Tests: Trendelenberg: ***    Maryank: Mannie Jeannine: ***     Adilia: ***                 H.S. SLR: ***    Piriformis Ext: ***      Long Sit: ***     Hip Scour: ***      Knee Varus/Valgus Stress: ***  Knee Lachmans: ***      Thessaly Test: ***      Other: ***     Modality rationale: {BSHSI INMOTION MODALITIES:84196} to improve the patients ability to ***   Min Type Additional Details    [] Estim: []Att   []Unatt        []TENS instruct                  []IFC  []Premod   []NMES                     []Other:  []w/US   []w/ice   []w/heat  Position:  Location:    []  Traction: [] Cervical       []Lumbar                       [] Prone          []Supine                       []Intermittent   []Continuous Lbs:  [] before manual  [] after manual  []w/heat    []  Ultrasound: []Continuous   [] Pulsed at:                           []1MHz   []3MHz Location:  W/cm2:    [] Paraffin         Location:   []w/heat    []  Ice     []  Heat  []  Ice massage Position:  Location:    []  Laser  []  Other: Position:  Location:      []  Vasopneumatic Device Pressure:       [] lo [] med [] hi   Temperature:      [x] Skin assessment post-treatment:  [x]intact []redness- no adverse reaction    []redness - adverse reaction:     *** min Therapeutic Exercise:  [] See flow sheet :   Rationale: {BSHSI IMMOTION THER EX:18960:a} to improve the patients ability to ***    *** min Therapeutic Activity:  []  See flow sheet :   Rationale: {BSHSI IMMOTION THER EX:13237:a}  to improve the patients ability to ***     *** min Neuromuscular Re-education:  []  See flow sheet :   Rationale: {BSHSI IMMOTION THER EX:58055:a}  to improve the patients ability to ***    *** min Self Care/Home Management: []  See flow sheet :   Rationale: {BSHSI IMMOTION THER EX:35616:a}  to improve the patients ability to ***    *** min Manual Therapy: ***    Rationale: {BSHSI IMMOTION MANUAL THERAPY:29670:a} to improve the patients ability to ***    *** min Gait Training:  ___ feet with ___ device on level surfaces with ___ level of assist   Rationale: {BSHSI IMMOTION THER EX:21539:a}  to improve the patients ability to ***          With   [] TE   [] TA   [] Neuro   [] SC   [] other: Patient Education: [x] Review HEP    [] Progressed/Changed HEP based on:   [] positioning   [] body mechanics   [] transfers   [] heat/ice application    [] other:      Other Objective/Functional Measures: FOTO Functional Measure: ***/100              ***           Pain Level (0-10 scale) post treatment: ***    ASSESSMENT/Changes in Function:     [x]  See Plan of Melissa, PT 11/23/2022

## 2022-11-30 ENCOUNTER — HOSPITAL ENCOUNTER (OUTPATIENT)
Dept: PHYSICAL THERAPY | Age: 72
Discharge: HOME | End: 2022-11-30
Payer: MEDICARE

## 2022-11-30 PROCEDURE — 97140 MANUAL THERAPY 1/> REGIONS: CPT

## 2022-11-30 PROCEDURE — 97110 THERAPEUTIC EXERCISES: CPT

## 2022-11-30 NOTE — PROGRESS NOTES
PT DAILY TREATMENT NOTE - [facility] 2-15    Patient Name: Lauren Girard. Date:2022  : 1950  [x]  Patient  Verified  Payor: Josemanuel Lang / Plan: [facility] HUMANA MEDICARE CHOICE PPO/PFFS / Product Type: Managed Care Medicare /    In time: 7183  Out time: 3997  Total Treatment Time (min): 53  Total Timed Codes (min): 53  1:1 Treatment Time ( only): 42   Visit #: 2     Treatment Area: Pain in left hip [M25.552]    SUBJECTIVE  Pain Level (0-10 scale): 4/10  Any medication changes, allergies to medications, adverse drug reactions, diagnosis change, or new procedure performed?: [x] No    [] Yes (see summary sheet for update)  Subjective functional status/changes:   [] No changes reported  Patient noted they have been feeling a bit better since previous treatment session, noted they still have some up and down moments, but better overall. Also noted they have been able to sleep a bit better. Patient noted they only had one day were they chose not to go on their walk due to increased pain. OBJECTIVE    43 min Therapeutic Exercise:  [x] See flow sheet :   Rationale: increase ROM, increase strength, improve coordination, improve balance, and increase proprioception to improve the patients ability to perform daily activities. 10 min Manual Therapy: MFR to hip flexor musculature/gluteal musculature/piriformis  Hip PROM to tolerance all planes  S/L hip extension with PA mobilizations (Grade III)     Rationale: decrease pain, increase ROM, increase tissue extensibility, decrease trigger points, and increase postural awareness to improve the patients ability to perform daily activities.      With   [] TE   [] TA   [] Neuro   [] SC   [] other: Patient Education: [x] Review HEP    [] Progressed/Changed HEP based on:   [] positioning   [] body mechanics   [] transfers   [] heat/ice application    [] other:      Other Objective/Functional Measures: NA     Pain Level (0-10 scale) post treatment: \"decreased,\"     ASSESSMENT/Changes in Function:   Patient tolerated treatment session well today, able to perform exercises and progressions without increasing symptoms. Patient continues to demonstrate decreased amounts of hip extension, noted improvements during manual therapy. Patient noted not feeling stretch during adductor stretch, transitioned to supine with a strap and noted improvement. Continue to progress as tolerated. Patient will continue to benefit from skilled PT services to modify and progress therapeutic interventions, address functional mobility deficits, address ROM deficits, address strength deficits, analyze and address soft tissue restrictions, analyze and cue movement patterns, analyze and modify body mechanics/ergonomics, and assess and modify postural abnormalities to attain remaining goals. [x]  See Plan of Care  []  See progress note/recertification  []  See Discharge Summary         Progress towards goals / Updated goals:  Progressing towards goals.      PLAN  [x]  Upgrade activities as tolerated     [x]  Continue plan of care  [x]  Update interventions per flow sheet       []  Discharge due to:_  []  Other:_      Lilian Hayes, PTA 11/30/2022

## 2022-12-02 ENCOUNTER — HOSPITAL ENCOUNTER (OUTPATIENT)
Dept: PHYSICAL THERAPY | Age: 72
Discharge: HOME | End: 2022-12-02
Payer: MEDICARE

## 2022-12-02 PROCEDURE — 97110 THERAPEUTIC EXERCISES: CPT | Performed by: PHYSICAL THERAPIST

## 2022-12-05 ENCOUNTER — HOSPITAL ENCOUNTER (OUTPATIENT)
Dept: PHYSICAL THERAPY | Age: 72
Discharge: HOME | End: 2022-12-05
Payer: MEDICARE

## 2022-12-05 PROCEDURE — 97110 THERAPEUTIC EXERCISES: CPT | Performed by: PHYSICAL THERAPIST

## 2022-12-05 NOTE — PROGRESS NOTES
PT DAILY TREATMENT NOTE - [facility] 2-15    Patient Name: Emi Marcelino. Date:2022  : 1950  [x]  Patient  Verified  Payor: Jin Laser / Plan: [facility] HUMANA MEDICARE CHOICE PPO/PFFS / Product Type: Managed Care Medicare /    In time: 200  Out time: 244  Total Treatment Time (min): 44  Total Timed Codes (min): 44  1:1 Treatment Time ([facility] only): 40  Visit #: 2     Treatment Area: Pain in left hip [M25.552]    SUBJECTIVE  Pain Level (0-10 scale): 4/10  Any medication changes, allergies to medications, adverse drug reactions, diagnosis change, or new procedure performed?: [x] No    [] Yes (see summary sheet for update)  Subjective functional status/changes:   [] No changes reported  Pt reports that he is back to walking his normal distance and only having minor tightness intermittently after his walks. He reports that this ussually resolves after rest and stretching. OBJECTIVE    44 min Therapeutic Exercise:  [x] See flow sheet :   Rationale: increase ROM, increase strength, improve coordination, improve balance, and increase proprioception to improve the patients ability to perform daily activities. min Manual Therapy: MFR to hip flexor musculature/gluteal musculature/piriformis  Hip PROM to tolerance all planes  S/L hip extension with PA mobilizations (Grade III)     Rationale: decrease pain, increase ROM, increase tissue extensibility, decrease trigger points, and increase postural awareness to improve the patients ability to perform daily activities. With   [] TE   [] TA   [] Neuro   [] SC   [] other: Patient Education: [x] Review HEP    [] Progressed/Changed HEP based on:   [] positioning   [] body mechanics   [] transfers   [] heat/ice application    [] other:      Other Objective/Functional Measures: NA     Pain Level (0-10 scale) post treatment: 2-3     ASSESSMENT/Changes in Function:   Pt has progressed well and is feeling better.   He feels that he can manage his symptoms on his own. We will plan one last visit in 2 weeks following a move, and we will see if he is still ready for DC. Patient will continue to benefit from skilled PT services to modify and progress therapeutic interventions, address functional mobility deficits, address ROM deficits, address strength deficits, analyze and address soft tissue restrictions, analyze and cue movement patterns, analyze and modify body mechanics/ergonomics, and assess and modify postural abnormalities to attain remaining goals. [x]  See Plan of Care  []  See progress note/recertification  []  See Discharge Summary         Progress towards goals / Updated goals:  Progressing towards goals.      PLAN  [x]  Upgrade activities as tolerated     [x]  Continue plan of care  [x]  Update interventions per flow sheet       []  Discharge due to:_  []  Other:_      Radha Ivey, PT 12/5/2022

## 2022-12-06 ENCOUNTER — APPOINTMENT (OUTPATIENT)
Dept: PHYSICAL THERAPY | Age: 72
End: 2022-12-06
Payer: MEDICARE

## 2022-12-27 DIAGNOSIS — I10 ESSENTIAL HYPERTENSION: ICD-10-CM

## 2022-12-27 RX ORDER — AMLODIPINE BESYLATE 10 MG/1
10 TABLET ORAL DAILY
Qty: 90 TABLET | Refills: 0 | Status: SHIPPED | OUTPATIENT
Start: 2022-12-27

## 2022-12-27 NOTE — TELEPHONE ENCOUNTER
PCP: Lyric Saini MD    Last appt: 11/17/2022  Future Appointments   Date Time Provider Chucho Prather   2/15/2023 11:30 AM Lyric Saini MD [facility] BS AMB   3/14/2023  2:50 PM Marquis Antoinette Palacios MD [facility] MAIN BS AMB       Requested Prescriptions     Pending Prescriptions Disp Refills    amLODIPine (NORVASC) 10 mg tablet 90 Tablet 0     Sig: Take 1 Tablet by mouth daily.

## 2023-01-03 ENCOUNTER — APPOINTMENT (OUTPATIENT)
Dept: PHYSICAL THERAPY | Age: 73
End: 2023-01-03

## 2023-02-14 NOTE — PROGRESS NOTES
Nelida Han is a 74 y.o. male who presents for follow up. Treated for HLP. On lipitor 1/2 of 40mg daily for years. No myalgias. He has been treated for PMR in the past, resolved. Left sided symptoms. History of cervical degenerative disc disease. Saw Dr. Shadi You and Dr. Elaine Mar. He is taking gabapentin 300mg BID.  is stretching regularly. prior PT. Sees Dr Valorie Hastings. EGD with gastritis. Changed diet. On nexium daily. Colonoscopy hemorrhoids. 2021. On metamucil daily. BM regular. Up to date on eye exam, Dr Frannie Brandon. Saw Dr. Leah Reyez, urology, annual follow up. Had urolift, Dr Tess Bowman. Some nocturia x2. Not bothersome. PSA 0.8 in . Using hearing aides sometimes. Past Medical History:   Diagnosis Date    Arthritis     Autoimmune disease ([facility] [address]     Patient stated \" HX of autoimmune muscular disorder\"    Enlarged prostate     GERD (gastroesophageal reflux disease)     High cholesterol     Hx of seasonal allergies     Hypertension     PMR (polymyalgia rheumatica) (HCC)     TIA (transient ischemic attack) 2019       Family History   Problem Relation Age of Onset    Cancer Mother         lung    Cancer Father         lung       Social History     Socioeconomic History    Marital status:      Spouse name: Not on file    Number of children: Not on file    Years of education: Not on file    Highest education level: Not on file   Occupational History    Not on file   Tobacco Use    Smoking status: Former     Packs/day: 1.50     Years: 15.00     Pack years: 22.50     Types: Cigarettes     Quit date: 9/3/1990     Years since quittin.4    Smokeless tobacco: Never   Vaping Use    Vaping Use: Never used   Substance and Sexual Activity    Alcohol use:  Yes     Alcohol/week: 2.0 standard drinks     Types: 2 Shots of liquor per week    Drug use: No    Sexual activity: Yes     Partners: Female     Birth control/protection: None   Other Topics Concern    Not on file Social History Narrative    Not on file     Social Determinants of Health     Financial Resource Strain: Low Risk     Difficulty of Paying Living Expenses: Not very hard   Food Insecurity: No Food Insecurity    Worried About Running Out of Food in the Last Year: Never true    Ran Out of Food in the Last Year: Never true   Transportation Needs: Not on file   Physical Activity: Not on file   Stress: Not on file   Social Connections: Not on file   Intimate Partner Violence: Not on file   Housing Stability: Not on file       Current Outpatient Medications on File Prior to Visit   Medication Sig Dispense Refill    esomeprazole (NEXIUM) 40 mg granules for oral suspension Take 40 mg by mouth. psyllium (METAMUCIL) powd Take  by mouth. Daily      polyethylene glycol 3350 (MIRALAX PO) Take  by mouth. Powder prn       aspirin 81 mg chewable tablet Take 1 Tab by mouth daily. 30 Tab 1    gabapentin (NEURONTIN) 300 mg capsule Take 300 mg by mouth two (2) times a day. fexofenadine (ALLEGRA) 180 mg tablet Take 180 mg by mouth daily as needed. cholecalciferol (VITAMIN D3) (1000 Units /25 mcg) tablet Take 1,000 Units by mouth daily. [DISCONTINUED] amLODIPine (NORVASC) 10 mg tablet Take 1 Tablet by mouth daily. 90 Tablet 0    [DISCONTINUED] atorvastatin (LIPITOR) 20 mg tablet Take 1 Tablet by mouth daily. 90 Tablet 3    albuterol (PROVENTIL HFA, VENTOLIN HFA, PROAIR HFA) 90 mcg/actuation inhaler Take 2 Puffs by inhalation every six (6) hours as needed for Wheezing or Shortness of Breath. (Patient not taking: Reported on 2/15/2023) 18 g 0    cyclobenzaprine (FLEXERIL) 10 mg tablet TAKE 1 TABLET BY MOUTH NIGHTLY AS NEEDED FOR MUSCLE SPASM (Patient not taking: Reported on 2/15/2023) 30 Tablet 0     No current facility-administered medications on file prior to visit. Review of Systems  Pertinent items are noted in HPI.     Objective:     Visit Vitals  /63   Pulse (!) 55   Temp 97.5 °F (36.4 °C) (Temporal) Resp 14   Ht 5' 10\" (1.778 m)   Wt 182 lb (82.6 kg)   SpO2 100%   BMI 26.11 kg/m²     Gen: well appearing male  HEENT:   PERRL,normal conjunctiva. External ear and canals normal, TMs no opacification or erythema,  OP no erythema, no exudates, MMM  Neck:  Supple. Thyroid normal size, nontender, without nodules. No masses or LAD  Resp:  No wheezing, no rhonchi, no rales. CV:  regular sylwia, normal S1S2, no murmur. GI: soft, nontender, without masses. No hepatosplenomegaly. Extrem:  +2 pulses, trace edema, warm distally      Assessment/Plan:       ICD-10-CM ICD-9-CM    1. Essential hypertension  A99 253.3 METABOLIC PANEL, COMPREHENSIVE      CBC W/O DIFF      URINALYSIS W/ RFLX MICROSCOPIC      amLODIPine (NORVASC) 10 mg tablet      2. Medicare annual wellness visit, subsequent  Z00.00 V70.0       3. Vitamin D deficiency  E55.9 268.9 VITAMIN D, 25 HYDROXY      4. Mixed hyperlipidemia  Y83.0 001.6 METABOLIC PANEL, COMPREHENSIVE      LIPID PANEL      5. Pure hypercholesterolemia  E78.00 272.0 atorvastatin (LIPITOR) 20 mg tablet              Leah Hernandes MD       This is the Subsequent Medicare Annual Wellness Exam, performed 12 months or more after the Initial AWV or the last Subsequent AWV    I have reviewed the patient's medical history in detail and updated the computerized patient record. Assessment/Plan   Education and counseling provided:  Are appropriate based on today's review and evaluation    1. Essential hypertension  -     METABOLIC PANEL, COMPREHENSIVE; Future  -     CBC W/O DIFF; Future  -     URINALYSIS W/ RFLX MICROSCOPIC; Future  -     amLODIPine (NORVASC) 10 mg tablet; Take 1 Tablet by mouth daily. , Normal, Disp-90 Tablet, R-3HOLD UNTIL NEEDED  2. Medicare annual wellness visit, subsequent  3. Vitamin D deficiency  -     VITAMIN D, 25 HYDROXY; Future  4. Mixed hyperlipidemia  -     METABOLIC PANEL, COMPREHENSIVE; Future  -     LIPID PANEL; Future  5.  Pure hypercholesterolemia  - atorvastatin (LIPITOR) 20 mg tablet; Take 1 Tablet by mouth daily. , Normal, Disp-90 Tablet, R-3HOLD UNTIL NEEDED     Depression Risk Factor Screening     3 most recent PHQ Screens 2/12/2023   PHQ Not Done -   Little interest or pleasure in doing things Not at all   Feeling down, depressed, irritable, or hopeless Not at all   Total Score PHQ 2 0       Alcohol & Drug Abuse Risk Screen   Do you average more than 1 drink per night or more than 7 drinks a week?: (P) No  In the past three months have you had more than 4 drinks containing alcohol on one occasion?: (P) No            Functional Ability and Level of Safety   Hearing:  Hearing: (P) Patient wears hearing aid      Activities of Daily Living: The home contains: (P) no safety equipment  Functional ADLs: (P) Patient does total self care     Ambulation:  Patient ambulates: (P) with no difficulty     Fall Risk:  Fall Risk Assessment, last 12 mths 2/15/2023   Able to walk? Yes   Fall in past 12 months? 0   Do you feel unsteady? 0   Are you worried about falling 0     Abuse Screen:  Do you ever feel afraid of your partner?: (P) No  Are you in a relationship with someone who physically or mentally threatens you?: (P) No  Is it safe for you to go home?: (P) Yes        Cognitive Screening   Has your family/caregiver stated any concerns about your memory?: (P) No     Cognitive Screening: Normal - Verbal Fluency Test    Health Maintenance Due     There are no preventive care reminders to display for this patient.       Patient Care Team   Patient Care Team:  Chantel Alexander MD as PCP - General (Internal Medicine Physician)  Chantel Alexander MD as PCP - [facility] Provider  Link Merlin, MD as Surgeon (General Surgery)  Holli Young MD (Ophthalmology)  Paddy Dumont OD (Optometry)  Chacho Hansen MD (Urology)  Katherine Gómez MD (Gastroenterology)  Hema Merida MD as Physician (Hand Surgery Physician)  Hema Merida MD as Consulting Provider (Hand Surgery Physician)  Yasmeen Herron MD (Neurology)    History     Patient Active Problem List   Diagnosis Code    Gastroesophageal reflux disease without esophagitis K21.9    Essential hypertension I10    Benign prostatic hyperplasia N40.0    Mixed hyperlipidemia E78.2    Primary osteoarthritis of first carpometacarpal joint of right hand M18.11    Dupuytren's contracture of right hand M72.0    Carpal tunnel syndrome, left G56.02    Pain of right hand M79.641    BMI 27.0-27.9,adult Z68.27    CVA (cerebral vascular accident) ([address] I63.9    TIA (transient ischemic attack) G45.9    DDD (degenerative disc disease), cervical M50.30     Past Medical History:   Diagnosis Date    Arthritis     Autoimmune disease ([address]     Patient stated \" HX of autoimmune muscular disorder\"    Enlarged prostate     GERD (gastroesophageal reflux disease)     High cholesterol     Hx of seasonal allergies     Hypertension     PMR (polymyalgia rheumatica) ([facility] [address]     TIA (transient ischemic attack) 2019      Past Surgical History:   Procedure Laterality Date    COLONOSCOPY N/A 1/5/2018    COLONOSCOPY WITH BIOPSIES performed by Kulwant Painter MD at [facility] AM[facility] OR    COLONOSCOPY N/A 6/16/2021    COLONOSCOPY,EGD performed by Yogi Garcia MD at [facility] ENDOSCOPY    HX [address] Right     HX ENDOSCOPY  2021    HX HEENT  2003    lymph node bx in neck (benign)    HX LAP CHOLECYSTECTOMY  2000    HX ORTHOPAEDIC  2008    Back Surgery lumbar    HX ORTHOPAEDIC Right 2016    thumb and palm under ring finger    HX PARATHYROIDECTOMY      one right sided 2015. Current Outpatient Medications   Medication Sig Dispense Refill    amLODIPine (NORVASC) 10 mg tablet Take 1 Tablet by mouth daily. 90 Tablet 3    atorvastatin (LIPITOR) 20 mg tablet Take 1 Tablet by mouth daily. 90 Tablet 3    esomeprazole (NEXIUM) 40 mg granules for oral suspension Take 40 mg by mouth. psyllium (METAMUCIL) powd Take  by mouth. Daily      polyethylene glycol 3350 (MIRALAX PO) Take  by mouth. Powder prn       aspirin 81 mg chewable tablet Take 1 Tab by mouth daily. 30 Tab 1    gabapentin (NEURONTIN) 300 mg capsule Take 300 mg by mouth two (2) times a day. fexofenadine (ALLEGRA) 180 mg tablet Take 180 mg by mouth daily as needed. cholecalciferol (VITAMIN D3) (1000 Units /25 mcg) tablet Take 1,000 Units by mouth daily. albuterol (PROVENTIL HFA, VENTOLIN HFA, PROAIR HFA) 90 mcg/actuation inhaler Take 2 Puffs by inhalation every six (6) hours as needed for Wheezing or Shortness of Breath. (Patient not taking: Reported on 2/15/2023) 18 g 0    cyclobenzaprine (FLEXERIL) 10 mg tablet TAKE 1 TABLET BY MOUTH NIGHTLY AS NEEDED FOR MUSCLE SPASM (Patient not taking: Reported on 2/15/2023) 30 Tablet 0     Allergies   Allergen Reactions    Shellfish Derived Anaphylaxis     Reports not allergic now based on labs   2022    Ace Inhibitors Cough    Nsaids (Non-Steroidal Anti-Inflammatory Drug) Other (comments)     GI bleeding. Pcn [Penicillins] Rash       Family History   Problem Relation Age of Onset    Cancer Mother         lung    Cancer Father         lung     Social History     Tobacco Use    Smoking status: Former     Packs/day: 1.50     Years: 15.00     Pack years: 22.50     Types: Cigarettes     Quit date: 9/3/1990     Years since quittin.4    Smokeless tobacco: Never   Substance Use Topics    Alcohol use:  Yes     Alcohol/week: 2.0 standard drinks     Types: 2 Shots of liquor per week         Hunter Rice MD

## 2023-02-14 NOTE — PATIENT INSTRUCTIONS
Medicare Wellness Visit, Male    The best way to live healthy is to have a lifestyle where you eat a well-balanced diet, exercise regularly, limit alcohol use, and quit all forms of tobacco/nicotine, if applicable. Regular preventive services are another way to keep healthy. Preventive services (vaccines, screening tests, monitoring & exams) can help personalize your care plan, which helps you manage your own care. Screening tests can find health problems at the earliest stages, when they are easiest to treat. [facility] follows the current, evidence-based guidelines published by the [facility] ([facility]) when recommending preventive services for our patients. Because we follow these guidelines, sometimes recommendations change over time as research supports it. (For example, a prostate screening blood test is no longer routinely recommended for men with no symptoms). Of course, you and your doctor may decide to screen more often for some diseases, based on your risk and co-morbidities (chronic disease you are already diagnosed with). Preventive services for you include:  - Medicare offers their members a free annual wellness visit, which is time for you and your primary care provider to discuss and plan for your preventive service needs.  Take advantage of this benefit every year!    -Over the age of 72 should receive the recommended pneumonia vaccines.   -All adults should have a flu vaccine yearly.  -All adults should receive a tetanus vaccine every 10 years.  -Over the age of 48 should receive the shingles vaccines.    -All adults should be screened once for Hepatitis C.  -All adults age 38-68 who are overweight should have a diabetes screening test once every three years.   -Other screening tests & preventive services for persons with diabetes include: an eye exam to screen for diabetic retinopathy, a kidney function test, a foot exam, and stricter control over your cholesterol.   -Cardiovascular screening for adults with routine risk involves an electrocardiogram (ECG) at intervals determined by the provider.     -Colorectal cancer screening should be done for adults age 43-69 with no increased risk factors for colorectal cancer. There are a number of acceptable methods of screening for this type of cancer. Each test has its own benefits and drawbacks. Discuss with your provider what is most appropriate for you during your annual wellness visit. The different tests include: colonoscopy (considered the best screening method), a fecal occult blood test, a fecal DNA test, and sigmoidoscopy.    -Lung cancer screening is recommended annually with a low dose CT scan for adults between age 54 and 68, who have smoked at least 30 pack years (equivalent of 1 pack per day for 30 days), and who is a current smoker or quit less than 15 years ago. -An Abdominal Aortic Aneurysm (AAA) Screening is recommended for men age 73-68 who has ever smoked in their lifetime.

## 2023-02-15 ENCOUNTER — OFFICE VISIT (OUTPATIENT)
Dept: INTERNAL MEDICINE CLINIC | Age: 73
End: 2023-02-15
Payer: MEDICARE

## 2023-02-15 VITALS
OXYGEN SATURATION: 100 % | HEIGHT: 70 IN | DIASTOLIC BLOOD PRESSURE: 63 MMHG | RESPIRATION RATE: 14 BRPM | HEART RATE: 55 BPM | WEIGHT: 182 LBS | SYSTOLIC BLOOD PRESSURE: 134 MMHG | BODY MASS INDEX: 26.05 KG/M2 | TEMPERATURE: 97.5 F

## 2023-02-15 DIAGNOSIS — E78.00 PURE HYPERCHOLESTEROLEMIA: ICD-10-CM

## 2023-02-15 DIAGNOSIS — Z00.00 MEDICARE ANNUAL WELLNESS VISIT, SUBSEQUENT: ICD-10-CM

## 2023-02-15 DIAGNOSIS — E55.9 VITAMIN D DEFICIENCY: ICD-10-CM

## 2023-02-15 DIAGNOSIS — E78.2 MIXED HYPERLIPIDEMIA: ICD-10-CM

## 2023-02-15 DIAGNOSIS — I10 ESSENTIAL HYPERTENSION: Primary | ICD-10-CM

## 2023-02-15 PROCEDURE — G8510 SCR DEP NEG, NO PLAN REQD: HCPCS | Performed by: FAMILY MEDICINE

## 2023-02-15 PROCEDURE — 1101F PT FALLS ASSESS-DOCD LE1/YR: CPT | Performed by: FAMILY MEDICINE

## 2023-02-15 PROCEDURE — G8427 DOCREV CUR MEDS BY ELIG CLIN: HCPCS | Performed by: FAMILY MEDICINE

## 2023-02-15 PROCEDURE — 3017F COLORECTAL CA SCREEN DOC REV: CPT | Performed by: FAMILY MEDICINE

## 2023-02-15 PROCEDURE — G8417 CALC BMI ABV UP PARAM F/U: HCPCS | Performed by: FAMILY MEDICINE

## 2023-02-15 PROCEDURE — G0439 PPPS, SUBSEQ VISIT: HCPCS | Performed by: FAMILY MEDICINE

## 2023-02-15 PROCEDURE — G8536 NO DOC ELDER MAL SCRN: HCPCS | Performed by: FAMILY MEDICINE

## 2023-02-15 PROCEDURE — 99214 OFFICE O/P EST MOD 30 MIN: CPT | Performed by: FAMILY MEDICINE

## 2023-02-15 RX ORDER — ATORVASTATIN CALCIUM 20 MG/1
20 TABLET, FILM COATED ORAL DAILY
Qty: 90 TABLET | Refills: 3 | Status: SHIPPED | OUTPATIENT
Start: 2023-02-15

## 2023-02-15 RX ORDER — AMLODIPINE BESYLATE 10 MG/1
10 TABLET ORAL DAILY
Qty: 90 TABLET | Refills: 3 | Status: SHIPPED | OUTPATIENT
Start: 2023-02-15

## 2023-02-15 NOTE — PROGRESS NOTES
1. \"Have you been to the ER, urgent care clinic since your last visit? Hospitalized since your last visit? \" No    2. \"Have you seen or consulted any other health care providers outside of the [address] [place] [address] since your last visit? \" Ortho Dr Nehemias Gary    3. For patients aged 39-70: Has the patient had a colonoscopy / FIT/ Cologuard? Yes - Care [place] present.  Most recent result on file

## 2023-02-16 LAB
25(OH)D3 SERPL-MCNC: 40.7 NG/ML (ref 30–100)
ALBUMIN SERPL-MCNC: 4.2 G/DL (ref 3.5–5)
ALBUMIN/GLOB SERPL: 1.4 (ref 1.1–2.2)
ALP SERPL-CCNC: 55 U/L (ref 45–117)
ALT SERPL-CCNC: 42 U/L (ref 12–78)
ANION GAP SERPL CALC-SCNC: 9 MMOL/L (ref 5–15)
APPEARANCE UR: CLEAR
AST SERPL-CCNC: 25 U/L (ref 15–37)
BACTERIA URNS QL MICRO: NEGATIVE /HPF
BILIRUB SERPL-MCNC: 2.5 MG/DL (ref 0.2–1)
BILIRUB UR QL: NEGATIVE
BUN SERPL-MCNC: 14 MG/DL (ref 6–20)
BUN/CREAT SERPL: 16 (ref 12–20)
CALCIUM SERPL-MCNC: 9.7 MG/DL (ref 8.5–10.1)
CHLORIDE SERPL-SCNC: 103 MMOL/L (ref 97–108)
CHOLEST SERPL-MCNC: 109 MG/DL
CO2 SERPL-SCNC: 29 MMOL/L (ref 21–32)
COLOR UR: ABNORMAL
CREAT SERPL-MCNC: 0.85 MG/DL (ref 0.7–1.3)
EPITH CASTS URNS QL MICRO: ABNORMAL /LPF
ERYTHROCYTE [DISTWIDTH] IN BLOOD BY AUTOMATED COUNT: 13.6 % (ref 11.5–14.5)
GLOBULIN SER CALC-MCNC: 2.9 G/DL (ref 2–4)
GLUCOSE SERPL-MCNC: 94 MG/DL (ref 65–100)
GLUCOSE UR STRIP.AUTO-MCNC: NEGATIVE MG/DL
HCT VFR BLD AUTO: 48.6 % (ref 36.6–50.3)
HDLC SERPL-MCNC: 64 MG/DL
HDLC SERPL: 1.7 (ref 0–5)
HGB BLD-MCNC: 15.2 G/DL (ref 12.1–17)
HGB UR QL STRIP: NEGATIVE
KETONES UR QL STRIP.AUTO: NEGATIVE MG/DL
LDLC SERPL CALC-MCNC: 29.4 MG/DL (ref 0–100)
LEUKOCYTE ESTERASE UR QL STRIP.AUTO: NEGATIVE
MCH RBC QN AUTO: 28.1 PG (ref 26–34)
MCHC RBC AUTO-ENTMCNC: 31.3 G/DL (ref 30–36.5)
MCV RBC AUTO: 89.8 FL (ref 80–99)
NITRITE UR QL STRIP.AUTO: NEGATIVE
NRBC # BLD: 0 K/UL (ref 0–0.01)
NRBC BLD-RTO: 0 PER 100 WBC
PH UR STRIP: 6 (ref 5–8)
PLATELET # BLD AUTO: 221 K/UL (ref 150–400)
PMV BLD AUTO: 12.3 FL (ref 8.9–12.9)
POTASSIUM SERPL-SCNC: 3.8 MMOL/L (ref 3.5–5.1)
PROT SERPL-MCNC: 7.1 G/DL (ref 6.4–8.2)
PROT UR STRIP-MCNC: ABNORMAL MG/DL
RBC # BLD AUTO: 5.41 M/UL (ref 4.1–5.7)
RBC #/AREA URNS HPF: ABNORMAL /HPF (ref 0–5)
SODIUM SERPL-SCNC: 141 MMOL/L (ref 136–145)
SP GR UR REFRACTOMETRY: 1.02 (ref 1–1.03)
TRIGL SERPL-MCNC: 78 MG/DL (ref ?–150)
UROBILINOGEN UR QL STRIP.AUTO: 0.2 EU/DL (ref 0.2–1)
VLDLC SERPL CALC-MCNC: 15.6 MG/DL
WBC # BLD AUTO: 10 K/UL (ref 4.1–11.1)
WBC URNS QL MICRO: ABNORMAL /HPF (ref 0–4)

## 2023-02-16 NOTE — PERIOP NOTES
[facility]  Ambulatory Surgery Unit  [address] [facility]  [place]  [address]  Pre-operative Instructions    Surgery/Procedure Date  Monday 2/27/2023            Tentative Arrival Time TBD      1. On the day of your surgery/procedure, please report to the Ambulatory Surgery Unit Registration Desk and sign in at your designated time. The Ambulatory Surgery Unit is located in [facility] on the [facility] side of the [facility] across from the [address] [place] [address]. Please have all of your health insurance cards, co-payment, and a photo ID.    **TWO adults may accompany you the day of the procedure. We have limited seating available. If our waiting room is at capacity, your ride may be asked to remain in their vehicle. No one under 15 is allowed in the waiting room. 2. You must have someone with you to drive you home, as you should not drive a car for 24 hours following anesthesia. Please make arrangements for a responsible adult friend or family member to stay with you for at least the first 24 hours after your surgery. 3. Do not have anything to eat or drink (including water, gum, mints, coffee, juice) after 11:59 PM on Sunday 2/26 . This may not apply to medications prescribed by your physician. (Please note below the special instructions with medications to take the morning of surgery, if applicable.)    4. We recommend you do not drink any alcoholic beverages for 24 hours before and after your surgery. 5. Contact your surgeons office for instructions on the following medications: non-steroidal anti-inflammatory drugs (i.e. Advil, Aleve), vitamins, and supplements. (Some surgeons will want you to stop these medications prior to surgery and others may allow you to take them)   **If you are currently taking Plavix, Coumadin, Aspirin and/or other blood-thinning agents, contact your surgeon for instructions. ** Your surgeon will partner with the physician prescribing these medications to determine if it is safe to stop or if you need to continue taking. Please do not stop taking these medications without instructions from your surgeon. 6. In an effort to help prevent surgical site infection, we ask that you shower with an anti-bacterial soap (i.e. Dial/Safeguard, or the soap provided to you at your preadmission testing appointment) for 3 days prior to and on the morning of surgery, using a fresh towel after each shower. (Please begin this process with fresh bed linens.) Do not apply any lotions, powders, or deodorants after the shower on the day of your procedure. If applicable, please do not shave the operative site for 48 hours prior to surgery. 7. Wear comfortable clothes. Wear glasses instead of contacts. Do not bring any jewelry or money (other than copays or fees as instructed). Do not wear make-up, particularly mascara, the morning of your surgery. Do not wear nail polish, particularly if you are having foot /hand surgery. Wear your hair loose or down, no ponytails, buns, silas pins or clips. All body piercings must be removed. 8. You should understand that if you do not follow these instructions your surgery may be cancelled. If your physical condition changes (i.e. fever, cold or flu) please contact your surgeon as soon as possible. 9. It is important that you be on time. If a situation occurs where you may be late, or if you have any questions or problems, please call [phone].    10. Your surgery time may be subject to change. You will receive a phone call the day prior to surgery to confirm your arrival time.     11. Pediatric patients: please bring a change of clothes, diapers, bottle/sippy cup, pacifier, etc.      Special Instructions: Bring inhaler with you on day of surgery     Take all medications and inhalers, as prescribed, on the morning of surgery with a sip of water       Insulin Dependent Diabetic patients: Take your diabetic medications as prescribed the day before surgery. Hold all diabetic medications the day of surgery. If you are scheduled to arrive for surgery after 8:00 AM, and your AM blood sugar is >200, please call Ambulatory Surgery. I understand a pre-operative phone call will be made to verify my surgery time. In the event that I am not available, I give permission for a message to be left on my answering service and/or with another person?       Yes    Reviewed instructions via telephone and sending to [facility] my chart account, pt verbalized understanding         ___________________      ___________________      ________________  (Signature of Patient)          (Witness)                   (Date and Time)

## 2023-02-24 ENCOUNTER — ANESTHESIA EVENT (OUTPATIENT)
Dept: SURGERY | Age: 73
End: 2023-02-24
Payer: MEDICARE

## 2023-02-26 ENCOUNTER — PATIENT MESSAGE (OUTPATIENT)
Dept: INTERNAL MEDICINE CLINIC | Age: 73
End: 2023-02-26

## 2023-02-27 ENCOUNTER — HOSPITAL ENCOUNTER (OUTPATIENT)
Age: 73
Setting detail: OUTPATIENT SURGERY
Discharge: HOME | End: 2023-02-27
Attending: ORTHOPAEDIC SURGERY | Admitting: ORTHOPAEDIC SURGERY
Payer: MEDICARE

## 2023-02-27 ENCOUNTER — ANESTHESIA (OUTPATIENT)
Dept: SURGERY | Age: 73
End: 2023-02-27
Payer: MEDICARE

## 2023-02-27 VITALS
DIASTOLIC BLOOD PRESSURE: 74 MMHG | TEMPERATURE: 97.8 F | HEIGHT: 70 IN | HEART RATE: 54 BPM | WEIGHT: 177 LBS | RESPIRATION RATE: 3 BRPM | OXYGEN SATURATION: 97 % | SYSTOLIC BLOOD PRESSURE: 135 MMHG | BODY MASS INDEX: 25.34 KG/M2

## 2023-02-27 DIAGNOSIS — G89.18 POST-OP PAIN: Primary | ICD-10-CM

## 2023-02-27 PROCEDURE — 76210000040 HC AMBSU PH I REC FIRST 0.5 HR: Performed by: ORTHOPAEDIC SURGERY

## 2023-02-27 PROCEDURE — 77030040356 HC CORD BPLR FRCP COVD -A: Performed by: ORTHOPAEDIC SURGERY

## 2023-02-27 PROCEDURE — 74011250636 HC RX REV CODE- 250/636: Performed by: ORTHOPAEDIC SURGERY

## 2023-02-27 PROCEDURE — 74011250636 HC RX REV CODE- 250/636: Performed by: STUDENT IN AN ORGANIZED HEALTH CARE EDUCATION/TRAINING PROGRAM

## 2023-02-27 PROCEDURE — 77030000032 HC CUF TRNQT ZIMM -B: Performed by: ORTHOPAEDIC SURGERY

## 2023-02-27 PROCEDURE — 74011000250 HC RX REV CODE- 250: Performed by: ORTHOPAEDIC SURGERY

## 2023-02-27 PROCEDURE — 77030002916 HC SUT ETHLN J&J -A: Performed by: ORTHOPAEDIC SURGERY

## 2023-02-27 PROCEDURE — 77030040922 HC BLNKT HYPOTHRM STRY -A: Performed by: ORTHOPAEDIC SURGERY

## 2023-02-27 PROCEDURE — 2709999900 HC NON-CHARGEABLE SUPPLY: Performed by: ORTHOPAEDIC SURGERY

## 2023-02-27 PROCEDURE — 76210000046 HC AMBSU PH II REC FIRST 0.5 HR: Performed by: ORTHOPAEDIC SURGERY

## 2023-02-27 PROCEDURE — 74011000250 HC RX REV CODE- 250: Performed by: NURSE ANESTHETIST, CERTIFIED REGISTERED

## 2023-02-27 PROCEDURE — 74011250636 HC RX REV CODE- 250/636: Performed by: NURSE ANESTHETIST, CERTIFIED REGISTERED

## 2023-02-27 PROCEDURE — 76030000002 HC AMB SURG OR TIME FIRST 0.: Performed by: ORTHOPAEDIC SURGERY

## 2023-02-27 PROCEDURE — 76060000073 HC AMB SURG ANES FIRST 0.5 HR: Performed by: ORTHOPAEDIC SURGERY

## 2023-02-27 RX ORDER — TRAMADOL HYDROCHLORIDE 50 MG/1
50 TABLET ORAL
Qty: 20 TABLET | Refills: 0 | Status: SHIPPED | OUTPATIENT
Start: 2023-02-27 | End: 2023-03-04

## 2023-02-27 RX ORDER — ONDANSETRON 2 MG/ML
4 INJECTION INTRAMUSCULAR; INTRAVENOUS AS NEEDED
Status: DISCONTINUED | OUTPATIENT
Start: 2023-02-27 | End: 2023-02-27 | Stop reason: HOSPADM

## 2023-02-27 RX ORDER — OXYCODONE HYDROCHLORIDE 5 MG/1
5 TABLET ORAL AS NEEDED
Status: DISCONTINUED | OUTPATIENT
Start: 2023-02-27 | End: 2023-02-27 | Stop reason: HOSPADM

## 2023-02-27 RX ORDER — PROPOFOL 10 MG/ML
INJECTION, EMULSION INTRAVENOUS
Status: DISCONTINUED | OUTPATIENT
Start: 2023-02-27 | End: 2023-02-27 | Stop reason: HOSPADM

## 2023-02-27 RX ORDER — SODIUM CHLORIDE, SODIUM LACTATE, POTASSIUM CHLORIDE, CALCIUM CHLORIDE 600; 310; 30; 20 MG/100ML; MG/100ML; MG/100ML; MG/100ML
25 INJECTION, SOLUTION INTRAVENOUS CONTINUOUS
Status: DISCONTINUED | OUTPATIENT
Start: 2023-02-27 | End: 2023-02-27 | Stop reason: HOSPADM

## 2023-02-27 RX ORDER — ONDANSETRON 2 MG/ML
INJECTION INTRAMUSCULAR; INTRAVENOUS AS NEEDED
Status: DISCONTINUED | OUTPATIENT
Start: 2023-02-27 | End: 2023-02-27 | Stop reason: HOSPADM

## 2023-02-27 RX ORDER — TRIAMCINOLONE ACETONIDE 40 MG/ML
40 INJECTION, SUSPENSION INTRA-ARTICULAR; INTRAMUSCULAR ONCE
Status: COMPLETED | OUTPATIENT
Start: 2023-02-27 | End: 2023-02-27

## 2023-02-27 RX ORDER — LIDOCAINE HYDROCHLORIDE 10 MG/ML
0.1 INJECTION, SOLUTION EPIDURAL; INFILTRATION; INTRACAUDAL; PERINEURAL AS NEEDED
Status: DISCONTINUED | OUTPATIENT
Start: 2023-02-27 | End: 2023-02-27 | Stop reason: HOSPADM

## 2023-02-27 RX ORDER — FENTANYL CITRATE 50 UG/ML
25 INJECTION, SOLUTION INTRAMUSCULAR; INTRAVENOUS
Status: DISCONTINUED | OUTPATIENT
Start: 2023-02-27 | End: 2023-02-27 | Stop reason: HOSPADM

## 2023-02-27 RX ORDER — PROPOFOL 10 MG/ML
INJECTION, EMULSION INTRAVENOUS AS NEEDED
Status: DISCONTINUED | OUTPATIENT
Start: 2023-02-27 | End: 2023-02-27 | Stop reason: HOSPADM

## 2023-02-27 RX ORDER — FENTANYL CITRATE 50 UG/ML
INJECTION, SOLUTION INTRAMUSCULAR; INTRAVENOUS AS NEEDED
Status: DISCONTINUED | OUTPATIENT
Start: 2023-02-27 | End: 2023-02-27 | Stop reason: HOSPADM

## 2023-02-27 RX ORDER — LIDOCAINE HYDROCHLORIDE 20 MG/ML
INJECTION, SOLUTION EPIDURAL; INFILTRATION; INTRACAUDAL; PERINEURAL AS NEEDED
Status: DISCONTINUED | OUTPATIENT
Start: 2023-02-27 | End: 2023-02-27 | Stop reason: HOSPADM

## 2023-02-27 RX ADMIN — WATER 2 G: 1 INJECTION INTRAMUSCULAR; INTRAVENOUS; SUBCUTANEOUS at 10:37

## 2023-02-27 RX ADMIN — PROPOFOL 30 MG: 10 INJECTION, EMULSION INTRAVENOUS at 10:36

## 2023-02-27 RX ADMIN — PROPOFOL 75 MCG/KG/MIN: 10 INJECTION, EMULSION INTRAVENOUS at 10:36

## 2023-02-27 RX ADMIN — FENTANYL CITRATE 25 MCG: 50 INJECTION, SOLUTION INTRAMUSCULAR; INTRAVENOUS at 10:36

## 2023-02-27 RX ADMIN — LIDOCAINE HYDROCHLORIDE 40 MG: 20 INJECTION, SOLUTION EPIDURAL; INFILTRATION; INTRACAUDAL; PERINEURAL at 10:36

## 2023-02-27 RX ADMIN — SODIUM CHLORIDE, POTASSIUM CHLORIDE, SODIUM LACTATE AND CALCIUM CHLORIDE 25 ML/HR: 600; 310; 30; 20 INJECTION, SOLUTION INTRAVENOUS at 10:17

## 2023-02-27 RX ADMIN — PROPOFOL 10 MG: 10 INJECTION, EMULSION INTRAVENOUS at 10:44

## 2023-02-27 RX ADMIN — ONDANSETRON HYDROCHLORIDE 4 MG: 2 INJECTION, SOLUTION INTRAMUSCULAR; INTRAVENOUS at 10:42

## 2023-02-27 RX ADMIN — FENTANYL CITRATE 25 MCG: 50 INJECTION, SOLUTION INTRAMUSCULAR; INTRAVENOUS at 10:32

## 2023-02-27 NOTE — PERIOP NOTES
Yuliya Lamar.  1950  [id number]    Situation:  Verbal report given from: JEREL De Leon CRNA and FABRICIO MaxwellRN  Procedure: Procedure(s):  LEFT MIDDLE FINGER TRIGGER FINGER RELEASE, LEFT BASAL JOINT INJECTION (ANES LOCAL WITH MAC)    Background:    Preoperative diagnosis: TRIGGER MIDDLE FINGER OF LEFT HAND  PRIMARY OA OF 1ST CARPOMETACARPAL JOINT LEFT HAND    Postoperative diagnosis: TRIGGER MIDDLE FINGER OF LEFT HAND  PRIMARY OA OF 1ST CARPOMETACARPAL JOINT LEFT HAND    :  Dr. Lydia Cruz    Assistant(s): Circ-1: Bk Maxwell RN  Scrub Tech-1: Gavino List    Specimens: * No specimens in log *    Assessment:  Intra-procedure medications         Anesthesia gave intra-procedure sedation and medications, see anesthesia flow sheet     Intravenous fluids: LR@ KVO     Vital signs stable       Recommendation:    Permission to share finding with  :  Wife Markus Ann

## 2023-02-27 NOTE — H&P
Subjective:   Patient ID: Anita Jj is a 74 y.o. male. Chief Complaint: Pain of the Left Thumb, Pain and Follow-up of the Left Middle Finger, and Pain of the Right Little Finger    Comes today for follow-up bilateral hands. Have previously seen him for multiple digit trigger fingers as well as left basal joint arthritis. He is reporting continued left base of thumb pain. Is reporting left long finger pain and locking. We have injected this twice now. Last injection for the long finger trigger finger on the left was in June of last year. He reports over the last month symptoms have recurred and worsened. Was reporting a new issue as well. This is right small finger pain and locking. This has been present for 2 months. No treatment or evaluation for this. ROS and PMHx reviewed with no changes    Objective:   Constitutional: No acute distress. Well nourished. Well developed. Eyes: Sclera are nonicteric. Respiratory: No labored breathing. See anesthesia note for full exam.   Cardiovascular: No marked edema. See anesthesia note for full exam.   Skin: No marked skin ulcers. Neurological: see below  Psychiatric: Alert and oriented x3. Musculoskeletal   Examination of the left hand demonstrates there is significant basal joint tenderness. No significant MP hyperextension. Neurovascular intact distally. At the long finger there is tenderness to palpation at the A1 pulley. There is active triggering here. Examination of the right small finger demonstrates there is tenderness to palpation at the A1 pulley. There is active triggering here. Neurovascularly intact distally. Radiographs:       No imaging obtained     Assessment:     1. Trigger middle finger of left hand   2. Primary osteoarthritis of first carpometacarpal joint of left hand   3. Trigger little finger of right hand     Plan:   I discussed with the patient the nature of their new an established condition and treatment options.  They desires to proceed with an injection of the right small finger due to significant symptoms. Risks, benefits and alternatives are discussed and they consent to proceed. This does include the significant risk of a steroid flare reaction. I performed this today under sterile conditions and they tolerated it well. For the left long finger trigger finger, has failed meaningful nonsurgical management. We have decided to move forward with definitive surgical management. Risks benefits and alternatives as well as postoperative course are discussed. Consents to proceed. Surgical date chosen. He would also like an injection of the basal joint while under anesthesia. Date of Surgery Update:  Anita Matos. was seen and examined. History and physical has been reviewed. The patient has been examined.  There have been no significant clinical changes since the completion of the originally dated History and Physical.    Signed By: Mary Salas MD     February 27, 2023 10:14 AM

## 2023-02-27 NOTE — OP NOTES
PATIENT NAME:  Anita Matos. SURGEON:  Mary Salas MD    DATE OF SURGERY:  2/27/2023    LOCATION: [facility]    PREOPERATIVE DIAGNOSIS:   left long finger trigger digit, left basal joint arthritis    POSTOPERATIVE DIAGNOSIS:  Same    PROCEDURE:  left long finger trigger release, left basal joint injection             ANESTHESIA:  Local 0.25% bupivicaine with 1% lidocaine with sedation    BLOOD LOSS:  Minimal    Tourniquet time: 6 min    Assistant: John Ortez PA-C     OPERATIVE INDICATIONS: The patient is a 74 y.o. old male who has developed a persistent left long finger trigger digit, unresponsive to all conservative treatment. The patient has elected to undergo an A1 pulley release. He understands the alternatives to surgery, the nature of this elective procedure, the usual recovery, possible variations in healing, and the potential for shortcomings and complications ( including but not exclusive to bleeding, infection, scar tenderness,  weakness, ). He also had left basal joint arthritis for which he desired an injection under anesthesia    DESCRIPTION  OF PROCEDURE: After satisfactory sedation had been attained, the left hand and forearm were prepped and draped in a sterile field. A timeout was taken and the operative site was confirmed. Local anesthesia was instilled into the incision site. The extremity was then elevated and exsanguinated and a forearm tourniquet was inflated to 200 mmHg. A small vertical incision was made in the distal palm over the A1 pulley of the long finger. Dissection was carried through the subcutaneous tissue and digital nerves were protected. The A1 annular pulley was reelased after which there was no evidence of triggering with flexion and extension of the digit. The flexor tendons were elevated out of the wound and noted to be without adhesions. The tourniquet was released.   Hemostasis was confirmed and the wound was copiously irrigated and closed with a interupted nylon sutures. The basal joint was then injected from a dorsal approach under sterile conditions with a mixture of 1cc kenalog 40 and 1cc of 1% plain lidocaine. A sterile dressing was then applied leaving the fingers free for range of motion. The patient tolerated the procedure well and was discharged to the recovery area uneventfully.

## 2023-02-27 NOTE — ANESTHESIA PREPROCEDURE EVALUATION
Relevant Problems   NEUROLOGY   (+) CVA (cerebral vascular accident) ([facility] [address]   (+) TIA (transient ischemic attack)      CARDIOVASCULAR   (+) Essential hypertension      GASTROINTESTINAL   (+) Gastroesophageal reflux disease without esophagitis       Anesthetic History   No history of anesthetic complications            Review of Systems / Medical History  Patient summary reviewed, nursing notes reviewed and pertinent labs reviewed    Pulmonary  Within defined limits                 Neuro/Psych         TIA ( on ASA)     Cardiovascular    Hypertension: well controlled          Hyperlipidemia    Exercise tolerance: >4 METS     GI/Hepatic/Renal     GERD: well controlled           Endo/Other        Arthritis     Other Findings   Comments: Left middle finger trigger    PMR (polymyalgia rheumatica)          Physical Exam    Airway  Mallampati: I  TM Distance: 4 - 6 cm  Neck ROM: normal range of motion        Cardiovascular    Rhythm: regular  Rate: normal         Dental    Dentition: Caps/crowns     Pulmonary  Breath sounds clear to auscultation               Abdominal  GI exam deferred       Other Findings            Anesthetic Plan    ASA: 2  Anesthesia type: MAC          Induction: Intravenous  Anesthetic plan and risks discussed with: Patient

## 2023-02-27 NOTE — ANESTHESIA POSTPROCEDURE EVALUATION
Procedure(s):  LEFT MIDDLE FINGER TRIGGER FINGER RELEASE, LEFT BASAL JOINT INJECTION (ANES LOCAL WITH MAC).     MAC    Anesthesia Post Evaluation        Patient location during evaluation: PACU  Patient participation: complete - patient participated  Level of consciousness: awake and alert  Pain score: 0  Airway patency: patent  Anesthetic complications: no  Cardiovascular status: acceptable  Respiratory status: acceptable  Hydration status: acceptable  Post anesthesia nausea and vomiting:  none  Final Post Anesthesia Temperature Assessment:  Normothermia (36.0-37.5 degrees C)      INITIAL Post-op Vital signs:   Vitals Value Taken Time   /73 02/27/23 1110   Temp 36.6 °C (97.8 °F) 02/27/23 1102   Pulse 55 02/27/23 1110   Resp 13 02/27/23 1110   SpO2 96 % 02/27/23 1110

## 2023-02-27 NOTE — PERIOP NOTES
Permission received to review discharge instructions and discuss private health information with wife, CHAIMIGUEL GUTHRIE. Patient states that family/friend will be with them for at least 24 hours following today's procedure. Mistral-Air warming blanket applied at this time. Set to appropriate setting that is comfortable to patient. Will continue to monitor.

## 2023-02-27 NOTE — DISCHARGE INSTRUCTIONS
[facility]  Post-operative instructions  For: Riddhi Villela. Your first postop appointment should be scheduled with Dr. Marah Christian for 2 weeks post-op. [address], [address]  [place]   Phone: [phone]  Hand Therapy Phone: [phone]  Fax: [phone]    Please follow these instructions for a safe and speedy recovery:    1. Surgical Bandage: Leave the bandage in place until we remove it. Please keep it clean and dry. To shower or bathe, apply a plastic bag or GLAD Press'n Seal® plastic wrap around the bandage or simply sponge bathe. 2. Elevation: Hand swelling is best prevented by keeping your hand elevated above the level of your heart at all times, night and day. The opposite, dangling your hand below your waist, will cause additional pain, swelling, and later stiffness. You can elevate the hand in a sling or by propping it on a pillow at night. Occasionally, we will provide you with a custom-made foam block for elevating the arm. Ice compresses may help but do not rep[lace elevation. Frequently, extreme pain is caused by a tight bandage, which should be loosened. If pain is severe and progressive, call us at [phone] during the day (ask for immediate connection to Dr. Subramanian Danger Team) or during the night (ask for the on-call physician). 3. Medication: You will be provided with an appropriate pain medication (over-the-counter or prescription). Please fill this at a pharmacy promptly so you will have it available when all local anesthetic wears off. Take this to relieve pain as directed on the bottle. Please refrain from driving, drinking alcohol, and making important medical decisions while taking the medication. Please call us if you need something stronger. Medication changes or refills must be made before 5pm or through your pharmacy.     4. Weight bearing: Do NOT bear any weight on the operative extremity. I want to thank you for choosing us for your hand care needs. My staff and I are committed to providing all our customers with the highest quality hand surgery and subsequent hand therapy care as possible. We want your recovery to be comfortable. If you have clinical non-emergent questions about your surgery or other hand conditions please call [phone] and ask for my team. Your call will be returned within 24 hours. TAKE NARCOTIC PAIN MEDICATIONS WITH FOOD     Narcotics tend to be constipating, we suggest taking a stool softener such as Colace or Miralax (follow package instructions). DO NOT DRIVE WHILE TAKING NARCOTIC PAIN MEDICATIONS. DO NOT TAKE SLEEPING MEDICATIONS OR ANTIANXIETY MEDICATIONS WHILE TAKING NARCOTIC PAIN MEDICATIONS,  ESPECIALLY THE NIGHT OF ANESTHESIA! CPAP PATIENTS BE SURE TO WEAR MACHINE WHENEVER NAPPING OR SLEEPING! DISCHARGE SUMMARY from Nurse    The following personal items collected during your admission are returned to you:   Dental Appliance: Dental Appliances: None  Vision: Visual Aid: Glasses  Hearing Aid:    Jewelry: Jewelry: None  Clothing: Clothing: With patient  Other Valuables: Other Valuables: Eyeglasses (Glasses in PACU)  Valuables sent to safe:        PATIENT INSTRUCTIONS:    After General Anesthesia or Intravenous Sedation, for 24 hours or while taking prescription Narcotics:        Someone should be with you for the next 24 hours. For your own safety, a responsible adult must drive you home. Limit your activities  Recommended activity: Rest today, up with assistance today. Do not climb stairs or shower unattended for the next 24 hours. Please start with a soft bland diet and advance as tolerated (no nausea) to regular diet. If you have a sore throat you should try the following: fluids, warm salt water gargles, or throat lozenges.  If it does not improve after several days please follow up with your primary physician. Do not drive and operate hazardous machinery  Do not make important personal or business decisions  Do  not drink alcoholic beverages  If you have not urinated within 8 hours after discharge, please contact your surgeon on call. Report the following to your surgeon:  Excessive pain, swelling, redness or odor of or around the surgical area  Temperature over 100.5  Nausea and vomiting lasting longer than 4 hours or if unable to take medications  Any signs of decreased circulation or nerve impairment to extremity: change in color, persistent  numbness, tingling, coldness or increase pain      You will receive a Post Operative Call from one of the Recovery Room Nurses on the day after your surgery to check on you. It is very important for us to know how you are recovering after your surgery. If you have an issue or need to speak with someone, please call your surgeon, do not wait for the post operative call. You may receive an e-mail or letter in the mail from [employer] regarding your experience with us in the Ambulatory Surgery Unit. Your feedback is valuable to us and we appreciate your participation in the survey. If the above instructions are not adequate or you are having problems after your surgery, call the physician at their office number. We wish you a speedy recovery ? What to do at Home:      *  Please give a list of your current medications to your Primary Care Provider. *  Please update this list whenever your medications are discontinued, doses are      changed, or new medications (including over-the-counter products) are added. *  Please carry medication information at all times in case of emergency situations. If you have not received your influenza and/or pneumococcal vaccine, please follow up with your primary care physician. The discharge information has been reviewed with the patient and caregiver. The patient and caregiver verbalized understanding. TO PREVENT AN INFECTION      8 Edde Marcus Pascualidi YOUR HANDS    To prevent infection, good handwashing is the most important thing you or your caregiver can do. Wash your hands with soap and water or use the hand  we gave you before you touch any wounds. SHOWER    Use the antibacterial soap we gave you when you take a shower. Shower with this soap until your wounds are healed. To reach all areas of your body, you may need someone to help you. Dont forget to clean your belly button with every shower. USE CLEAN SHEETS    Use freshly cleaned sheets on your bed after surgery. To keep the surgery site clean, do not allow pets to sleep with you while your wound is still healing. STOP SMOKING    Stop smoking, or at least cut back on smoking    Smoking slows your healing. CONTROL YOUR BLOOD SUGAR    High blood sugars slow wound healing. If you are diabetic, control your blood sugar levels before and after your surgery.

## 2023-02-27 NOTE — PERIOP NOTES
Patient: Arnaldo Mancia MRN: [id number]  SSN: [id number]   YOB: 1950  Age: 74 y.o. Sex: male     Patient is status post Procedure(s):  LEFT MIDDLE FINGER TRIGGER FINGER RELEASE, LEFT BASAL JOINT INJECTION (ANES LOCAL WITH MAC). Surgeon(s) and Role: Adrienne Jamison MD - Primary    Local/Dose/Irrigation:  SEE [facility]                  Peripheral IV 02/27/23 Posterior;Right Hand (Active)   Site Assessment Clean, dry, & intact 02/27/23 1011   Phlebitis Assessment 0 02/27/23 1011   Infiltration Assessment 0 02/27/23 1011   Dressing Status Clean, dry, & intact 02/27/23 1011   Dressing Type Transparent 02/27/23 1011   Hub Color/Line Status Pink; Infusing 02/27/23 1011                           Dressing/Packing:  Incision 02/27/23 Hand Left-Dressing/Treatment: Gauze dressing/dressing sponge;Xeroform; Other (Comment); Adhesive bandage (COBAN) (02/27/23 0900)

## 2023-03-14 ENCOUNTER — OFFICE VISIT (OUTPATIENT)
Dept: FAMILY MEDICINE CLINIC | Age: 73
End: 2023-03-14
Payer: MEDICARE

## 2023-03-14 VITALS
BODY MASS INDEX: 26.03 KG/M2 | HEART RATE: 50 BPM | HEIGHT: 70 IN | SYSTOLIC BLOOD PRESSURE: 142 MMHG | RESPIRATION RATE: 18 BRPM | WEIGHT: 181.8 LBS | TEMPERATURE: 97.5 F | DIASTOLIC BLOOD PRESSURE: 64 MMHG | OXYGEN SATURATION: 96 %

## 2023-03-14 DIAGNOSIS — E78.2 MIXED HYPERLIPIDEMIA: ICD-10-CM

## 2023-03-14 DIAGNOSIS — I63.9 CEREBROVASCULAR ACCIDENT (CVA), UNSPECIFIED MECHANISM (HCC): Primary | ICD-10-CM

## 2023-03-14 DIAGNOSIS — I10 ESSENTIAL HYPERTENSION: ICD-10-CM

## 2023-03-14 DIAGNOSIS — G45.9 TIA (TRANSIENT ISCHEMIC ATTACK): ICD-10-CM

## 2023-03-14 DIAGNOSIS — R35.1 NOCTURIA: ICD-10-CM

## 2023-03-14 PROCEDURE — G8417 CALC BMI ABV UP PARAM F/U: HCPCS | Performed by: FAMILY MEDICINE

## 2023-03-14 PROCEDURE — 3077F SYST BP >= 140 MM HG: CPT | Performed by: FAMILY MEDICINE

## 2023-03-14 PROCEDURE — G8427 DOCREV CUR MEDS BY ELIG CLIN: HCPCS | Performed by: FAMILY MEDICINE

## 2023-03-14 PROCEDURE — 1101F PT FALLS ASSESS-DOCD LE1/YR: CPT | Performed by: FAMILY MEDICINE

## 2023-03-14 PROCEDURE — 3078F DIAST BP <80 MM HG: CPT | Performed by: FAMILY MEDICINE

## 2023-03-14 PROCEDURE — 99204 OFFICE O/P NEW MOD 45 MIN: CPT | Performed by: FAMILY MEDICINE

## 2023-03-14 PROCEDURE — 3017F COLORECTAL CA SCREEN DOC REV: CPT | Performed by: FAMILY MEDICINE

## 2023-03-14 PROCEDURE — 1123F ACP DISCUSS/DSCN MKR DOCD: CPT | Performed by: FAMILY MEDICINE

## 2023-03-14 PROCEDURE — G8536 NO DOC ELDER MAL SCRN: HCPCS | Performed by: FAMILY MEDICINE

## 2023-03-14 PROCEDURE — G8510 SCR DEP NEG, NO PLAN REQD: HCPCS | Performed by: FAMILY MEDICINE

## 2023-03-14 NOTE — PROGRESS NOTES
Chele Garcia. is a 74 y.o. male    Transfer to us from [place]. Notes from prior PCP Dr. Luis Manuel Massey reviewed. HPI:  Hyperlipidemia. On lipitor 20mg/d. Vanessa well. No myalgias, arthralgias, unusual weakness. Lab Results   Component Value Date/Time    Cholesterol, total 109 02/15/2023 12:48 PM    HDL Cholesterol 64 02/15/2023 12:48 PM    LDL, calculated 29.4 02/15/2023 12:48 PM    VLDL, calculated 15.6 02/15/2023 12:48 PM    Triglyceride 78 02/15/2023 12:48 PM    CHOL/HDL Ratio 1.7 02/15/2023 12:48 PM       Lab Results   Component Value Date/Time    ALT (SGPT) 42 02/15/2023 12:48 PM    Alk. phosphatase 55 02/15/2023 12:48 PM    Bilirubin, total 2.5 (H) 02/15/2023 12:48 PM       Hypertension  Blood pressures fair today. Better on prev checks. Management at last visit included continuing current regimen . Current regimen: calcium channel blocker. Symptoms include no symptoms. Patient denies chest pain, palpitations, peripheral edema. Has hx ACEI cough in past.  Lab review:   Lab Results   Component Value Date/Time    Sodium 141 02/15/2023 12:48 PM    Potassium 3.8 02/15/2023 12:48 PM    Chloride 103 02/15/2023 12:48 PM    CO2 29 02/15/2023 12:48 PM    Anion gap 9 02/15/2023 12:48 PM    Glucose 94 02/15/2023 12:48 PM    BUN 14 02/15/2023 12:48 PM    Creatinine 0.85 02/15/2023 12:48 PM    BUN/Creatinine ratio 16 02/15/2023 12:48 PM    GFR est AA >60 08/15/2022 10:20 AM    GFR est non-AA >60 08/15/2022 10:20 AM    Calcium 9.7 02/15/2023 12:48 PM     GI issues  Recurrent constipation, hx GERD. [facility] uses fiber supplement, occ uses miralax. Has cut back on miralax lately. BPH  Managed by [facility]. Mild sx lately. Lab Results   Component Value Date/Time    Prostate Specific Ag 0.8 02/15/2022 08:05 AM     DJD/  Cervical degenerative disc disease  Saw Dr. Joao Escalante and Dr. Stephanie Oneal. He is taking gabapentin 300mg BID, gets from them. Taking regularly, vanessa well, no sedation. Did ok with prior PT.   Plans neck injection in April 2023    [facility] FACILITY, Medications/Allergies: reviewed, on chart. Current Outpatient Medications   Medication Sig    amLODIPine (NORVASC) 10 mg tablet Take 1 Tablet by mouth daily. (Patient taking differently: Take 10 mg by mouth Every morning.)    atorvastatin (LIPITOR) 20 mg tablet Take 1 Tablet by mouth daily. (Patient taking differently: Take 20 mg by mouth nightly.)    esomeprazole (NEXIUM) 40 mg granules for oral suspension Take 40 mg by mouth Every morning. albuterol (PROVENTIL HFA, VENTOLIN HFA, PROAIR HFA) 90 mcg/actuation inhaler Take 2 Puffs by inhalation every six (6) hours as needed for Wheezing or Shortness of Breath. aspirin 81 mg chewable tablet Take 1 Tab by mouth daily. (Patient taking differently: Take 81 mg by mouth daily. Indications: prescribed by neurologist after TIA (unsure of name))    gabapentin (NEURONTIN) 300 mg capsule Take 300 mg by mouth two (2) times a day. fexofenadine (ALLEGRA) 180 mg tablet Take 180 mg by mouth daily as needed. cholecalciferol (VITAMIN D3) (1000 Units /25 mcg) tablet Take 1,000 Units by mouth daily. No current facility-administered medications for this visit.       ROS:  Constitutional: No fever, chills or abnormal weight loss  Respiratory: No cough, SOB   CV: No chest pain or Palpitations    Visit Vitals  BP (!) 142/64   Pulse (!) 50   Temp 97.5 °F (36.4 °C) (Temporal)   Resp 18   Ht 5' 10\" (1.778 m)   Wt 181 lb 12.8 oz (82.5 kg)   SpO2 96%   BMI 26.09 kg/m²   +4#  Wt Readings from Last 3 Encounters:   03/14/23 181 lb 12.8 oz (82.5 kg)   02/27/23 177 lb (80.3 kg)   02/15/23 182 lb (82.6 kg)     BP Readings from Last 10 Encounters:   03/14/23 (!) 142/64   02/27/23 135/74   02/15/23 134/63   11/17/22 136/65   08/15/22 (!) 150/78   03/07/22 (!) 141/62   02/14/22 128/63   06/16/21 121/87   12/16/20 133/76   07/20/20 149/72     Physical Examination: General appearance - alert, well appearing, and in no distress  Mental status - alert, oriented to person, place, and time  Eyes - pupils equal and reactive, extraocular eye movements intact  ENT - bilateral external ears and nose normal. Normal lips  Neck - supple, no significant adenopathy, no thyromegaly or mass  Chest - clear to auscultation, no wheezes, rales or rhonchi, symmetric air entry  Heart - normal rate, regular rhythm, normal S1, S2, no murmurs, rubs, clicks or gallops  Extremities - peripheral pulses normal, no pedal edema, no clubbing or cyanosis    A/p:  HTN  Fair today, prev ok. Con't current tx for now, GFR and lytes good, but if GFR dropping in future, or other signs of end organ damage, consider adding ARB. If BP's consistently >523 systolic, also consider adding ARB. HLD and hx TIA  well controlled. con't current tx. Plan labs early 2024. Ok to hold ASA     BPH  Minimal sx lately. Con't follow up with [facility] or Shalonda Eugene. .    Insomnia  Mildly bothersome, mostly related to nocturia. Will work on the nocturia first, but consider trazodone PRN. Follow up 6mo for BP check.

## 2023-03-14 NOTE — PROGRESS NOTES
Marilee Hodler. is a 74 y.o. male presenting for/with:    Chief Complaint   Patient presents with    New Patient       Visit Vitals  BP (!) 142/64   Pulse (!) 50   Temp 97.5 °F (36.4 °C) (Temporal)   Resp 18   Ht 5' 10\" (1.778 m)   Wt 181 lb 12.8 oz (82.5 kg)   SpO2 96%   BMI 26.09 kg/m²     Pain Scale: /10  Pain Location:     1. \"Have you been to the ER, urgent care clinic since your last visit? Hospitalized since your last visit? \" No    2. \"Have you seen or consulted any other health care providers outside of the [address] [place] [address] since your last visit? \" No     3. For patients aged 39-70: Has the patient had a colonoscopy / FIT/ Cologuard? Yes - no Care Gap present      If the patient is female:    4. For patients aged 41-77: Has the patient had a mammogram within the past 2 years? NA - based on age or sex      11. For patients aged 21-65: Has the patient had a pap smear? NA - based on age or sex      Symptom review:  Learning Assessment 3/14/2023   PRIMARY LEARNER Patient   PRIMARY LANGUAGE ENGLISH   LEARNER PREFERENCE PRIMARY READING     DEMONSTRATION   ANSWERED BY patient   RELATIONSHIP SELF     Fall Risk Assessment, last 12 mths 3/14/2023   Able to walk? Yes   Fall in past 12 months? 0   Do you feel unsteady? 0   Are you worried about falling 0       3 most recent PHQ Screens 3/14/2023   PHQ Not Done -   Little interest or pleasure in doing things Not at all   Feeling down, depressed, irritable, or hopeless Not at all   Total Score PHQ 2 0     Abuse Screening Questionnaire 3/14/2023   Do you ever feel afraid of your partner? N   Are you in a relationship with someone who physically or mentally threatens you? N   Is it safe for you to go home?  Y       ADL Assessment 3/14/2023   Feeding yourself No Help Needed   Getting from bed to chair No Help Needed   Getting dressed No Help Needed   Bathing or showering No Help Needed   Walk across the room (includes cane/walker) No Help Needed   Using the telphone No Help Needed   Taking your medications No Help Needed   Preparing meals No Help Needed   Managing money (expenses/bills) No Help Needed   Moderately strenuous housework (laundry) No Help Needed   Shopping for personal items (toiletries/medicines) No Help Needed   Shopping for groceries No Help Needed   Driving No Help Needed   Climbing a flight of stairs No Help Needed   Getting to places beyond walking distances No Help Needed      Advance Care Planning 3/14/2023   Patient's Healthcare Decision Maker is: Legal Next of Kin   Primary Decision Maker Name -   Primary Decision Maker Phone Number -   Primary Decision Maker Relationship to Patient -   Confirm Advance Directive None   Patient Would Like to Complete Advance Directive No

## 2023-04-25 ENCOUNTER — APPOINTMENT (OUTPATIENT)
Dept: GENERAL RADIOLOGY | Age: 73
End: 2023-04-25
Attending: PHYSICAL MEDICINE & REHABILITATION
Payer: MEDICARE

## 2023-04-25 ENCOUNTER — HOSPITAL ENCOUNTER (OUTPATIENT)
Age: 73
Setting detail: OUTPATIENT SURGERY
Discharge: HOME | End: 2023-04-25
Attending: PHYSICAL MEDICINE & REHABILITATION | Admitting: PHYSICAL MEDICINE & REHABILITATION
Payer: MEDICARE

## 2023-04-25 VITALS
WEIGHT: 178 LBS | SYSTOLIC BLOOD PRESSURE: 140 MMHG | RESPIRATION RATE: 16 BRPM | OXYGEN SATURATION: 98 % | TEMPERATURE: 97.6 F | DIASTOLIC BLOOD PRESSURE: 75 MMHG | HEART RATE: 63 BPM | HEIGHT: 70 IN | BODY MASS INDEX: 25.48 KG/M2

## 2023-04-25 PROCEDURE — 77030003665 HC NDL SPN BBMI -A: Performed by: PHYSICAL MEDICINE & REHABILITATION

## 2023-04-25 PROCEDURE — 76000 FLUOROSCOPY <1 HR PHYS/QHP: CPT

## 2023-04-25 PROCEDURE — 76210000046 HC AMBSU PH II REC FIRST 0.5 HR: Performed by: PHYSICAL MEDICINE & REHABILITATION

## 2023-04-25 PROCEDURE — 2709999900 HC NON-CHARGEABLE SUPPLY: Performed by: PHYSICAL MEDICINE & REHABILITATION

## 2023-04-25 PROCEDURE — 76030000002 HC AMB SURG OR TIME FIRST 0.: Performed by: PHYSICAL MEDICINE & REHABILITATION

## 2023-04-25 PROCEDURE — 72020 X-RAY EXAM OF SPINE 1 VIEW: CPT

## 2023-04-25 PROCEDURE — 74011000250 HC RX REV CODE- 250: Performed by: PHYSICAL MEDICINE & REHABILITATION

## 2023-04-25 PROCEDURE — 74011250636 HC RX REV CODE- 250/636: Performed by: PHYSICAL MEDICINE & REHABILITATION

## 2023-04-25 RX ORDER — BUPIVACAINE HYDROCHLORIDE 5 MG/ML
10 INJECTION, SOLUTION EPIDURAL; INTRACAUDAL ONCE
Status: COMPLETED | OUTPATIENT
Start: 2023-04-25 | End: 2023-04-25

## 2023-04-25 RX ORDER — DEXAMETHASONE SODIUM PHOSPHATE 4 MG/ML
10 INJECTION, SOLUTION INTRA-ARTICULAR; INTRALESIONAL; INTRAMUSCULAR; INTRAVENOUS; SOFT TISSUE ONCE
Status: COMPLETED | OUTPATIENT
Start: 2023-04-25 | End: 2023-04-25

## 2023-04-25 RX ORDER — LIDOCAINE HYDROCHLORIDE 20 MG/ML
10 INJECTION, SOLUTION INFILTRATION; PERINEURAL ONCE
Status: COMPLETED | OUTPATIENT
Start: 2023-04-25 | End: 2023-04-25

## 2023-04-25 NOTE — OP NOTES
Operative Note    Patient: Sharon Sharpe YOB: 1950  MRN: [id number]    Date of Procedure: 4/25/2023   Cervical Facet Medial Branch Block Injection Operative Report    Indications: This is a 74 y.o. male who presents with cervicalgia. He was positive for cervical DJD. The patient was admitted for surgery as conservative measures have failed. Date of Surgery: 4/25/2023    Preoperative Diagnosis: cervical spondylosis    Postoperative Diagnosis: cervical spondylosis    Surgeon(s) and Role:     * Navneet Bobby MD - Primary     Procedure:  Procedure(s):  RIGHT C4 - C5 AND C5 - C6 MEDIAL BRANCH BLOCK    Procedure in Detail:  After appropriate informed consent was obtained, the patient was taken to the operating suite and placed in the prone position on the operating table on appropriate padding. The cervical region was prepped and draped in the usual sterile fashion. Intraoperative fluoroscopy was used to localize the cervical spine. The skin was infiltrated with 2% lidocaine. An 25-g needle was advanced into the Right C4-5 and C5-6 facet medial branches under fluoroscopic guidance (the lateral waist of the cervical pillars). Needle placement confirmation with contrast. Permanent fluoroscopic images were saved in the patient's record. Next, 3ml of 0.5% marcaine and 10 mg of Dexamethasone were injected divided equally between injection sites. The needle was removed from the patient. The patient was then turned back into the supine position on the stretcher and was taken to the Recovery Room in stable condition.     Estimated Blood Loss:  none     Specimens: None       Drains: None          Complications:  None    Signed By: Ana Madsen MD                        April 25, 2023    Electronically Signed by Ana Madsen MD on 4/25/2023 at 3:47 PM

## 2023-04-25 NOTE — H&P
Procedural Case Note    4/25/2023    (2:52 PM)    Zulema Pu.    1950   (89 y.o.)    [id number]    CC:  pain    ROS:   Complete ROS obtained, no CP, no SOB, no N or V    PMH:     Past Medical History:   Diagnosis Date    Arthritis     Enlarged prostate     Fibromyalgia     GERD (gastroesophageal reflux disease)     High cholesterol     Hx of seasonal allergies     Hypertension     PMR (polymyalgia rheumatica) (HCC)     TIA (transient ischemic attack) 2019    no residuals       ALLERGIES:     Allergies   Allergen Reactions    Ace Inhibitors Cough    Nsaids (Non-Steroidal Anti-Inflammatory Drug) Other (comments)     GI bleeding. Pcn [Penicillins] Rash     Tolerated ancef on 3/7/22       MEDS:     Current Facility-Administered Medications   Medication Dose Route Frequency    BUPivacaine (PF) (MARCAINE) 0.5 % (5 mg/mL) injection 50 mg  10 mL SubCUTAneous ONCE    lidocaine (XYLOCAINE) 20 mg/mL (2 %) injection 200 mg  10 mL IntraDERMal ONCE    dexamethasone (DECADRON) 4 mg/mL injection 10 mg  10 mg Intra artICUlar ONCE    iohexoL (OMNIPAQUE) 180 mg iodine/mL injection 10 mL  10 mL Intra artICUlar ONCE        Visit Vitals  BP (!) 142/73 (BP 1 Location: Right arm, BP Patient Position: At rest)   Pulse 60   Temp 97.5 °F (36.4 °C)   Resp 16   Ht 5' 10\" (1.778 m)   Wt 80.7 kg (178 lb)   SpO2 100%   BMI 25.54 kg/m²     PE:  Gen: NAD  Head: normocephalic  Heart: RRR  Lungs: CTA sabina  Abd: NT, ND, soft  Neuro: awake and alert  Skin: intact    IMPRESSION:   Cervical spondylosis    Note:  The clinical status was discussed in detail with the patient. The procedure was again discussed and described in detail. All understand and accept the planned procedure and risks; reject other forms of treatment. All questions are answered.     Di Mckeon MD

## 2023-04-25 NOTE — DISCHARGE INSTRUCTIONS
Discharge Instructions  Cervical Medial Branch Block  You had a cervical facet injection today. You will probably have some numbness in your neck and/or arms for the next 6 to 8 hours. The steroids will slowly become effective, reducing your pain, over the next 2 weeks. You should begin feeling better after a few days, but it may take up to 2 weeks to notice the difference. The benefit you get from your injection will last a variable amount of time, depending on the severity of your cervical spine problem. If the results you experience are significant, but not long lasting, you may be a candidate for a procedure that can be longer lasting (radiofrequency ablation of the nerves innervating the facet joints). Pain:  Most people do not have any increase in pain after this injection. However, you might experience some soreness at the site of the injection. If this happens, putting an ice pack over the sore area will help. Bandage: You have a small bandage covering the site of the injection. You may remove it once you get home. Restrictions:  Someone should drive you home after the injection. After that, you have no restrictions. You may resume your normal level of activity. You may take a shower or bath, and you may eat normally. You should continue your current exercises and/or therapy routine. Medications:  Continue your current medications as prescribed. If your pain decreases, you may reduce the amount of your pain medicines. If you stopped taking anticoagulants or blood-thinners before the injection, start them tomorrow. If you have diabetes, your blood sugar may be elevated for a few days. Call your primary doctor with any questions.   Call Dr. Mitzie Bloch at [phone]  if you experience:  Fever (101 degrees Fahrenheit or greater)  Nausea or vomiting  Headache unrelieved by your normal pain medicine  Redness or swelling at the injection site that lasts more than 1 day  New numbness, tingling, weakness, or pain that you didnt have before the injection. If still having pain in 1-2 weeks, call office at [phone] for a follow up appointment. DISCHARGE SUMMARY from Nurse    The following personal items collected during your admission are returned to you:   Dental Appliance: Dental Appliances: None  Vision: Visual Aid: Glasses  Hearing Aid:    Jewelry:    Clothing:    Other Valuables:    Valuables sent to safe: If you were given prescriptions, please review the written information on prescribed medications. You will receive a Post Operative Call from one of the Recovery Room Nurses on the day after your surgery to check on you. It is very important for us to know how you are recovering after your surgery. You may receive an e-mail or letter in the mail from [employer] regarding your experience with us in the Ambulatory Surgery Unit. Your feedback is valuable to us and we appreciate your participation in the survey. If you have not had your influenza or pneumococcal vaccines, please follow up with your primary care physician. The discharge information has been reviewed with the patient. The patient verbalized understanding.

## 2023-04-25 NOTE — PERIOP NOTES
Rivka Nicholsonin.  1950  [id number]    Situation:  Verbal report given from: Drea Cervantes  Procedure: Procedure(s):  RIGHT C4 - C5 AND C5 - C6 MEDIAL BRANCH BLOCK    Background:    Preoperative diagnosis: Cervicalgia [M54.2]  DDD (degenerative disc disease), cervical [M50.30]  Cervical spinal stenosis [M48.02]  Spondylosis of cervical region without myelopathy or radiculopathy [M47.812]    Postoperative diagnosis: Cervicalgia [M54.2]  DDD (degenerative disc disease), cervical [M50.30]  Cervical spinal stenosis [M48.02]  Spondylosis of cervical region without myelopathy or radiculopathy [D66.399]    :  Dr. Olevia Eisenmenger:  Intra-procedure medications, procedure, and allergies reviewed        Recommendation:    Discharge patient home after discharge instructions reviewed with patient. Rest until local has worn off.

## 2023-04-25 NOTE — PERIOP NOTES
Patient received to PACU, VSS. Patient awake and alert with no complaints of pain. Injection site intact. Neuro:  Push/Pull assessment:     LUE Response:  strong   RUE Response:  strong      Discharge instructions given. Patient verbalized understanding of instructions and follow up. Patient states ready for discharge - patient discharged at this time by wheelchair with all belongings. wife to provide transportation home.

## 2023-05-24 RX ORDER — ESOMEPRAZOLE MAGNESIUM 40 MG/1
40 FOR SUSPENSION ORAL EVERY MORNING
COMMUNITY

## 2023-05-24 RX ORDER — ATORVASTATIN CALCIUM 20 MG/1
20 TABLET, FILM COATED ORAL DAILY
COMMUNITY
Start: 2023-02-15

## 2023-05-24 RX ORDER — GABAPENTIN 300 MG/1
300 CAPSULE ORAL 2 TIMES DAILY
COMMUNITY
Start: 2018-10-01

## 2023-05-24 RX ORDER — ALBUTEROL SULFATE 90 UG/1
2 AEROSOL, METERED RESPIRATORY (INHALATION) EVERY 6 HOURS PRN
COMMUNITY
Start: 2021-12-01

## 2023-05-24 RX ORDER — FEXOFENADINE HCL 180 MG/1
180 TABLET ORAL DAILY PRN
COMMUNITY

## 2023-05-24 RX ORDER — AMLODIPINE BESYLATE 10 MG/1
10 TABLET ORAL DAILY
COMMUNITY
Start: 2023-02-15

## 2023-05-24 RX ORDER — ASPIRIN 81 MG/1
81 TABLET, CHEWABLE ORAL DAILY
COMMUNITY
Start: 2019-08-27

## 2023-06-24 SDOH — ECONOMIC STABILITY: FOOD INSECURITY: WITHIN THE PAST 12 MONTHS, THE FOOD YOU BOUGHT JUST DIDN'T LAST AND YOU DIDN'T HAVE MONEY TO GET MORE.: NEVER TRUE

## 2023-06-24 SDOH — ECONOMIC STABILITY: INCOME INSECURITY: HOW HARD IS IT FOR YOU TO PAY FOR THE VERY BASICS LIKE FOOD, HOUSING, MEDICAL CARE, AND HEATING?: NOT VERY HARD

## 2023-06-24 SDOH — ECONOMIC STABILITY: HOUSING INSECURITY
IN THE LAST 12 MONTHS, WAS THERE A TIME WHEN YOU DID NOT HAVE A STEADY PLACE TO SLEEP OR SLEPT IN A SHELTER (INCLUDING NOW)?: NO

## 2023-06-24 SDOH — ECONOMIC STABILITY: TRANSPORTATION INSECURITY
IN THE PAST 12 MONTHS, HAS LACK OF TRANSPORTATION KEPT YOU FROM MEETINGS, WORK, OR FROM GETTING THINGS NEEDED FOR DAILY LIVING?: NO

## 2023-06-24 SDOH — ECONOMIC STABILITY: FOOD INSECURITY: WITHIN THE PAST 12 MONTHS, YOU WORRIED THAT YOUR FOOD WOULD RUN OUT BEFORE YOU GOT MONEY TO BUY MORE.: NEVER TRUE

## 2023-06-27 ENCOUNTER — OFFICE VISIT (OUTPATIENT)
Age: 73
End: 2023-06-27
Payer: MEDICARE

## 2023-06-27 VITALS
HEIGHT: 70 IN | DIASTOLIC BLOOD PRESSURE: 64 MMHG | OXYGEN SATURATION: 98 % | SYSTOLIC BLOOD PRESSURE: 136 MMHG | RESPIRATION RATE: 18 BRPM | HEART RATE: 60 BPM | TEMPERATURE: 97.5 F | BODY MASS INDEX: 26.97 KG/M2 | WEIGHT: 188.4 LBS

## 2023-06-27 DIAGNOSIS — R42 DIZZINESS: ICD-10-CM

## 2023-06-27 DIAGNOSIS — I10 ESSENTIAL HYPERTENSION: ICD-10-CM

## 2023-06-27 DIAGNOSIS — E78.2 MIXED HYPERLIPIDEMIA: ICD-10-CM

## 2023-06-27 DIAGNOSIS — N40.0 BENIGN PROSTATIC HYPERPLASIA WITHOUT LOWER URINARY TRACT SYMPTOMS: ICD-10-CM

## 2023-06-27 DIAGNOSIS — I63.30 CEREBROVASCULAR ACCIDENT (CVA) DUE TO THROMBOSIS OF CEREBRAL ARTERY (HCC): Primary | ICD-10-CM

## 2023-06-27 PROBLEM — M79.641 PAIN OF RIGHT HAND: Status: RESOLVED | Noted: 2017-09-20 | Resolved: 2023-06-27

## 2023-06-27 PROBLEM — G45.9 TIA (TRANSIENT ISCHEMIC ATTACK): Status: RESOLVED | Noted: 2019-08-26 | Resolved: 2023-06-27

## 2023-06-27 PROCEDURE — 1123F ACP DISCUSS/DSCN MKR DOCD: CPT | Performed by: FAMILY MEDICINE

## 2023-06-27 PROCEDURE — 3078F DIAST BP <80 MM HG: CPT | Performed by: FAMILY MEDICINE

## 2023-06-27 PROCEDURE — 3075F SYST BP GE 130 - 139MM HG: CPT | Performed by: FAMILY MEDICINE

## 2023-06-27 PROCEDURE — 4004F PT TOBACCO SCREEN RCVD TLK: CPT | Performed by: FAMILY MEDICINE

## 2023-06-27 PROCEDURE — G8419 CALC BMI OUT NRM PARAM NOF/U: HCPCS | Performed by: FAMILY MEDICINE

## 2023-06-27 PROCEDURE — G8427 DOCREV CUR MEDS BY ELIG CLIN: HCPCS | Performed by: FAMILY MEDICINE

## 2023-06-27 PROCEDURE — 99214 OFFICE O/P EST MOD 30 MIN: CPT | Performed by: FAMILY MEDICINE

## 2023-06-27 PROCEDURE — 3017F COLORECTAL CA SCREEN DOC REV: CPT | Performed by: FAMILY MEDICINE

## 2023-06-27 SDOH — ECONOMIC STABILITY: FOOD INSECURITY: WITHIN THE PAST 12 MONTHS, THE FOOD YOU BOUGHT JUST DIDN'T LAST AND YOU DIDN'T HAVE MONEY TO GET MORE.: NEVER TRUE

## 2023-06-27 SDOH — ECONOMIC STABILITY: INCOME INSECURITY: HOW HARD IS IT FOR YOU TO PAY FOR THE VERY BASICS LIKE FOOD, HOUSING, MEDICAL CARE, AND HEATING?: NOT HARD AT ALL

## 2023-06-27 SDOH — ECONOMIC STABILITY: FOOD INSECURITY: WITHIN THE PAST 12 MONTHS, YOU WORRIED THAT YOUR FOOD WOULD RUN OUT BEFORE YOU GOT MONEY TO BUY MORE.: NEVER TRUE

## 2023-06-27 ASSESSMENT — PATIENT HEALTH QUESTIONNAIRE - PHQ9
2. FEELING DOWN, DEPRESSED OR HOPELESS: 0
SUM OF ALL RESPONSES TO PHQ QUESTIONS 1-9: 0
SUM OF ALL RESPONSES TO PHQ QUESTIONS 1-9: 0
SUM OF ALL RESPONSES TO PHQ9 QUESTIONS 1 & 2: 0
SUM OF ALL RESPONSES TO PHQ QUESTIONS 1-9: 0
1. LITTLE INTEREST OR PLEASURE IN DOING THINGS: 0
SUM OF ALL RESPONSES TO PHQ QUESTIONS 1-9: 0

## 2023-06-28 LAB
ANION GAP SERPL CALC-SCNC: 8 MMOL/L (ref 5–15)
BASOPHILS # BLD: 0 K/UL (ref 0–0.1)
BASOPHILS NFR BLD: 0 % (ref 0–1)
BUN SERPL-MCNC: 23 MG/DL (ref 6–20)
BUN/CREAT SERPL: 22 (ref 12–20)
CALCIUM SERPL-MCNC: 9.4 MG/DL (ref 8.5–10.1)
CHLORIDE SERPL-SCNC: 105 MMOL/L (ref 97–108)
CO2 SERPL-SCNC: 28 MMOL/L (ref 21–32)
CREAT SERPL-MCNC: 1.06 MG/DL (ref 0.7–1.3)
DIFFERENTIAL METHOD BLD: NORMAL
EOSINOPHIL # BLD: 0.1 K/UL (ref 0–0.4)
EOSINOPHIL NFR BLD: 1 % (ref 0–7)
ERYTHROCYTE [DISTWIDTH] IN BLOOD BY AUTOMATED COUNT: 13.4 % (ref 11.5–14.5)
GLUCOSE SERPL-MCNC: 148 MG/DL (ref 65–100)
HCT VFR BLD AUTO: 45.6 % (ref 36.6–50.3)
HGB BLD-MCNC: 14.1 G/DL (ref 12.1–17)
IMM GRANULOCYTES # BLD AUTO: 0 K/UL (ref 0–0.04)
IMM GRANULOCYTES NFR BLD AUTO: 0 % (ref 0–0.5)
LYMPHOCYTES # BLD: 2.2 K/UL (ref 0.8–3.5)
LYMPHOCYTES NFR BLD: 22 % (ref 12–49)
MCH RBC QN AUTO: 27.6 PG (ref 26–34)
MCHC RBC AUTO-ENTMCNC: 30.9 G/DL (ref 30–36.5)
MCV RBC AUTO: 89.2 FL (ref 80–99)
MONOCYTES # BLD: 0.7 K/UL (ref 0–1)
MONOCYTES NFR BLD: 7 % (ref 5–13)
NEUTS SEG # BLD: 7.2 K/UL (ref 1.8–8)
NEUTS SEG NFR BLD: 70 % (ref 32–75)
NRBC # BLD: 0 K/UL (ref 0–0.01)
NRBC BLD-RTO: 0 PER 100 WBC
PLATELET # BLD AUTO: 198 K/UL (ref 150–400)
PMV BLD AUTO: 11.8 FL (ref 8.9–12.9)
POTASSIUM SERPL-SCNC: 3.4 MMOL/L (ref 3.5–5.1)
PSA SERPL-MCNC: 0.8 NG/ML (ref 0.01–4)
RBC # BLD AUTO: 5.11 M/UL (ref 4.1–5.7)
SODIUM SERPL-SCNC: 141 MMOL/L (ref 136–145)
WBC # BLD AUTO: 10.4 K/UL (ref 4.1–11.1)

## 2023-08-02 RX ORDER — ESOMEPRAZOLE MAGNESIUM 40 MG/1
40 CAPSULE, DELAYED RELEASE ORAL
COMMUNITY

## 2023-08-08 ENCOUNTER — HOSPITAL ENCOUNTER (OUTPATIENT)
Facility: HOSPITAL | Age: 73
Setting detail: SURGERY ADMIT
Discharge: HOME | End: 2023-08-08
Attending: PHYSICAL MEDICINE & REHABILITATION | Admitting: PHYSICAL MEDICINE & REHABILITATION
Payer: MEDICARE

## 2023-08-08 ENCOUNTER — APPOINTMENT (OUTPATIENT)
Facility: HOSPITAL | Age: 73
End: 2023-08-08
Attending: PHYSICAL MEDICINE & REHABILITATION
Payer: MEDICARE

## 2023-08-08 VITALS
TEMPERATURE: 97.1 F | OXYGEN SATURATION: 96 % | SYSTOLIC BLOOD PRESSURE: 136 MMHG | HEART RATE: 59 BPM | HEIGHT: 70 IN | RESPIRATION RATE: 16 BRPM | DIASTOLIC BLOOD PRESSURE: 63 MMHG | BODY MASS INDEX: 26.2 KG/M2 | WEIGHT: 183 LBS

## 2023-08-08 PROCEDURE — 7100000011 HC PHASE II RECOVERY - ADDTL 15 MIN: Performed by: PHYSICAL MEDICINE & REHABILITATION

## 2023-08-08 PROCEDURE — 7100000010 HC PHASE II RECOVERY - FIRST 15 MIN: Performed by: PHYSICAL MEDICINE & REHABILITATION

## 2023-08-08 PROCEDURE — 2500000003 HC RX 250 WO HCPCS: Performed by: PHYSICAL MEDICINE & REHABILITATION

## 2023-08-08 PROCEDURE — 3600000012 HC SURGERY LEVEL 2 ADDTL 15MIN: Performed by: PHYSICAL MEDICINE & REHABILITATION

## 2023-08-08 PROCEDURE — 3600000002 HC SURGERY LEVEL 2 BASE: Performed by: PHYSICAL MEDICINE & REHABILITATION

## 2023-08-08 PROCEDURE — 6360000002 HC RX W HCPCS: Performed by: PHYSICAL MEDICINE & REHABILITATION

## 2023-08-08 PROCEDURE — 2709999900 HC NON-CHARGEABLE SUPPLY: Performed by: PHYSICAL MEDICINE & REHABILITATION

## 2023-08-08 RX ORDER — LIDOCAINE HYDROCHLORIDE 20 MG/ML
10 INJECTION, SOLUTION EPIDURAL; INFILTRATION; INTRACAUDAL; PERINEURAL ONCE
Status: COMPLETED | OUTPATIENT
Start: 2023-08-08 | End: 2023-08-08

## 2023-08-08 RX ORDER — DEXAMETHASONE SODIUM PHOSPHATE 4 MG/ML
10 INJECTION, SOLUTION INTRA-ARTICULAR; INTRALESIONAL; INTRAMUSCULAR; INTRAVENOUS; SOFT TISSUE ONCE
Status: COMPLETED | OUTPATIENT
Start: 2023-08-08 | End: 2023-08-08

## 2023-08-08 RX ORDER — BUPIVACAINE HYDROCHLORIDE 5 MG/ML
10 INJECTION, SOLUTION EPIDURAL; INTRACAUDAL ONCE
Status: COMPLETED | OUTPATIENT
Start: 2023-08-08 | End: 2023-08-08

## 2023-08-08 ASSESSMENT — PAIN - FUNCTIONAL ASSESSMENT: PAIN_FUNCTIONAL_ASSESSMENT: NONE - DENIES PAIN

## 2023-08-08 NOTE — PERIOP NOTE
Permission received to review discharge instructions and discuss private health information with wife [facility].     Neuro:  Push/Pull assessment:     LUE Response: strong    RUE Response: strong

## 2023-08-08 NOTE — PERIOP NOTE
Patient received to PACU, VSS. Patient awake and alert with no complaints of pain. Injection site intact. Neuro:  Push/Pull assessment:     LUE Response: equal and strong   LLE Response:  equal and strong   RUE Response:  equal and strong   RLE Response:equal and strong    Discharge instructions given. Patient verbalized understanding of instructions and follow up. 1035Patient states ready for discharge. No bleeding or swelling at back  1040 - patient discharged at this time by assistance walking with all belongings. Wife to provide transportation home.

## 2023-08-08 NOTE — DISCHARGE INSTRUCTIONS
Discharge Instructions  Cervical Medial Branch Block  You had a cervical facet injection today. You will probably have some numbness in your neck and/or arms for the next 6 to 8 hours. The steroids will slowly become effective, reducing your pain, over the next 2 weeks. You should begin feeling better after a few days, but it may take up to 2 weeks to notice the difference. The benefit you get from your injection will last a variable amount of time, depending on the severity of your cervical spine problem. If the results you experience are significant, but not long lasting, you may be a candidate for a procedure that can be longer lasting (radiofrequency ablation of the nerves innervating the facet joints). Pain:  Most people do not have any increase in pain after this injection. However, you might experience some soreness at the site of the injection. If this happens, putting an ice pack over the sore area will help. Bandage: You have a small bandage covering the site of the injection. You may remove it once you get home. Restrictions:  Someone should drive you home after the injection. After that, you have no restrictions. You may resume your normal level of activity. You may take a shower or bath, and you may eat normally. You should continue your current exercises and/or therapy routine. Medications:  Continue your current medications as prescribed. If your pain decreases, you may reduce the amount of your pain medicines. If you stopped taking anticoagulants or blood-thinners before the injection, start them tomorrow. If you have diabetes, your blood sugar may be elevated for a few days. Call your primary doctor with any questions.   Call Dr. Rafi Sy at [phone]  if you experience:  Fever (101 degrees Fahrenheit or greater)  Nausea or vomiting  Headache unrelieved by your normal pain medicine  Redness or swelling at the injection site that lasts more than 1 day  New numbness,

## 2023-08-08 NOTE — OP NOTE
Operative Note      Patient: Elisa Carter. YOB: 1950  MRN: [id number]    Date of Procedure: 8/8/2023  Cervical Facet Medial Branch Block Injection Operative Report    Indications: This is a 74 y.o. male who presents with cervicalgia. He was positive for cervical DJD. The patient was admitted for surgery as conservative measures have failed. Preoperative Diagnosis: cervical spondylosis    Postoperative Diagnosis: cervical spondylosis    Surgeon(s) and Role:     * Minh Connell MD - Primary     Procedure:  Procedure(s):  BILATERAL C4-5 AND C5-6 MEDIAL BRANCH BLOCK    Procedure in Detail:  After appropriate informed consent was obtained, the patient was taken to the operating suite and placed in the prone position on the operating table on appropriate padding. The cervical region was prepped and draped in the usual sterile fashion. Intraoperative fluoroscopy was used to localize the cervical spine. The skin was infiltrated with 2% lidocaine. An 25-g needle was advanced into the Chauncey C4-5 and C5-6 facet medial branches under fluoroscopic guidance (the lateral waist of the cervical pillars). Needle placement confirmation with contrast. Permanent fluoroscopic images were saved in the patient's record. Next, 4ml of 0.5% marcaine and 10 mg of Dexamethasone were injected divided equally between injection sites. The needle was removed from the patient. The patient was then turned back into the supine position on the stretcher and was taken to the Recovery Room in stable condition.     Estimated Blood Loss:  none     Specimens: None       Drains: None          Complications:  None    Signed By: Fernanda Claude, MD                        August 8, 2023    Electronically signed by Fernanda Claude, MD on 8/8/2023 at 10:28 AM

## 2023-09-20 ENCOUNTER — OFFICE VISIT (OUTPATIENT)
Age: 73
End: 2023-09-20
Payer: MEDICARE

## 2023-09-20 VITALS
BODY MASS INDEX: 26.48 KG/M2 | SYSTOLIC BLOOD PRESSURE: 132 MMHG | HEART RATE: 62 BPM | TEMPERATURE: 97.1 F | OXYGEN SATURATION: 99 % | HEIGHT: 70 IN | WEIGHT: 185 LBS | DIASTOLIC BLOOD PRESSURE: 70 MMHG | RESPIRATION RATE: 18 BRPM

## 2023-09-20 DIAGNOSIS — I63.30 CEREBROVASCULAR ACCIDENT (CVA) DUE TO THROMBOSIS OF CEREBRAL ARTERY (HCC): ICD-10-CM

## 2023-09-20 DIAGNOSIS — R35.1 NOCTURIA: ICD-10-CM

## 2023-09-20 DIAGNOSIS — E78.2 MIXED HYPERLIPIDEMIA: ICD-10-CM

## 2023-09-20 DIAGNOSIS — N40.0 BENIGN PROSTATIC HYPERPLASIA WITHOUT LOWER URINARY TRACT SYMPTOMS: Primary | ICD-10-CM

## 2023-09-20 DIAGNOSIS — I10 ESSENTIAL HYPERTENSION: ICD-10-CM

## 2023-09-20 LAB
ANION GAP SERPL CALC-SCNC: 5 MMOL/L (ref 5–15)
APPEARANCE UR: CLEAR
BACTERIA URNS QL MICRO: NEGATIVE /HPF
BILIRUB UR QL: NEGATIVE
BUN SERPL-MCNC: 14 MG/DL (ref 6–20)
BUN/CREAT SERPL: 15 (ref 12–20)
CALCIUM SERPL-MCNC: 9.2 MG/DL (ref 8.5–10.1)
CHLORIDE SERPL-SCNC: 105 MMOL/L (ref 97–108)
CO2 SERPL-SCNC: 28 MMOL/L (ref 21–32)
COLOR UR: NORMAL
CREAT SERPL-MCNC: 0.93 MG/DL (ref 0.7–1.3)
EPITH CASTS URNS QL MICRO: NORMAL /LPF
GLUCOSE SERPL-MCNC: 104 MG/DL (ref 65–100)
GLUCOSE UR STRIP.AUTO-MCNC: NEGATIVE MG/DL
HGB UR QL STRIP: NEGATIVE
HYALINE CASTS URNS QL MICRO: NORMAL /LPF (ref 0–5)
KETONES UR QL STRIP.AUTO: NEGATIVE MG/DL
LEUKOCYTE ESTERASE UR QL STRIP.AUTO: NEGATIVE
NITRITE UR QL STRIP.AUTO: NEGATIVE
PH UR STRIP: 6.5 (ref 5–8)
POTASSIUM SERPL-SCNC: 3.8 MMOL/L (ref 3.5–5.1)
PROT UR STRIP-MCNC: NEGATIVE MG/DL
RBC #/AREA URNS HPF: NORMAL /HPF (ref 0–5)
SODIUM SERPL-SCNC: 138 MMOL/L (ref 136–145)
SP GR UR REFRACTOMETRY: 1.02 (ref 1–1.03)
UROBILINOGEN UR QL STRIP.AUTO: 0.2 EU/DL (ref 0.2–1)
WBC URNS QL MICRO: NORMAL /HPF (ref 0–4)

## 2023-09-20 PROCEDURE — 3075F SYST BP GE 130 - 139MM HG: CPT | Performed by: FAMILY MEDICINE

## 2023-09-20 PROCEDURE — 1036F TOBACCO NON-USER: CPT | Performed by: FAMILY MEDICINE

## 2023-09-20 PROCEDURE — G8419 CALC BMI OUT NRM PARAM NOF/U: HCPCS | Performed by: FAMILY MEDICINE

## 2023-09-20 PROCEDURE — 1123F ACP DISCUSS/DSCN MKR DOCD: CPT | Performed by: FAMILY MEDICINE

## 2023-09-20 PROCEDURE — 99214 OFFICE O/P EST MOD 30 MIN: CPT | Performed by: FAMILY MEDICINE

## 2023-09-20 PROCEDURE — 3078F DIAST BP <80 MM HG: CPT | Performed by: FAMILY MEDICINE

## 2023-09-20 PROCEDURE — G8427 DOCREV CUR MEDS BY ELIG CLIN: HCPCS | Performed by: FAMILY MEDICINE

## 2023-09-20 PROCEDURE — 3017F COLORECTAL CA SCREEN DOC REV: CPT | Performed by: FAMILY MEDICINE

## 2023-09-20 RX ORDER — TAMSULOSIN HYDROCHLORIDE 0.4 MG/1
0.4 CAPSULE ORAL DAILY
Qty: 30 CAPSULE | Refills: 5 | Status: SHIPPED | OUTPATIENT
Start: 2023-09-20

## 2023-09-20 ASSESSMENT — PATIENT HEALTH QUESTIONNAIRE - PHQ9
SUM OF ALL RESPONSES TO PHQ QUESTIONS 1-9: 0
SUM OF ALL RESPONSES TO PHQ QUESTIONS 1-9: 0
2. FEELING DOWN, DEPRESSED OR HOPELESS: 0
SUM OF ALL RESPONSES TO PHQ QUESTIONS 1-9: 0
SUM OF ALL RESPONSES TO PHQ QUESTIONS 1-9: 0
1. LITTLE INTEREST OR PLEASURE IN DOING THINGS: 0
SUM OF ALL RESPONSES TO PHQ9 QUESTIONS 1 & 2: 0

## 2023-09-20 NOTE — PATIENT INSTRUCTIONS
RSV vaccine and new COVID omicron vaccines due this fall at your pharmacy. Please get those when available, they can help prevent severe lung disease.

## 2023-09-20 NOTE — PROGRESS NOTES
Shauna Ray. is a 74 y.o. male  Chief Complaint   Patient presents with    Hypertension     States he checks bp ocassionally at home and it has been controlled     Cholesterol Problem    Insomnia     States he has been having some trouble sleeping for the past several months      HPI:  Symptoms include nocturia, up to 3x/night now. Low volume, with LUTS. Prev seen by Balta Guzman MD for urolift and BPH. Did well at first, but gradually worsening. Difficulty getting to sleep after. Treatment to date: tried flomax in past, stephen well, able to skip it after urolift. Hyperlipidemia and hx CVA. On lipitor 20mg/d, ASA 81mg/d, and BP regimen. Stephen well. No myalgias, arthralgias, unusual weakness. Lab Results   Component Value Date    CHOL 109 02/15/2023    HDL 64 02/15/2023    LDLCALC 29.4 02/15/2023    TRIG 78 02/15/2023    AST 25 02/15/2023    ALT 42 02/15/2023    ALKPHOS 55 02/15/2023    BILITOT 2.5 (H) 02/15/2023       Hypertension  Blood pressures ok today. Current regimen: calcium channel blocker. Symptoms include no sx. Patient denies chest pain, palpitations, peripheral edema. Has hx ACEI cough in past.  Lab review:   Lab Results   Component Value Date     06/27/2023    K 3.4 (L) 06/27/2023     06/27/2023    CO2 28 06/27/2023    GLUCOSE 148 (H) 06/27/2023    BUN 23 (H) 06/27/2023    CREATININE 1.06 06/27/2023     GI issues  Recurrent constipation, hx GERD. [facility] uses fiber supplement, occ uses miralax. BPH  Managed by [facility]. Mild sx lately. Lab Results   Component Value Date    PSA 0.8 06/27/2023     DJD/  Cervical degenerative disc disease  Saw Dr. Miguel Lucero and Dr. Tiny Gabriel. He is taking gabapentin 300mg BID, gets from them. Taking regularly, stephen well, no sedation. Did ok with prior PT. Had neck injection in April 2023, did well with that, will see again in late summer 2023. Reviewed PMH, PSH, SH, Medications, allergies (see chart).   Current Outpatient Medications   Medication Sig

## 2023-10-09 DIAGNOSIS — R35.1 NOCTURIA: ICD-10-CM

## 2023-10-09 DIAGNOSIS — N40.0 BENIGN PROSTATIC HYPERPLASIA WITHOUT LOWER URINARY TRACT SYMPTOMS: ICD-10-CM

## 2023-10-09 RX ORDER — TAMSULOSIN HYDROCHLORIDE 0.4 MG/1
0.4 CAPSULE ORAL DAILY
Qty: 90 CAPSULE | Refills: 3 | Status: SHIPPED | OUTPATIENT
Start: 2023-10-09

## 2023-10-09 NOTE — TELEPHONE ENCOUNTER
----- Message from Debby Veras. sent at 10/8/2023  3:25 PM EDT -----  Regardin Day request  Contact: [phone]  The Tamsulosin is helping. Please give me a 90 day refill.

## 2023-12-14 ENCOUNTER — TELEPHONE (OUTPATIENT)
Age: 73
End: 2023-12-14

## 2023-12-14 ENCOUNTER — OFFICE VISIT (OUTPATIENT)
Age: 73
End: 2023-12-14
Payer: MEDICARE

## 2023-12-14 VITALS
OXYGEN SATURATION: 97 % | DIASTOLIC BLOOD PRESSURE: 66 MMHG | BODY MASS INDEX: 27.57 KG/M2 | HEIGHT: 70 IN | TEMPERATURE: 98 F | SYSTOLIC BLOOD PRESSURE: 136 MMHG | WEIGHT: 192.6 LBS | HEART RATE: 61 BPM | RESPIRATION RATE: 18 BRPM

## 2023-12-14 DIAGNOSIS — S51.851A CAT BITE OF RIGHT FOREARM, INITIAL ENCOUNTER: Primary | ICD-10-CM
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
## 2023-12-14 PROCEDURE — 99213 OFFICE O/P EST LOW 20 MIN: CPT | Performed by: INTERNAL MEDICINE

## 2023-12-14 PROCEDURE — 3075F SYST BP GE 130 - 139MM HG: CPT | Performed by: INTERNAL MEDICINE

## 2023-12-14 PROCEDURE — G8419 CALC BMI OUT NRM PARAM NOF/U: HCPCS | Performed by: INTERNAL MEDICINE

## 2023-12-14 PROCEDURE — 1036F TOBACCO NON-USER: CPT | Performed by: INTERNAL MEDICINE

## 2023-12-14 PROCEDURE — 3017F COLORECTAL CA SCREEN DOC REV: CPT | Performed by: INTERNAL MEDICINE

## 2023-12-14 PROCEDURE — 3078F DIAST BP <80 MM HG: CPT | Performed by: INTERNAL MEDICINE

## 2023-12-14 PROCEDURE — G8427 DOCREV CUR MEDS BY ELIG CLIN: HCPCS | Performed by: INTERNAL MEDICINE

## 2023-12-14 PROCEDURE — G8484 FLU IMMUNIZE NO ADMIN: HCPCS | Performed by: INTERNAL MEDICINE

## 2023-12-14 PROCEDURE — 1123F ACP DISCUSS/DSCN MKR DOCD: CPT | Performed by: INTERNAL MEDICINE

## 2023-12-14 RX ORDER — CLINDAMYCIN HYDROCHLORIDE 300 MG/1
300 CAPSULE ORAL 3 TIMES DAILY
Qty: 21 CAPSULE | Refills: 0 | Status: SHIPPED | OUTPATIENT
Start: 2023-12-14 | End: 2023-12-21

## 2023-12-14 RX ORDER — LEVOFLOXACIN 750 MG/1
750 TABLET, FILM COATED ORAL DAILY
Qty: 7 TABLET | Refills: 0 | Status: SHIPPED | OUTPATIENT
Start: 2023-12-14 | End: 2023-12-21

## 2023-12-14 ASSESSMENT — PATIENT HEALTH QUESTIONNAIRE - PHQ9
2. FEELING DOWN, DEPRESSED OR HOPELESS: 0
SUM OF ALL RESPONSES TO PHQ QUESTIONS 1-9: 0
1. LITTLE INTEREST OR PLEASURE IN DOING THINGS: 0
SUM OF ALL RESPONSES TO PHQ QUESTIONS 1-9: 0
SUM OF ALL RESPONSES TO PHQ QUESTIONS 1-9: 0
SUM OF ALL RESPONSES TO PHQ9 QUESTIONS 1 & 2: 0
SUM OF ALL RESPONSES TO PHQ QUESTIONS 1-9: 0

## 2023-12-14 NOTE — PROGRESS NOTES
1. Cat bite of right forearm, initial encounter  Currently localized to the dorsal aspect of the right forearm measuring approximately 8 cm x 5 cm in area. No purulence or drainage. Advised to keep clean and dry and to refrain from putting anything on it other than plain Vaseline or nothing. Because he is allergic to penicillin he is unable to take Augmentin so he is going to have to take 2 drug therapy which includes Levaquin and clindamycin. He should expect some improvement within the next couple of days and if not I will see him first thing Monday morning. Occasional side effects from his antibiotics include diarrhea  - clindamycin (CLEOCIN) 300 MG capsule; Take 1 capsule by mouth 3 times daily for 7 days  Dispense: 21 capsule; Refill: 0  - levoFLOXacin (LEVAQUIN) 750 MG tablet; Take 1 tablet by mouth daily for 7 days  Dispense: 7 tablet; Refill: 0       Chief Complaint   Patient presents with    Animal Bite     Pt reports that he was playing with his cat last Monday and his his cat bit his right arm. Pt reports pain and redness where his cat bit him. No orders of the defined types were placed in this encounter. Elia Owens MD, FACP      HPI:         is a 74 y.o. male who notes the onset of a skin problem. The details are as follows: Bitten by cat couple days ago and now presents with redness on the dorsal aspect of the right forearm. Veterinary shots are up-to-date. Allergies   Allergen Reactions    Ace Inhibitors Cough     Other reaction(s): Cough    Other reaction(s): Cough      Other Reaction(s): Cough    Nsaids Other (See Comments)     GI bleeding. Other reaction(s): Other (comments)    GI bleeding. Other reaction(s): Other (comments)      GI bleeding. GI bleeding. Other reaction(s): Other (comments) GI bleeding.     Penicillins Rash     Tolerated ancef on 3/7/22    Other reaction(s): Rash    Other reaction(s): Rash      Tolerated ancef on 3/7/22

## 2023-12-14 NOTE — TELEPHONE ENCOUNTER
Pt stated that he was bitten by his cat on Tuesday and thinks that where the bite is has become infected and wants to be seen, please advise, call back # [phone].

## 2024-02-28 ENCOUNTER — TELEMEDICINE (OUTPATIENT)
Age: 74
End: 2024-02-28
Payer: MEDICARE

## 2024-02-28 DIAGNOSIS — N32.81 OAB (OVERACTIVE BLADDER): ICD-10-CM

## 2024-02-28 DIAGNOSIS — R35.0 URINARY FREQUENCY: ICD-10-CM

## 2024-02-28 DIAGNOSIS — N40.1 BENIGN PROSTATIC HYPERPLASIA WITH URINARY FREQUENCY: Primary | ICD-10-CM

## 2024-02-28 DIAGNOSIS — R35.0 BENIGN PROSTATIC HYPERPLASIA WITH URINARY FREQUENCY: Primary | ICD-10-CM

## 2024-02-28 LAB
BILIRUBIN, URINE, POC: NEGATIVE
BLOOD URINE, POC: NEGATIVE
GLUCOSE URINE, POC: NEGATIVE
KETONES, URINE, POC: NEGATIVE
LEUKOCYTE ESTERASE, URINE, POC: NEGATIVE
NITRITE, URINE, POC: NEGATIVE
PH, URINE, POC: 6 (ref 4.6–8)
PROTEIN,URINE, POC: NEGATIVE
SPECIFIC GRAVITY, URINE, POC: 1.01 (ref 1–1.03)
URINALYSIS CLARITY, POC: CLEAR
UROBILINOGEN, POC: NORMAL

## 2024-02-28 PROCEDURE — 81003 URINALYSIS AUTO W/O SCOPE: CPT | Performed by: FAMILY MEDICINE

## 2024-02-28 PROCEDURE — 99442 PR PHYS/QHP TELEPHONE EVALUATION 11-20 MIN: CPT | Performed by: FAMILY MEDICINE

## 2024-02-28 RX ORDER — TROSPIUM CHLORIDE 20 MG/1
20 TABLET, FILM COATED ORAL EVERY MORNING
Qty: 30 TABLET | Refills: 11 | Status: SHIPPED | OUTPATIENT
Start: 2024-02-28

## 2024-02-28 ASSESSMENT — PATIENT HEALTH QUESTIONNAIRE - PHQ9
SUM OF ALL RESPONSES TO PHQ9 QUESTIONS 1 & 2: 0
1. LITTLE INTEREST OR PLEASURE IN DOING THINGS: 0
SUM OF ALL RESPONSES TO PHQ QUESTIONS 1-9: 0
2. FEELING DOWN, DEPRESSED OR HOPELESS: 0

## 2024-02-28 NOTE — PROGRESS NOTES
Sascha Bhatti Jr. is a 73 y.o. male presenting for/with:    Chief Complaint   Patient presents with    Urinary Frequency     Pt reports having urinary frequency for the past 3 weeks. Pt reports that his urine has a odor and he also wet the bed last night. Pt denies having any pain when urinating.       There were no vitals filed for this visit.    Pain Scale: /10  Pain Location:     \"Have you been to the ER, urgent care clinic since your last visit?  Hospitalized since your last visit?\"    NO    “Have you seen or consulted any other health care providers outside of [facility] since your last visit?”    NO               2/28/2024    10:45 AM   PHQ-9    Little interest or pleasure in doing things 0   Feeling down, depressed, or hopeless 0   PHQ-2 Score 0   PHQ-9 Total Score 0           2/28/2024     4:30 PM 3/11/2023    12:00 AM   [facility] AMB LEARNING ASSESSMENT   Primary Learner Patient Patient   Primary Language ENGLISH ENGLISH   Learning Preference DEMONSTRATION READING    DEMONSTRATION   Answered By patient patient   Relationship to Learner SELF SELF            2/28/2024    10:45 AM   Amb Fall Risk Assessment and TUG Test   Do you feel unsteady or are you worried about falling?  no   2 or more falls in past year? no   Fall with injury in past year? no           2/28/2024    10:00 AM 12/14/2023     1:00 PM 9/20/2023     8:00 AM 6/27/2023     1:00 PM   ADL ASSESSMENT   Feeding yourself No Help Needed No Help Needed No Help Needed No Help Needed   Getting from bed to chair No Help Needed No Help Needed No Help Needed No Help Needed   Getting dressed No Help Needed No Help Needed No Help Needed No Help Needed   Bathing or showering No Help Needed No Help Needed No Help Needed No Help Needed   Walk across the room (includes cane/walker) No Help Needed No Help Needed No Help Needed No Help Needed   Using the telphone No Help Needed No Help Needed No Help Needed No Help Needed   Taking your medications No 
consents to care. The patient was located in a state where the provider was licensed to provide care  Miguel A Betancourt MD    Due to this being a TeleHealth evaluation, many elements of the physical examination are unable to be assessed.   We discussed the expected course, resolution and complications of the diagnosis(es) in detail.  Medication risks, benefits, costs, interactions, and alternatives were discussed as indicated.  I advised him to contact the office if his condition worsens, changes or fails to improve as anticipated. He expressed understanding with the diagnosis(es) and plan.     Services were provided through audio synchronous discussion virtually to substitute for in-person clinic visit.  An electronic signature was used to authenticate this note.

## 2024-03-01 LAB
BACTERIA SPEC CULT: NORMAL
CC UR VC: NORMAL
SERVICE CMNT-IMP: NORMAL

## 2024-03-02 ENCOUNTER — PATIENT MESSAGE (OUTPATIENT)
Age: 74
End: 2024-03-02

## 2024-03-04 DIAGNOSIS — N32.81 OAB (OVERACTIVE BLADDER): ICD-10-CM

## 2024-03-04 DIAGNOSIS — R35.0 URINARY FREQUENCY: ICD-10-CM

## 2024-03-04 RX ORDER — TROSPIUM CHLORIDE 20 MG/1
20 TABLET, FILM COATED ORAL 2 TIMES DAILY
Qty: 30 TABLET | Refills: 11
Start: 2024-03-04

## 2024-03-04 RX ORDER — ATORVASTATIN CALCIUM 20 MG/1
20 TABLET, FILM COATED ORAL DAILY
Qty: 90 TABLET | Refills: 3 | Status: SHIPPED | OUTPATIENT
Start: 2024-03-04

## 2024-03-04 NOTE — TELEPHONE ENCOUNTER
From: Sascha Bhatti Jr.  To: Dr. Miguel A Betancourt  Sent: 3/2/2024 9:03 AM EST  Subject: Rx refill    I need a refill for Atorvastatin 20 MG Tab QTY 90

## 2024-03-14 ENCOUNTER — PATIENT MESSAGE (OUTPATIENT)
Age: 74
End: 2024-03-14

## 2024-03-14 DIAGNOSIS — R35.1 NOCTURIA: ICD-10-CM

## 2024-03-14 DIAGNOSIS — N40.0 BENIGN PROSTATIC HYPERPLASIA WITHOUT LOWER URINARY TRACT SYMPTOMS: ICD-10-CM

## 2024-03-14 DIAGNOSIS — R35.0 URINARY FREQUENCY: ICD-10-CM

## 2024-03-14 DIAGNOSIS — N32.81 OAB (OVERACTIVE BLADDER): ICD-10-CM

## 2024-03-14 SDOH — HEALTH STABILITY: PHYSICAL HEALTH: ON AVERAGE, HOW MANY MINUTES DO YOU ENGAGE IN EXERCISE AT THIS LEVEL?: 30 MIN

## 2024-03-14 SDOH — HEALTH STABILITY: PHYSICAL HEALTH: ON AVERAGE, HOW MANY DAYS PER WEEK DO YOU ENGAGE IN MODERATE TO STRENUOUS EXERCISE (LIKE A BRISK WALK)?: 5 DAYS

## 2024-03-14 ASSESSMENT — LIFESTYLE VARIABLES
HOW MANY STANDARD DRINKS CONTAINING ALCOHOL DO YOU HAVE ON A TYPICAL DAY: 1
HOW MANY STANDARD DRINKS CONTAINING ALCOHOL DO YOU HAVE ON A TYPICAL DAY: 1 OR 2
HOW OFTEN DO YOU HAVE A DRINK CONTAINING ALCOHOL: 3
HOW OFTEN DO YOU HAVE A DRINK CONTAINING ALCOHOL: 2-4 TIMES A MONTH
HOW OFTEN DO YOU HAVE SIX OR MORE DRINKS ON ONE OCCASION: 1

## 2024-03-14 ASSESSMENT — PATIENT HEALTH QUESTIONNAIRE - PHQ9
SUM OF ALL RESPONSES TO PHQ QUESTIONS 1-9: 0
SUM OF ALL RESPONSES TO PHQ9 QUESTIONS 1 & 2: 0
SUM OF ALL RESPONSES TO PHQ QUESTIONS 1-9: 0
SUM OF ALL RESPONSES TO PHQ QUESTIONS 1-9: 0
2. FEELING DOWN, DEPRESSED OR HOPELESS: NOT AT ALL
1. LITTLE INTEREST OR PLEASURE IN DOING THINGS: NOT AT ALL
SUM OF ALL RESPONSES TO PHQ QUESTIONS 1-9: 0

## 2024-03-15 RX ORDER — TROSPIUM CHLORIDE 20 MG/1
20 TABLET, FILM COATED ORAL 2 TIMES DAILY
Qty: 60 TABLET | Refills: 11 | Status: SHIPPED | OUTPATIENT
Start: 2024-03-15

## 2024-03-15 NOTE — TELEPHONE ENCOUNTER
Jozef, Nadine, LPN 3/14/2024 12:06 PM EDT      ----- Message -----  From: Sascha Bhatti Jr.  Sent: 3/14/2024 12:03 PM EDT  To: *  Subject: Trospium     Now that I'm taking 2 tablets per day, I need a new prescription for 20mg twice per day. I only have a few tablets left.

## 2024-03-18 ENCOUNTER — OFFICE VISIT (OUTPATIENT)
Age: 74
End: 2024-03-18
Payer: MEDICARE

## 2024-03-18 VITALS
HEIGHT: 70 IN | OXYGEN SATURATION: 99 % | HEART RATE: 80 BPM | SYSTOLIC BLOOD PRESSURE: 130 MMHG | WEIGHT: 185 LBS | RESPIRATION RATE: 17 BRPM | DIASTOLIC BLOOD PRESSURE: 60 MMHG | BODY MASS INDEX: 26.48 KG/M2

## 2024-03-18 DIAGNOSIS — E78.2 MIXED HYPERLIPIDEMIA: ICD-10-CM

## 2024-03-18 DIAGNOSIS — Z86.73 HISTORY OF STROKE: ICD-10-CM

## 2024-03-18 DIAGNOSIS — D48.5 NEOPLASM OF UNCERTAIN BEHAVIOR OF SKIN: ICD-10-CM

## 2024-03-18 DIAGNOSIS — Z00.00 MEDICARE ANNUAL WELLNESS VISIT, SUBSEQUENT: Primary | ICD-10-CM

## 2024-03-18 DIAGNOSIS — I63.30 CEREBROVASCULAR ACCIDENT (CVA) DUE TO THROMBOSIS OF CEREBRAL ARTERY (HCC): ICD-10-CM

## 2024-03-18 DIAGNOSIS — I10 ESSENTIAL HYPERTENSION: ICD-10-CM

## 2024-03-18 DIAGNOSIS — K21.9 GASTROESOPHAGEAL REFLUX DISEASE WITHOUT ESOPHAGITIS: ICD-10-CM

## 2024-03-18 DIAGNOSIS — N40.0 BENIGN PROSTATIC HYPERPLASIA WITHOUT LOWER URINARY TRACT SYMPTOMS: ICD-10-CM

## 2024-03-18 PROCEDURE — 99214 OFFICE O/P EST MOD 30 MIN: CPT | Performed by: FAMILY MEDICINE

## 2024-03-18 PROCEDURE — 1123F ACP DISCUSS/DSCN MKR DOCD: CPT | Performed by: FAMILY MEDICINE

## 2024-03-18 PROCEDURE — 3075F SYST BP GE 130 - 139MM HG: CPT | Performed by: FAMILY MEDICINE

## 2024-03-18 PROCEDURE — G8427 DOCREV CUR MEDS BY ELIG CLIN: HCPCS | Performed by: FAMILY MEDICINE

## 2024-03-18 PROCEDURE — G8484 FLU IMMUNIZE NO ADMIN: HCPCS | Performed by: FAMILY MEDICINE

## 2024-03-18 PROCEDURE — 1036F TOBACCO NON-USER: CPT | Performed by: FAMILY MEDICINE

## 2024-03-18 PROCEDURE — 3017F COLORECTAL CA SCREEN DOC REV: CPT | Performed by: FAMILY MEDICINE

## 2024-03-18 PROCEDURE — G8419 CALC BMI OUT NRM PARAM NOF/U: HCPCS | Performed by: FAMILY MEDICINE

## 2024-03-18 PROCEDURE — 3078F DIAST BP <80 MM HG: CPT | Performed by: FAMILY MEDICINE

## 2024-03-18 PROCEDURE — G0439 PPPS, SUBSEQ VISIT: HCPCS | Performed by: FAMILY MEDICINE

## 2024-03-18 RX ORDER — AMLODIPINE BESYLATE 10 MG/1
10 TABLET ORAL DAILY
Qty: 90 TABLET | Refills: 3 | Status: SHIPPED | OUTPATIENT
Start: 2024-03-18

## 2024-03-18 NOTE — PROGRESS NOTES
dressed No Help Needed No Help Needed No Help Needed No Help Needed No Help Needed   Bathing or showering No Help Needed No Help Needed No Help Needed No Help Needed No Help Needed   Walk across the room (includes cane/walker) No Help Needed No Help Needed No Help Needed No Help Needed No Help Needed   Using the telphone No Help Needed No Help Needed No Help Needed No Help Needed No Help Needed   Taking your medications No Help Needed No Help Needed No Help Needed No Help Needed No Help Needed   Preparing meals No Help Needed No Help Needed No Help Needed No Help Needed No Help Needed   Managing money (expenses/bills) No Help Needed No Help Needed No Help Needed No Help Needed No Help Needed   Moderately strenuous housework (laundry) No Help Needed No Help Needed No Help Needed No Help Needed No Help Needed   Shopping for personal items (toiletries/medicines) No Help Needed No Help Needed No Help Needed No Help Needed No Help Needed   Shopping for groceries No Help Needed No Help Needed No Help Needed No Help Needed No Help Needed   Driving No Help Needed No Help Needed No Help Needed No Help Needed No Help Needed   Climbing a flight of stairs No Help Needed No Help Needed No Help Needed No Help Needed No Help Needed   Getting to places beyond walking distances No Help Needed No Help Needed No Help Needed No Help Needed No Help Needed           3/18/2024     7:00 AM   AMB Abuse Screening   Do you ever feel afraid of your partner? N   Are you in a relationship with someone who physically or mentally threatens you? N   Is it safe for you to go home? Y       Advance Care Planning     The patient has appointed the following active healthcare agents:    Primary Decision Maker: Alonso Bhatti - [facility] - [phone]  
visit, subsequent    Recommendations for Preventive Services Due: see orders and patient instructions/AVS.  Recommended screening schedule for the next 5-10 years is provided to the patient in written form: see Patient Instructions/AVS.     Return for Medicare Annual Wellness Visit in 1 year.     Subjective     Patient's complete Health Risk Assessment and screening values have been reviewed and are found in Flowsheets. The following problems were reviewed today and where indicated follow up appointments were made and/or referrals ordered.    No Positive Risk Factors identified today.                                Objective   Vitals:    03/18/24 0744   BP: 130/60   Site: Left Upper Arm   Position: Sitting   Cuff Size: Medium Adult   Pulse: 80   Resp: 17   SpO2: 99%   Weight: 83.9 kg (185 lb)   Height: 1.778 m (5' 10\")      Body mass index is 26.54 kg/m².            Allergies   Allergen Reactions    Nsaids Other (See Comments)     GI bleeding.    Ace Inhibitors Cough    Penicillins Rash     Tolerated ancef on 3/7/22     Prior to Visit Medications    Medication Sig Taking? Authorizing Provider   amLODIPine (NORVASC) 10 MG tablet Take 1 tablet by mouth daily Yes Miguel A Betancourt MD   trospium (SANCTURA) 20 MG tablet Take 1 tablet by mouth 2 times daily Indications: overactive bladder Yes Miguel A Betancourt MD   atorvastatin (LIPITOR) 20 MG tablet Take 1 tablet by mouth daily Yes Miguel A Betancourt MD   tamsulosin (FLOMAX) 0.4 MG capsule Take 1 capsule by mouth daily Indications: urination and prostate Yes Miguel A Betancourt MD   esomeprazole (NEXIUM) 40 MG delayed release capsule Take 1 capsule by mouth every morning (before breakfast) Yes Provider, MD Michelle   aspirin 81 MG chewable tablet Take 1 tablet by mouth daily Yes Automatic Reconciliation, Ar   vitamin D (CHOLECALCIFEROL) 25 MCG (1000 UT) TABS tablet Take 1 tablet by mouth daily Yes Automatic Reconciliation, Ar   fexofenadine (ALLEGRA) 180 MG

## 2024-03-19 LAB
ALBUMIN SERPL-MCNC: 3.9 G/DL (ref 3.5–5)
ALBUMIN/GLOB SERPL: 1.3 (ref 1.1–2.2)
ALP SERPL-CCNC: 55 U/L (ref 45–117)
ALT SERPL-CCNC: 34 U/L (ref 12–78)
ANION GAP SERPL CALC-SCNC: 7 MMOL/L (ref 5–15)
AST SERPL-CCNC: 17 U/L (ref 15–37)
BILIRUB SERPL-MCNC: 2.4 MG/DL (ref 0.2–1)
BUN SERPL-MCNC: 14 MG/DL (ref 6–20)
BUN/CREAT SERPL: 14 (ref 12–20)
CALCIUM SERPL-MCNC: 9.5 MG/DL (ref 8.5–10.1)
CHLORIDE SERPL-SCNC: 108 MMOL/L (ref 97–108)
CHOLEST SERPL-MCNC: 106 MG/DL
CO2 SERPL-SCNC: 29 MMOL/L (ref 21–32)
CREAT SERPL-MCNC: 1.02 MG/DL (ref 0.7–1.3)
GLOBULIN SER CALC-MCNC: 2.9 G/DL (ref 2–4)
GLUCOSE SERPL-MCNC: 94 MG/DL (ref 65–100)
HDLC SERPL-MCNC: 60 MG/DL
HDLC SERPL: 1.8 (ref 0–5)
LDLC SERPL CALC-MCNC: 29.8 MG/DL (ref 0–100)
POTASSIUM SERPL-SCNC: 3.6 MMOL/L (ref 3.5–5.1)
PROT SERPL-MCNC: 6.8 G/DL (ref 6.4–8.2)
PSA SERPL-MCNC: 0.7 NG/ML (ref 0.01–4)
SODIUM SERPL-SCNC: 144 MMOL/L (ref 136–145)
TRIGL SERPL-MCNC: 81 MG/DL
VLDLC SERPL CALC-MCNC: 16.2 MG/DL

## 2024-04-15 DIAGNOSIS — M50.30 DDD (DEGENERATIVE DISC DISEASE), CERVICAL: Primary | ICD-10-CM

## 2024-04-15 RX ORDER — GABAPENTIN 300 MG/1
300 CAPSULE ORAL 2 TIMES DAILY
Qty: 180 CAPSULE | Refills: 1 | Status: SHIPPED | OUTPATIENT
Start: 2024-04-15 | End: 2024-10-12

## 2024-04-15 NOTE — TELEPHONE ENCOUNTER
----- Message from Sascha Bhatti Jr. sent at 4/15/2024 10:22 AM EDT -----  Regarding: Gabapentin Refill  Contact: [phone]  I need a refill on my Gabapentin 300MG Cap

## 2024-06-28 ENCOUNTER — OFFICE VISIT (OUTPATIENT)
Age: 74
End: 2024-06-28

## 2024-06-28 VITALS
WEIGHT: 184.2 LBS | BODY MASS INDEX: 26.37 KG/M2 | OXYGEN SATURATION: 97 % | HEIGHT: 70 IN | TEMPERATURE: 97.3 F | HEART RATE: 65 BPM | RESPIRATION RATE: 18 BRPM | SYSTOLIC BLOOD PRESSURE: 132 MMHG | DIASTOLIC BLOOD PRESSURE: 64 MMHG
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
## 2024-06-28 DIAGNOSIS — R21 RASH AND NONSPECIFIC SKIN ERUPTION: ICD-10-CM

## 2024-06-28 RX ORDER — DOXYCYCLINE HYCLATE 100 MG
100 TABLET ORAL 2 TIMES DAILY
Qty: 14 TABLET | Refills: 0 | Status: SHIPPED | OUTPATIENT
Start: 2024-06-28 | End: 2024-07-05

## 2024-06-28 SDOH — ECONOMIC STABILITY: FOOD INSECURITY: WITHIN THE PAST 12 MONTHS, THE FOOD YOU BOUGHT JUST DIDN'T LAST AND YOU DIDN'T HAVE MONEY TO GET MORE.: NEVER TRUE

## 2024-06-28 SDOH — ECONOMIC STABILITY: INCOME INSECURITY: HOW HARD IS IT FOR YOU TO PAY FOR THE VERY BASICS LIKE FOOD, HOUSING, MEDICAL CARE, AND HEATING?: NOT VERY HARD

## 2024-06-28 SDOH — ECONOMIC STABILITY: FOOD INSECURITY: WITHIN THE PAST 12 MONTHS, YOU WORRIED THAT YOUR FOOD WOULD RUN OUT BEFORE YOU GOT MONEY TO BUY MORE.: NEVER TRUE

## 2024-06-28 ASSESSMENT — PATIENT HEALTH QUESTIONNAIRE - PHQ9
SUM OF ALL RESPONSES TO PHQ QUESTIONS 1-9: 0
1. LITTLE INTEREST OR PLEASURE IN DOING THINGS: NOT AT ALL
SUM OF ALL RESPONSES TO PHQ QUESTIONS 1-9: 0
SUM OF ALL RESPONSES TO PHQ9 QUESTIONS 1 & 2: 0
SUM OF ALL RESPONSES TO PHQ QUESTIONS 1-9: 0
SUM OF ALL RESPONSES TO PHQ QUESTIONS 1-9: 0
2. FEELING DOWN, DEPRESSED OR HOPELESS: NOT AT ALL

## 2024-06-28 NOTE — PROGRESS NOTES
Sascha Bhatti Jr. is a 74 y.o. male  Chief Complaint   Patient presents with    Insect Bite     X 5 days, left arm insect bite.        HPI:  Symptoms include red rash to L arm. Was working in the yard 4d ago and felt a bite. Looked at arm a few minutes later, and saw a red ting which then enlarged overnight to ~2.5cm (quarter sized). Was worried it had a \"bulls eye\" appearance, but then it did not enlarge, redness and swelling improved, and bruising began to consolidate. No fever/chills. No joint pain, no other abn bruises, no cough, dyspnea. No known tick bite. Evaluation to date: none. Treatment to date: rest, monitoring.    Reviewed PMH, PSH, SH, Medications, allergies (see chart).  Current Outpatient Medications   Medication Sig    gabapentin (NEURONTIN) 300 MG capsule Take 1 capsule by mouth 2 times daily for 180 days. Max Daily Amount: 600 mg    amLODIPine (NORVASC) 10 MG tablet Take 1 tablet by mouth daily    trospium (SANCTURA) 20 MG tablet Take 1 tablet by mouth 2 times daily Indications: overactive bladder    atorvastatin (LIPITOR) 20 MG tablet Take 1 tablet by mouth daily    tamsulosin (FLOMAX) 0.4 MG capsule Take 1 capsule by mouth daily Indications: urination and prostate    esomeprazole (NEXIUM) 40 MG delayed release capsule Take 1 capsule by mouth every morning (before breakfast)    albuterol sulfate HFA (PROVENTIL;VENTOLIN;PROAIR) 108 (90 Base) MCG/ACT inhaler Inhale 2 puffs into the lungs every 6 hours as needed    aspirin 81 MG chewable tablet Take 1 tablet by mouth daily    vitamin D (CHOLECALCIFEROL) 25 MCG (1000 UT) TABS tablet Take 1 tablet by mouth daily    fexofenadine (ALLEGRA) 180 MG tablet Take 1 tablet by mouth daily as needed     No current facility-administered medications for this visit.     ROS:   General: No fever, chills, or abnormal weight loss  Respiratory: No cough, dyspnea  CV: No chest pain, palpitations    Objective:  Visit Vitals  /64   Pulse 65   Temp 97.3 °F 
No Help Needed   Walk across the room (includes cane/walker) No Help Needed No Help Needed No Help Needed No Help Needed No Help Needed No Help Needed   Using the telphone No Help Needed No Help Needed No Help Needed No Help Needed No Help Needed No Help Needed   Taking your medications No Help Needed No Help Needed No Help Needed No Help Needed No Help Needed No Help Needed   Preparing meals No Help Needed No Help Needed No Help Needed No Help Needed No Help Needed No Help Needed   Managing money (expenses/bills) No Help Needed No Help Needed No Help Needed No Help Needed No Help Needed No Help Needed   Moderately strenuous housework (laundry) No Help Needed No Help Needed No Help Needed No Help Needed No Help Needed No Help Needed   Shopping for personal items (toiletries/medicines) No Help Needed No Help Needed No Help Needed No Help Needed No Help Needed No Help Needed   Shopping for groceries No Help Needed No Help Needed No Help Needed No Help Needed No Help Needed No Help Needed   Driving No Help Needed No Help Needed No Help Needed No Help Needed No Help Needed No Help Needed   Climbing a flight of stairs No Help Needed No Help Needed No Help Needed No Help Needed No Help Needed No Help Needed   Getting to places beyond walking distances No Help Needed No Help Needed No Help Needed No Help Needed No Help Needed No Help Needed           6/28/2024     3:00 PM   AMB Abuse Screening   Do you ever feel afraid of your partner? N   Are you in a relationship with someone who physically or mentally threatens you? N   Is it safe for you to go home? Y       Advance Care Planning     The patient has appointed the following active healthcare agents:    Primary Decision Maker: Alonso Bhatti - [facility] - [phone]

## 2024-08-01 ENCOUNTER — HOSPITAL ENCOUNTER (OUTPATIENT)
Facility: HOSPITAL | Age: 74
Discharge: HOME | End: 2024-08-01
Payer: MEDICARE

## 2024-08-01 DIAGNOSIS — M51.36 DDD (DEGENERATIVE DISC DISEASE), LUMBAR: ICD-10-CM

## 2024-08-01 PROCEDURE — 72148 MRI LUMBAR SPINE W/O DYE: CPT

## 2024-09-06 ENCOUNTER — ANESTHESIA EVENT (OUTPATIENT)
Facility: HOSPITAL | Age: 74
End: 2024-09-06
Payer: MEDICARE

## 2024-09-09 ENCOUNTER — ANESTHESIA (OUTPATIENT)
Facility: HOSPITAL | Age: 74
End: 2024-09-09
Payer: MEDICARE

## 2024-09-09 ENCOUNTER — HOSPITAL ENCOUNTER (OUTPATIENT)
Facility: HOSPITAL | Age: 74
Setting detail: OUTPATIENT SURGERY
Discharge: HOME | End: 2024-09-09
Attending: ORTHOPAEDIC SURGERY | Admitting: ORTHOPAEDIC SURGERY
Payer: MEDICARE

## 2024-09-09 VITALS
HEART RATE: 55 BPM | OXYGEN SATURATION: 98 % | SYSTOLIC BLOOD PRESSURE: 139 MMHG | TEMPERATURE: 97.9 F | RESPIRATION RATE: 15 BRPM | WEIGHT: 180.6 LBS | BODY MASS INDEX: 25.86 KG/M2 | DIASTOLIC BLOOD PRESSURE: 72 MMHG | HEIGHT: 70 IN

## 2024-09-09 DIAGNOSIS — G89.18 POST-OP PAIN: Primary | ICD-10-CM

## 2024-09-09 PROCEDURE — 6360000002 HC RX W HCPCS: Performed by: ORTHOPAEDIC SURGERY

## 2024-09-09 PROCEDURE — 7100000011 HC PHASE II RECOVERY - ADDTL 15 MIN: Performed by: ORTHOPAEDIC SURGERY

## 2024-09-09 PROCEDURE — 7100000010 HC PHASE II RECOVERY - FIRST 15 MIN: Performed by: ORTHOPAEDIC SURGERY

## 2024-09-09 PROCEDURE — 3700000000 HC ANESTHESIA ATTENDED CARE: Performed by: ORTHOPAEDIC SURGERY

## 2024-09-09 PROCEDURE — 2500000003 HC RX 250 WO HCPCS: Performed by: NURSE ANESTHETIST, CERTIFIED REGISTERED

## 2024-09-09 PROCEDURE — 3600000002 HC SURGERY LEVEL 2 BASE: Performed by: ORTHOPAEDIC SURGERY

## 2024-09-09 PROCEDURE — 2709999900 HC NON-CHARGEABLE SUPPLY: Performed by: ORTHOPAEDIC SURGERY

## 2024-09-09 PROCEDURE — 6360000002 HC RX W HCPCS: Performed by: NURSE ANESTHETIST, CERTIFIED REGISTERED

## 2024-09-09 PROCEDURE — 7100000000 HC PACU RECOVERY - FIRST 15 MIN: Performed by: ORTHOPAEDIC SURGERY

## 2024-09-09 PROCEDURE — 2580000003 HC RX 258: Performed by: ORTHOPAEDIC SURGERY

## 2024-09-09 PROCEDURE — 3700000001 HC ADD 15 MINUTES (ANESTHESIA): Performed by: ORTHOPAEDIC SURGERY

## 2024-09-09 PROCEDURE — 2580000003 HC RX 258: Performed by: ANESTHESIOLOGY

## 2024-09-09 PROCEDURE — 2500000003 HC RX 250 WO HCPCS: Performed by: ORTHOPAEDIC SURGERY

## 2024-09-09 PROCEDURE — 3600000012 HC SURGERY LEVEL 2 ADDTL 15MIN: Performed by: ORTHOPAEDIC SURGERY

## 2024-09-09 RX ORDER — SODIUM CHLORIDE 0.9 % (FLUSH) 0.9 %
5-40 SYRINGE (ML) INJECTION PRN
Status: DISCONTINUED | OUTPATIENT
Start: 2024-09-09 | End: 2024-09-09 | Stop reason: HOSPADM

## 2024-09-09 RX ORDER — TRAMADOL HYDROCHLORIDE 50 MG/1
50 TABLET ORAL EVERY 6 HOURS PRN
Qty: 20 TABLET | Refills: 0 | Status: SHIPPED | OUTPATIENT
Start: 2024-09-09 | End: 2024-09-14

## 2024-09-09 RX ORDER — SODIUM CHLORIDE 9 MG/ML
INJECTION, SOLUTION INTRAVENOUS PRN
Status: DISCONTINUED | OUTPATIENT
Start: 2024-09-09 | End: 2024-09-09 | Stop reason: HOSPADM

## 2024-09-09 RX ORDER — ONDANSETRON 2 MG/ML
INJECTION INTRAMUSCULAR; INTRAVENOUS PRN
Status: DISCONTINUED | OUTPATIENT
Start: 2024-09-09 | End: 2024-09-09 | Stop reason: SDUPTHER

## 2024-09-09 RX ORDER — OXYCODONE HYDROCHLORIDE 5 MG/1
5 TABLET ORAL PRN
Status: DISCONTINUED | OUTPATIENT
Start: 2024-09-09 | End: 2024-09-09 | Stop reason: HOSPADM

## 2024-09-09 RX ORDER — SODIUM CHLORIDE 0.9 % (FLUSH) 0.9 %
5-40 SYRINGE (ML) INJECTION EVERY 12 HOURS SCHEDULED
Status: DISCONTINUED | OUTPATIENT
Start: 2024-09-09 | End: 2024-09-09 | Stop reason: HOSPADM

## 2024-09-09 RX ORDER — CEFAZOLIN SODIUM 1 G/3ML
INJECTION, POWDER, FOR SOLUTION INTRAMUSCULAR; INTRAVENOUS
Status: DISCONTINUED
Start: 2024-09-09 | End: 2024-09-09 | Stop reason: HOSPADM

## 2024-09-09 RX ORDER — FENTANYL CITRATE 50 UG/ML
25 INJECTION, SOLUTION INTRAMUSCULAR; INTRAVENOUS EVERY 5 MIN PRN
Status: DISCONTINUED | OUTPATIENT
Start: 2024-09-09 | End: 2024-09-09 | Stop reason: HOSPADM

## 2024-09-09 RX ORDER — ONDANSETRON 2 MG/ML
4 INJECTION INTRAMUSCULAR; INTRAVENOUS
Status: DISCONTINUED | OUTPATIENT
Start: 2024-09-09 | End: 2024-09-09 | Stop reason: HOSPADM

## 2024-09-09 RX ORDER — DROPERIDOL 2.5 MG/ML
0.62 INJECTION, SOLUTION INTRAMUSCULAR; INTRAVENOUS
Status: DISCONTINUED | OUTPATIENT
Start: 2024-09-09 | End: 2024-09-09 | Stop reason: HOSPADM

## 2024-09-09 RX ORDER — LIDOCAINE HYDROCHLORIDE 10 MG/ML
INJECTION, SOLUTION INFILTRATION; PERINEURAL PRN
Status: DISCONTINUED | OUTPATIENT
Start: 2024-09-09 | End: 2024-09-09 | Stop reason: ALTCHOICE

## 2024-09-09 RX ORDER — MEPERIDINE HYDROCHLORIDE 25 MG/ML
12.5 INJECTION INTRAMUSCULAR; INTRAVENOUS; SUBCUTANEOUS EVERY 5 MIN PRN
Status: DISCONTINUED | OUTPATIENT
Start: 2024-09-09 | End: 2024-09-09 | Stop reason: HOSPADM

## 2024-09-09 RX ORDER — ACETAMINOPHEN 500 MG
1000 TABLET ORAL
Status: DISCONTINUED | OUTPATIENT
Start: 2024-09-09 | End: 2024-09-09 | Stop reason: HOSPADM

## 2024-09-09 RX ORDER — OXYCODONE HYDROCHLORIDE 5 MG/1
10 TABLET ORAL PRN
Status: DISCONTINUED | OUTPATIENT
Start: 2024-09-09 | End: 2024-09-09 | Stop reason: HOSPADM

## 2024-09-09 RX ORDER — NALOXONE HYDROCHLORIDE 0.4 MG/ML
INJECTION, SOLUTION INTRAMUSCULAR; INTRAVENOUS; SUBCUTANEOUS PRN
Status: DISCONTINUED | OUTPATIENT
Start: 2024-09-09 | End: 2024-09-09 | Stop reason: HOSPADM

## 2024-09-09 RX ORDER — WATER 10 ML/10ML
INJECTION INTRAMUSCULAR; INTRAVENOUS; SUBCUTANEOUS
Status: DISCONTINUED
Start: 2024-09-09 | End: 2024-09-09 | Stop reason: HOSPADM

## 2024-09-09 RX ORDER — LIDOCAINE HYDROCHLORIDE 10 MG/ML
1 INJECTION, SOLUTION EPIDURAL; INFILTRATION; INTRACAUDAL; PERINEURAL
Status: DISCONTINUED | OUTPATIENT
Start: 2024-09-09 | End: 2024-09-09 | Stop reason: HOSPADM

## 2024-09-09 RX ORDER — LIDOCAINE HYDROCHLORIDE 20 MG/ML
INJECTION, SOLUTION EPIDURAL; INFILTRATION; INTRACAUDAL; PERINEURAL PRN
Status: DISCONTINUED | OUTPATIENT
Start: 2024-09-09 | End: 2024-09-09 | Stop reason: SDUPTHER

## 2024-09-09 RX ORDER — BUPIVACAINE HYDROCHLORIDE 2.5 MG/ML
INJECTION, SOLUTION EPIDURAL; INFILTRATION; INTRACAUDAL PRN
Status: DISCONTINUED | OUTPATIENT
Start: 2024-09-09 | End: 2024-09-09 | Stop reason: ALTCHOICE

## 2024-09-09 RX ORDER — PROPOFOL 10 MG/ML
INJECTION, EMULSION INTRAVENOUS PRN
Status: DISCONTINUED | OUTPATIENT
Start: 2024-09-09 | End: 2024-09-09 | Stop reason: SDUPTHER

## 2024-09-09 RX ORDER — SODIUM CHLORIDE, SODIUM LACTATE, POTASSIUM CHLORIDE, CALCIUM CHLORIDE 600; 310; 30; 20 MG/100ML; MG/100ML; MG/100ML; MG/100ML
INJECTION, SOLUTION INTRAVENOUS CONTINUOUS
Status: DISCONTINUED | OUTPATIENT
Start: 2024-09-09 | End: 2024-09-09 | Stop reason: HOSPADM

## 2024-09-09 RX ADMIN — SODIUM CHLORIDE, POTASSIUM CHLORIDE, SODIUM LACTATE AND CALCIUM CHLORIDE: 600; 310; 30; 20 INJECTION, SOLUTION INTRAVENOUS at 06:51

## 2024-09-09 RX ADMIN — PROPOFOL 40 MG: 10 INJECTION, EMULSION INTRAVENOUS at 07:31

## 2024-09-09 RX ADMIN — PROPOFOL 40 MG: 10 INJECTION, EMULSION INTRAVENOUS at 07:40

## 2024-09-09 RX ADMIN — LIDOCAINE HYDROCHLORIDE 40 MG: 20 INJECTION, SOLUTION EPIDURAL; INFILTRATION; INTRACAUDAL; PERINEURAL at 07:31

## 2024-09-09 RX ADMIN — WATER 2000 MG: 1 INJECTION INTRAMUSCULAR; INTRAVENOUS; SUBCUTANEOUS at 07:34

## 2024-09-09 RX ADMIN — PROPOFOL 40 MG: 10 INJECTION, EMULSION INTRAVENOUS at 07:34

## 2024-09-09 RX ADMIN — ONDANSETRON HYDROCHLORIDE 4 MG: 2 INJECTION, SOLUTION INTRAMUSCULAR; INTRAVENOUS at 07:34

## 2024-09-09 RX ADMIN — PROPOFOL 40 MG: 10 INJECTION, EMULSION INTRAVENOUS at 07:44

## 2024-09-09 RX ADMIN — PROPOFOL 40 MG: 10 INJECTION, EMULSION INTRAVENOUS at 07:37

## 2024-09-09 RX ADMIN — PROPOFOL 40 MG: 10 INJECTION, EMULSION INTRAVENOUS at 07:48

## 2024-09-09 ASSESSMENT — PAIN - FUNCTIONAL ASSESSMENT: PAIN_FUNCTIONAL_ASSESSMENT: 0-10

## 2024-09-16 ENCOUNTER — OFFICE VISIT (OUTPATIENT)
Age: 74
End: 2024-09-16
Payer: MEDICARE

## 2024-09-16 VITALS
BODY MASS INDEX: 26.43 KG/M2 | HEART RATE: 65 BPM | DIASTOLIC BLOOD PRESSURE: 78 MMHG | SYSTOLIC BLOOD PRESSURE: 148 MMHG | OXYGEN SATURATION: 98 % | WEIGHT: 184.6 LBS | TEMPERATURE: 97.4 F | RESPIRATION RATE: 18 BRPM | HEIGHT: 70 IN

## 2024-09-16 DIAGNOSIS — N40.0 BENIGN PROSTATIC HYPERPLASIA WITHOUT LOWER URINARY TRACT SYMPTOMS: ICD-10-CM

## 2024-09-16 DIAGNOSIS — E78.2 MIXED HYPERLIPIDEMIA: ICD-10-CM

## 2024-09-16 DIAGNOSIS — I10 ESSENTIAL HYPERTENSION: ICD-10-CM

## 2024-09-16 DIAGNOSIS — Z86.73 HISTORY OF STROKE: ICD-10-CM

## 2024-09-16 DIAGNOSIS — R35.1 NOCTURIA: ICD-10-CM

## 2024-09-16 DIAGNOSIS — Z23 NEEDS FLU SHOT: Primary | ICD-10-CM

## 2024-09-16 PROCEDURE — 99214 OFFICE O/P EST MOD 30 MIN: CPT | Performed by: FAMILY MEDICINE

## 2024-09-16 PROCEDURE — 3077F SYST BP >= 140 MM HG: CPT | Performed by: FAMILY MEDICINE

## 2024-09-16 PROCEDURE — 1123F ACP DISCUSS/DSCN MKR DOCD: CPT | Performed by: FAMILY MEDICINE

## 2024-09-16 PROCEDURE — 90653 IIV ADJUVANT VACCINE IM: CPT | Performed by: FAMILY MEDICINE

## 2024-09-16 PROCEDURE — 3078F DIAST BP <80 MM HG: CPT | Performed by: FAMILY MEDICINE

## 2024-09-16 PROCEDURE — G0008 ADMIN INFLUENZA VIRUS VAC: HCPCS | Performed by: FAMILY MEDICINE

## 2024-09-16 PROCEDURE — 1036F TOBACCO NON-USER: CPT | Performed by: FAMILY MEDICINE

## 2024-09-16 PROCEDURE — G8419 CALC BMI OUT NRM PARAM NOF/U: HCPCS | Performed by: FAMILY MEDICINE

## 2024-09-16 PROCEDURE — G8427 DOCREV CUR MEDS BY ELIG CLIN: HCPCS | Performed by: FAMILY MEDICINE

## 2024-09-16 RX ORDER — TAMSULOSIN HYDROCHLORIDE 0.4 MG/1
0.4 CAPSULE ORAL DAILY
Qty: 90 CAPSULE | Refills: 3 | Status: SHIPPED | OUTPATIENT
Start: 2024-10-01

## 2024-09-16 RX ORDER — VIBEGRON 75 MG/1
75 TABLET, FILM COATED ORAL DAILY
COMMUNITY

## 2024-09-16 SDOH — ECONOMIC STABILITY: FOOD INSECURITY: WITHIN THE PAST 12 MONTHS, YOU WORRIED THAT YOUR FOOD WOULD RUN OUT BEFORE YOU GOT MONEY TO BUY MORE.: NEVER TRUE

## 2024-09-16 SDOH — ECONOMIC STABILITY: INCOME INSECURITY: HOW HARD IS IT FOR YOU TO PAY FOR THE VERY BASICS LIKE FOOD, HOUSING, MEDICAL CARE, AND HEATING?: NOT VERY HARD

## 2024-09-16 SDOH — ECONOMIC STABILITY: FOOD INSECURITY: WITHIN THE PAST 12 MONTHS, THE FOOD YOU BOUGHT JUST DIDN'T LAST AND YOU DIDN'T HAVE MONEY TO GET MORE.: NEVER TRUE

## 2024-09-16 ASSESSMENT — PATIENT HEALTH QUESTIONNAIRE - PHQ9
2. FEELING DOWN, DEPRESSED OR HOPELESS: NOT AT ALL
SUM OF ALL RESPONSES TO PHQ QUESTIONS 1-9: 0
SUM OF ALL RESPONSES TO PHQ QUESTIONS 1-9: 0
1. LITTLE INTEREST OR PLEASURE IN DOING THINGS: NOT AT ALL
SUM OF ALL RESPONSES TO PHQ9 QUESTIONS 1 & 2: 0
SUM OF ALL RESPONSES TO PHQ QUESTIONS 1-9: 0
SUM OF ALL RESPONSES TO PHQ QUESTIONS 1-9: 0

## 2024-10-20 DIAGNOSIS — M50.30 DDD (DEGENERATIVE DISC DISEASE), CERVICAL: ICD-10-CM

## 2024-10-21 RX ORDER — GABAPENTIN 300 MG/1
300 CAPSULE ORAL 2 TIMES DAILY
Qty: 180 CAPSULE | Refills: 1 | Status: SHIPPED | OUTPATIENT
Start: 2024-10-21 | End: 2025-04-19

## 2024-12-16 ENCOUNTER — PATIENT MESSAGE (OUTPATIENT)
Age: 74
End: 2024-12-16

## 2024-12-16 DIAGNOSIS — I10 PRIMARY HYPERTENSION: Primary | ICD-10-CM

## 2024-12-17 RX ORDER — LOSARTAN POTASSIUM 25 MG/1
25 TABLET ORAL DAILY
Qty: 30 TABLET | Refills: 5 | Status: SHIPPED | OUTPATIENT
Start: 2024-12-17

## 2025-01-03 ENCOUNTER — PATIENT MESSAGE (OUTPATIENT)
Age: 75
End: 2025-01-03

## 2025-01-03 DIAGNOSIS — G47.9 SLEEP DISORDER: Primary | ICD-10-CM

## 2025-02-12 RX ORDER — ATORVASTATIN CALCIUM 20 MG/1
20 TABLET, FILM COATED ORAL DAILY
Qty: 90 TABLET | Refills: 3 | Status: SHIPPED | OUTPATIENT
Start: 2025-02-12

## 2025-02-24 DIAGNOSIS — I63.30 CEREBROVASCULAR ACCIDENT (CVA) DUE TO THROMBOSIS OF CEREBRAL ARTERY (HCC): ICD-10-CM

## 2025-02-24 DIAGNOSIS — I10 ESSENTIAL HYPERTENSION: ICD-10-CM

## 2025-02-24 RX ORDER — AMLODIPINE BESYLATE 10 MG/1
10 TABLET ORAL DAILY
Qty: 90 TABLET | Refills: 3 | Status: SHIPPED | OUTPATIENT
Start: 2025-02-24

## 2025-02-25 ENCOUNTER — HOSPITAL ENCOUNTER (OUTPATIENT)
Facility: HOSPITAL | Age: 75
Discharge: HOME | End: 2025-02-28
Payer: MEDICARE

## 2025-02-25 DIAGNOSIS — M25.561 RIGHT KNEE PAIN, UNSPECIFIED CHRONICITY: ICD-10-CM

## 2025-02-25 PROCEDURE — 73564 X-RAY EXAM KNEE 4 OR MORE: CPT

## 2025-02-26 DIAGNOSIS — I10 PRIMARY HYPERTENSION: ICD-10-CM

## 2025-02-26 RX ORDER — LOSARTAN POTASSIUM 25 MG/1
12.5 TABLET ORAL DAILY
Qty: 30 TABLET | Refills: 5
Start: 2025-02-26

## 2025-03-10 SDOH — HEALTH STABILITY: PHYSICAL HEALTH: ON AVERAGE, HOW MANY DAYS PER WEEK DO YOU ENGAGE IN MODERATE TO STRENUOUS EXERCISE (LIKE A BRISK WALK)?: 4 DAYS

## 2025-03-10 SDOH — HEALTH STABILITY: PHYSICAL HEALTH: ON AVERAGE, HOW MANY MINUTES DO YOU ENGAGE IN EXERCISE AT THIS LEVEL?: 60 MIN

## 2025-03-10 ASSESSMENT — LIFESTYLE VARIABLES
HOW MANY STANDARD DRINKS CONTAINING ALCOHOL DO YOU HAVE ON A TYPICAL DAY: 1 OR 2
HOW OFTEN DO YOU HAVE SIX OR MORE DRINKS ON ONE OCCASION: 1
HOW OFTEN DO YOU HAVE A DRINK CONTAINING ALCOHOL: 2-4 TIMES A MONTH
HOW MANY STANDARD DRINKS CONTAINING ALCOHOL DO YOU HAVE ON A TYPICAL DAY: 1
HOW OFTEN DO YOU HAVE A DRINK CONTAINING ALCOHOL: 3

## 2025-03-10 ASSESSMENT — PATIENT HEALTH QUESTIONNAIRE - PHQ9
1. LITTLE INTEREST OR PLEASURE IN DOING THINGS: NOT AT ALL
2. FEELING DOWN, DEPRESSED OR HOPELESS: NOT AT ALL
SUM OF ALL RESPONSES TO PHQ QUESTIONS 1-9: 0

## 2025-03-14 SDOH — ECONOMIC STABILITY: INCOME INSECURITY: IN THE LAST 12 MONTHS, WAS THERE A TIME WHEN YOU WERE NOT ABLE TO PAY THE MORTGAGE OR RENT ON TIME?: NO

## 2025-03-14 SDOH — ECONOMIC STABILITY: FOOD INSECURITY: WITHIN THE PAST 12 MONTHS, THE FOOD YOU BOUGHT JUST DIDN'T LAST AND YOU DIDN'T HAVE MONEY TO GET MORE.: NEVER TRUE

## 2025-03-14 SDOH — ECONOMIC STABILITY: FOOD INSECURITY: WITHIN THE PAST 12 MONTHS, YOU WORRIED THAT YOUR FOOD WOULD RUN OUT BEFORE YOU GOT MONEY TO BUY MORE.: NEVER TRUE

## 2025-03-14 SDOH — ECONOMIC STABILITY: TRANSPORTATION INSECURITY
IN THE PAST 12 MONTHS, HAS THE LACK OF TRANSPORTATION KEPT YOU FROM MEDICAL APPOINTMENTS OR FROM GETTING MEDICATIONS?: NO

## 2025-03-17 ENCOUNTER — OFFICE VISIT (OUTPATIENT)
Age: 75
End: 2025-03-17
Payer: MEDICARE

## 2025-03-17 VITALS
HEIGHT: 70 IN | BODY MASS INDEX: 26.28 KG/M2 | HEART RATE: 61 BPM | RESPIRATION RATE: 18 BRPM | TEMPERATURE: 97.1 F | WEIGHT: 183.6 LBS | OXYGEN SATURATION: 98 % | DIASTOLIC BLOOD PRESSURE: 70 MMHG | SYSTOLIC BLOOD PRESSURE: 136 MMHG

## 2025-03-17 DIAGNOSIS — Z87.39 HISTORY OF POLYMYALGIA RHEUMATICA: ICD-10-CM

## 2025-03-17 DIAGNOSIS — E78.2 MIXED HYPERLIPIDEMIA: ICD-10-CM

## 2025-03-17 DIAGNOSIS — R53.83 OTHER FATIGUE: ICD-10-CM

## 2025-03-17 DIAGNOSIS — M47.26 OSTEOARTHRITIS OF SPINE WITH RADICULOPATHY, LUMBAR REGION: ICD-10-CM

## 2025-03-17 DIAGNOSIS — M54.42 CHRONIC BILATERAL LOW BACK PAIN WITH BILATERAL SCIATICA: ICD-10-CM

## 2025-03-17 DIAGNOSIS — G89.29 CHRONIC BILATERAL LOW BACK PAIN WITH BILATERAL SCIATICA: ICD-10-CM

## 2025-03-17 DIAGNOSIS — I10 ESSENTIAL HYPERTENSION: ICD-10-CM

## 2025-03-17 DIAGNOSIS — G47.10 HYPERSOMNIA, UNSPECIFIED: ICD-10-CM

## 2025-03-17 DIAGNOSIS — N40.0 BENIGN PROSTATIC HYPERPLASIA WITHOUT LOWER URINARY TRACT SYMPTOMS: ICD-10-CM

## 2025-03-17 DIAGNOSIS — M54.41 CHRONIC BILATERAL LOW BACK PAIN WITH BILATERAL SCIATICA: ICD-10-CM

## 2025-03-17 DIAGNOSIS — Z00.00 MEDICARE ANNUAL WELLNESS VISIT, SUBSEQUENT: Primary | ICD-10-CM

## 2025-03-17 PROCEDURE — G8427 DOCREV CUR MEDS BY ELIG CLIN: HCPCS | Performed by: FAMILY MEDICINE

## 2025-03-17 PROCEDURE — 1125F AMNT PAIN NOTED PAIN PRSNT: CPT | Performed by: FAMILY MEDICINE

## 2025-03-17 PROCEDURE — 1159F MED LIST DOCD IN RCRD: CPT | Performed by: FAMILY MEDICINE

## 2025-03-17 PROCEDURE — G8419 CALC BMI OUT NRM PARAM NOF/U: HCPCS | Performed by: FAMILY MEDICINE

## 2025-03-17 PROCEDURE — 1036F TOBACCO NON-USER: CPT | Performed by: FAMILY MEDICINE

## 2025-03-17 PROCEDURE — 99214 OFFICE O/P EST MOD 30 MIN: CPT | Performed by: FAMILY MEDICINE

## 2025-03-17 PROCEDURE — 3078F DIAST BP <80 MM HG: CPT | Performed by: FAMILY MEDICINE

## 2025-03-17 PROCEDURE — G0439 PPPS, SUBSEQ VISIT: HCPCS | Performed by: FAMILY MEDICINE

## 2025-03-17 PROCEDURE — 3075F SYST BP GE 130 - 139MM HG: CPT | Performed by: FAMILY MEDICINE

## 2025-03-17 PROCEDURE — 1123F ACP DISCUSS/DSCN MKR DOCD: CPT | Performed by: FAMILY MEDICINE

## 2025-03-17 PROCEDURE — 1160F RVW MEDS BY RX/DR IN RCRD: CPT | Performed by: FAMILY MEDICINE

## 2025-03-17 NOTE — PROGRESS NOTES
Sascha Bhatti Jr. is a 74 y.o. male  Chief Complaint   Patient presents with    Medicare AWV     HPI:  BPH  Symptoms include nocturia. W/C now on current regimen. Hx low volume/  LUTS. Prev seen by NICO Juárez MD for urolift and BPH. Difficulty getting to sleep after.Treatment to date: flomax 0.4mg BID, as well as trospium and Gemtesa. Still having some disordered sleep though. Has appt with Matthew in May.  Lab Results   Component Value Date    PSA 0.7 03/18/2024     Hyperlipidemia and hx CVA.  On lipitor 20mg/d, ASA 81mg/d, and BP regimen. Stephen well. No myalgias, arthralgias, unusual weakness.  Lab Results   Component Value Date    CHOL 106 03/18/2024    HDL 60 03/18/2024    TRIG 81 03/18/2024    AST 17 03/18/2024    ALT 34 03/18/2024    ALKPHOS 55 03/18/2024    BILITOT 2.4 (H) 03/18/2024       Hypertension  Blood pressures a little better today. Current regimen: calcium channel blocker. Symptoms include no sx. Patient denies chest pain, palpitations, peripheral edema. Has hx ACEI cough in past.  Lab review:   Lab Results   Component Value Date     03/18/2024    K 3.6 03/18/2024     03/18/2024    CO2 29 03/18/2024    GLUCOSE 94 03/18/2024    BUN 14 03/18/2024    CREATININE 1.02 03/18/2024     DJD/  Cervical and lumbar degenerative disc disease  Saw Dr. Burns and Dr. Flores.  He is taking gabapentin 300mg BID, gets from them. Taking regularly, stephen well, no sedation. Did ok with prior PT.  Had neck injection in April 2023, did well with that, and now has had generally successful nerve root ablation in fall 2024. Will see again in summer 2025 for recheck, injection as indicated. Did have hx PMR in past, wondering if flare of LBP is related to a PMR flareup.    Reviewed PMH, PSH, SH, Medications, allergies (see chart).  Current Outpatient Medications   Medication Sig    losartan (COZAAR) 25 MG tablet Take 0.5 tablets by mouth daily    amLODIPine (NORVASC) 10 MG tablet Take 1 tablet by mouth once daily

## 2025-03-17 NOTE — PROGRESS NOTES
Sascha Silvaashley Garay is a 74 y.o. male presenting for/with:    Chief Complaint   Patient presents with    Medicare AWV       Vitals:    03/17/25 0800   BP: 136/70   Pulse: 61   Resp: 18   Temp: 97.1 °F (36.2 °C)   TempSrc: Temporal   SpO2: 98%   Weight: 83.3 kg (183 lb 9.6 oz)   Height: 1.778 m (5' 10\")       Pain Scale: 4/10  Pain Location: Hip (and legs)    \"Have you been to the ER, urgent care clinic since your last visit?  Hospitalized since your last visit?\"    NO    “Have you seen or consulted any other health care providers outside of [facility] since your last visit?”    NO               3/10/2025     3:58 PM   PHQ-9    Little interest or pleasure in doing things 0   Feeling down, depressed, or hopeless 0   PHQ-2 Score 0    PHQ-9 Total Score 0        Patient-reported           3/18/2024     7:50 AM 2/28/2024     4:30 PM 3/11/2023    12:00 AM   [facility] AMB LEARNING ASSESSMENT   Primary Learner Patient Patient Patient   Primary Language ENGLISH ENGLISH ENGLISH   Learning Preference DEMONSTRATION DEMONSTRATION READING    DEMONSTRATION   Answered By self patient patient   Relationship to Learner SELF SELF SELF            3/10/2025     3:58 PM   Amb Fall Risk Assessment and TUG Test   Do you feel unsteady or are you worried about falling?  no   2 or more falls in past year? no   Fall with injury in past year? yes           3/17/2025     8:00 AM 9/16/2024     8:00 AM 6/28/2024     3:00 PM 3/18/2024     7:00 AM 2/28/2024    10:00 AM 12/14/2023     1:00 PM 9/20/2023     8:00 AM   ADL ASSESSMENT   Feeding yourself No Help Needed No Help Needed No Help Needed No Help Needed No Help Needed No Help Needed No Help Needed   Getting from bed to chair No Help Needed No Help Needed No Help Needed No Help Needed No Help Needed No Help Needed No Help Needed   Getting dressed No Help Needed No Help Needed No Help Needed No Help Needed No Help Needed No Help Needed No Help Needed   Bathing or showering No Help Needed

## 2025-03-17 NOTE — PATIENT INSTRUCTIONS
device in the bathroom with you.   Where can you learn more?  Go to https://www.Nitch.net/patientEd and enter G117 to learn more about \"Preventing Falls: Care Instructions.\"  Current as of: July 31, 2024  Content Version: 14.4  © 6578-3328 [employer].   Care instructions adapted under license by [employer]. If you have questions about a medical condition or this instruction, always ask your healthcare professional. [employer], disclaims any warranty or liability for your use of this information.         A Healthy Heart: Care Instructions  Overview     Coronary artery disease, also called heart disease, occurs when a substance called plaque builds up in the vessels that supply oxygen-rich blood to your heart muscle. This can narrow the blood vessels and reduce blood flow. A heart attack happens when blood flow is completely blocked. A high-fat diet, smoking, and other factors increase the risk of heart disease.  Your doctor has found that you have a chance of having heart disease. A heart-healthy lifestyle can help keep your heart healthy and prevent heart disease. This lifestyle includes eating healthy, being active, staying at a weight that's healthy for you, and not smoking or using tobacco. It also includes taking medicines as directed, managing other health conditions, and trying to get a healthy amount of sleep.  Follow-up care is a key part of your treatment and safety. Be sure to make and go to all appointments, and call your doctor if you are having problems. It's also a good idea to know your test results and keep a list of the medicines you take.  How can you care for yourself at home?  Diet    Use less salt when you cook and eat. This helps lower your blood pressure. Taste food before salting. Add only a little salt when you think you need it. With time, your taste buds will adjust to less salt.     Eat fewer snack items, fast foods, canned soups, and other high-salt,

## 2025-03-18 ENCOUNTER — RESULTS FOLLOW-UP (OUTPATIENT)
Age: 75
End: 2025-03-18

## 2025-03-18 LAB
ALBUMIN SERPL-MCNC: 3.8 G/DL (ref 3.5–5)
ALBUMIN/GLOB SERPL: 1.3 (ref 1.1–2.2)
ALP SERPL-CCNC: 51 U/L (ref 45–117)
ALT SERPL-CCNC: 32 U/L (ref 12–78)
ANION GAP SERPL CALC-SCNC: 8 MMOL/L (ref 2–12)
AST SERPL-CCNC: 24 U/L (ref 15–37)
BILIRUB SERPL-MCNC: 2.3 MG/DL (ref 0.2–1)
BUN SERPL-MCNC: 12 MG/DL (ref 6–20)
BUN/CREAT SERPL: 14 (ref 12–20)
CALCIUM SERPL-MCNC: 9.4 MG/DL (ref 8.5–10.1)
CHLORIDE SERPL-SCNC: 105 MMOL/L (ref 97–108)
CHOLEST SERPL-MCNC: 109 MG/DL
CO2 SERPL-SCNC: 27 MMOL/L (ref 21–32)
CREAT SERPL-MCNC: 0.83 MG/DL (ref 0.7–1.3)
CRP SERPL-MCNC: <0.29 MG/DL (ref 0–0.3)
ERYTHROCYTE [SEDIMENTATION RATE] IN BLOOD: 2 MM/HR (ref 0–20)
GLOBULIN SER CALC-MCNC: 2.9 G/DL (ref 2–4)
GLUCOSE SERPL-MCNC: 94 MG/DL (ref 65–100)
HDLC SERPL-MCNC: 62 MG/DL
HDLC SERPL: 1.8 (ref 0–5)
LDLC SERPL CALC-MCNC: 29.2 MG/DL (ref 0–100)
POTASSIUM SERPL-SCNC: 3.4 MMOL/L (ref 3.5–5.1)
PROT SERPL-MCNC: 6.7 G/DL (ref 6.4–8.2)
PSA SERPL-MCNC: 0.7 NG/ML (ref 0.01–4)
SODIUM SERPL-SCNC: 140 MMOL/L (ref 136–145)
TRIGL SERPL-MCNC: 89 MG/DL
VLDLC SERPL CALC-MCNC: 17.8 MG/DL

## 2025-04-10 ENCOUNTER — TELEPHONE (OUTPATIENT)
Age: 75
End: 2025-04-10

## 2025-04-10 NOTE — TELEPHONE ENCOUNTER
Called patient to get schedule for a consult. Patient said he does not need to come for a consult.

## 2025-04-13 DIAGNOSIS — M50.30 DDD (DEGENERATIVE DISC DISEASE), CERVICAL: ICD-10-CM

## 2025-04-14 RX ORDER — GABAPENTIN 300 MG/1
300 CAPSULE ORAL 2 TIMES DAILY
Qty: 180 CAPSULE | Refills: 1 | Status: SHIPPED | OUTPATIENT
Start: 2025-04-14 | End: 2025-10-11

## 2025-05-19 DIAGNOSIS — I10 PRIMARY HYPERTENSION: ICD-10-CM

## 2025-05-19 RX ORDER — LOSARTAN POTASSIUM 25 MG/1
25 TABLET ORAL DAILY
Qty: 30 TABLET | Refills: 0 | Status: SHIPPED | OUTPATIENT
Start: 2025-05-19

## 2025-06-17 ENCOUNTER — OFFICE VISIT (OUTPATIENT)
Age: 75
End: 2025-06-17
Payer: MEDICARE

## 2025-06-17 VITALS
HEIGHT: 70 IN | OXYGEN SATURATION: 100 % | SYSTOLIC BLOOD PRESSURE: 125 MMHG | TEMPERATURE: 98.1 F | WEIGHT: 182 LBS | DIASTOLIC BLOOD PRESSURE: 63 MMHG | BODY MASS INDEX: 26.05 KG/M2 | HEART RATE: 60 BPM | RESPIRATION RATE: 18 BRPM

## 2025-06-17 DIAGNOSIS — I10 PRIMARY HYPERTENSION: ICD-10-CM

## 2025-06-17 DIAGNOSIS — R55 NEAR SYNCOPE: ICD-10-CM

## 2025-06-17 DIAGNOSIS — I10 ESSENTIAL HYPERTENSION: Primary | ICD-10-CM

## 2025-06-17 DIAGNOSIS — Z86.79 HISTORY OF HYPOTENSION: ICD-10-CM

## 2025-06-17 PROCEDURE — 1160F RVW MEDS BY RX/DR IN RCRD: CPT | Performed by: FAMILY MEDICINE

## 2025-06-17 PROCEDURE — 1036F TOBACCO NON-USER: CPT | Performed by: FAMILY MEDICINE

## 2025-06-17 PROCEDURE — 3074F SYST BP LT 130 MM HG: CPT | Performed by: FAMILY MEDICINE

## 2025-06-17 PROCEDURE — G8419 CALC BMI OUT NRM PARAM NOF/U: HCPCS | Performed by: FAMILY MEDICINE

## 2025-06-17 PROCEDURE — 1123F ACP DISCUSS/DSCN MKR DOCD: CPT | Performed by: FAMILY MEDICINE

## 2025-06-17 PROCEDURE — G8427 DOCREV CUR MEDS BY ELIG CLIN: HCPCS | Performed by: FAMILY MEDICINE

## 2025-06-17 PROCEDURE — 99213 OFFICE O/P EST LOW 20 MIN: CPT | Performed by: FAMILY MEDICINE

## 2025-06-17 PROCEDURE — 3078F DIAST BP <80 MM HG: CPT | Performed by: FAMILY MEDICINE

## 2025-06-17 PROCEDURE — 1159F MED LIST DOCD IN RCRD: CPT | Performed by: FAMILY MEDICINE

## 2025-06-17 RX ORDER — LOSARTAN POTASSIUM 25 MG/1
12.5 TABLET ORAL DAILY
Qty: 45 TABLET | Refills: 3
Start: 2025-06-17

## 2025-06-17 SDOH — ECONOMIC STABILITY: FOOD INSECURITY: WITHIN THE PAST 12 MONTHS, YOU WORRIED THAT YOUR FOOD WOULD RUN OUT BEFORE YOU GOT MONEY TO BUY MORE.: NEVER TRUE

## 2025-06-17 SDOH — ECONOMIC STABILITY: FOOD INSECURITY: WITHIN THE PAST 12 MONTHS, THE FOOD YOU BOUGHT JUST DIDN'T LAST AND YOU DIDN'T HAVE MONEY TO GET MORE.: NEVER TRUE

## 2025-06-17 NOTE — PROGRESS NOTES
Sascha Bhatti Jr. is a 75 y.o. male  Chief Complaint   Patient presents with    Blood Pressure Check     Patient stated that he experienced low blood pressure on 06/16/2025, 88/44. He experienced lightheadedness and blurry vision.      HPI:  Hypertension and weakness  Blood pressures a low lower in past couple days, with increased dizziness over past 2 days. Had been doing some katharina work, started feeling dizzy (without palpitaitons), checked BP, and was 88/40's. We held the losartan and norvasc, and is feeling a lot better today. Did take the losartan last night though, 12.5mg. Current regimen: ARB. Patient denies chest pain, palpitations, peripheral edema. Has hx ACEI cough in past.  Lab review:   Lab Results   Component Value Date/Time     03/17/2025 08:53 AM    K 3.4 03/17/2025 08:53 AM     03/17/2025 08:53 AM    CO2 27 03/17/2025 08:53 AM    BUN 12 03/17/2025 08:53 AM    CREATININE 0.83 03/17/2025 08:53 AM    GLUCOSE 94 03/17/2025 08:53 AM    CALCIUM 9.4 03/17/2025 08:53 AM    LABGLOM >90 03/17/2025 08:53 AM    LABGLOM >60 03/18/2024 08:21 AM    LABGLOM >60 02/15/2023 12:48 PM        Reviewed PMH, PSH, SH, Medications, allergies (see chart).  Current Outpatient Medications   Medication Sig    losartan (COZAAR) 25 MG tablet Take 1 tablet by mouth once daily    gabapentin (NEURONTIN) 300 MG capsule Take 1 capsule by mouth 2 times daily for 180 days. Max Daily Amount: 600 mg    amLODIPine (NORVASC) 10 MG tablet Take 1 tablet by mouth once daily    atorvastatin (LIPITOR) 20 MG tablet Take 1 tablet by mouth once daily    GEMTESA 75 MG TABS tablet Take 1 tablet by mouth daily    tamsulosin (FLOMAX) 0.4 MG capsule Take 1 capsule by mouth daily Indications: urination and prostate    trospium (SANCTURA) 20 MG tablet Take 1 tablet by mouth 2 times daily Indications: overactive bladder    esomeprazole (NEXIUM) 40 MG delayed release capsule Take 1 capsule by mouth every morning (before breakfast)    
    The patient has appointed the following active healthcare agents:    Primary Decision Maker: Alonso Bhatti - [facility] - [phone]

## 2025-06-18 LAB
ANION GAP SERPL CALC-SCNC: 9 MMOL/L (ref 2–12)
BASOPHILS # BLD: 0.03 K/UL (ref 0–0.1)
BASOPHILS NFR BLD: 0.3 % (ref 0–1)
BUN SERPL-MCNC: 16 MG/DL (ref 6–20)
BUN/CREAT SERPL: 15 (ref 12–20)
CALCIUM SERPL-MCNC: 9.6 MG/DL (ref 8.5–10.1)
CHLORIDE SERPL-SCNC: 102 MMOL/L (ref 97–108)
CO2 SERPL-SCNC: 28 MMOL/L (ref 21–32)
CREAT SERPL-MCNC: 1.09 MG/DL (ref 0.7–1.3)
DIFFERENTIAL METHOD BLD: NORMAL
EOSINOPHIL # BLD: 0.06 K/UL (ref 0–0.4)
EOSINOPHIL NFR BLD: 0.6 % (ref 0–7)
ERYTHROCYTE [DISTWIDTH] IN BLOOD BY AUTOMATED COUNT: 13.5 % (ref 11.5–14.5)
GLUCOSE SERPL-MCNC: 96 MG/DL (ref 65–100)
HCT VFR BLD AUTO: 48 % (ref 36.6–50.3)
HGB BLD-MCNC: 14.4 G/DL (ref 12.1–17)
IMM GRANULOCYTES # BLD AUTO: 0.04 K/UL (ref 0–0.04)
IMM GRANULOCYTES NFR BLD AUTO: 0.4 % (ref 0–0.5)
LYMPHOCYTES # BLD: 2.19 K/UL (ref 0.8–3.5)
LYMPHOCYTES NFR BLD: 20.6 % (ref 12–49)
MCH RBC QN AUTO: 27.7 PG (ref 26–34)
MCHC RBC AUTO-ENTMCNC: 30 G/DL (ref 30–36.5)
MCV RBC AUTO: 92.3 FL (ref 80–99)
MONOCYTES # BLD: 0.76 K/UL (ref 0–1)
MONOCYTES NFR BLD: 7.1 % (ref 5–13)
NEUTS SEG # BLD: 7.55 K/UL (ref 1.8–8)
NEUTS SEG NFR BLD: 71 % (ref 32–75)
NRBC # BLD: 0 K/UL (ref 0–0.01)
NRBC BLD-RTO: 0 PER 100 WBC
PLATELET # BLD AUTO: 225 K/UL (ref 150–400)
PMV BLD AUTO: 11.9 FL (ref 8.9–12.9)
POTASSIUM SERPL-SCNC: 3.3 MMOL/L (ref 3.5–5.1)
RBC # BLD AUTO: 5.2 M/UL (ref 4.1–5.7)
SODIUM SERPL-SCNC: 139 MMOL/L (ref 136–145)
WBC # BLD AUTO: 10.6 K/UL (ref 4.1–11.1)

## 2025-06-26 ENCOUNTER — RESULTS FOLLOW-UP (OUTPATIENT)
Age: 75
End: 2025-06-26

## 2025-06-29 ENCOUNTER — PATIENT MESSAGE (OUTPATIENT)
Age: 75
End: 2025-06-29

## 2025-06-29 DIAGNOSIS — S76.319A HAMSTRING STRAIN, UNSPECIFIED LATERALITY, INITIAL ENCOUNTER: Primary | ICD-10-CM

## 2025-07-02 NOTE — THERAPY EVALUATION
[facility]  [address] [place] [address]  Office [phone]  Fax [phone]       PHYSICAL THERAPY - MEDICARE EVALUATION/PLAN OF CARE NOTE (updated 3/23)      Date: 7/3/2025          Patient Name:  Sascha Bhatti Jr. :  1950   Medical   Diagnosis:  Hamstring strain, unspecified laterality, initial encounter [S76.207N] Treatment Diagnosis:  M79.652  Pain in left thigh    Referral Source:  Miguel A Betancourt MD Insurance:  Payor: HUMANA MEDICARE / Plan: [employer] CHOICE-PPO MEDICARE / Product Type: *No Product type* /             Patient  verified yes     Visit #   Current  / Total 1 16     SUBJECTIVE  Pain Level (0-10 scale): worst 7, best 4, today 6  []constant []intermittent []improving []worsening []no change since onset    Any medication changes, allergies to medications, adverse drug reactions, diagnosis change, or new procedure performed?: [x] No    [] Yes (see summary sheet for update)  Medications: Verified on Patient Summary List    Subjective functional status/changes:     2 different onset of injury. He was trying to move up to get the pickleball and it pulled the left hamstring. This was around 7 weeks ago. He tried icing but couldn't get it to improve so he stopped playing pickleball for 2 weeks and just did some walking. But he couldn't do a wide stride walk or an incline walk. So then he returned to CRAZEball and right away, he could feel the pull in the upper hamstring.  So about 2 weeks ago, he was playing and he felt a pain in the left buttock.  Sitting driving in the car, after some time, it's painful.  Some relief with self stretches    Prior history of hamstring strains. History of LBP.    Onset Date: about 7 weeks ago  Start of Care: 7/3/2025   PLOF: unlimited, pickleball twice a week  Limitations to PLOF: current pain  Mechanism of Injury: playing pickleball  PMHx/Surgical Hx: back , right thumb ,

## 2025-07-03 ENCOUNTER — HOSPITAL ENCOUNTER (OUTPATIENT)
Facility: HOSPITAL | Age: 75
Setting detail: RECURRING SERIES
Discharge: HOME | End: 2025-07-06
Payer: MEDICARE

## 2025-07-03 PROCEDURE — 97161 PT EVAL LOW COMPLEX 20 MIN: CPT

## 2025-07-03 PROCEDURE — 97110 THERAPEUTIC EXERCISES: CPT

## 2025-07-10 NOTE — PROGRESS NOTES
PHYSICAL THERAPY - MEDICARE DAILY TREATMENT NOTE (updated 3/23)      Date: 2025          Patient Name:  Sascha Bhatti Jr. :  1950   Medical   Diagnosis:  Hamstring strain, unspecified laterality, initial encounter [S76.319A] Treatment Diagnosis:  M79.652  Pain in left thigh    Referral Source:  Miguel A Betancourt MD Insurance:   Payor: HUMANA MEDICARE / Plan: [employer] CHOICE-PPO MEDICARE / Product Type: *No Product type* /                     Patient  verified yes     Visit #   Current  / Total 2 16   Time   In / Out 11:30 12:15   Total Treatment Time 45   Total Timed Codes 45   1:1 Treatment Time 45       BC Totals Reminder:  bill using total billable   min of TIMED therapeutic procedures and modalities.   8-22 min = 1 unit; 23-37 min = 2 units; 38-52 min = 3 units; 53-67 min = 4 units; 68-82 min = 5 units        SUBJECTIVE    Pain Level (0-10 scale): 0    Any medication changes, allergies to medications, adverse drug reactions, diagnosis change, or new procedure performed?: [x] No    [] Yes (see summary sheet for update)  Medications: Verified on Patient Summary List    Subjective functional status/changes:     Patient states he's having less pain in the hamstring in the middle but more in the upper part and the buttock. Sitting is painful.    OBJECTIVE      Therapeutic Procedures:  Tx Min Billable or 1:1 Min (if diff from Tx Min) Procedure, Rationale, Specifics   35 35 10029 Therapeutic Exercise (timed):  increase ROM, strength, coordination, balance, and proprioception to improve patient's ability to progress to PLOF and address remaining functional goals. (see flow sheet as applicable)     Details if applicable:    LE 90/90 glut set + PPT x20  LE 90/90 bridge 2x10  HS sets with LE 90/90 over swiss ball 2x10  Prone lying lumbar extension 3x10   Prone lying eccentric HS curls 10# 2x10 LLE  Supine march hip flexion red TB at feet, 2x10B  Fwd/bwd monster walk red TB at thighs  Standing march

## 2025-07-11 ENCOUNTER — HOSPITAL ENCOUNTER (OUTPATIENT)
Facility: HOSPITAL | Age: 75
Setting detail: RECURRING SERIES
Discharge: HOME | End: 2025-07-14
Payer: MEDICARE

## 2025-07-11 PROCEDURE — 97140 MANUAL THERAPY 1/> REGIONS: CPT

## 2025-07-11 PROCEDURE — 97110 THERAPEUTIC EXERCISES: CPT

## 2025-07-17 NOTE — PROGRESS NOTES
PHYSICAL THERAPY - MEDICARE DAILY TREATMENT NOTE (updated 3/23)      Date: 2025          Patient Name:  Sascha Bhatti Jr. :  1950   Medical   Diagnosis:  Hamstring strain, unspecified laterality, initial encounter [S76.319A] Treatment Diagnosis:  M79.652  Pain in left thigh    Referral Source:  Miguel A Betancourt MD Insurance:   Payor: HUMANA MEDICARE / Plan: [employer] CHOICE-PPO MEDICARE / Product Type: *No Product type* /                     Patient  verified yes     Visit #   Current  / Total 3 16   Time   In / Out 10:45 11:30   Total Treatment Time 45   Total Timed Codes 45   1:1 Treatment Time 45      [facility] Totals Reminder:  bill using total billable   min of TIMED therapeutic procedures and modalities.   8-22 min = 1 unit; 23-37 min = 2 units; 38-52 min = 3 units; 53-67 min = 4 units; 68-82 min = 5 units        SUBJECTIVE    Pain Level (0-10 scale): 0    Any medication changes, allergies to medications, adverse drug reactions, diagnosis change, or new procedure performed?: [x] No    [] Yes (see summary sheet for update)  Medications: Verified on Patient Summary List    Subjective functional status/changes:     Patient states he thinks the hamstring area is better but the buttock area is still there with some improvement. The last 2 days he did a fair amount of driving and he has to keep moving around.    OBJECTIVE      Therapeutic Procedures:  Tx Min Billable or 1:1 Min (if diff from Tx Min) Procedure, Rationale, Specifics   35 61 [id number] Therapeutic Exercise (timed):  increase ROM, strength, coordination, balance, and proprioception to improve patient's ability to progress to PLOF and address remaining functional goals. (see flow sheet as applicable)     Details if applicable:    SL bridges 3x6B  Supine march hip flexion red TB at feet, 2x10B  Standing unilateral march with 25# KB 2x10B  Long sitting LTR   Resisted fwd walking 60# x3, 70# x4 CGA  Resisted bwd walking 60# x6 CGA  Resisted lat

## 2025-07-18 ENCOUNTER — HOSPITAL ENCOUNTER (OUTPATIENT)
Facility: HOSPITAL | Age: 75
Setting detail: RECURRING SERIES
Discharge: HOME | End: 2025-07-21
Payer: MEDICARE

## 2025-07-18 PROCEDURE — 97110 THERAPEUTIC EXERCISES: CPT

## 2025-07-18 PROCEDURE — 97140 MANUAL THERAPY 1/> REGIONS: CPT

## 2025-07-22 ENCOUNTER — HOSPITAL ENCOUNTER (OUTPATIENT)
Facility: HOSPITAL | Age: 75
Setting detail: RECURRING SERIES
Discharge: HOME | End: 2025-07-25
Payer: MEDICARE

## 2025-07-22 PROCEDURE — 97110 THERAPEUTIC EXERCISES: CPT

## 2025-07-22 NOTE — PROGRESS NOTES
PHYSICAL THERAPY - MEDICARE DAILY TREATMENT NOTE (updated 3/23)      Date: 2025          Patient Name:  Sascha Bhatti Jr. :  1950   Medical   Diagnosis:  Hamstring strain, unspecified laterality, initial encounter [S76.319A] Treatment Diagnosis:  M79.652  Pain in left thigh    Referral Source:  Miguel A Betancourt MD Insurance:   Payor: HU[employer]NA MEDICARE / Plan: [employer] CHOICE-PPO MEDICARE / Product Type: *No Product type* /                     Patient  verified yes     Visit #   Current  / Total 4 16   Time   In / Out 1400 1445   Total Treatment Time 45   Total Timed Codes 45   1:1 Treatment Time 45       BC Totals Reminder:  bill using total billable   min of TIMED therapeutic procedures and modalities.   8-22 min = 1 unit; 23-37 min = 2 units; 38-52 min = 3 units; 53-67 min = 4 units; 68-82 min = 5 units        SUBJECTIVE    Pain Level (0-10 scale): 0    Any medication changes, allergies to medications, adverse drug reactions, diagnosis change, or new procedure performed?: [x] No    [] Yes (see summary sheet for update)  Medications: Verified on Patient Summary List    Subjective functional status/changes:     Patient states he thinks the hamstring area is better but the buttock area is still there with some improvement.   OBJECTIVE      Therapeutic Procedures:  Tx Min Billable or 1:1 Min (if diff from Tx Min) Procedure, Rationale, Specifics   45 45 [id number] Therapeutic Exercise (timed):  increase ROM, strength, coordination, balance, and proprioception to improve patient's ability to progress to PLOF and address remaining functional goals. (see flow sheet as applicable)     Details if applicable:    SL bridges 3x6B  Supine march hip flexion red TB at feet, 2x10B  Standing unilateral march with 25# KB 2x10B  Long sitting LTR  3 sets of 6\"  Resisted fwd walking 70# x6 CGA  Resisted bwd walking 70# x6 CGA  Resisted lat walking 50# x6 each CGA  Standing pigeon +FB with bench   Split stance with

## 2025-07-24 NOTE — PROGRESS NOTES
PHYSICAL THERAPY - MEDICARE DAILY TREATMENT NOTE (updated 3/23)      Date: 2025          Patient Name:  Sascha Bhatti Jr. :  1950   Medical   Diagnosis:  Hamstring strain, unspecified laterality, initial encounter [S76.319A] Treatment Diagnosis:  M79.652  Pain in left thigh    Referral Source:  Miguel A Betancourt MD Insurance:   Payor: [employer] MEDICARE / Plan: [employer] CHOICE-PPO MEDICARE / Product Type: *No Product type* /                     Patient  verified yes     Visit #   Current  / Total 5 16   Time   In / Out 9:54 10:48   Total Treatment Time 54   Total Timed Codes 54   1:1 Treatment Time 54      [facility] Totals Reminder:  bill using total billable   min of TIMED therapeutic procedures and modalities.   8-22 min = 1 unit; 23-37 min = 2 units; 38-52 min = 3 units; 53-67 min = 4 units; 68-82 min = 5 units        SUBJECTIVE    Pain Level (0-10 scale): 0    Any medication changes, allergies to medications, adverse drug reactions, diagnosis change, or new procedure performed?: [x] No    [] Yes (see summary sheet for update)  Medications: Verified on Patient Summary List    Subjective functional status/changes:     When he played pickleball on Tuesday, he didn't go full out. He felt a little bit in the buttock/upper thigh area, but not bad. He played 3 games yesterday, but still not full speed. He says it's coming along. Sitting is a little bit better, where he can sit for a little longer.     OBJECTIVE      Therapeutic Procedures:  Tx Min Billable or 1:1 Min (if diff from Tx Min) Procedure, Rationale, Specifics   54 54 [id number] Therapeutic Exercise (timed):  increase ROM, strength, coordination, balance, and proprioception to improve patient's ability to progress to PLOF and address remaining functional goals. (see flow sheet as applicable)     Details if applicable:    Walking HS sweep dynamic warmup  Standing pigeon +FB with plinth table x10B  Long sitting LTR x10  Half kneel to stand with 1

## 2025-07-25 ENCOUNTER — HOSPITAL ENCOUNTER (OUTPATIENT)
Facility: HOSPITAL | Age: 75
Setting detail: RECURRING SERIES
Discharge: HOME | End: 2025-07-28
Payer: MEDICARE

## 2025-07-25 PROCEDURE — 97110 THERAPEUTIC EXERCISES: CPT

## 2025-07-28 NOTE — PROGRESS NOTES
Note/Recertification    Short Term Goals: To be accomplished in 8 treatments  Improve LLE strength by at least 1/2 grade to improve transfers.  Improve L 90/90 HS length by at least 5 degrees for flexibility.  Long Term Goals: To be accomplished in 16 treatments  Independent with HEP  Improve LEFS score to at least 42/80 for minimal detectable change for functional improvement.  Patient will have full strength to LLE to return to playing pickleball 2x/week.      PLAN  Yes  Continue plan of care  Re-Cert Due: 10/3/25  []  Upgrade activities as tolerated  []  Discharge due to :  []  Other:      Katiuska Estrada, PT       7/28/2025       5:00 PM

## 2025-07-29 ENCOUNTER — HOSPITAL ENCOUNTER (OUTPATIENT)
Facility: HOSPITAL | Age: 75
Setting detail: RECURRING SERIES
Discharge: HOME | End: 2025-08-01
Payer: MEDICARE

## 2025-07-29 PROCEDURE — 97110 THERAPEUTIC EXERCISES: CPT

## 2025-08-01 DIAGNOSIS — I10 PRIMARY HYPERTENSION: ICD-10-CM

## 2025-08-03 RX ORDER — LOSARTAN POTASSIUM 25 MG/1
25 TABLET ORAL DAILY
Qty: 90 TABLET | Refills: 3 | Status: SHIPPED | OUTPATIENT
Start: 2025-08-03

## 2025-08-04 ENCOUNTER — HOSPITAL ENCOUNTER (OUTPATIENT)
Facility: HOSPITAL | Age: 75
Setting detail: RECURRING SERIES
Discharge: HOME | End: 2025-08-07
Payer: MEDICARE

## 2025-08-04 PROCEDURE — 97110 THERAPEUTIC EXERCISES: CPT

## 2025-08-06 ENCOUNTER — HOSPITAL ENCOUNTER (OUTPATIENT)
Facility: HOSPITAL | Age: 75
Setting detail: RECURRING SERIES
Discharge: HOME | End: 2025-08-09
Payer: MEDICARE

## 2025-08-06 PROCEDURE — 97110 THERAPEUTIC EXERCISES: CPT

## 2025-08-12 ENCOUNTER — HOSPITAL ENCOUNTER (OUTPATIENT)
Facility: HOSPITAL | Age: 75
Setting detail: RECURRING SERIES
Discharge: HOME | End: 2025-08-15
Payer: MEDICARE

## 2025-08-12 PROCEDURE — 97530 THERAPEUTIC ACTIVITIES: CPT

## 2025-08-12 PROCEDURE — 97110 THERAPEUTIC EXERCISES: CPT

## 2025-08-23 DIAGNOSIS — R35.1 NOCTURIA: ICD-10-CM

## 2025-08-23 DIAGNOSIS — N40.0 BENIGN PROSTATIC HYPERPLASIA WITHOUT LOWER URINARY TRACT SYMPTOMS: ICD-10-CM

## 2025-08-25 ENCOUNTER — OFFICE VISIT (OUTPATIENT)
Age: 75
End: 2025-08-25
Payer: MEDICARE

## 2025-08-25 VITALS
DIASTOLIC BLOOD PRESSURE: 60 MMHG | BODY MASS INDEX: 25.94 KG/M2 | WEIGHT: 181.2 LBS | OXYGEN SATURATION: 99 % | HEART RATE: 64 BPM | SYSTOLIC BLOOD PRESSURE: 144 MMHG | RESPIRATION RATE: 16 BRPM | TEMPERATURE: 97.3 F | HEIGHT: 70 IN

## 2025-08-25 DIAGNOSIS — I10 ESSENTIAL HYPERTENSION: ICD-10-CM

## 2025-08-25 DIAGNOSIS — R19.7 DIARRHEA, UNSPECIFIED TYPE: Primary | ICD-10-CM

## 2025-08-25 DIAGNOSIS — R19.7 DIARRHEA, UNSPECIFIED TYPE: ICD-10-CM

## 2025-08-25 DIAGNOSIS — R10.2 ABDOMINAL PAIN, SUPRAPUBIC: ICD-10-CM

## 2025-08-25 LAB
ALBUMIN SERPL-MCNC: 3.9 G/DL (ref 3.5–5.2)
ALBUMIN/GLOB SERPL: 1.4 (ref 1.1–2.2)
ALP SERPL-CCNC: 50 U/L (ref 40–129)
ALT SERPL-CCNC: 33 U/L (ref 10–50)
ANION GAP SERPL CALC-SCNC: 12 MMOL/L (ref 2–14)
AST SERPL-CCNC: 26 U/L (ref 10–50)
BASOPHILS # BLD: 0.02 K/UL (ref 0–0.1)
BASOPHILS NFR BLD: 0.2 % (ref 0–1)
BILIRUB SERPL-MCNC: 1.8 MG/DL (ref 0–1.2)
BUN SERPL-MCNC: 14 MG/DL (ref 8–23)
BUN/CREAT SERPL: 14 (ref 12–20)
CALCIUM SERPL-MCNC: 9.5 MG/DL (ref 8.8–10.2)
CHLORIDE SERPL-SCNC: 101 MMOL/L (ref 98–107)
CO2 SERPL-SCNC: 28 MMOL/L (ref 20–29)
CREAT SERPL-MCNC: 1.05 MG/DL (ref 0.7–1.2)
DIFFERENTIAL METHOD BLD: ABNORMAL
EOSINOPHIL # BLD: 0.08 K/UL (ref 0–0.4)
EOSINOPHIL NFR BLD: 1 % (ref 0–7)
ERYTHROCYTE [DISTWIDTH] IN BLOOD BY AUTOMATED COUNT: 13.5 % (ref 11.5–14.5)
GLOBULIN SER CALC-MCNC: 2.7 G/DL (ref 2–4)
GLUCOSE SERPL-MCNC: 74 MG/DL (ref 65–100)
HCT VFR BLD AUTO: 48.2 % (ref 36.6–50.3)
HGB BLD-MCNC: 14.8 G/DL (ref 12.1–17)
IMM GRANULOCYTES # BLD AUTO: 0.11 K/UL (ref 0–0.04)
IMM GRANULOCYTES NFR BLD AUTO: 1.3 % (ref 0–0.5)
LIPASE SERPL-CCNC: 38 U/L (ref 13–60)
LYMPHOCYTES # BLD: 1.89 K/UL (ref 0.8–3.5)
LYMPHOCYTES NFR BLD: 23 % (ref 12–49)
MCH RBC QN AUTO: 27.8 PG (ref 26–34)
MCHC RBC AUTO-ENTMCNC: 30.7 G/DL (ref 30–36.5)
MCV RBC AUTO: 90.6 FL (ref 80–99)
MONOCYTES # BLD: 0.73 K/UL (ref 0–1)
MONOCYTES NFR BLD: 8.9 % (ref 5–13)
NEUTS SEG # BLD: 5.39 K/UL (ref 1.8–8)
NEUTS SEG NFR BLD: 65.6 % (ref 32–75)
NRBC # BLD: 0 K/UL (ref 0–0.01)
NRBC BLD-RTO: 0 PER 100 WBC
PLATELET # BLD AUTO: 195 K/UL (ref 150–400)
PMV BLD AUTO: 12.4 FL (ref 8.9–12.9)
POTASSIUM SERPL-SCNC: 3.5 MMOL/L (ref 3.5–5.1)
PROT SERPL-MCNC: 6.5 G/DL (ref 6.4–8.3)
RBC # BLD AUTO: 5.32 M/UL (ref 4.1–5.7)
SODIUM SERPL-SCNC: 142 MMOL/L (ref 136–145)
WBC # BLD AUTO: 8.2 K/UL (ref 4.1–11.1)

## 2025-08-25 PROCEDURE — 1126F AMNT PAIN NOTED NONE PRSNT: CPT | Performed by: FAMILY MEDICINE

## 2025-08-25 PROCEDURE — G8419 CALC BMI OUT NRM PARAM NOF/U: HCPCS | Performed by: FAMILY MEDICINE

## 2025-08-25 PROCEDURE — G8427 DOCREV CUR MEDS BY ELIG CLIN: HCPCS | Performed by: FAMILY MEDICINE

## 2025-08-25 PROCEDURE — 3078F DIAST BP <80 MM HG: CPT | Performed by: FAMILY MEDICINE

## 2025-08-25 PROCEDURE — 3077F SYST BP >= 140 MM HG: CPT | Performed by: FAMILY MEDICINE

## 2025-08-25 PROCEDURE — 1123F ACP DISCUSS/DSCN MKR DOCD: CPT | Performed by: FAMILY MEDICINE

## 2025-08-25 PROCEDURE — 1036F TOBACCO NON-USER: CPT | Performed by: FAMILY MEDICINE

## 2025-08-25 PROCEDURE — 99213 OFFICE O/P EST LOW 20 MIN: CPT | Performed by: FAMILY MEDICINE

## 2025-08-25 PROCEDURE — 1160F RVW MEDS BY RX/DR IN RCRD: CPT | Performed by: FAMILY MEDICINE

## 2025-08-25 PROCEDURE — 1159F MED LIST DOCD IN RCRD: CPT | Performed by: FAMILY MEDICINE

## 2025-08-25 ASSESSMENT — PATIENT HEALTH QUESTIONNAIRE - PHQ9
SUM OF ALL RESPONSES TO PHQ QUESTIONS 1-9: 0
1. LITTLE INTEREST OR PLEASURE IN DOING THINGS: NOT AT ALL
2. FEELING DOWN, DEPRESSED OR HOPELESS: NOT AT ALL
SUM OF ALL RESPONSES TO PHQ QUESTIONS 1-9: 0

## 2025-08-26 ENCOUNTER — RESULTS FOLLOW-UP (OUTPATIENT)
Age: 75
End: 2025-08-26

## 2025-08-26 RX ORDER — TAMSULOSIN HYDROCHLORIDE 0.4 MG/1
CAPSULE ORAL
Qty: 90 CAPSULE | Refills: 3 | Status: SHIPPED | OUTPATIENT
Start: 2025-08-26

## (undated) DEVICE — Device

## (undated) DEVICE — CONTINU-FLO SOLUTION SET, 2 INJECTION SITES, MALE LUER LOCK ADAPTER WITH RETRACTABLE COLLAR, LARGE BORE STOPCOCK WITH ROTATING MALE LUER LOCK EXTENSION SET, 2 INJECTION SITES, MALE LUER LOCK ADAPTER WITH RETRACTABLE COLLAR: Brand: INTERLINK/CONTINU-FLO

## (undated) DEVICE — HAND-MRMCASU: Brand: MEDLINE INDUSTRIES, INC.

## (undated) DEVICE — TOWEL OR BL STR 4/PK -- MEDICHOICE - MEDLINE

## (undated) DEVICE — Device: Brand: JELCO

## (undated) DEVICE — ZIMMER® STERILE DISPOSABLE TOURNIQUET CUFF WITH PLC, DUAL PORT, SINGLE BLADDER, 18 IN. (46 CM)

## (undated) DEVICE — GLOVE ORANGE PI 8   MSG9080

## (undated) DEVICE — GLOVE SURG SZ 7 L12IN FNGR THK79MIL GRN LTX FREE

## (undated) DEVICE — BIPOLAR FORCEPS CORD: Brand: VALLEYLAB

## (undated) DEVICE — CORD ES L12FT BPLR FRCP

## (undated) DEVICE — SOLUTION IRRIG 1000ML 0.9% SOD CHL USP POUR PLAS BTL

## (undated) DEVICE — STERILE POLYISOPRENE POWDER-FREE SURGICAL GLOVES: Brand: PROTEXIS

## (undated) DEVICE — GLOVE SURG SZ 65 THK91MIL LTX FREE SYN POLYISOPRENE

## (undated) DEVICE — DISPOSABLE TOURNIQUET CUFF SINGLE BLADDER, DUAL PORT AND QUICK CONNECT CONNECTOR: Brand: COLOR CUFF

## (undated) DEVICE — SYR 10ML LUER LOK 1/5ML GRAD --

## (undated) DEVICE — MARKER,SKIN,WI/RULER AND LABELS: Brand: MEDLINE

## (undated) DEVICE — NEEDLE HYPO 18GA L1.5IN PNK S STL HUB POLYPR SHLD REG BVL

## (undated) DEVICE — ENDO CARRY-ON PROCEDURE KIT INCLUDES ENZYMATIC SPONGE, GAUZE, BIOHAZARD LABEL, TRAY, LUBRICANT, DIRTY SCOPE LABEL, WATER LABEL, TRAY, DRAWSTRING PAD, AND DEFENDO 4-PIECE KIT.: Brand: ENDO CARRY-ON PROCEDURE KIT

## (undated) DEVICE — GLOVE ORANGE PI 7   MSG9070

## (undated) DEVICE — KENDALL DL ECG CABLE AND LEAD WIRE SYSTEM, 3-LEAD, SINGLE PATIENT USE: Brand: KENDALL

## (undated) DEVICE — INSTA-GARD PROCEDURE MASK, YELLOW: Brand: CONVERTORS

## (undated) DEVICE — 3M™ COBAN™ NL STERILE NON-LATEX SELF-ADHERENT WRAP, 2084S, 4 IN X 5 YD (10 CM X 4,5 M), 18 ROLLS/CASE: Brand: 3M™ COBAN™

## (undated) DEVICE — BASIN EMSIS 16OZ GRAPHITE PLAS KID SHP MOLD GRAD FOR ORAL

## (undated) DEVICE — SYSTEM EKG CBL L144IN 2 CONN 5 LD

## (undated) DEVICE — PREP SKN CHLRAPRP APL 26ML STR --

## (undated) DEVICE — ADULT SPO2 SENSOR: Brand: NELLCOR

## (undated) DEVICE — (D)SYR 10ML 1/5ML GRAD NSAF -- PKGING CHANGE USE ITEM 338027

## (undated) DEVICE — HAND I-LF: Brand: MEDLINE INDUSTRIES, INC.

## (undated) DEVICE — SOL TOP ALC ISO 70% 4OZ --

## (undated) DEVICE — GOWN,SIRUS,NONRNF,SETINSLV,XL,20/CS: Brand: MEDLINE

## (undated) DEVICE — SET 2ND L34IN N DEHP THE QUEENS MED CNTR VALUELINK

## (undated) DEVICE — SOLUTION IV 1000ML 0.9% SOD CHL

## (undated) DEVICE — KENDALL RADIOLUCENT FOAM MONITORING ELECTRODE RECTANGULAR SHAPE: Brand: KENDALL

## (undated) DEVICE — SUT ETHLN 3-0 18IN PS2 BLK --

## (undated) DEVICE — STERILE POLYISOPRENE POWDER-FREE SURGICAL GLOVES WITH EMOLLIENT COATING: Brand: PROTEXIS

## (undated) DEVICE — INTENDED FOR TISSUE SEPARATION, AND OTHER PROCEDURES THAT REQUIRE A SHARP SURGICAL BLADE TO PUNCTURE OR CUT.: Brand: BARD-PARKER ® CARBON RIB-BACK BLADES

## (undated) DEVICE — COVER LT HNDL PLAS RIG 1 PER PK

## (undated) DEVICE — POLYLINED TOWEL: Brand: CONVERTORS

## (undated) DEVICE — 3M™ TEGADERM™ TRANSPARENT FILM DRESSING FRAME STYLE, 1624W, 2-3/8 IN X 2-3/4 IN (6 CM X 7 CM), 100/CT 4CT/CASE: Brand: 3M™ TEGADERM™

## (undated) DEVICE — WRAP COHESIVE W2INXL5YD TAN SELF ADH BNDG HND NON STERILE TEAR CARING

## (undated) DEVICE — SOLUTION LACTATED RINGERS INJECTION USP

## (undated) DEVICE — TOWEL SURG W17XL27IN STD BLU COT NONFENESTRATED PREWASHED

## (undated) DEVICE — TRAY PREP DRY W/ PREM GLV 2 APPL 6 SPNG 2 UNDPD 1 OVERWRAP

## (undated) DEVICE — APPLICATOR MEDICATED 26 CC SOLUTION HI LT ORNG CHLORAPREP

## (undated) DEVICE — NEEDLE HYPO 25GA L1.5IN BVL ORIENTED ECLIPSE

## (undated) DEVICE — CATHETER IV 20GA L1.25IN FEP STR HUB TEF INTROCAN SFTY

## (undated) DEVICE — INFECTION CONTROL KIT SYS

## (undated) DEVICE — LIGHT HANDLE: Brand: DEVON

## (undated) DEVICE — FORCEPS BX L240CM JAW DIA2.8MM L CAP W/ NDL MIC MESH TOOTH

## (undated) DEVICE — SUTURE ETHLN SZ 4-0 L18IN NONABSORBABLE BLK L19MM PS-2 3/8 1667H

## (undated) DEVICE — NEEDLE HYPO 22GA L1.5IN BLK S STL HUB POLYPR SHLD REG BVL

## (undated) DEVICE — BAG SPEC BIOHZRD 10 X 10 IN --

## (undated) DEVICE — SYR 5ML 1/5 GRAD LL NSAF LF --

## (undated) DEVICE — TOWEL,OR,DSP,ST,BLUE,STD,4/PK,20PK/CS: Brand: MEDLINE

## (undated) DEVICE — SET EXTN PRIMING 0.59ML 8.5IN 1.55LB PRSS RATE MINIBOR PUR

## (undated) DEVICE — BNDG ELAS HK LOOP 2X5YD NS -- MATRIX

## (undated) DEVICE — 1200 GUARD II KIT W/5MM TUBE W/O VAC TUBE: Brand: GUARDIAN

## (undated) DEVICE — SYRINGE MED 5ML STD CLR PLAS LUERLOCK TIP N CTRL DISP

## (undated) DEVICE — CANNULA CUSH AD W/ 14FT TBG

## (undated) DEVICE — TUBING HYDR IRR --

## (undated) DEVICE — CANNULA INJ L2.5IN BLNT TIP 3MM CLR BODY W/ 1 W VLV DLP

## (undated) DEVICE — OCCLUSIVE GAUZE STRIP,3% BISMUTH TRIBROMOPHENATE IN PETROLATUM BLEND: Brand: XEROFORM

## (undated) DEVICE — SET ADMIN  IV TUBE 155IN Y-CON -- MAXGUARD

## (undated) DEVICE — TOWEL 4 PLY TISS 19X30 SUE WHT

## (undated) DEVICE — BLANKET WRM AD PREM MISTRAL-AIR

## (undated) DEVICE — HYPODERMIC SAFETY NEEDLE: Brand: MONOJECT

## (undated) DEVICE — FORCEPS BX L160CM DIA8MM GRSP DISECT CUP TIP NONLOCKING ROT

## (undated) DEVICE — NEEDLE SPNL L4.75IN OD25GA QNCKE TYP SPINOCAN

## (undated) DEVICE — SOL INJ L R 1000ML BG --

## (undated) DEVICE — SYRINGE MED 10ML LUERLOCK TIP W/O SFTY DISP

## (undated) DEVICE — SOLIDIFIER MEDC 1200ML -- CONVERT TO 356117

## (undated) DEVICE — THE PARA-PAK SYSTEMS PROVIDE STANDARDIZED PROCEDURES FOR THE ROUTINE COLLECTION, TRANSPORTATION, PRESERVATION AND EXAMINATION OF STOOL SPECIMENS FOR INTESTINAL PARASITES. KIT SYSTEMS ARE DESIGNED FOR EASY USE BY INDIVIDUALS NOT TRAINED IN MICROBIOLOGICAL PROCEDURES AND AFFORD AN EXCELLENT MEANS OF MINIMIZING THE ADVERSE EFFECTS OF DELAY IN SPECIMEN TRANSPORT.: Brand: PARA-PAK LV-PVA / 10% FORMALIN / CLEAN

## (undated) DEVICE — SYRINGE,EAR/ULCER, 2 OZ, STERILE: Brand: MEDLINE

## (undated) DEVICE — CATHETER ETER IV 20GA L125IN POLYUR STR RADPQ INTROCAN SFTY

## (undated) DEVICE — (D)PREP SKN SCRB EXDINE 4OZ -- DUPE USE ITEM 324303

## (undated) DEVICE — BLADE KNF CRPL TUNN MINI DISP --

## (undated) DEVICE — KIT INFECTION CTRL ST FRAN --

## (undated) DEVICE — SUTURE ETHILON SZ 3-0 L18IN NONABSORBABLE BLK PS-2 L19MM 3/8 1669H